# Patient Record
Sex: FEMALE | Race: BLACK OR AFRICAN AMERICAN | NOT HISPANIC OR LATINO | Employment: OTHER | URBAN - METROPOLITAN AREA
[De-identification: names, ages, dates, MRNs, and addresses within clinical notes are randomized per-mention and may not be internally consistent; named-entity substitution may affect disease eponyms.]

---

## 2018-09-25 DIAGNOSIS — N63.11 BREAST LUMP ON RIGHT SIDE AT 10 O'CLOCK POSITION: Primary | ICD-10-CM

## 2018-09-28 ENCOUNTER — HOSPITAL ENCOUNTER (OUTPATIENT)
Dept: RADIOLOGY | Facility: HOSPITAL | Age: 68
Discharge: HOME | End: 2018-09-28
Attending: NURSE PRACTITIONER
Payer: MEDICARE

## 2018-09-28 DIAGNOSIS — N63.11 BREAST LUMP ON RIGHT SIDE AT 10 O'CLOCK POSITION: ICD-10-CM

## 2018-09-28 PROCEDURE — 77062 BREAST TOMOSYNTHESIS BI: CPT | Mod: 26,,, | Performed by: RADIOLOGY

## 2018-09-28 PROCEDURE — 77066 DX MAMMO INCL CAD BI: CPT | Mod: 26,,, | Performed by: RADIOLOGY

## 2018-09-28 PROCEDURE — 76642 ULTRASOUND BREAST LIMITED: CPT | Mod: 26,50,, | Performed by: RADIOLOGY

## 2018-09-28 PROCEDURE — 77066 DX MAMMO INCL CAD BI: CPT | Mod: TC,PO

## 2018-09-28 PROCEDURE — 76642 ULTRASOUND BREAST LIMITED: CPT | Mod: TC,50,PO

## 2018-10-11 ENCOUNTER — HOSPITAL ENCOUNTER (OUTPATIENT)
Dept: RADIOLOGY | Facility: HOSPITAL | Age: 68
Discharge: HOME | End: 2018-10-11
Attending: NURSE PRACTITIONER
Payer: MEDICARE

## 2018-10-11 DIAGNOSIS — R92.8 ABNORMAL MAMMOGRAM: ICD-10-CM

## 2018-10-11 PROCEDURE — 88360 TUMOR IMMUNOHISTOCHEM/MANUAL: CPT | Mod: 26,,, | Performed by: PATHOLOGY

## 2018-10-11 PROCEDURE — 19084 BX BREAST ADD LESION US IMAG: CPT | Mod: RT,,, | Performed by: RADIOLOGY

## 2018-10-11 PROCEDURE — 19083 BX BREAST 1ST LESION US IMAG: CPT | Mod: RT,,, | Performed by: RADIOLOGY

## 2018-10-11 PROCEDURE — 77065 DX MAMMO INCL CAD UNI: CPT | Mod: 26,RT,, | Performed by: RADIOLOGY

## 2018-10-11 PROCEDURE — 27201068 US BREAST BIOPSY WITH IMAGING 1ST SITE RIGHT: Mod: PO

## 2018-10-11 PROCEDURE — 88342 IMHCHEM/IMCYTCHM 1ST ANTB: CPT | Mod: 26,59,, | Performed by: PATHOLOGY

## 2018-10-11 PROCEDURE — 88305 TISSUE EXAM BY PATHOLOGIST: CPT | Mod: 26,,, | Performed by: PATHOLOGY

## 2018-10-11 PROCEDURE — 77065 DX MAMMO INCL CAD UNI: CPT | Mod: TC,PO,RT

## 2018-10-11 PROCEDURE — 19084 BX BREAST ADD LESION US IMAG: CPT | Mod: PO,RT

## 2018-10-11 PROCEDURE — 88360 TUMOR IMMUNOHISTOCHEM/MANUAL: CPT | Mod: 59 | Performed by: PATHOLOGY

## 2018-10-15 ENCOUNTER — TELEPHONE (OUTPATIENT)
Dept: SURGERY | Facility: CLINIC | Age: 68
End: 2018-10-15

## 2018-10-15 NOTE — TELEPHONE ENCOUNTER
Called patient to go over biopsy results. Explained to patient that biopsy showed invasive mammary carcinoma. We discussed what this means, and that the next step is to meet with a surgeon to discuss treatment options. Patient would like a female surgeon as soon as possible. Scheduled with DR. Frederick for Thursday afternoon. Reviewed the location of the breast center, patient verbalized understanding of all information

## 2018-10-19 ENCOUNTER — OFFICE VISIT (OUTPATIENT)
Dept: SURGERY | Facility: CLINIC | Age: 68
End: 2018-10-19
Payer: MEDICARE

## 2018-10-19 ENCOUNTER — DOCUMENTATION ONLY (OUTPATIENT)
Dept: SURGERY | Facility: CLINIC | Age: 68
End: 2018-10-19

## 2018-10-19 VITALS
HEART RATE: 88 BPM | TEMPERATURE: 98 F | HEIGHT: 67 IN | DIASTOLIC BLOOD PRESSURE: 67 MMHG | BODY MASS INDEX: 23.18 KG/M2 | SYSTOLIC BLOOD PRESSURE: 147 MMHG | WEIGHT: 147.69 LBS

## 2018-10-19 DIAGNOSIS — C50.411 MALIGNANT NEOPLASM OF UPPER-OUTER QUADRANT OF RIGHT BREAST IN FEMALE, ESTROGEN RECEPTOR POSITIVE: Primary | ICD-10-CM

## 2018-10-19 DIAGNOSIS — Z01.818 PRE-OP TESTING: ICD-10-CM

## 2018-10-19 DIAGNOSIS — Z17.0 MALIGNANT NEOPLASM OF UPPER-OUTER QUADRANT OF RIGHT BREAST IN FEMALE, ESTROGEN RECEPTOR POSITIVE: Primary | ICD-10-CM

## 2018-10-19 DIAGNOSIS — E04.9 THYROID GOITER: ICD-10-CM

## 2018-10-19 PROCEDURE — 99205 OFFICE O/P NEW HI 60 MIN: CPT | Mod: S$PBB,,, | Performed by: SURGERY

## 2018-10-19 PROCEDURE — 99215 OFFICE O/P EST HI 40 MIN: CPT | Mod: PBBFAC,PO | Performed by: SURGERY

## 2018-10-19 PROCEDURE — 99999 PR PBB SHADOW E&M-EST. PATIENT-LVL V: CPT | Mod: PBBFAC,,, | Performed by: SURGERY

## 2018-10-19 PROCEDURE — 1101F PT FALLS ASSESS-DOCD LE1/YR: CPT | Mod: CPTII,,, | Performed by: SURGERY

## 2018-10-19 RX ORDER — BLOOD SUGAR DIAGNOSTIC
STRIP MISCELLANEOUS
COMMUNITY
Start: 2018-09-05 | End: 2022-04-28

## 2018-10-19 RX ORDER — IBUPROFEN 200 MG
TABLET ORAL
Refills: 0 | COMMUNITY
Start: 2018-08-03 | End: 2023-04-10

## 2018-10-19 RX ORDER — METFORMIN HYDROCHLORIDE 500 MG/1
TABLET ORAL
COMMUNITY
Start: 2018-09-20 | End: 2020-04-20 | Stop reason: SDUPTHER

## 2018-10-19 RX ORDER — LEVOTHYROXINE SODIUM 75 UG/1
75 TABLET ORAL
COMMUNITY
Start: 2018-09-20 | End: 2019-05-23

## 2018-10-19 RX ORDER — LOSARTAN POTASSIUM 50 MG/1
50 TABLET ORAL DAILY
COMMUNITY
Start: 2018-09-20 | End: 2019-05-22 | Stop reason: SDUPTHER

## 2018-10-19 NOTE — PROGRESS NOTES
Breast Surgery  [facility]  Department of Surgery      REFERRING PROVIDER:  Nila Freitas NP  [address] [place] [address]    Chief Complaint: Consult (New Patient New Cancer Right Breast )      Subjective:      Patient ID: Alia Andre is a 68 y.o. female who presents with newly diagnosed RIGHT breast cancer. She reports that she felt the mass on SBE approx 1 month ago. Bilateral diagnostic mammogram and ultrasound (18) was BI-RADS 4 showing 19 x 14 x 14 mm irregularly shaped, hypoechoic mass with indistinct margins seen in the RIGHT breast at [id number], a lymph node in the RIGHT axilla with thickened cortex of 3 mm, and a LEFT breast 9 mm oval simple cyst at [id number]. An ultrasound guided biopsy was performed on 10/11/18 with pathology revealing grade 3 ER+ (10-20%, weak) SD- Her2- [id number]% invasive mammary carcinoma with medullary features of the RIGHT breast with CNBx of lymph node showing no evidence of carcinoma.    Patient does routinely do self breast exams. Patient has noted a change on breast exam. Patient denies nipple discharge. Patient denies previous breast biopsy (prior to current events). Patient denies a personal history of breast cancer (prior to current events).    Findings at that time were the following:   Tumor size: 1.9 cm (on imaging)   Tumor thgthrthathdtheth:th th4th Estrogen Receptor: + (10-20%, weak)  Progesterone Receptor: -  Her-2 cristhian: -   Lymph node status: Clinically negative   Lymphatic invasion: N/A       Risk Factors/Breast History  Age of Menarche: 20  Menstrual History: Regular, monthly, no menorrhagia  Age of Menopause: Uncertain (hot flashes at 29, cycles until late 30s)  History of Hormonal Therapy: No OCPs or HRT  Number of Pregnancies: 2 ()  Age of First Birth: 24  Lactational History: No  Mammogram History: One at approx age 42 prior to this occurrence  History of Chest Radiation: No  History of Breast Bx: Yes (current events only)  History  "of Breast Ca: Yes (current events only)  Family History of Breast Ca: Paternal first cousin (age late 50s), maternal first cousin (age late 50s), paternal aunt (age early 50s), possible sister (age 60s) - states "they didn't do any surgery; they're just watching it every year"  Family History of Ovarian Ca: No      Current everyday smoker. 1 PPD x 40 years.  Had BTL. No hysterectomy or oophorectomy.    Past Medical History:   Diagnosis Date    Diabetes mellitus type 2 in nonobese     Goiter     Hypertension     Tobacco abuse       No past surgical history on file.     Current Outpatient Medications on File Prior to Visit   Medication Sig Dispense Refill    ACCU-CHEK USHA PLUS TEST STRP Strp       levothyroxine (SYNTHROID) 75 MCG tablet       losartan (COZAAR) 50 MG tablet       metFORMIN (GLUCOPHAGE) 500 MG tablet       nicotine (NICODERM CQ) 21 mg/24 hr APPLY 1 PATCH TRANSDERMALLY EVERY 24 HOURS  0     No current facility-administered medications on file prior to visit.      Social History     Socioeconomic History    Marital status:      Spouse name: Not on file    Number of children: Not on file    Years of education: Not on file    Highest education level: Not on file   Social Needs    Financial resource strain: Not on file    Food insecurity - worry: Not on file    Food insecurity - inability: Not on file    Transportation needs - medical: Not on file    Transportation needs - non-medical: Not on file   Occupational History    Not on file   Tobacco Use    Smoking status: Not on file   Substance and Sexual Activity    Alcohol use: Not on file    Drug use: Not on file    Sexual activity: Not on file   Other Topics Concern    Not on file   Social History Narrative    Not on file     No family history on file.     Review of Systems   Constitutional: Negative for activity change, chills, fatigue and fever.   HENT: Negative for congestion, rhinorrhea, sore throat, trouble swallowing " "and voice change.    Eyes: Negative for visual disturbance.   Respiratory: Negative for cough, shortness of breath and wheezing.    Cardiovascular: Negative for chest pain, palpitations and leg swelling.   Gastrointestinal: Negative for abdominal distention, abdominal pain, constipation, diarrhea, nausea and vomiting.   Genitourinary: Negative for dysuria, frequency and hematuria.   Musculoskeletal: Negative for arthralgias and myalgias.   Skin: Negative for color change, rash and wound.   Neurological: Negative for syncope, weakness and numbness.   Hematological: Does not bruise/bleed easily.   Psychiatric/Behavioral: Negative for behavioral problems and confusion.        Objective:   BP (!) 147/67 (BP Location: Left arm, Patient Position: Sitting, BP Method: Medium (Automatic))   Pulse 88   Temp 98.2 °F (36.8 °C) (Oral)   Ht 5' 7" (1.702 m)   Wt 67 kg (147 lb 11.2 oz)   BMI 23.13 kg/m²     Physical Exam   Nursing note and vitals reviewed.  Constitutional: She is oriented to person, place, and time. She appears well-developed and well-nourished. No distress.   HENT:   Head: Normocephalic and atraumatic.   Eyes: Conjunctivae and EOM are normal.   Neck: Neck supple. Thyromegaly (Large symmetric goiter) present.   Cardiovascular: Normal rate, regular rhythm and intact distal pulses.    Pulmonary/Chest: Effort normal. No respiratory distress. She has no wheezes. Right breast exhibits mass. Right breast exhibits no inverted nipple, no nipple discharge, no skin change and no tenderness. Left breast exhibits no inverted nipple, no mass, no nipple discharge, no skin change and no tenderness. Breasts are symmetrical. There is no breast swelling.       Abdominal: Soft. She exhibits no distension. There is no tenderness.   Genitourinary: No breast bleeding.   Musculoskeletal: Normal range of motion. She exhibits no edema or deformity.   Neurological: She is alert and oriented to person, place, and time.   Skin: Skin is " warm and dry. No rash noted. She is not diaphoretic. No erythema.     Psychiatric: She has a normal mood and affect. Her behavior is normal.       Radiology review: Images personally reviewed by me in the clinic.     Bilateral Diagnostic Mammogram (9/28/18): BI-RADS 4. The breasts are almost entirely fatty. There is a low density, oval mass with circumscribed margins seen in the upper outer quadrant of the LEFT breast in the posterior depth. There is a high density, irregularly shaped mass seen in the upper outer quadrant of the RIGHT breast in the middle depth. The mass correlates with a palpable mass reported by the patient.    US Breast Bilateral Limited (9/28/18): BI-RADS 4. There is a 9 mm oval simple cyst seen in the LEFT breast at 1 o'clock, 6 cm from the nipple. There is a lymph node seen in the right axilla with thickened cortex of 3 mm. There is a 19 mm x 14 mm x 14 mm irregularly shaped, hypoechoic mass with indistinct margins seen in the right breast at 11 o'clock, 6 cm from the nipple. The mass correlates with a palpable mass reported by the patient and mammographic finding.       Assessment:       1. Malignant neoplasm of upper-outer quadrant of right breast in female, estrogen receptor positive        Plan:     Options for management were discussed with the patient and her family. We reviewed the existing data noting the equivalency of breast conserving surgery with radiation therapy and mastectomy. We also reviewed the guidelines of the National Comprehensive Cancer Network for Stage 1 breast carcinoma. We discussed the need for lumpectomy margins to be negative for carcinoma, the necessity for postoperative radiation therapy after breast conservation in most cases, the possibility of a failed or false negative sentinel lymph node biopsy and the potential need for complete lymphadenectomy for a failed or positive sentinel lymph node biopsy were fully discussed. In the setting of mastectomy, delayed  or immediate reconstruction options are available and were discussed.     In the setting of lumpectomy, radiation therapy would be recommended majority of the time. The duration and treatment side effects were discussed with the patient. This will coordinated with the radiation oncologist pending final pathology.    We also discussed the role of systemic therapy in the treatment of early stage breast cancer. We discussed that this is based on tumor biology and lina status and will be determined based on final pathology. We discussed that if the cancer is hormone positive, endocrine therapy would be recommended in most cases and its use can reduce the risk of recurrence as well as improve survival. Side effects of treatment were briefly discussed. We also discussed the potential role for chemotherapy based on a number of factors such as tumor phenotype (ER+ vs. triple negative vs. Qzx9div+) and this would be determined in coordination with the medical oncologist.    The patient, in consultation with her family, has elected to proceed with right total mastectomy and sentinel lymph node biopsy without reconstruction. The operative risks of bleeding, infection, recurrence, scarring, and anesthetic complications and the possibility of requiring further surgery were all noted.  We will have her see Dr. Cameron to discuss her thyroid goiter and Port-A-Cath placement.  Will do genetic testing as well  Patient will also see medical oncology preop, though I do think upfront surgery is the best approach.    Surgery scheduled. Follow-up in clinic roughly 10 days after surgery.     Patient was educated on breast cancer, receptors, wire localization lumpectomy, mastectomy, sentinel lymph node mapping and biopsy, axillary lymph node dissection, reconstruction, breast prosthesis with post-mastectomy bra and radiation therapy. Patient was given patient information binder [facility]. All her  questions were answered.    Total time spent with the patient: 60 minutes. 45 minutes of face to face consultation and 15 minutes of chart review and coordination of care.

## 2018-10-19 NOTE — LETTER
October 19, 2018      Nila Freitas, TOMÁS  [address]  [facility] [address]           [facility]  [address]  [facility] [address]  Phone: [phone]  Fax: [phone]          Patient: Alia Andre   MR Number: [id number]   YOB: 1950   Date of Visit: 10/19/2018       Dear Nila Freitas:    Thank you for referring Alia Andre to me for evaluation. Attached you will find relevant portions of my assessment and plan of care.    If you have questions, please do not hesitate to call me. I look forward to following Alia Andre along with you.    Sincerely,    Cristiane Frederick MD    Enclosure  CC:  No Recipients    If you would like to receive this communication electronically, please contact [email or web address] or [phone] to request more information on [facility] Link access.    For providers and/or their staff who would like to refer a patient to [facility], please contact us through our one-stop-shop provider referral line, [facility], at [phone].    If you feel you have received this communication in error or would no longer like to receive these types of communications, please e-mail [email or web address]

## 2018-10-19 NOTE — PROGRESS NOTES
Nurse Navigator Note:     Met with patient during her consult with Dr. Frederick. Patient and I reviewed the information she discussed with Dr. Frederick, including treatment options, diagnosis, and future plans for workup. Patient and I went through the new patient binder, explained some of the information and why it is provided.     Also offered patient consults with our other specialty clinics: Dr. Reeves for gynecological health during treatment, Carolyn Davis for physical therapy evaluation, Dr. Golden for psychological support, and Marian Pollard for nutritional counseling. Explained to patient that all of these support services are completely optional. Discussed that physical therapy is the only service that is recommended pre-op specifically, everything else can be requested at a later time. Patient was given a copy of Dr. Frederick's card and my card. Encouraged her to call me if she has any questions or concerns or would like to schedule any additional appointments. Verbalized understanding of all information.

## 2018-10-19 NOTE — H&P (VIEW-ONLY)
Breast Surgery  [facility]  Department of Surgery      REFERRING PROVIDER:  Nila Freitas NP  [address] [place] [address]    Chief Complaint: Consult (New Patient New Cancer Right Breast )      Subjective:      Patient ID: Alia Andre is a 68 y.o. female who presents with newly diagnosed RIGHT breast cancer. She reports that she felt the mass on SBE approx 1 month ago. Bilateral diagnostic mammogram and ultrasound (18) was BI-RADS 4 showing 19 x 14 x 14 mm irregularly shaped, hypoechoic mass with indistinct margins seen in the RIGHT breast at [id number], a lymph node in the RIGHT axilla with thickened cortex of 3 mm, and a LEFT breast 9 mm oval simple cyst at 9MR7CNS. An ultrasound guided biopsy was performed on 10/11/18 with pathology revealing grade 3 ER+ (10-20%, weak) NH- Her2- [id number]% invasive mammary carcinoma with medullary features of the RIGHT breast with CNBx of lymph node showing no evidence of carcinoma.    Patient does routinely do self breast exams. Patient has noted a change on breast exam. Patient denies nipple discharge. Patient denies previous breast biopsy (prior to current events). Patient denies a personal history of breast cancer (prior to current events).    Findings at that time were the following:   Tumor size: 1.9 cm (on imaging)   Tumor thgthrthathdtheth:th th4th Estrogen Receptor: + (10-20%, weak)  Progesterone Receptor: -  Her-2 cristhian: -   Lymph node status: Clinically negative   Lymphatic invasion: N/A       Risk Factors/Breast History  Age of Menarche: 20  Menstrual History: Regular, monthly, no menorrhagia  Age of Menopause: Uncertain (hot flashes at 29, cycles until late 30s)  History of Hormonal Therapy: No OCPs or HRT  Number of Pregnancies: 2 ()  Age of First Birth: 24  Lactational History: No  Mammogram History: One at approx age 42 prior to this occurrence  History of Chest Radiation: No  History of Breast Bx: Yes (current events only)  History  "of Breast Ca: Yes (current events only)  Family History of Breast Ca: Paternal first cousin (age late 50s), maternal first cousin (age late 50s), paternal aunt (age early 50s), possible sister (age 60s) - states "they didn't do any surgery; they're just watching it every year"  Family History of Ovarian Ca: No      Current everyday smoker. 1 PPD x 40 years.  Had BTL. No hysterectomy or oophorectomy.    Past Medical History:   Diagnosis Date    Diabetes mellitus type 2 in nonobese     Goiter     Hypertension     Tobacco abuse       No past surgical history on file.     Current Outpatient Medications on File Prior to Visit   Medication Sig Dispense Refill    ACCU-CHEK USHA PLUS TEST STRP Strp       levothyroxine (SYNTHROID) 75 MCG tablet       losartan (COZAAR) 50 MG tablet       metFORMIN (GLUCOPHAGE) 500 MG tablet       nicotine (NICODERM CQ) 21 mg/24 hr APPLY 1 PATCH TRANSDERMALLY EVERY 24 HOURS  0     No current facility-administered medications on file prior to visit.      Social History     Socioeconomic History    Marital status:      Spouse name: Not on file    Number of children: Not on file    Years of education: Not on file    Highest education level: Not on file   Social Needs    Financial resource strain: Not on file    Food insecurity - worry: Not on file    Food insecurity - inability: Not on file    Transportation needs - medical: Not on file    Transportation needs - non-medical: Not on file   Occupational History    Not on file   Tobacco Use    Smoking status: Not on file   Substance and Sexual Activity    Alcohol use: Not on file    Drug use: Not on file    Sexual activity: Not on file   Other Topics Concern    Not on file   Social History Narrative    Not on file     No family history on file.     Review of Systems   Constitutional: Negative for activity change, chills, fatigue and fever.   HENT: Negative for congestion, rhinorrhea, sore throat, trouble swallowing " "and voice change.    Eyes: Negative for visual disturbance.   Respiratory: Negative for cough, shortness of breath and wheezing.    Cardiovascular: Negative for chest pain, palpitations and leg swelling.   Gastrointestinal: Negative for abdominal distention, abdominal pain, constipation, diarrhea, nausea and vomiting.   Genitourinary: Negative for dysuria, frequency and hematuria.   Musculoskeletal: Negative for arthralgias and myalgias.   Skin: Negative for color change, rash and wound.   Neurological: Negative for syncope, weakness and numbness.   Hematological: Does not bruise/bleed easily.   Psychiatric/Behavioral: Negative for behavioral problems and confusion.        Objective:   BP (!) 147/67 (BP Location: Left arm, Patient Position: Sitting, BP Method: Medium (Automatic))   Pulse 88   Temp 98.2 °F (36.8 °C) (Oral)   Ht 5' 7" (1.702 m)   Wt 67 kg (147 lb 11.2 oz)   BMI 23.13 kg/m²     Physical Exam   Nursing note and vitals reviewed.  Constitutional: She is oriented to person, place, and time. She appears well-developed and well-nourished. No distress.   HENT:   Head: Normocephalic and atraumatic.   Eyes: Conjunctivae and EOM are normal.   Neck: Neck supple. Thyromegaly (Large symmetric goiter) present.   Cardiovascular: Normal rate, regular rhythm and intact distal pulses.    Pulmonary/Chest: Effort normal. No respiratory distress. She has no wheezes. Right breast exhibits mass. Right breast exhibits no inverted nipple, no nipple discharge, no skin change and no tenderness. Left breast exhibits no inverted nipple, no mass, no nipple discharge, no skin change and no tenderness. Breasts are symmetrical. There is no breast swelling.       Abdominal: Soft. She exhibits no distension. There is no tenderness.   Genitourinary: No breast bleeding.   Musculoskeletal: Normal range of motion. She exhibits no edema or deformity.   Neurological: She is alert and oriented to person, place, and time.   Skin: Skin is " warm and dry. No rash noted. She is not diaphoretic. No erythema.     Psychiatric: She has a normal mood and affect. Her behavior is normal.       Radiology review: Images personally reviewed by me in the clinic.     Bilateral Diagnostic Mammogram (9/28/18): BI-RADS 4. The breasts are almost entirely fatty. There is a low density, oval mass with circumscribed margins seen in the upper outer quadrant of the LEFT breast in the posterior depth. There is a high density, irregularly shaped mass seen in the upper outer quadrant of the RIGHT breast in the middle depth. The mass correlates with a palpable mass reported by the patient.    US Breast Bilateral Limited (9/28/18): BI-RADS 4. There is a 9 mm oval simple cyst seen in the LEFT breast at 1 o'clock, 6 cm from the nipple. There is a lymph node seen in the right axilla with thickened cortex of 3 mm. There is a 19 mm x 14 mm x 14 mm irregularly shaped, hypoechoic mass with indistinct margins seen in the right breast at 11 o'clock, 6 cm from the nipple. The mass correlates with a palpable mass reported by the patient and mammographic finding.       Assessment:       1. Malignant neoplasm of upper-outer quadrant of right breast in female, estrogen receptor positive        Plan:     Options for management were discussed with the patient and her family. We reviewed the existing data noting the equivalency of breast conserving surgery with radiation therapy and mastectomy. We also reviewed the guidelines of the National Comprehensive Cancer Network for Stage 1 breast carcinoma. We discussed the need for lumpectomy margins to be negative for carcinoma, the necessity for postoperative radiation therapy after breast conservation in most cases, the possibility of a failed or false negative sentinel lymph node biopsy and the potential need for complete lymphadenectomy for a failed or positive sentinel lymph node biopsy were fully discussed. In the setting of mastectomy, delayed  or immediate reconstruction options are available and were discussed.     In the setting of lumpectomy, radiation therapy would be recommended majority of the time. The duration and treatment side effects were discussed with the patient. This will coordinated with the radiation oncologist pending final pathology.    We also discussed the role of systemic therapy in the treatment of early stage breast cancer. We discussed that this is based on tumor biology and lina status and will be determined based on final pathology. We discussed that if the cancer is hormone positive, endocrine therapy would be recommended in most cases and its use can reduce the risk of recurrence as well as improve survival. Side effects of treatment were briefly discussed. We also discussed the potential role for chemotherapy based on a number of factors such as tumor phenotype (ER+ vs. triple negative vs. Sey1ilh+) and this would be determined in coordination with the medical oncologist.    The patient, in consultation with her family, has elected to proceed with right total mastectomy and sentinel lymph node biopsy without reconstruction. The operative risks of bleeding, infection, recurrence, scarring, and anesthetic complications and the possibility of requiring further surgery were all noted.  We will have her see Dr. Cameron to discuss her thyroid goiter and Port-A-Cath placement.  Will do genetic testing as well  Patient will also see medical oncology preop, though I do think upfront surgery is the best approach.    Surgery scheduled. Follow-up in clinic roughly 10 days after surgery.     Patient was educated on breast cancer, receptors, wire localization lumpectomy, mastectomy, sentinel lymph node mapping and biopsy, axillary lymph node dissection, reconstruction, breast prosthesis with post-mastectomy bra and radiation therapy. Patient was given patient information binder [facility]. All her  questions were answered.    Total time spent with the patient: 60 minutes. 45 minutes of face to face consultation and 15 minutes of chart review and coordination of care.

## 2018-10-22 ENCOUNTER — TELEPHONE (OUTPATIENT)
Dept: PREADMISSION TESTING | Facility: HOSPITAL | Age: 68
End: 2018-10-22

## 2018-10-22 DIAGNOSIS — Z17.0 MALIGNANT NEOPLASM OF RIGHT BREAST IN FEMALE, ESTROGEN RECEPTOR POSITIVE, UNSPECIFIED SITE OF BREAST: Primary | ICD-10-CM

## 2018-10-22 DIAGNOSIS — Z91.89 AT RISK FOR LYMPHEDEMA: ICD-10-CM

## 2018-10-22 DIAGNOSIS — C50.911 MALIGNANT NEOPLASM OF RIGHT BREAST IN FEMALE, ESTROGEN RECEPTOR POSITIVE, UNSPECIFIED SITE OF BREAST: Primary | ICD-10-CM

## 2018-10-22 NOTE — TELEPHONE ENCOUNTER
----- Message from Maryam Rubio RN sent at 10/19/2018  4:04 PM CDT -----  Regarding: Pre-Op appointments   Good afternoon,  Ms Andre needs a pre-op appointment and a pre-op with anesthesia.  Patient has thyroid goiter.  Scheduled for mastectomy surgery 11-16-18.  Please call patient to schedule.  Thank you.    Maryam Rubio RN

## 2018-10-23 ENCOUNTER — TELEPHONE (OUTPATIENT)
Dept: SURGERY | Facility: CLINIC | Age: 68
End: 2018-10-23

## 2018-10-23 NOTE — PROGRESS NOTES
CC:  Breast cancer    HPI:  Ms. Andre is a 67 yo woman with HTN, T2DM, goiter who presents today for further evaluation of newly diagnosed right breast cancer. She self palpated a mass one month ago. Mammogram/US on 18 showed a 19 x 14 x 14 mm lesion in the right breast at 11 oclock. One LN in the right axilla with thickened cortex of 3 mm. a left breast 9 mm cyst at 10 oclock. Biopsy on 10/11/18 showed grade 3 invasive mammary carcinoma with medullary features of the right breast, ER +10-20% weak, WI negative, HER2 negative, Ki 67 40%. Biopsy of axillary LN negative for carcinoma. She presents to discuss further management. Currently feels well. Genetic study pending.     GYN:  Age of menarche 20, had cycles until late 30s. Postmenopausal. , no OCP or HRT.       ECO    Past Medical History:   Diagnosis Date    Diabetes mellitus type 2 in nonobese     Goiter     Hypertension     Tobacco abuse         PSH: No surgery    Family History   Problem Relation Age of Onset    Breast cancer Sister     Breast cancer Paternal Aunt     Breast cancer Paternal Cousin     Breast cancer Maternal Cousin        Social History     Socioeconomic History    Marital status:      Spouse name: Not on file    Number of children: Not on file    Years of education: Not on file    Highest education level: Not on file   Social Needs    Financial resource strain: Not on file    Food insecurity - worry: Not on file    Food insecurity - inability: Not on file    Transportation needs - medical: Not on file    Transportation needs - non-medical: Not on file   Occupational History    Not on file   Tobacco Use    Smoking status: Current Every Day Smoker     Packs/day: 1.00     Types: Cigarettes   Substance and Sexual Activity    Alcohol use: No     Frequency: Never    Drug use: Not on file    Sexual activity: Not on file   Other Topics Concern    Not on file   Social History Narrative    Not on file        Review of Systems   Constitutional: Negative for appetite change, chills, fatigue, fever and unexpected weight change.   HENT: Negative for mouth sores, nosebleeds, tinnitus, trouble swallowing and voice change.    Eyes: Negative for pain, redness and visual disturbance.   Respiratory: Negative for cough, shortness of breath and wheezing.    Cardiovascular: Negative for chest pain, palpitations and leg swelling.   Gastrointestinal: Negative for abdominal distention, abdominal pain, blood in stool, constipation, diarrhea, nausea and vomiting.   Endocrine: Negative for polydipsia, polyphagia and polyuria.   Genitourinary: Negative for flank pain, frequency and hematuria.   Musculoskeletal: Negative for arthralgias, back pain, gait problem, joint swelling, myalgias, neck pain and neck stiffness.   Skin: Negative for color change, pallor, rash and wound.   Neurological: Negative for tremors, seizures, syncope, speech difficulty, weakness, light-headedness, numbness and headaches.   Hematological: Negative for adenopathy. Does not bruise/bleed easily.   Psychiatric/Behavioral: Negative for confusion, dysphoric mood and self-injury. The patient is nervous/anxious.    All other systems reviewed and are negative.      Objective:  Physical Exam   Constitutional: She is oriented to person, place, and time. She appears well-developed and well-nourished. No distress.   HENT:   Head: Normocephalic and atraumatic.   Mouth/Throat: Oropharynx is clear and moist. No oropharyngeal exudate.   Eyes: Conjunctivae and EOM are normal. Pupils are equal, round, and reactive to light. No scleral icterus.   Neck: Normal range of motion. Neck supple. No JVD present. Thyromegaly present.   Cardiovascular: Normal rate, regular rhythm, normal heart sounds and intact distal pulses. Exam reveals no gallop and no friction rub.   No murmur heard.  Pulmonary/Chest: Effort normal and breath sounds normal. No respiratory distress. She has no  wheezes. She has no rales.       One 3.5 x 4.5 cm mass   Abdominal: Soft. Bowel sounds are normal. She exhibits no distension and no mass. There is no hepatosplenomegaly. There is no tenderness. There is no rebound. No hernia.   Musculoskeletal: Normal range of motion. She exhibits no edema, tenderness or deformity.   Lymphadenopathy:     She has no cervical adenopathy.        Right: No supraclavicular adenopathy present.        Left: No supraclavicular adenopathy present.   Neurological: She is alert and oriented to person, place, and time. No cranial nerve deficit. She exhibits normal muscle tone.   Skin: Skin is warm and dry. No rash noted. She is not diaphoretic. No erythema. No pallor.   Psychiatric: She has a normal mood and affect. Her behavior is normal. Judgment and thought content normal.   Vitals reviewed.      Labs:  pending    Imaging Data:  Mammogram 9/28/18:  This procedure was performed using tomosynthesis.  Computer-aided detection was utilized in the interpretation of this examination.  The breasts are almost entirely fatty.      Left  Mammo Digital Diagnostic Bilat with Tomosynthesis_CAD  There is a low density, oval mass with circumscribed margins seen in the upper outer quadrant of the left breast in the posterior depth.      US Breast Bilateral Limited  There is a 9 mm oval simple cyst seen in the left breast at 1 o'clock, 6 cm from the nipple.      Right  Mammo Digital Diagnostic Bilat with Tomosynthesis_CAD  There is a high density, irregularly shaped mass seen in the upper outer quadrant of the right breast in the middle depth. The mass correlates with a palpable mass reported by the patient.      US Breast Bilateral Limited  There is a lymph node seen in the right axilla with thickened cortex of 3 mm.      There is a 19 mm x 14 mm x 14 mm irregularly shaped, hypoechoic mass with indistinct margins seen in the right breast at 11 o'clock, 6 cm from the nipple. The mass correlates with a  palpable mass reported by the patient and mammographic finding.     Assessment and plan:    1. Invasive ductal carcinoma of breast, female, right    2. Infiltrating ductal carcinoma of right female breast    3. Essential hypertension    4. Type 2 diabetes mellitus without complication, without long-term current use of insulin    5. Menopause    6. Vitamin D deficiency        1-2  - Ms. Andre is a 69 yo postmenopausal woman with newly diagnosed clinical stage I ([id number]) invasive ductal carcinoma of the right breast, grade 3, ER weakly positive, FL negative, HER2 negative  - Long discussion re the diagnosis and treatment strategy. I recommend upfront surgery followed by adjuvant chemotherapy, +/- radiation, and adjuvant endocrine therapy. This will give her the opportunity of receiving a non-anthracycline based chemotherapy if she has pathologic lymph node negative disease.   - She is scheduled for surgery on 10/16. I will see her 3 weeks after surgery to discuss pathology result and chemo regimen    3.   - BP good  - c/w current meds    4  - BS good  - c/w metformin    5.6  - check vit D on return.     Distress Screening Results: Psychosocial Distress screening score of Distress Score: 0 noted and reviewed. No intervention indicated.

## 2018-10-24 ENCOUNTER — OFFICE VISIT (OUTPATIENT)
Dept: HEMATOLOGY/ONCOLOGY | Facility: CLINIC | Age: 68
End: 2018-10-24
Payer: MEDICARE

## 2018-10-24 ENCOUNTER — TELEPHONE (OUTPATIENT)
Dept: CARDIOTHORACIC SURGERY | Facility: CLINIC | Age: 68
End: 2018-10-24

## 2018-10-24 VITALS
DIASTOLIC BLOOD PRESSURE: 63 MMHG | TEMPERATURE: 98 F | SYSTOLIC BLOOD PRESSURE: 135 MMHG | HEIGHT: 66 IN | OXYGEN SATURATION: 99 % | HEART RATE: 84 BPM | RESPIRATION RATE: 16 BRPM | BODY MASS INDEX: 24.27 KG/M2 | WEIGHT: 151 LBS

## 2018-10-24 DIAGNOSIS — I10 ESSENTIAL HYPERTENSION: ICD-10-CM

## 2018-10-24 DIAGNOSIS — C50.911 INFILTRATING DUCTAL CARCINOMA OF RIGHT FEMALE BREAST: ICD-10-CM

## 2018-10-24 DIAGNOSIS — E55.9 VITAMIN D DEFICIENCY: ICD-10-CM

## 2018-10-24 DIAGNOSIS — C50.911 INVASIVE DUCTAL CARCINOMA OF BREAST, FEMALE, RIGHT: Primary | ICD-10-CM

## 2018-10-24 DIAGNOSIS — Z78.0 MENOPAUSE: ICD-10-CM

## 2018-10-24 DIAGNOSIS — E11.9 TYPE 2 DIABETES MELLITUS WITHOUT COMPLICATION, WITHOUT LONG-TERM CURRENT USE OF INSULIN: ICD-10-CM

## 2018-10-24 PROCEDURE — 99204 OFFICE O/P NEW MOD 45 MIN: CPT | Mod: S$PBB,,, | Performed by: INTERNAL MEDICINE

## 2018-10-24 PROCEDURE — 99999 PR PBB SHADOW E&M-EST. PATIENT-LVL III: CPT | Mod: PBBFAC,,, | Performed by: INTERNAL MEDICINE

## 2018-10-24 PROCEDURE — 1101F PT FALLS ASSESS-DOCD LE1/YR: CPT | Mod: CPTII,,, | Performed by: INTERNAL MEDICINE

## 2018-10-24 PROCEDURE — 99213 OFFICE O/P EST LOW 20 MIN: CPT | Mod: PBBFAC | Performed by: INTERNAL MEDICINE

## 2018-10-24 RX ORDER — MICONAZOLE NITRATE 2 %
CREAM (GRAM) TOPICAL
Refills: 0 | COMMUNITY
Start: 2018-08-06 | End: 2023-04-10

## 2018-10-24 RX ORDER — ISOPROPYL ALCOHOL 0.75 G/1
SWAB TOPICAL
COMMUNITY
Start: 2018-09-05 | End: 2023-10-25 | Stop reason: SDUPTHER

## 2018-10-24 NOTE — TELEPHONE ENCOUNTER
Returned call to pt.  Pt rescheduled to 10:30 with Dr. Cameron on 11-1.  ----- Message from Cassia Barkley sent at 10/24/2018 10:01 AM CDT -----    Reason for call:Later appt time        Communication Preference:[phone]    Additional Information:Pt states she need to speak with nurse to find if there is a later appt time on 11/1/18.

## 2018-10-24 NOTE — LETTER
October 24, 2018      Cristiane Frederick MD  [address] [facility] [address]           [facility] - Hematology Oncology  [address], [address]  [facility] [address]  Phone: [phone]          Patient: Alia Andre   MR Number: [id number]   YOB: 1950   Date of Visit: 10/24/2018       Dear Dr. Cristiane Frederick:    Thank you for referring Alia Andre to me for evaluation. Attached you will find relevant portions of my assessment and plan of care.    If you have questions, please do not hesitate to call me. I look forward to following Alia Andre along with you.    Sincerely,    Alexander Gacres MD    Enclosure  CC:  No Recipients    If you would like to receive this communication electronically, please contact [email or web address] or [phone] to request more information on [facility] access.    For providers and/or their staff who would like to refer a patient to [facility], please contact us through our one-stop-shop provider referral line, [facility], at [phone].    If you feel you have received this communication in error or would no longer like to receive these types of communications, please e-mail [email or web address]

## 2018-10-29 ENCOUNTER — ANESTHESIA EVENT (OUTPATIENT)
Dept: SURGERY | Facility: HOSPITAL | Age: 68
End: 2018-10-29
Payer: MEDICARE

## 2018-11-01 ENCOUNTER — INITIAL CONSULT (OUTPATIENT)
Dept: SURGERY | Facility: CLINIC | Age: 68
End: 2018-11-01
Payer: MEDICARE

## 2018-11-01 VITALS
HEIGHT: 66 IN | TEMPERATURE: 98 F | SYSTOLIC BLOOD PRESSURE: 154 MMHG | BODY MASS INDEX: 24.64 KG/M2 | RESPIRATION RATE: 18 BRPM | HEART RATE: 79 BPM | WEIGHT: 153.31 LBS | DIASTOLIC BLOOD PRESSURE: 73 MMHG

## 2018-11-01 DIAGNOSIS — E04.9 GOITER: Primary | ICD-10-CM

## 2018-11-01 PROCEDURE — 99999 PR PBB SHADOW E&M-EST. PATIENT-LVL IV: CPT | Mod: PBBFAC,,, | Performed by: SURGERY

## 2018-11-01 PROCEDURE — 99214 OFFICE O/P EST MOD 30 MIN: CPT | Mod: S$GLB,,, | Performed by: SURGERY

## 2018-11-01 PROCEDURE — 1101F PT FALLS ASSESS-DOCD LE1/YR: CPT | Mod: CPTII,S$GLB,, | Performed by: SURGERY

## 2018-11-01 NOTE — PROGRESS NOTES
Attending attestation:  I have reviewed the Resident's history and physical, assessment and plan. I agree with the findings.  Any changes were made directly in the note below.     Graciela Echeverria MD  Endocrine Surgery           Consult Note  Endocrine Surgery    Visit Diagnosis: Goiter [E04.9]    SUBJECTIVE:     Alia Andre is a 68 y.o. female seen at the request of Dr. Cristiane Frederick today in the Endocrine Surgery Clinic for evaluation of a goiter. Her history dates back to her early 30's when patient self-identified a thyroid mass and was subsequently treated for hypothyroidism. She states that she has been taking synthroid daily since that time. Over the last thirty years, her goiter has only slowly grown. She states that her last thyroid ultrasound was approximately three years ago, and was not told of any malignancy or abnormality at that time.  Alia Andre complains of difficulty with shortness of breath especially with postural changes, palpable neck mass and growth of thyroid nodule over time. The patient denies hot/cold intolerance, fatigue, unexplained weight changes, difficulty with swallowing and change in voice quality. She does not have a history of head and neck radiation therapy. She does have a family history of thyroid disease, but no thyroid cancers. She does have a family history of endocrinopathies. She is taking thyroid hormone supplementation. She has not undergone radioactive iodine treatment.    Of note, the patient was recently diagnosed with breast cancer (Malignant neoplasm of upper-outer quadrant of right breast in female, estrogen receptor positive) and is in need of a port for adjuvant treatment.     Review of patient's allergies indicates:  No Known Allergies    Current Outpatient Medications   Medication Sig Dispense Refill    ACCU-CHEK USHA PLUS TEST STRP Strp       BD ALCOHOL SWABS PadM       levothyroxine (SYNTHROID) 75 MCG tablet Take 75 mcg by mouth before  "breakfast.       losartan (COZAAR) 50 MG tablet Take 50 mg by mouth once daily.       metFORMIN (GLUCOPHAGE) 500 MG tablet daily with breakfast.       nicotine (NICODERM CQ) 21 mg/24 hr APPLY 1 PATCH TRANSDERMALLY EVERY 24 HOURS  0    nicotine, polacrilex, (NICORETTE) 2 mg Gum CHEW 1 PIECE (2 MG) BY MOUTH 10 TIMES PER DAY AS NEEDED  0     No current facility-administered medications for this visit.        Past Medical History:   Diagnosis Date    Diabetes mellitus type 2 in nonobese     Goiter     Hypertension     Tobacco abuse      Past Surgical History:   Procedure Laterality Date    TUBAL LIGATION      1970/80's     Social History     Tobacco Use    Smoking status: Current Every Day Smoker     Packs/day: 1.00     Types: Cigarettes    Smokeless tobacco: Never Used   Substance Use Topics    Alcohol use: No     Frequency: Never    Drug use: No          Review of Systems:    Review of Systems   Constitutional: negative for chills, fatigue, fevers, malaise and night sweats  Eyes: negative for icterus and irritation  Respiratory: negative for cough and dyspnea on exertion  Cardiovascular: negative for chest pain and palpitations  Gastrointestinal: negative for constipation, diarrhea and dysphagia  Genitourinary:negative for dysuria, hematuria and nocturia  Integument/breast: negative for rash and skin color change  Hematologic/lymphatic: negative for bleeding and easy bruising  Neurological: negative for gait problems, headaches and seizures  Behavioral/Psych: negative for anxiety and depression  Endocrine: negative    ENT ROS: negative  Endocrine ROS:   negative for - hair pattern changes, malaise/lethargy, mood swings, palpitations or polydipsia/polyuria        OBJECTIVE:     Vital Signs:  BP (!) 154/73   Pulse 79   Temp 97.7 °F (36.5 °C)   Resp 18   Ht 5' 6" (1.676 m)   Wt 69.6 kg (153 lb 5.3 oz)   BMI 24.75 kg/m²    Body mass index is 24.75 kg/m².      Physical Exam:    General:  no distress, see " vitals for BMI    Eyes:  conjunctivae/corneas clear   Neck: trachea midline and symmetric, no adenopathy    Thyroid:  thyroid enlarged and nontender. No palpable nodules.   Lung: clear to auscultation bilaterally   Heart:  regular rate and rhythm   Abdomen: soft, non-tender; bowel sounds normal; no masses,  no organomegaly   Skin/Extremities: warm and well-perfused   Pulses: 2+ and symmetric   Neuro: normal without focal findings and mental status, speech normal, alert and oriented x3       Laboratory/Radiologic Studies      Thyroid Uptake and scan(5/11/2015):        Component Value Date    Sodium 139 11/05/2018    Potassium 4.1 11/05/2018    Chloride 105 11/05/2018    CO2 25 11/05/2018    Glucose 88 11/05/2018    BUN, Bld 14 11/05/2018    Creatinine 0.7 11/05/2018    Calcium 10.2 11/05/2018    Anion Gap 9 11/05/2018    eGFR if African American >60.0 11/05/2018    eGFR if non African American >60.0 11/05/2018       ASSESSMENT/PLAN:       Assessment    Ms. Andre is a 68 y.o. female who presents with evidence of Thyroid goiter and evaluation for port placement for Right-sided breast cancer.    Plan    During this visit, thyroid anatomy and physiology were reviewed and she was given a copy of our patient education booklet, Understanding Thyroid Disease. Prior to her surgery, the patient will need to undergo ultrasound evaluation of the thyroid. We will also request records of her last ultrasound (approximately three years ago) and her most recent thyroid lab work completed by her PCP.   While the results of this thyroid imaging and lab work will not interfere with her port placement and right mastectomy, we feel that she would benefit from an updated evaluation. We will review findings and determine if concurrent surgery with thyroidectomy is possible and beneficial.  Patient's questions were answered and she wishes to proceed with the port placement and with the thyroid evaluation.

## 2018-11-01 NOTE — PROGRESS NOTES
Consult Note  Endocrine Surgery    Visit Diagnosis: Goiter [E04.9]    SUBJECTIVE:     Alia Andre is a 68 y.o. female seen at the request of Dr. Cristiane Frederick today in the Endocrine Surgery Clinic for evaluation of a goiter. Her history dates back to several years when patient was noted to have a thyroid goiter. Subsequent evaluation included neck ultrasound. Alia Andre complains of {ENDOSUR THYROID SYMPTOMS:06888}. The patient denies {ENDOSUR THYROID SYMPTOMS:35628}. She {does/does not:19886} have a history of head and neck radiation therapy. She does have a family history of thyroid disease(daughters with goiter and thyroid surgery). No thyroid cancer in the family. She is not taking thyroid hormone supplementation. She has not undergone radioactive iodine treatment.      Of note, the patient was recently diagnosed with breast cancer (Malignant neoplasm of upper-outer quadrant of right breast in female, estrogen receptor positive) and is in need of a port.  Will plan this on the day of her    Review of patient's allergies indicates:  No Known Allergies    Current Outpatient Medications   Medication Sig Dispense Refill    ACCU-CHEK USHA PLUS TEST STRP Strp       BD ALCOHOL SWABS PadM       levothyroxine (SYNTHROID) 75 MCG tablet Take 75 mcg by mouth before breakfast.       losartan (COZAAR) 50 MG tablet Take 50 mg by mouth once daily.       metFORMIN (GLUCOPHAGE) 500 MG tablet daily with breakfast.       nicotine (NICODERM CQ) 21 mg/24 hr APPLY 1 PATCH TRANSDERMALLY EVERY 24 HOURS  0    nicotine, polacrilex, (NICORETTE) 2 mg Gum CHEW 1 PIECE (2 MG) BY MOUTH 10 TIMES PER DAY AS NEEDED  0     No current facility-administered medications for this visit.        Past Medical History:   Diagnosis Date    Diabetes mellitus type 2 in nonobese     Goiter     Hypertension     Tobacco abuse      Past Surgical History:   Procedure Laterality Date    TUBAL LIGATION      1970/80's     Social History  "    Tobacco Use    Smoking status: Current Every Day Smoker     Packs/day: 1.00     Types: Cigarettes    Smokeless tobacco: Never Used   Substance Use Topics    Alcohol use: No     Frequency: Never    Drug use: No          Review of Systems:    Review of Systems   Constitutional: negative for chills, fatigue, fevers, malaise and night sweats  Eyes: negative for icterus and irritation  Respiratory: negative for cough and dyspnea on exertion  Cardiovascular: negative for chest pain and palpitations  Gastrointestinal: negative for constipation, diarrhea and dysphagia  Genitourinary:negative for dysuria, hematuria and nocturia  Integument/breast: negative for rash and skin color change  Hematologic/lymphatic: negative for bleeding and easy bruising  Neurological: negative for gait problems, headaches and seizures  Behavioral/Psych: negative for anxiety and depression  Endocrine: negative    ENT ROS: negative  Endocrine ROS:   negative for - hair pattern changes, malaise/lethargy, mood swings, palpitations or polydipsia/polyuria      OBJECTIVE:     Vital Signs:  BP (!) 154/73   Pulse 79   Temp 97.7 °F (36.5 °C)   Resp 18   Ht 5' 6" (1.676 m)   Wt 69.6 kg (153 lb 5.3 oz)   BMI 24.75 kg/m²    Body mass index is 24.75 kg/m².      Physical Exam:    General:  no distress, see vitals for BMI    Eyes:  conjunctivae/corneas clear   Neck: trachea midline and symmetric, no adenopathy    Thyroid:  {ENDOSUR THYROID EXAM:56880::"thyroid not enlarged"}   Lung: clear to auscultation bilaterally   Heart:  regular rate and rhythm   Abdomen: soft, non-tender; bowel sounds normal; no masses,  no organomegaly   Skin/Extremities: warm and well-perfused   Pulses: 2+ and symmetric   Neuro: normal without focal findings and mental status, speech normal, alert and oriented x3       Laboratory/Radiologic Studies    Studies:  Date:       Results:    Ultrasound:   Outside report/films not available today     Thyroid uptake and " scan(5/11/2015):            Component Value Date    Sodium 139 11/05/2018    Potassium 4.1 11/05/2018    Chloride 105 11/05/2018    CO2 25 11/05/2018    Glucose 88 11/05/2018    BUN, Bld 14 11/05/2018    Creatinine 0.7 11/05/2018    Calcium 10.2 11/05/2018    Anion Gap 9 11/05/2018    eGFR if African American >60.0 11/05/2018    eGFR if non African American >60.0 11/05/2018       ASSESSMENT/PLAN:       Assessment    Ms. Andre is a 68 y.o. female who presents with evidence of Goiter [E04.9].    Plan    During this visit, lab and imaging results were reviewed with the patient and her child(Daughter). Thyroid anatomy and physiology were reviewed and she was given a copy of our patient education booklet, Understanding Thyroid Disease. The above testing and examination support the diagnosis of goiter.     The patient is interested in possible combination surgery. Potential complications of this operation were reviewed with the patient.

## 2018-11-05 ENCOUNTER — INITIAL CONSULT (OUTPATIENT)
Dept: INTERNAL MEDICINE | Facility: CLINIC | Age: 68
End: 2018-11-05
Payer: MEDICARE

## 2018-11-05 ENCOUNTER — HOSPITAL ENCOUNTER (OUTPATIENT)
Dept: PREADMISSION TESTING | Facility: HOSPITAL | Age: 68
Discharge: HOME | End: 2018-11-05
Attending: ANESTHESIOLOGY
Payer: MEDICARE

## 2018-11-05 ENCOUNTER — HOSPITAL ENCOUNTER (OUTPATIENT)
Dept: RADIOLOGY | Facility: HOSPITAL | Age: 68
Discharge: HOME | End: 2018-11-05
Attending: SURGERY
Payer: MEDICARE

## 2018-11-05 ENCOUNTER — HOSPITAL ENCOUNTER (OUTPATIENT)
Dept: CARDIOLOGY | Facility: CLINIC | Age: 68
Discharge: HOME | End: 2018-11-05
Payer: MEDICARE

## 2018-11-05 VITALS
WEIGHT: 152.63 LBS | BODY MASS INDEX: 24.53 KG/M2 | DIASTOLIC BLOOD PRESSURE: 72 MMHG | SYSTOLIC BLOOD PRESSURE: 131 MMHG | HEIGHT: 66 IN | TEMPERATURE: 98 F | HEART RATE: 72 BPM | OXYGEN SATURATION: 98 %

## 2018-11-05 DIAGNOSIS — Z01.818 PRE-OP TESTING: ICD-10-CM

## 2018-11-05 DIAGNOSIS — E04.9 GOITER: ICD-10-CM

## 2018-11-05 DIAGNOSIS — I10 ESSENTIAL HYPERTENSION: ICD-10-CM

## 2018-11-05 DIAGNOSIS — R07.89 ATYPICAL CHEST PAIN: ICD-10-CM

## 2018-11-05 DIAGNOSIS — E11.9 TYPE 2 DIABETES MELLITUS WITHOUT COMPLICATION, WITHOUT LONG-TERM CURRENT USE OF INSULIN: ICD-10-CM

## 2018-11-05 DIAGNOSIS — Z01.818 PREOP EXAMINATION: Primary | ICD-10-CM

## 2018-11-05 DIAGNOSIS — Z72.0 TOBACCO ABUSE: ICD-10-CM

## 2018-11-05 PROCEDURE — 99214 OFFICE O/P EST MOD 30 MIN: CPT | Mod: S$GLB,,, | Performed by: HOSPITALIST

## 2018-11-05 PROCEDURE — 93005 ELECTROCARDIOGRAM TRACING: CPT | Mod: S$GLB,,, | Performed by: SURGERY

## 2018-11-05 PROCEDURE — 71046 X-RAY EXAM CHEST 2 VIEWS: CPT | Mod: TC,FY

## 2018-11-05 PROCEDURE — 93010 ELECTROCARDIOGRAM REPORT: CPT | Mod: S$GLB,,, | Performed by: INTERNAL MEDICINE

## 2018-11-05 PROCEDURE — 1101F PT FALLS ASSESS-DOCD LE1/YR: CPT | Mod: CPTII,S$GLB,, | Performed by: HOSPITALIST

## 2018-11-05 PROCEDURE — 71046 X-RAY EXAM CHEST 2 VIEWS: CPT | Mod: 26,,, | Performed by: RADIOLOGY

## 2018-11-05 PROCEDURE — 99999 PR PBB SHADOW E&M-EST. PATIENT-LVL III: CPT | Mod: PBBFAC,,, | Performed by: HOSPITALIST

## 2018-11-05 NOTE — HPI
History of present illness- I had the pleasure of meeting this pleasant 68 y.o. lady in the pre op clinic prior to her elective Breast , port placement  surgery. The patient is new to me ..    I have obtained the history by speaking to the patient and by reviewing the electronic health records.    Events leading up to surgery / History of presenting illness -    Malignant neoplasm of  right breast   She felt a breast lump, during self breast exam about 1- 1.5 months ago  Further evaluation , lead to the above diagnosis  Relevant health conditions of significance for the perioperative period/ History of presenting illness -    Patient Active Problem List    Diagnosis Date Noted    Essential hypertension 11/05/2018    Goiter 11/05/2018    Tobacco abuse 11/05/2018    Atypical chest pain 11/05/2018    Infiltrating ductal carcinoma of right female breast 10/24/2018    Type 2 diabetes mellitus, without long-term current use of insulin 10/24/2018     Not known to have heart disease

## 2018-11-05 NOTE — ANESTHESIA PREPROCEDURE EVALUATION
[facility]  Anesthesia Pre-Operative Evaluation         Patient Name: Alia Andre  YOB: 1950  MRN: [id number]    SUBJECTIVE:     Pre-operative evaluation for Procedure(s) (LRB):  MASTECTOMY RIGHT 2.5 HR CASE (Right)  BIOPSY, LYMPH NODE, SENTINEL RIGHT (Right)  [id number] LEFT (Left)     11/05/2018    Alia Andre is a 68 y.o. female w/ a significant PMHx of HTN, T2DM, goiter (x30 years), recently diagnosed invasive ductal carcinoma of the right breast. Patient presents to pre-op clinic for evaluation prior to planned procedures above on 11/16.     Patient was seen today by general surgery for workup of her goiter - plan to repeat ultrasound and repeat labs before deciding on thyroidectomy. Patient otherwise is doing well this morning with no specific complaints. Her HTN appears to be well controlled on losartan. She does note she has been smoking 1 ppd x30 years and is trying to quit in anticipation of surgery using nicotine patch and gum. She otherwise has no cardiopulmonary history or complaints. She has good exercise tolerance, >4 mets. She denies any history of anesthesia complications.     Prev airway: None documented      Patient Active Problem List   Diagnosis    Infiltrating ductal carcinoma of right female breast    Type 2 diabetes mellitus, without long-term current use of insulin    Essential hypertension    Goiter    Tobacco abuse    Atypical chest pain       Review of patient's allergies indicates:  No Known Allergies    Current Outpatient Medications:    Current Outpatient Medications:     ACCU-CHEK USHA PLUS TEST STRP Strp, , Disp: , Rfl:     BD ALCOHOL SWABS PadM, , Disp: , Rfl:     levothyroxine (SYNTHROID) 75 MCG tablet, Take 75 mcg by mouth before breakfast. , Disp: , Rfl:     losartan (COZAAR) 50 MG tablet, Take 50 mg by mouth once daily. , Disp: , Rfl:     metFORMIN (GLUCOPHAGE) 500 MG tablet, daily with breakfast. , Disp: , Rfl:      nicotine (NICODERM CQ) 21 mg/24 hr, APPLY 1 PATCH TRANSDERMALLY EVERY 24 HOURS, Disp: , Rfl: 0    nicotine, polacrilex, (NICORETTE) 2 mg Gum, CHEW 1 PIECE (2 MG) BY MOUTH 10 TIMES PER DAY AS NEEDED, Disp: , Rfl: 0    Past Surgical History:   Procedure Laterality Date    TUBAL LIGATION      1970/80's       Social History     Socioeconomic History    Marital status:      Spouse name: Not on file    Number of children: Not on file    Years of education: Not on file    Highest education level: Not on file   Social Needs    Financial resource strain: Not on file    Food insecurity - worry: Not on file    Food insecurity - inability: Not on file    Transportation needs - medical: Not on file    Transportation needs - non-medical: Not on file   Occupational History    Not on file   Tobacco Use    Smoking status: Current Every Day Smoker     Packs/day: 1.00     Types: Cigarettes    Smokeless tobacco: Never Used   Substance and Sexual Activity    Alcohol use: No     Frequency: Never    Drug use: No    Sexual activity: Not on file   Other Topics Concern    Not on file   Social History Narrative    Not on file       OBJECTIVE:     Vital Signs Range (Last 24H):  Temp:  [36.8 °C (98.2 °F)]   Pulse:  [72]   BP: (131)/(72)   SpO2:  [98 %]       Significant Labs:  No results found for: WBC, HGB, HCT, PLT, CHOL, TRIG, HDL, LDLDIRECT, ALT, AST, NA, K, CL, CREATININE, BUN, CO2, TSH, PSA, INR, GLUF, HGBA1C, MICROALBUR    Diagnostic Studies: No relevant studies.    EKG:    Pending 11/5/2018    2D ECHO:  No results found for this or any previous visit.      ASSESSMENT/PLAN:         Anesthesia Evaluation    I have reviewed the Patient Summary Reports.    I have reviewed the Nursing Notes.   I have reviewed the Medications.     Review of Systems  Anesthesia Hx:  No problems with previous Anesthesia Denies Hx of Anesthetic complications  History of prior surgery of interest to airway management or planning:  (tubal ligation)  Denies Personal Hx of Anesthesia complications.   Social:  Smoker    Hematology/Oncology:        Current/Recent Cancer.   EENT/Dental:EENT/Dental Normal   Cardiovascular:   Hypertension, well controlled Denies MI.      Pulmonary:  Pulmonary Normal  Denies COPD.  Denies Asthma.  Denies Shortness of breath.    Renal/:  Renal/ Normal     Hepatic/GI:  Hepatic/GI Normal    Musculoskeletal:  Musculoskeletal Normal    Neurological:  Neurology Normal    Endocrine:   Diabetes, type 2 Hypothyroidism    Psych:  Psychiatric Normal           Physical Exam  General:  Well nourished    Airway/Jaw/Neck:  Airway Findings: Mouth Opening: Normal Tongue: Normal  General Airway Assessment: Adult  Mallampati: II  TM Distance: Normal, at least 6 cm  Jaw/Neck Findings:  Neck ROM: Normal ROM  Neck Findings:  Goiter, mod.       Chest/Lungs:  Chest/Lungs Findings: Clear to auscultation, Normal Respiratory Rate     Heart/Vascular:  Heart Findings: Rate: Normal  Rhythm: Regular Rhythm     Abdomen:  Abdomen Findings:  Normal, Soft, Nontender     Musculoskeletal:  Musculoskeletal Findings: Normal    Mental Status:  Mental Status Findings:  Cooperative, Alert and Oriented         Anesthesia Plan  Type of Anesthesia, risks & benefits discussed:  Anesthesia Type:  general, regional  Patient's Preference: GA   Intra-op Monitoring Plan: standard ASA monitors and arterial line  Intra-op Monitoring Plan Comments:   Post Op Pain Control Plan: multimodal analgesia, IV/PO Opioids PRN and per primary service following discharge from PACU  Post Op Pain Control Plan Comments:   Induction:   IV  Beta Blocker:  Patient is not currently on a Beta-Blocker (No further documentation required).       Informed Consent: Patient understands risks and agrees with Anesthesia plan.  Questions answered. Anesthesia consent signed with patient.  ASA Score: 3     Day of Surgery Review of History & Physical:  There are no significant changes.  H&P update  referred to the surgeon.         Ready For Surgery From Anesthesia Perspective.

## 2018-11-05 NOTE — ASSESSMENT & PLAN NOTE
I suggested to consider stopping  smoking tobacco for its benefits in the kevin operative period and in the long term  Tobacco use-I  Informed about risk of wound healing problem ,infection,lung complications,thrombosis with tobacco use     Not known to have COPD, Bronchitis, Emphysema, wheezing , chronic phlegm   No inhaler, oxygen use   No undue SOB

## 2018-11-05 NOTE — ASSESSMENT & PLAN NOTE
Home BP readings -120's/70's   Recent BP readings in the [id number]/60-70's- As per Her - BP with doctor's higher than at the house   Hypertension-  Blood pressure is acceptable .I suggest holding Losartan - on the morning of the surgery and can continue that  post operatively under blood pressure, electrolyte and renal function monitoring as long as they are acceptable.I suggest addressing pain control as uncontrolled pain can increased blood pressure   Suggested taking her BP machine to see , how it compares with MD's machine

## 2018-11-05 NOTE — ASSESSMENT & PLAN NOTE
Chest pain-  Type of pain -tingling    Location-   Rt breast    Radiation-  None   Occasionally - past few days  ( 4 days ) - had it twice   Associated with- shortness of breath, sweating, nausea, vomiting diarrhea  Relived spontaneously   Not on exertion and does not feel the discomfort while physically active    CV risks   HTN- Yes  DM- Yes  HLD-none  Tobacco - yes  Family history -  None  Does not sound cardiac  Wonders, if it is related to the breat  biopsy that she had   Getting better

## 2018-11-05 NOTE — PATIENT INSTRUCTIONS
PreOp Instructions given:    -- Medication information (what to hold and what to take)   -- NPO guidelines as follows: (or as per your Surgeon)  1. Stop ALL solid food, gum, candy (including vitamins) 8 hours before surgery/procedure time.  2. Stop all CLOUDY liquids: coffee with creamer, cloudy juices, 6 hours prior to surgery/procedure time.  3. The patient should be ENCOURAGED to drink carbohydrate-rich clear liquids (sports drinks, clear juices) until 2 hours prior to surgery/procedure time.  4. CLEAR liquids include only water, black coffee NO creamer, clear oral rehydration drinks, clear sports drinks or clear fruit juices (no orange juice, no pulpy juices, no apple cider).   5. IF IN DOUBT, drink water instead.   6. NOTHING TO DRINK 2 hours before to surgery/procedure time. If you are told to take medication on the morning of surgery, it may be taken with a sip of water.   -- Arrival place and directions given; time to be given the day before procedure by the Surgeon's Office   -- Bathing with antibacterial soap   -- Don't wear any jewelry or bring any valuables AM of surgery   -- No makeup or moisturizer to face   -- No perfume/cologne, powder, lotions or aftershave     Pt verbalized understanding.    unable to perform/numbness

## 2018-11-05 NOTE — ASSESSMENT & PLAN NOTE
For many years   Not troubled with speaking , swallow, breathing problem  Work up in progress   Might have surgery on her thyroid along with the breast

## 2018-11-05 NOTE — PROGRESS NOTES
[facility] - Pre Op Consult  Progress Note    Patient Name: Alia Andre  MRN: [id number]  Date of Evaluation- 11/05/2018  PCP- German Morales MD    Future cases for Alia Andre [[id number]]     Case ID Status Date Time Krzysztof Procedure Provider Location    [id number] [facility] 11/16/2018  8:15  MASTECTOMY RIGHT 2.5 HR CASE Cristiane Frederick MD [[id number]] [facility] OR 2ND FLR          HPI:  History of present illness- I had the pleasure of meeting this pleasant 68 y.o. lady in the pre op clinic prior to her elective Breast , port placement  surgery. The patient is new to me ..    I have obtained the history by speaking to the patient and by reviewing the electronic health records.    Events leading up to surgery / History of presenting illness -    Malignant neoplasm of  right breast   She felt a breast lump, during self breast exam about 1- 1.5 months ago  Further evaluation , lead to the above diagnosis  Relevant health conditions of significance for the perioperative period/ History of presenting illness -    Patient Active Problem List    Diagnosis Date Noted    Essential hypertension 11/05/2018    Goiter 11/05/2018    Tobacco abuse 11/05/2018    Atypical chest pain 11/05/2018    Infiltrating ductal carcinoma of right female breast 10/24/2018    Type 2 diabetes mellitus, without long-term current use of insulin 10/24/2018     Not known to have heart disease       Subjective/ Objective:          Chief complaint-Preoperative evaluation, Perioperative Medical management, complication reduction plan     Active cardiac conditions- none    Revised cardiac risk index predictors- none    Functional capacity -Examples of physical activity , walks up to 3 miles, walks her grand children to the bus stop , 2 miles away, works in the house, yard,  can take 1 flight of stairs----- She can undertake all the above activities without  chest pain,chest tightness, Shortness of breath ,dizziness,lightheadedness making her exercise  "tolerance more, than 4 Mets.       Review of Systems   Constitutional: Negative for chills and fever.        No unusual weight changes     HENT:        STOPBANG score 3 / 8  HTN  Age over 50   Neck size over 40 CM     Eyes:        No new visual changes   Respiratory:        No cough , phlegm  No Hemoptysis   Cardiovascular:        As noted   Gastrointestinal:        No overt GI/ blood losses  Bowel movements- Regular   Endocrine:        Prednisone use > 20 mg daily for 3 weeks-None   Genitourinary: Negative for dysuria.        No urinary hesitancy    Musculoskeletal:          No unusual, muscle, joint pains   Skin: Negative for rash.   Neurological: Negative for syncope.        No unilateral weakness   Hematological:        Current use of Anticoagulants  Current use of Antiplatelet agents  None   Psychiatric/Behavioral:        No Depression,Anxiety       No vascular stenting     No past medical history pertinent negatives.    No past surgical history on file.    No anesthesia, bleeding, cardiac problems, PONV with previous surgeries/procedures.  Medications and Allergies reviewed in epic.   FH- No anesthesia,bleeding  , early onset heart disease in family   Lives alone , daughters are going to help       Physical Exam  Blood pressure 131/72, pulse 72, temperature 98.2 °F (36.8 °C), temperature source Oral, height 5' 6" (1.676 m), weight 69.2 kg (152 lb 9.6 oz), SpO2 98 %.      Physical Exam  Constitutional- Vitals - Body mass index is 24.63 kg/m².,   Vitals:    11/05/18 1003   BP: 131/72   Pulse: 72   Temp: 98.2 °F (36.8 °C)     General appearance-Conscious,Coherent  Eyes- No conjunctival icterus,pupils  round  and reactive to light   ENT-Oral cavity- moist ,  upper denture and lower denture  , Hearing grossly normal   Neck- thyromegaly ,Trachea -central, No jugular venous distension,   No Carotid Bruit   Cardiovascular -Heart Sounds- Normal  and  no murmur   , No gallop rhythm   Respiratory - Normal Respiratory " Effort, Normal breath sounds,  no wheeze  and  no forced expiratory wheeze    Peripheral pitting pedal edema-- none  and  varicose veins right lower extremity , no calf pain   Gastrointestinal -Soft abdomen, No palpable masses, Non Tender,Liver,Spleen not palpable. No-- free fluid and shifting dullness  Musculoskeletal- No finger Clubbing. Strength grossly normal   Lymphatic-No Palpable cervical, axillary,Inguinal lymphadenopathy   Psychiatric - normal effect,Orientation  Rt Dorsalis pedis pulses-palpable    Lt Dorsalis pedis pulses- palpable   Rt Posterior tibial pulses -palpable   Left posterior tibial pulses -palpable   Miscellaneous -  no renal bruit and  bowel sounds positive   Investigations    Labs, EKG- Pending           Review of old records- Was done and information gathered regards to events leading to surgery and health conditions of significance in the perioperative period.        Preoperative cardiac risk assessment-  The patient does not have any active cardiac conditions . Revised cardiac risk index predictors- 0---.Functional capacity is more than 4 Mets. She will be undergoing a Breast , Port ( possibly thyroid procedure ) procedure that carries a intermediate risk     The estimated risk of the rate of adverse cardiac outcomes  0.4%    No further cardiac work up is indicated prior to proceeding with the surgery          American Society of Anesthesiologists Physical status classification ( ASA ) class:      Postoperative pulmonary complication risk assessment:      ARISCAT ( Canet) risk index- risk class -  Low, if duration of surgery is under 3 hours, intermediate, if duration of surgery is over 3 hours      Reji Respiratory failure index- percentage risk of respiratory failure: 0.5 %  ( 1.8 % if she is going to have thyroid surgery also )     Assessment/Plan:     Essential hypertension  Home BP readings -120's/70's   Recent BP readings in the [id number]-[id number]/60-70's- As per Her - BP with  doctor's higher than at the house   Hypertension-  Blood pressure is acceptable .I suggest holding Losartan - on the morning of the surgery and can continue that  post operatively under blood pressure, electrolyte and renal function monitoring as long as they are acceptable.I suggest addressing pain control as uncontrolled pain can increased blood pressure   Suggested taking her BP machine to see , how it compares with MD's machine    Type 2 diabetes mellitus, without long-term current use of insulin  Type 2  Diabetes Mellitus  On treatment with oral agent, not on Insulin    Hemoglobin A1c- un known to her   Capillary glucose check-most days   Pre breakfast -110  Follows diet   Not known to have diabetic complications     Diabetes Mellitus-I suggest monitoring the glucose in the perioperative period ( Before meals and bed time,if the patient is on oral feeds or every 6 hourly ,if the patient is NPO )  Blood glucose target in hospitalized patients is 140-180. Oral Hypoglycemic agents are generally avoided during the hospital stay . If glucose is consistently elevated ,I suggest using basal ,prandial Insulin regimen to control the glucose , as elevated glucose can be associated with adverse surgical out comes. Please consider involving Hospital Medicine or Endocrinology ,if any help is needed with Glucose control. Patient will be instructed based on the pre op clinic guidelines  about adjustment of diabetic treatment  considering the NPO status for Surgery         Goiter  For many years   Not troubled with speaking , swallow, breathing problem  Work up in progress   Might have surgery on her thyroid along with the breast      Tobacco abuse   I suggested to consider stopping  smoking tobacco for its benefits in the kevin operative period and in the long term  Tobacco use-I  Informed about risk of wound healing problem ,infection,lung complications,thrombosis with tobacco use     Not known to have COPD, Bronchitis,  Emphysema, wheezing , chronic phlegm   No inhaler, oxygen use   No undue SOB      Atypical chest pain  Chest pain-  Type of pain -tingling    Location-   Rt breast    Radiation-  None   Occasionally - past few days  ( 4 days ) - had it twice   Associated with- shortness of breath, sweating, nausea, vomiting diarrhea  Relived spontaneously   Not on exertion and does not feel the discomfort while physically active    CV risks   HTN- Yes  DM- Yes  HLD-none  Tobacco - yes  Family history -  None  Does not sound cardiac  Wonders, if it is related to the breat  biopsy that she had   Getting better              Preventive perioperative care    Thromboembolic prophylaxis:  Her risk factors for thrombosis include cancer, tobacco use, surgical procedure and age.I suggest  thromboembolic prophylaxis ( mechanical/pharmacological, weighing the risk benefits of pharmacological agent use considering kevin procedural bleeding )  during the perioperative period.I suggested being active in the post operative period.      Postoperative pulmonary complication prophylaxis-Risk factors for post operative pulmonary complications include age over 65 years, ASA class >2, tobacco use and proximity of the surgical site to the lungs- I suggest tobacco smoking cessation, incentive spirometry use, early ambulation and end tidal carbon dioxide monitoring  , oral care , head end of bed elevation      Renal complication prophylaxis-Risk factors for renal complications include diabetes mellitus and hypertension . I suggest keeping her well hydrated .I suggested drinking 2 litre's of water a day      Surgical site Infection Prophylaxis-I  suggest appropriate antibiotic for Prophylaxis against Surgical site infections, if applicable      This visit was focused on Preoperative evaluation, Perioperative Medical management, complication reduction plans. I suggest that the patient follows up with primary care or relevant sub specialists for ongoing health  care.    I appreciate the opportunity to be involved in this patients care. Please feel free to contact me if there were any questions about this consultation.    Patient is optimized     Patient  was instructed to call and update me about any changes to health,  medication, office visits ,testing out side of the [facility] center , hospitalizations between now and surgery     Yane Shepard MD  Perioperative Medicine  [facility]   Pager [phone]    No thyroid bruit   ----  11/5- 19 15   CBC- Hb,HCT,PLT-N  CMP-N   EKG from 11/5/2018 personally reviewed reportedly showed   Sinus rhythm with 1st degree A-V block with occasional Premature  ventricular complexes  Otherwise normal ECG  No previous ECGs available   CXR from 11/5 /2018 personally reviewed reportedly showed     Heart size normal.  The lungs are clear.  No pleural effusion    Called to discuss - spoke to her -acceptable to move forward with surgery   No palpitations  Suggested coke reduction    ---  11/13- 16 57     Called to follow up about her tobacco use status- unable to speak/ leave a message  ---  11/15- 7 56     Called to follow up , spoke to her, to address any concerns with the up coming surgery or any questions on Medication instructions -  Doing good  ,No changes to Medication, Health -  Walked this morning and feeing good - breathing good , no chest pain   Suggested follow up about thyroid  Suggested tobacco cessation - she is aware of the tobacco effects on  healing   She has cut down on tobacco   Not drinking coke

## 2018-11-05 NOTE — DISCHARGE INSTRUCTIONS
Your surgery has been scheduled for:__________________________________________    You should report to:  ____[facility], located on the [place] side of the first floor of the           [facility] ([phone])  ____[facility], located on the [facility] side of the            Second floor of the [facility] ([phone])  ____3rd Floor [facility] located on the [facility] side of the [facility] [phone]  Please Note   - Tell your doctor if you take Aspirin, products containing Aspirin, herbal medications  or blood thinners, such as Coumadin, Ticlid, or Plavix.  (Consult your provider regarding holding or stopping before surgery).  - Arrange for someone to drive you home following surgery.  You will not be allowed to leave the surgical facility alone or drive yourself home following sedation and anesthesia.  Before Surgery  - Stop taking all herbal medications 14days prior to surgery  - No Motrin/Advil (Ibuprofen) 7 days before surgery  - No Aleve (Naproxen) 7 days before surgery  - No Goody's/BC powder 7 days before surgery  - Stop Taking Asprin, products containing Asprin _____days before surgery  - Stop taking blood thinners_______days before surgery  - Refrain from drinking alcoholic beverages for 24hours before and after surgery  - Stop or limit smoking _________days before surgery  - You may take Tylenol for pain  Night before Surgery  - Take a shower or bath (shower is recommended).  Bathe with Hibiclens soap or an antibacterial soap from the neck down.  If not supplied by your surgeon, hibiclens soap will need to be purchased over the counter in pharmacy.  Rinse soap off thoroughly.  - Shampoo your hair with your regular shampoo                           Food and Beverage Instructions  1. Stop ALL solid food, gum, candy (including vitamins) 8 hours before surgery/procedure time.  2. The patient should be  ENCOURAGED to drink carbohydrate-rich clear liquids (sports drinks, clear juices) until 2 hours prior to surgery/procedure time.  3. CLEAR liquids include only water, black coffee NO creamer, clear oral rehydration drinks, clear sports drinks or clear fruit juices (no orange juice, no pulpy juices, no apple cider). Advise patients if they can read newsprint through the liquid, it qualifies as clear liquid.   4. IF IN DOUBT, drink water instead.   5. NOTHING  TO DRINK 2 hours before to arrival for surgery/procedure time. If you are told to take medication on the morning of surgery, it may be taken with a sip of water.     The Day of Surgery  - Take another bath or shower with hibiclens or any antibacterial soap, to reduce the chance of infection.  - Take heart and blood pressure medications with a small sip of water, as advised by the perioperative team.  - Do not take fluid pills  - You may brush your teeth and rinse your mouth, but do not swall any additional water.   - Do not apply perfumes, powder, body lotions or deodorant on the day of surgery.  - Nail polish should be removed.  - Do not wear makeup or moisturizer  - Wear comfortable clothes, such as a button front shirt and loose fitting pants.  - Leave all jewelry, including body piercings, and valuables at home.    - Bring any devices you will neeed after surgery such as crutches or canes.  - If you have sleep apnea, please bring your CPAP machine  In the event that your physical condition changes including the onset of a cold or respiratory illness, or if you have to delay or cancel your surgery, please notify your surgeon.    Your surgery has been scheduled for:__________________________________________    You should report to:  ____[facility], located on the [place] side of the first floor of the [facility] ([phone])  ____[facility], located on the [facility] side of the             Second floor of the [facility] [phone])  ____3rd Floor [facility] located on the [facility] side of the [facility] [phone]  Please Note   - Tell your doctor if you take Aspirin, products containing Aspirin, herbal medications  or blood thinners, such as Coumadin, Ticlid, or Plavix.  (Consult your provider regarding holding or stopping before surgery).  - Arrange for someone to drive you home following surgery.  You will not be allowed to leave the surgical facility alone or drive yourself home following sedation and anesthesia.  Before Surgery  - Stop taking all herbal medications 14days prior to surgery  - No Motrin/Advil (Ibuprofen) 7 days before surgery  - No Aleve (Naproxen) 7 days before surgery  - No Goody's/BC powder 7 days before surgery  - Stop Taking Asprin, products containing Asprin _____days before surgery  - Stop taking blood thinners_______days before surgery  - Refrain from drinking alcoholic beverages for 24hours before and after surgery  - Stop or limit smoking _________days before surgery  - You may take Tylenol for pain  Night before Surgery  - Take a shower or bath (shower is recommended).  Bathe with Hibiclens soap or an antibacterial soap from the neck down.  If not supplied by your surgeon, hibiclens soap will need to be purchased over the counter in pharmacy.  Rinse soap off thoroughly.  - Shampoo your hair with your regular shampoo                           Food and Beverage Instructions  6. Stop ALL solid food, gum, candy (including vitamins) 8 hours before surgery/procedure time.  7. The patient should be ENCOURAGED to drink carbohydrate-rich clear liquids (sports drinks, clear juices) until 2 hours prior to surgery/procedure time.  8. CLEAR liquids include only water, black coffee NO creamer, clear oral rehydration drinks, clear sports drinks or clear fruit juices (no orange juice, no pulpy juices, no apple cider). Advise patients if they  can read newsprint through the liquid, it qualifies as clear liquid.   9. IF IN DOUBT, drink water instead.   10. NOTHING  TO DRINK 2 hours before to arrival for surgery/procedure time. If you are told to take medication on the morning of surgery, it may be taken with a sip of water.     The Day of Surgery  - Take another bath or shower with hibiclens or any antibacterial soap, to reduce the chance of infection.  - Take heart and blood pressure medications with a small sip of water, as advised by the perioperative team.  - Do not take fluid pills  - You may brush your teeth and rinse your mouth, but do not swall any additional water.   - Do not apply perfumes, powder, body lotions or deodorant on the day of surgery.  - Nail polish should be removed.  - Do not wear makeup or moisturizer  - Wear comfortable clothes, such as a button front shirt and loose fitting pants.  - Leave all jewelry, including body piercings, and valuables at home.    - Bring any devices you will neeed after surgery such as crutches or canes.  - If you have sleep apnea, please bring your CPAP machine  In the event that your physical condition changes including the onset of a cold or respiratory illness, or if you have to delay or cancel your surgery, please notify your surgeon.    Anesthesia: General Anesthesia     You are watched continuously during your procedure by your anesthesia provider.     Youre due to have surgery. During surgery, youll be given medicine called anesthesia or anesthetic. This will keep you comfortable and pain-free. Your anesthesia provider will use general anesthesia.  What is general anesthesia?  General anesthesia puts you into a state like deep sleep. It goes into the bloodstream (IV anesthetics), into the lungs (gas anesthetics), or both. You feel nothing during the procedure. You will not remember it. During the procedure, the anesthesia provider monitors you continuously. He or she checks your heart rate and  rhythm, blood pressure, breathing, and blood oxygen.  · IV anesthetics. IV anesthetics are given through an IV line in your arm. Theyre often given first. This is so you are asleep before a gas anesthetic is started. Some kinds of IV anesthetics relieve pain. Others relax you. Your doctor will decide which kind is best in your case.  · Gas anesthetics. Gas anesthetics are breathed into the lungs. They are often used to keep you asleep. They can be given through a facemask or a tube placed in your larynx or trachea (breathing tube).  ? If you have a facemask, your anesthesia provider will most likely place it over your nose and mouth while youre still awake. Youll breathe oxygen through the mask as your IV anesthetic is started. Gas anesthetic may be added through the mask.  ? If you have a tube in the larynx or trachea, it will be inserted into your throat after youre asleep.  Anesthesia tools and medicines  You will likely have:  · IV anesthetics. These are put into an IV line into your bloodstream.  · Gas anesthetics. You breathe these anesthetics into your lungs, where they pass into your bloodstream.  · Pulse oximeter. This is a small clip that is attached to the end of your finger. This measures your blood oxygen level.  · Electrocardiography leads (electrodes). These are small sticky pads that are placed on your chest. They record your heart rate and rhythm.  · Blood pressure cuff. This reads your blood pressure.  Risks and possible complications  General anesthesia has some risks. These include:  · Breathing problems  · Nausea and vomiting  · Sore throat or hoarseness (usually temporary)  · Allergic reaction to the anesthetic  · Irregular heartbeat (rare)  · Cardiac arrest (rare)   Anesthesia safety  · Follow all instructions you are given for how long not to eat or drink before your procedure.  · Be sure your doctor knows what medicines and drugs you take. This includes over-the-counter medicines, herbs,  supplements, alcohol or other drugs. You will be asked when those were last taken.  · Have an adult family member or friend drive you home after the procedure.  · For the first 24 hours after your surgery:  ? Do not drive or use heavy equipment.  ? Do not make important decisions or sign legal documents. If important decisions or signing legal documents is necessary during the first 24 hours after surgery, have a trusted family member or spouse act on your behalf.  ? Avoid alcohol.  ? Have a responsible adult stay with you. He or she can watch for problems and help keep you safe.  Date Last Reviewed: 12/1/2016 © 2000-2017 [employer]. [address] [place] [address], [place] [address]. All rights reserved. This information is not intended as a substitute for professional medical care. Always follow your healthcare professional's instructions  Anesthesia: General Anesthesia     You are watched continuously during your procedure by your anesthesia provider.     Youre due to have surgery. During surgery, youll be given medicine called anesthesia or anesthetic. This will keep you comfortable and pain-free. Your anesthesia provider will use general anesthesia.  What is general anesthesia?  General anesthesia puts you into a state like deep sleep. It goes into the bloodstream (IV anesthetics), into the lungs (gas anesthetics), or both. You feel nothing during the procedure. You will not remember it. During the procedure, the anesthesia provider monitors you continuously. He or she checks your heart rate and rhythm, blood pressure, breathing, and blood oxygen.  · IV anesthetics. IV anesthetics are given through an IV line in your arm. Theyre often given first. This is so you are asleep before a gas anesthetic is started. Some kinds of IV anesthetics relieve pain. Others relax you. Your doctor will decide which kind is best in your case.  · Gas anesthetics. Gas anesthetics are breathed into the lungs. They are  often used to keep you asleep. They can be given through a facemask or a tube placed in your larynx or trachea (breathing tube).  ? If you have a facemask, your anesthesia provider will most likely place it over your nose and mouth while youre still awake. Youll breathe oxygen through the mask as your IV anesthetic is started. Gas anesthetic may be added through the mask.  ? If you have a tube in the larynx or trachea, it will be inserted into your throat after youre asleep.  Anesthesia tools and medicines  You will likely have:  · IV anesthetics. These are put into an IV line into your bloodstream.  · Gas anesthetics. You breathe these anesthetics into your lungs, where they pass into your bloodstream.  · Pulse oximeter. This is a small clip that is attached to the end of your finger. This measures your blood oxygen level.  · Electrocardiography leads (electrodes). These are small sticky pads that are placed on your chest. They record your heart rate and rhythm.  · Blood pressure cuff. This reads your blood pressure.  Risks and possible complications  General anesthesia has some risks. These include:  · Breathing problems  · Nausea and vomiting  · Sore throat or hoarseness (usually temporary)  · Allergic reaction to the anesthetic  · Irregular heartbeat (rare)  · Cardiac arrest (rare)   Anesthesia safety  · Follow all instructions you are given for how long not to eat or drink before your procedure.  · Be sure your doctor knows what medicines and drugs you take. This includes over-the-counter medicines, herbs, supplements, alcohol or other drugs. You will be asked when those were last taken.  · Have an adult family member or friend drive you home after the procedure.  · For the first 24 hours after your surgery:  ? Do not drive or use heavy equipment.  ? Do not make important decisions or sign legal documents. If important decisions or signing legal documents is necessary during the first 24 hours after surgery,  have a trusted family member or spouse act on your behalf.  ? Avoid alcohol.  ? Have a responsible adult stay with you. He or she can watch for problems and help keep you safe.  Date Last Reviewed: 12/1/2016  © 8996-5861 [employer]. [address] [place] [address], [place] [address]. All rights reserved. This information is not intended as a substitute for professional medical care. Always follow your healthcare professional's instructions

## 2018-11-05 NOTE — OUTPATIENT SUBJECTIVE & OBJECTIVE
"Outpatient Subjective & Objective     Chief complaint-Preoperative evaluation, Perioperative Medical management, complication reduction plan     Active cardiac conditions- none    Revised cardiac risk index predictors- none    Functional capacity -Examples of physical activity , walks up to 3 miles, walks her grand children to the bus stop , 2 miles away, works in the house, yard,  can take 1 flight of stairs----- She can undertake all the above activities without  chest pain,chest tightness, Shortness of breath ,dizziness,lightheadedness making her exercise tolerance more, than 4 Mets.       Review of Systems   Constitutional: Negative for chills and fever.        No unusual weight changes     HENT:        STOPBANG score 3 / 8  HTN  Age over 50   Neck size over 40 CM     Eyes:        No new visual changes   Respiratory:        No cough , phlegm  No Hemoptysis   Cardiovascular:        As noted   Gastrointestinal:        No overt GI/ blood losses  Bowel movements- Regular   Endocrine:        Prednisone use > 20 mg daily for 3 weeks-None   Genitourinary: Negative for dysuria.        No urinary hesitancy    Musculoskeletal:          No unusual, muscle, joint pains   Skin: Negative for rash.   Neurological: Negative for syncope.        No unilateral weakness   Hematological:        Current use of Anticoagulants  Current use of Antiplatelet agents  None   Psychiatric/Behavioral:        No Depression,Anxiety       No vascular stenting     No past medical history pertinent negatives.    No past surgical history on file.    No anesthesia, bleeding, cardiac problems, PONV with previous surgeries/procedures.  Medications and Allergies reviewed in epic.   FH- No anesthesia,bleeding  , early onset heart disease in family   Lives alone , daughters are going to help       Physical Exam  Blood pressure 131/72, pulse 72, temperature 98.2 °F (36.8 °C), temperature source Oral, height 5' 6" (1.676 m), weight 69.2 kg (152 lb 9.6 " oz), SpO2 98 %.      Physical Exam  Constitutional- Vitals - Body mass index is 24.63 kg/m².,   Vitals:    11/05/18 1003   BP: 131/72   Pulse: 72   Temp: 98.2 °F (36.8 °C)     General appearance-Conscious,Coherent  Eyes- No conjunctival icterus,pupils  round  and reactive to light   ENT-Oral cavity- moist ,  upper denture and lower denture  , Hearing grossly normal   Neck- thyromegaly ,Trachea -central, No jugular venous distension,   No Carotid Bruit   Cardiovascular -Heart Sounds- Normal  and  no murmur   , No gallop rhythm   Respiratory - Normal Respiratory Effort, Normal breath sounds,  no wheeze  and  no forced expiratory wheeze    Peripheral pitting pedal edema-- none  and  varicose veins right lower extremity , no calf pain   Gastrointestinal -Soft abdomen, No palpable masses, Non Tender,Liver,Spleen not palpable. No-- free fluid and shifting dullness  Musculoskeletal- No finger Clubbing. Strength grossly normal   Lymphatic-No Palpable cervical, axillary,Inguinal lymphadenopathy   Psychiatric - normal effect,Orientation  Rt Dorsalis pedis pulses-palpable    Lt Dorsalis pedis pulses- palpable   Rt Posterior tibial pulses -palpable   Left posterior tibial pulses -palpable   Miscellaneous -  no renal bruit and  bowel sounds positive   Investigations    Labs, EKG- Pending           Review of old records- Was done and information gathered regards to events leading to surgery and health conditions of significance in the perioperative period.    Outpatient Subjective & Objective

## 2018-11-05 NOTE — ASSESSMENT & PLAN NOTE
Type 2  Diabetes Mellitus  On treatment with oral agent, not on Insulin    Hemoglobin A1c- un known to her   Capillary glucose check-most days   Pre breakfast -110  Follows diet   Not known to have diabetic complications     Diabetes Mellitus-I suggest monitoring the glucose in the perioperative period ( Before meals and bed time,if the patient is on oral feeds or every 6 hourly ,if the patient is NPO )  Blood glucose target in hospitalized patients is 140-180. Oral Hypoglycemic agents are generally avoided during the hospital stay . If glucose is consistently elevated ,I suggest using basal ,prandial Insulin regimen to control the glucose , as elevated glucose can be associated with adverse surgical out comes. Please consider involving Hospital Medicine or Endocrinology ,if any help is needed with Glucose control. Patient will be instructed based on the pre op clinic guidelines  about adjustment of diabetic treatment  considering the NPO status for Surgery

## 2018-11-05 NOTE — LETTER
November 5, 2018      Soren Grande MD  [address] [facility] [address]           [facility]  [address] [facility] [address]  Phone: [phone]          Patient: Alia Andre   MR Number: [id number]   YOB: 1950   Date of Visit: 11/5/2018       Dear Dr. Soren Grande:    Thank you for referring Alia Andre to me for evaluation. Attached you will find relevant portions of my assessment and plan of care.    If you have questions, please do not hesitate to call me. I look forward to following Alia Andre along with you.    Sincerely,    Yane Shepard MD    Enclosure  CC:  MD Cristiane Duran MD Jeremy G Dumas, MD    If you would like to receive this communication electronically, please contact [email or web address] or [phone] to request more information on [employer] Link access.    For providers and/or their staff who would like to refer a patient to [facility], please contact us through our one-stop-shop provider referral line, [facility], at [phone].    If you feel you have received this communication in error or would no longer like to receive these types of communications, please e-mail [email or web address]         
03-Oct-2018

## 2018-11-06 NOTE — PROGRESS NOTES
OUTPATIENT PHYSICAL THERAPY   EVALUATION    Name: Alia Andre  Clinic Number: [id number]    Therapy Diagnosis:   Encounter Diagnoses   Name Primary?    Infiltrating ductal carcinoma of right female breast Yes    At risk for lymphedema      Physician: Cristiane Frederick MD    Physician Orders: PT Eval and Treat   Medical Diagnosis: right breast cancer  Evaluation Date: 11/7/2018  Authorization period Expiration: 12/31/18  Plan of Care Certification Period: 11/7/18  Insurance: [employer]    Visit #: 1/ Visits authorized: 20  Time In:10:00 AM  Time Out: 10:45 AM  Total Billable Time: 45 minutes    Precautions: Standard and cancer    History   History of Present Illness: Alia is a 68 y.o. female that presents to  [facility] at the [facility] secondary to dx of right breast cancer.    Dx: right breast invasive mammary carcinoma, Grade 3, ER (+), NV (-), HER2 (-)  Surgery date: 11/16/18 right mastectomy      Pt presents today for baseline measurements to aid in the early detection of lymphedema, UE muscle testing, postural and ROM assessment along with education of risk of lymphedema and surgical precautions post surgery. Circumferential measurements will be taken today of BL UEs for early detection of lymphedema post surgery. Pt will also be instructed in exercises to perform pre and post-surgery to insure best outcomes.     Past Medical History:   Past Medical History:   Diagnosis Date    Cancer     breast    Diabetes mellitus type 2 in nonobese     Goiter     Hypertension     Tobacco abuse        Past Surgical History:   Alia Andre  has a past surgical history that includes Tubal ligation; MASTECTOMY RIGHT 2.5 HR CASE (Right, 11/16/2018); BIOPSY, LYMPH NODE, SENTINEL RIGHT (Right, 11/16/2018); and LNVJISUSA-JXPA-E-CATH LEFT (Left, 11/16/2018).    Medications:  Alia has a current medication list which includes the following prescription(s):  "accu-chek carmela plus test strp, acetaminophen, aspirin, bd alcohol swabs, levothyroxine, losartan, metformin, nicotine, nicotine (polacrilex), and oxycodone-acetaminophen.    Allergies:  Review of patient's allergies indicates:  No Known Allergies       Hand dominance: right Handed  Prior Therapy: None  Nutrition:  Normal  Social History: Lives alone  Place of Residence (Steps/Adaptations): 4 steps to enter home  Current functional status:  Independent with all ADL's  Exercise routine prior to onset : Walking--about 2 miles a day  DME owned: None  Work:  Caregiver                         Subjective   Pt states: having surgery 11/16/18  Pain: 0/10 on VAS.     Objective   Mental status :alert    Posture/Alignment   Postural examination/scapula alignment: Forward head and rounded shoulders  Joint integrity: WFLs  Skin integrity: intact  Edema: none noted    Sensation: Light Touch: Intact           Proprioception: Intact  - appearance: well groomed     ROM:   UPPER EXTREMITY--AROM/PROM  (R) UE: WNLs  (L) UE: WNLs     Shoulder Range of Motion:   ACTIVE ROM LEFT RIGHT   Flexion 170 170   Abduction 170 170   Horizontal Abd 60 60   Extension 70 70   IR/90deg 80 70   ER/90deg 85 80     Strength: manual muscle test grades below   Upper Extremity Strength   (L) UE (R) UE   Shoulder flexion: 5/5 5/5   Shoulder Abduction: 5/5 5/5   Shoulder IR 5/5 5/5   Shoulder ER 5/5 5/5   Elbow flexion: 5/5 5/5   Elbow extension: 5/5 5/5   Lower Trap: 5/5 5/5   Middle Trap: 5/5 5/5    5/5 5/5       Baseline Measurements of BL UE's for early detection of Lymphedema:     LANDMARK RIGHT UE LEFT UE DIFFERENCE   E + 8" 36 cm 35 cm 1 cm   E + 6" 32 cm 31 cm 1 cm   E + 4" 28.5 cm 28 cm 0.5 cm   E + 2" 26 cm 26 cm 0 cm   Elbow 25 cm 25 cm 0 cm   W+ 8" 25 cm 24.5cm 0.5 cm   W +  6" 23 cm 22 cm 1 cm   W + 4" 20 cm 19 cm 1 cm   Wrist 17 cm 17 cm 0 cm   DPC 21 cm 21 cm 0 cm   IP Thumb 8 cm 7.5 cm 0.5 cm         Coordination:   - fine motor: WFL  - UE " coordination: intact     - LE coordination:  Not tested     Functional Mobility (Bed mobility, transfers)  Bed mobility: I =  independent   Roll to left: I  Roll to right: I  Supine to prone: I  Scooting to edge of bed: I  Supine to sit: I  Sit to supine: I  Transfers to bed: I  Transfers to toilet: I  Sit to stand:  I  Stand pivot:  I  Car transfers: I      ADL's:  Feeding: I = independent   Grooming: I  Hygiene: I  UB Dressing: I  LB Dressing: I  Toileting: I  Bathing: I    Gait Assessment:   - AD used: none  - Assistance: independent  - Distance: community distances       Endurance Deficit: none      Patient Education   - role of PT in multi - disciplinary team, goals for PT  - Pt was educated in lymphedema etiology and management plans.    - Pt was provided with written risk reductions and precautions for managing lymphedema.   - Reviewed MELINDA drain care instructions.     ROM/lifting Precautions post surgery discussed -  until drains have been removed:  - do not lift affected arm above 90 degrees of shoulder flexion  - do not lift over 5 lbs  - do not pull or push heavy objects  - do not sleep on your stomach or surgery side     Written Home Exercises Provided and Patient Education: Handouts given   Pt was instructed in and performed therapeutic exercise for postural correction and alignment, stretching and soft tissue mobility, and strengthening.     Exercises included: handout given    - exaggerated deep breathing and relaxation  - scapular retractions  - wrist circles  - elbow flexion/extension      Pt was able to demonstrate and report understanding and performance    Pt has no cultural, educational or language barriers to learning provided.    Functional Limitations Reporting     Category: Carrying and lifting   0% Limitation   Current/ : CH = 0 % impaired, limited or restricted  Goal/ : CH = 0 % impaired, limited or restricted   Discharge:   CH = 0% impaired, limited or restrictied       Modifier   Impairment Limitation Restriction    CH  0 % impaired, limited or restricted    CI  @ least 1% but less than 20% impaired, limited or restricted    CJ  @ least 20%<40% impaired, limited or restricted    CK  @ least 40%<60% impaired, limited or restricted    CL  @ least 60% <80% impaired, limited or restricted    CM  @ least 80%<100% impaired limited or restricted    CN  100% impaired, limited or restricted     Assessment   This is a 68 y.o. female referred to outpatient physical therapy and presents with a medical diagnosis of right breast cancer and was seen today pre-operatively to assess strength and ROM of BL UEs, to take baseline circumferential measurements of BL UEs to aid in the early detection of lymphedema and provide pt education on exercises/precations post breast surgery. Pt does not exhibit any ROM impairments  Pt educated in lymphedema risks/precautions as well as ROM/lifting precautions post surgery - pt demonstrated/verbalized understanding. No goals established this visit as goals for PT will be established post surgery at follow up.      Anticipated barriers to physical therapy: None     Pt's spiritual, cultural and educational needs considered and pt agreeable to plan of care and goals as stated below:     Medical necessity is demonstrated by the following IMPAIRMENTS/PROMBLEM LIST:  History  Co-morbidities and personal factors that may impact the plan of care Examination  Body Structures and Functions, activity limitations and participation restrictions that may impact the plan of care    Clinical Presentation   Co-morbidities:   Right Breast Cancer        Personal Factors:   no deficits Body Regions:   upper extremities    Body Systems:   ROM  strength  edema        Participation Restrictions:   No Deficits     Activity limitations:   Mobility  lifting and carrying objects    Self care  no deficits    Domestic Life  no deficits    Interactions/Relationships  no deficits    Life Areas  no  deficits    Community and Social Life  no deficits         stable and uncomplicated                      low   low  low Decision Making/ Complexity Score:  low       Plan   Schedule patient for follow up with Physical therapy post surgery. Goals for therapy post surgery will be established at that time.     Therapist: Karely Pittman, PT  12/5/2018

## 2018-11-07 ENCOUNTER — CLINICAL SUPPORT (OUTPATIENT)
Dept: REHABILITATION | Facility: HOSPITAL | Age: 68
End: 2018-11-07
Payer: MEDICARE

## 2018-11-07 ENCOUNTER — HOSPITAL ENCOUNTER (OUTPATIENT)
Dept: RADIOLOGY | Facility: OTHER | Age: 68
Discharge: HOME | End: 2018-11-07
Attending: SURGERY
Payer: MEDICARE

## 2018-11-07 DIAGNOSIS — E04.9 GOITER: ICD-10-CM

## 2018-11-07 DIAGNOSIS — Z91.89 AT RISK FOR LYMPHEDEMA: ICD-10-CM

## 2018-11-07 DIAGNOSIS — C50.911 INFILTRATING DUCTAL CARCINOMA OF RIGHT FEMALE BREAST: Primary | ICD-10-CM

## 2018-11-07 PROCEDURE — G8985 CARRY GOAL STATUS: HCPCS | Mod: CH,PO | Performed by: PHYSICAL MEDICINE & REHABILITATION

## 2018-11-07 PROCEDURE — G8984 CARRY CURRENT STATUS: HCPCS | Mod: CH,PO | Performed by: PHYSICAL MEDICINE & REHABILITATION

## 2018-11-07 PROCEDURE — 97161 PT EVAL LOW COMPLEX 20 MIN: CPT | Mod: PO | Performed by: PHYSICAL MEDICINE & REHABILITATION

## 2018-11-07 PROCEDURE — G8986 CARRY D/C STATUS: HCPCS | Mod: CH,PO | Performed by: PHYSICAL MEDICINE & REHABILITATION

## 2018-11-07 PROCEDURE — 76536 US EXAM OF HEAD AND NECK: CPT | Mod: 26,,, | Performed by: RADIOLOGY

## 2018-11-07 PROCEDURE — 76536 US EXAM OF HEAD AND NECK: CPT | Mod: TC

## 2018-11-07 NOTE — PATIENT INSTRUCTIONS
PRE/POST OP PATIENT EDUCATION    Post Operative Instructions     Range of Motion/lifting Precautions post surgery  The following activities should be avoided until your drain(s) have been removed  - do not lift affected arm above 90 degrees of shoulder flexion  - do not lift over 5 lbs  - do not pull or push heavy objects  - do not sleep on your stomach or surgery side       After surgery, you may begin self-care tasks including grooming, dressing, feeding and simple hygiene as soon as you feel up to it.    Schedule your post-op therapy follow-up after your drains have been removed     When to call your doctor   - if any part of your affected arm or axilla feels hot, is reddened or has increased swelling   - if you develop a temperature over 101 degrees Fahrenheit      Lymphedema - Identification and Prevention     Lymphedema - is the swelling of a body area or extremity caused by the accumulation of lymphatic fluid.  There is a risk for lymphedema with the removal of lymph nodes, trauma or radiation therapy.  Treatment of breast cancer often involves surgery: mastectomy or lumpectomy. Some of the lymph nodes in the underarm (called axillary lymph nodes) may be removed and checked to see if they contain cancer cells.     During breast surgery when axillary lymph nodes are removed (with sentinel node biopsy or axillary dissection) or are treated with radiation therapy, the lymphatic system may become impaired. This may prevent lymphatic fluid from leaving the area therefore, causing lymphedema.     Lymphatic fluid is a normal part of the circulatory system. Its function is to remove waste products and to produce cells vital to fighting infection. Swelling occurs when the vessels become restricted and the lymphatic fluid is unable to freely flow through them.  If lymphedema is left untreated, the affected limb could progressively become more swollen, which could lead to hardening  of the skin, bulkiness in the limb, infection and impaired wound healing.         There are things you can do to decrease the chance of developing lymphedema.                                          [email or web address]                                                                                                                                                  The information presented is intended for general information and educational purposes. It is not intended to replace the  advice of your health care provider. Contact your health care provider if you believe you have a health problem.                                                    POST OP EXERCISES - SAFE TO DO THE FIRST 2 WEEKS AFTER SURGERY UNTIL YOUR FOLLOW UP APPOINTMENT WITH PHYSICAL/OCCUPATIONAL THERAPY    Scapular Retraction (Standing)    With arms at sides, squeeze shoulder blades together. Do not shrug shoulders and do not hold your breath. Hold 5 seconds. Repeat 10 times 1 sessions 1-2 x day.       Exaggerated Breathing and Relaxation      Practice deep breathing frequently in the first few days following surgery even before you begin exercising. This exercise helps with tissue extensibility in the chest wall.  Inhale slowly and deeply through the nose and exhale through pursed lips. Concentrate on relaxing as you let the air out of your lungs. Repeat three (3) to four (4) times, remembering to breath in deeply and then relaxing. This exercise helps to ease the sensation of pulling and discomfort that may be experienced while exercising.      Ball Squeeze OR Hand pumps       Perform this exercise three (3) times a day for 1-3 minutes each time.    The ball squeeze or hand pumps helps to prevent or reduce temporary swelling that may occur in the affected arm. This exercise may be performed standing, sitting or while lying in bed. During this exercise the affected arm should be slightly bent and held upward. Support your arm with a  pillow if you are uncomfortable holding it up.    a. Hold a rubber ball in your hand on the affected side and lift your arm upward.  b. Alternate squeezing and relaxing the ball.              AROM: Elbow Flexion / Extension        With left hand palm up, gently bend elbow as far as possible. Then straighten arm as far as possible. Do this in standing.   Repeat 10 times per set. Do 1 sets per session. Do 1-3 sessions per day.

## 2018-11-09 ENCOUNTER — DOCUMENTATION ONLY (OUTPATIENT)
Dept: SURGERY | Facility: CLINIC | Age: 68
End: 2018-11-09

## 2018-11-15 ENCOUNTER — TELEPHONE (OUTPATIENT)
Dept: SURGERY | Facility: CLINIC | Age: 68
End: 2018-11-15

## 2018-11-15 NOTE — TELEPHONE ENCOUNTER
Spoke with pt regarding surgery, pt advised to arrive to [facility] at 0530 for 0700 surgery, pt verbalized understanding, pre-op education reinforced, all questions answered at this time, pt given reassurance

## 2018-11-16 ENCOUNTER — HOSPITAL ENCOUNTER (OUTPATIENT)
Facility: HOSPITAL | Age: 68
Discharge: HOME | End: 2018-11-17
Attending: SURGERY | Admitting: SURGERY
Payer: MEDICARE

## 2018-11-16 ENCOUNTER — HOSPITAL ENCOUNTER (OUTPATIENT)
Dept: RADIOLOGY | Facility: HOSPITAL | Age: 68
Discharge: HOME | End: 2018-11-16
Attending: SURGERY | Admitting: SURGERY
Payer: MEDICARE

## 2018-11-16 ENCOUNTER — ANESTHESIA (OUTPATIENT)
Dept: SURGERY | Facility: HOSPITAL | Age: 68
End: 2018-11-16
Payer: MEDICARE

## 2018-11-16 DIAGNOSIS — Z17.0 MALIGNANT NEOPLASM OF UPPER-OUTER QUADRANT OF RIGHT BREAST IN FEMALE, ESTROGEN RECEPTOR POSITIVE: ICD-10-CM

## 2018-11-16 DIAGNOSIS — C50.919 INVASIVE CARCINOMA OF BREAST: Primary | ICD-10-CM

## 2018-11-16 DIAGNOSIS — C50.411 MALIGNANT NEOPLASM OF UPPER-OUTER QUADRANT OF RIGHT BREAST IN FEMALE, ESTROGEN RECEPTOR POSITIVE: ICD-10-CM

## 2018-11-16 LAB
POCT GLUCOSE: 103 MG/DL (ref 70–110)
POCT GLUCOSE: 117 MG/DL (ref 70–110)
POCT GLUCOSE: 123 MG/DL (ref 70–110)
POCT GLUCOSE: 96 MG/DL (ref 70–110)

## 2018-11-16 PROCEDURE — S0020 INJECTION, BUPIVICAINE HYDRO: HCPCS | Performed by: ANESTHESIOLOGY

## 2018-11-16 PROCEDURE — 77001 FLUOROGUIDE FOR VEIN DEVICE: CPT | Mod: 26,,, | Performed by: SURGERY

## 2018-11-16 PROCEDURE — 88307 TISSUE EXAM BY PATHOLOGIST: CPT | Performed by: PATHOLOGY

## 2018-11-16 PROCEDURE — 25000003 PHARM REV CODE 250: Performed by: SURGERY

## 2018-11-16 PROCEDURE — 63600175 PHARM REV CODE 636 W HCPCS: Performed by: STUDENT IN AN ORGANIZED HEALTH CARE EDUCATION/TRAINING PROGRAM

## 2018-11-16 PROCEDURE — 36000707: Performed by: SURGERY

## 2018-11-16 PROCEDURE — 88360 TUMOR IMMUNOHISTOCHEM/MANUAL: CPT | Performed by: PATHOLOGY

## 2018-11-16 PROCEDURE — 37000009 HC ANESTHESIA EA ADD 15 MINS: Performed by: SURGERY

## 2018-11-16 PROCEDURE — 63600175 PHARM REV CODE 636 W HCPCS: Performed by: NURSE ANESTHETIST, CERTIFIED REGISTERED

## 2018-11-16 PROCEDURE — 38525 BIOPSY/REMOVAL LYMPH NODES: CPT | Mod: 51,RT,, | Performed by: SURGERY

## 2018-11-16 PROCEDURE — 71000039 HC RECOVERY, EACH ADD'L HOUR: Performed by: SURGERY

## 2018-11-16 PROCEDURE — 25000003 PHARM REV CODE 250: Performed by: NURSE ANESTHETIST, CERTIFIED REGISTERED

## 2018-11-16 PROCEDURE — 36561 INSERT TUNNELED CV CATH: CPT | Mod: LT,,, | Performed by: SURGERY

## 2018-11-16 PROCEDURE — 63600175 PHARM REV CODE 636 W HCPCS: Mod: JG | Performed by: SURGERY

## 2018-11-16 PROCEDURE — 82962 GLUCOSE BLOOD TEST: CPT | Mod: 91 | Performed by: SURGERY

## 2018-11-16 PROCEDURE — 71000033 HC RECOVERY, INTIAL HOUR: Performed by: SURGERY

## 2018-11-16 PROCEDURE — C1894 INTRO/SHEATH, NON-LASER: HCPCS | Performed by: SURGERY

## 2018-11-16 PROCEDURE — 63600175 PHARM REV CODE 636 W HCPCS: Performed by: ANESTHESIOLOGY

## 2018-11-16 PROCEDURE — D9220A PRA ANESTHESIA: Mod: CRNA,,, | Performed by: NURSE ANESTHETIST, CERTIFIED REGISTERED

## 2018-11-16 PROCEDURE — 27000221 HC OXYGEN, UP TO 24 HOURS

## 2018-11-16 PROCEDURE — 25000003 PHARM REV CODE 250: Performed by: STUDENT IN AN ORGANIZED HEALTH CARE EDUCATION/TRAINING PROGRAM

## 2018-11-16 PROCEDURE — 38900 IO MAP OF SENT LYMPH NODE: CPT | Mod: ,,, | Performed by: SURGERY

## 2018-11-16 PROCEDURE — 63600175 PHARM REV CODE 636 W HCPCS: Performed by: SURGERY

## 2018-11-16 PROCEDURE — 64520 N BLOCK LUMBAR/THORACIC: CPT | Mod: 59 | Performed by: ANESTHESIOLOGY

## 2018-11-16 PROCEDURE — A9520 TC99 TILMANOCEPT DIAG 0.5MCI: HCPCS

## 2018-11-16 PROCEDURE — 82962 GLUCOSE BLOOD TEST: CPT | Performed by: SURGERY

## 2018-11-16 PROCEDURE — 88360 TUMOR IMMUNOHISTOCHEM/MANUAL: CPT | Mod: 26,,, | Performed by: PATHOLOGY

## 2018-11-16 PROCEDURE — D9220A PRA ANESTHESIA: Mod: ANES,,, | Performed by: ANESTHESIOLOGY

## 2018-11-16 PROCEDURE — 88307 TISSUE EXAM BY PATHOLOGIST: CPT | Mod: 26,,, | Performed by: PATHOLOGY

## 2018-11-16 PROCEDURE — 19303 MAST SIMPLE COMPLETE: CPT | Mod: RT,,, | Performed by: SURGERY

## 2018-11-16 PROCEDURE — G0378 HOSPITAL OBSERVATION PER HR: HCPCS

## 2018-11-16 PROCEDURE — 88341 IMHCHEM/IMCYTCHM EA ADD ANTB: CPT | Mod: 26,,, | Performed by: PATHOLOGY

## 2018-11-16 PROCEDURE — C1788 PORT, INDWELLING, IMP: HCPCS | Performed by: SURGERY

## 2018-11-16 PROCEDURE — 36000706: Performed by: SURGERY

## 2018-11-16 PROCEDURE — 94761 N-INVAS EAR/PLS OXIMETRY MLT: CPT

## 2018-11-16 PROCEDURE — 37000008 HC ANESTHESIA 1ST 15 MINUTES: Performed by: SURGERY

## 2018-11-16 PROCEDURE — 64461 PVB THORACIC SINGLE INJ SITE: CPT | Mod: 59,RT,, | Performed by: ANESTHESIOLOGY

## 2018-11-16 PROCEDURE — C1769 GUIDE WIRE: HCPCS | Performed by: SURGERY

## 2018-11-16 PROCEDURE — S0020 INJECTION, BUPIVICAINE HYDRO: HCPCS | Performed by: SURGERY

## 2018-11-16 PROCEDURE — 25000003 PHARM REV CODE 250: Performed by: ANESTHESIOLOGY

## 2018-11-16 PROCEDURE — 88342 IMHCHEM/IMCYTCHM 1ST ANTB: CPT | Mod: 26,,, | Performed by: PATHOLOGY

## 2018-11-16 PROCEDURE — S4991 NICOTINE PATCH NONLEGEND: HCPCS | Performed by: STUDENT IN AN ORGANIZED HEALTH CARE EDUCATION/TRAINING PROGRAM

## 2018-11-16 DEVICE — KIT POWERPORT SINGLE 8FR: Type: IMPLANTABLE DEVICE | Site: CHEST | Status: FUNCTIONAL

## 2018-11-16 RX ORDER — IBUPROFEN 200 MG
24 TABLET ORAL
Status: DISCONTINUED | OUTPATIENT
Start: 2018-11-16 | End: 2018-11-17 | Stop reason: HOSPADM

## 2018-11-16 RX ORDER — PROPOFOL 10 MG/ML
VIAL (ML) INTRAVENOUS
Status: DISCONTINUED | OUTPATIENT
Start: 2018-11-16 | End: 2018-11-16

## 2018-11-16 RX ORDER — INSULIN ASPART 100 [IU]/ML
0-5 INJECTION, SOLUTION INTRAVENOUS; SUBCUTANEOUS
Status: DISCONTINUED | OUTPATIENT
Start: 2018-11-16 | End: 2018-11-17 | Stop reason: HOSPADM

## 2018-11-16 RX ORDER — ISOSULFAN BLUE 50 MG/5ML
INJECTION, SOLUTION SUBCUTANEOUS
Status: DISCONTINUED | OUTPATIENT
Start: 2018-11-16 | End: 2018-11-16 | Stop reason: HOSPADM

## 2018-11-16 RX ORDER — FENTANYL CITRATE 50 UG/ML
25 INJECTION, SOLUTION INTRAMUSCULAR; INTRAVENOUS EVERY 5 MIN PRN
Status: DISCONTINUED | OUTPATIENT
Start: 2018-11-16 | End: 2018-11-16 | Stop reason: HOSPADM

## 2018-11-16 RX ORDER — OXYCODONE AND ACETAMINOPHEN 10; 325 MG/1; MG/1
TABLET ORAL
Status: DISPENSED
Start: 2018-11-16 | End: 2018-11-16

## 2018-11-16 RX ORDER — PHENYLEPHRINE HYDROCHLORIDE 10 MG/ML
INJECTION INTRAVENOUS
Status: DISCONTINUED | OUTPATIENT
Start: 2018-11-16 | End: 2018-11-16

## 2018-11-16 RX ORDER — ONDANSETRON 2 MG/ML
INJECTION INTRAMUSCULAR; INTRAVENOUS
Status: DISCONTINUED | OUTPATIENT
Start: 2018-11-16 | End: 2018-11-16

## 2018-11-16 RX ORDER — ROCURONIUM BROMIDE 10 MG/ML
INJECTION, SOLUTION INTRAVENOUS
Status: DISCONTINUED | OUTPATIENT
Start: 2018-11-16 | End: 2018-11-16

## 2018-11-16 RX ORDER — LIDOCAINE HCL/PF 100 MG/5ML
SYRINGE (ML) INTRAVENOUS
Status: DISCONTINUED | OUTPATIENT
Start: 2018-11-16 | End: 2018-11-16

## 2018-11-16 RX ORDER — OXYCODONE AND ACETAMINOPHEN 5; 325 MG/1; MG/1
1 TABLET ORAL EVERY 4 HOURS PRN
Status: DISCONTINUED | OUTPATIENT
Start: 2018-11-16 | End: 2018-11-17 | Stop reason: HOSPADM

## 2018-11-16 RX ORDER — IBUPROFEN 200 MG
1 TABLET ORAL DAILY
Status: DISCONTINUED | OUTPATIENT
Start: 2018-11-16 | End: 2018-11-17 | Stop reason: HOSPADM

## 2018-11-16 RX ORDER — FENTANYL CITRATE 50 UG/ML
25 INJECTION, SOLUTION INTRAMUSCULAR; INTRAVENOUS EVERY 5 MIN PRN
Status: DISCONTINUED | OUTPATIENT
Start: 2018-11-16 | End: 2018-11-16

## 2018-11-16 RX ORDER — OXYCODONE AND ACETAMINOPHEN 10; 325 MG/1; MG/1
1 TABLET ORAL EVERY 4 HOURS PRN
Status: DISCONTINUED | OUTPATIENT
Start: 2018-11-16 | End: 2018-11-17 | Stop reason: HOSPADM

## 2018-11-16 RX ORDER — LEVOTHYROXINE SODIUM 75 UG/1
75 TABLET ORAL
Status: DISCONTINUED | OUTPATIENT
Start: 2018-11-17 | End: 2018-11-17 | Stop reason: HOSPADM

## 2018-11-16 RX ORDER — MIDAZOLAM HYDROCHLORIDE 1 MG/ML
0.5 INJECTION INTRAMUSCULAR; INTRAVENOUS
Status: DISCONTINUED | OUTPATIENT
Start: 2018-11-16 | End: 2018-11-16

## 2018-11-16 RX ORDER — CEFAZOLIN SODIUM 1 G/3ML
2 INJECTION, POWDER, FOR SOLUTION INTRAMUSCULAR; INTRAVENOUS
Status: COMPLETED | OUTPATIENT
Start: 2018-11-16 | End: 2018-11-16

## 2018-11-16 RX ORDER — ACETAMINOPHEN 325 MG/1
325 TABLET ORAL EVERY 6 HOURS PRN
COMMUNITY

## 2018-11-16 RX ORDER — SUCCINYLCHOLINE CHLORIDE 20 MG/ML
INJECTION INTRAMUSCULAR; INTRAVENOUS
Status: DISCONTINUED | OUTPATIENT
Start: 2018-11-16 | End: 2018-11-16

## 2018-11-16 RX ORDER — SODIUM CHLORIDE 0.9 % (FLUSH) 0.9 %
3 SYRINGE (ML) INJECTION
Status: DISCONTINUED | OUTPATIENT
Start: 2018-11-16 | End: 2018-11-17 | Stop reason: HOSPADM

## 2018-11-16 RX ORDER — SODIUM CHLORIDE 9 MG/ML
INJECTION, SOLUTION INTRAVENOUS CONTINUOUS
Status: DISCONTINUED | OUTPATIENT
Start: 2018-11-16 | End: 2018-11-16

## 2018-11-16 RX ORDER — FENTANYL CITRATE 50 UG/ML
INJECTION, SOLUTION INTRAMUSCULAR; INTRAVENOUS
Status: DISCONTINUED | OUTPATIENT
Start: 2018-11-16 | End: 2018-11-16

## 2018-11-16 RX ORDER — EPHEDRINE SULFATE 50 MG/ML
INJECTION, SOLUTION INTRAVENOUS
Status: DISCONTINUED | OUTPATIENT
Start: 2018-11-16 | End: 2018-11-16

## 2018-11-16 RX ORDER — IBUPROFEN 200 MG
16 TABLET ORAL
Status: DISCONTINUED | OUTPATIENT
Start: 2018-11-16 | End: 2018-11-17 | Stop reason: HOSPADM

## 2018-11-16 RX ORDER — BUPIVACAINE HYDROCHLORIDE 5 MG/ML
INJECTION, SOLUTION EPIDURAL; INTRACAUDAL
Status: DISCONTINUED | OUTPATIENT
Start: 2018-11-16 | End: 2018-11-16 | Stop reason: HOSPADM

## 2018-11-16 RX ORDER — HEPARIN SODIUM (PORCINE) LOCK FLUSH IV SOLN 100 UNIT/ML 100 UNIT/ML
SOLUTION INTRAVENOUS
Status: DISCONTINUED | OUTPATIENT
Start: 2018-11-16 | End: 2018-11-16 | Stop reason: HOSPADM

## 2018-11-16 RX ORDER — OXYCODONE AND ACETAMINOPHEN 5; 325 MG/1; MG/1
1 TABLET ORAL EVERY 6 HOURS PRN
Qty: 20 TABLET | Refills: 0 | Status: SHIPPED | OUTPATIENT
Start: 2018-11-16 | End: 2019-01-15

## 2018-11-16 RX ORDER — GLUCAGON 1 MG
1 KIT INJECTION
Status: DISCONTINUED | OUTPATIENT
Start: 2018-11-16 | End: 2018-11-17 | Stop reason: HOSPADM

## 2018-11-16 RX ORDER — BUPIVACAINE HYDROCHLORIDE 5 MG/ML
INJECTION, SOLUTION EPIDURAL; INTRACAUDAL
Status: COMPLETED | OUTPATIENT
Start: 2018-11-16 | End: 2018-11-16

## 2018-11-16 RX ADMIN — EPHEDRINE SULFATE 5 MG: 50 INJECTION, SOLUTION INTRAMUSCULAR; INTRAVENOUS; SUBCUTANEOUS at 09:11

## 2018-11-16 RX ADMIN — PROPOFOL 130 MG: 10 INJECTION, EMULSION INTRAVENOUS at 07:11

## 2018-11-16 RX ADMIN — CEFAZOLIN 2 G: 330 INJECTION, POWDER, FOR SOLUTION INTRAMUSCULAR; INTRAVENOUS at 07:11

## 2018-11-16 RX ADMIN — SODIUM CHLORIDE: 0.9 INJECTION, SOLUTION INTRAVENOUS at 06:11

## 2018-11-16 RX ADMIN — BUPIVACAINE HYDROCHLORIDE 30 ML: 5 INJECTION, SOLUTION EPIDURAL; INTRACAUDAL; PERINEURAL at 08:11

## 2018-11-16 RX ADMIN — SUCCINYLCHOLINE CHLORIDE 120 MG: 20 INJECTION, SOLUTION INTRAMUSCULAR; INTRAVENOUS at 07:11

## 2018-11-16 RX ADMIN — EPHEDRINE SULFATE 5 MG: 50 INJECTION, SOLUTION INTRAMUSCULAR; INTRAVENOUS; SUBCUTANEOUS at 08:11

## 2018-11-16 RX ADMIN — PHENYLEPHRINE HYDROCHLORIDE 200 MCG: 10 INJECTION INTRAVENOUS at 08:11

## 2018-11-16 RX ADMIN — FENTANYL CITRATE 50 MCG: 50 INJECTION INTRAMUSCULAR; INTRAVENOUS at 06:11

## 2018-11-16 RX ADMIN — ONDANSETRON 4 MG: 2 INJECTION INTRAMUSCULAR; INTRAVENOUS at 09:11

## 2018-11-16 RX ADMIN — MIDAZOLAM HYDROCHLORIDE 2 MG: 1 INJECTION, SOLUTION INTRAMUSCULAR; INTRAVENOUS at 06:11

## 2018-11-16 RX ADMIN — PROPOFOL 20 MG: 10 INJECTION, EMULSION INTRAVENOUS at 08:11

## 2018-11-16 RX ADMIN — NICOTINE 1 PATCH: 21 PATCH, EXTENDED RELEASE TRANSDERMAL at 06:11

## 2018-11-16 RX ADMIN — SODIUM CHLORIDE, SODIUM GLUCONATE, SODIUM ACETATE, POTASSIUM CHLORIDE, MAGNESIUM CHLORIDE, SODIUM PHOSPHATE, DIBASIC, AND POTASSIUM PHOSPHATE: .53; .5; .37; .037; .03; .012; .00082 INJECTION, SOLUTION INTRAVENOUS at 08:11

## 2018-11-16 RX ADMIN — OXYCODONE HYDROCHLORIDE AND ACETAMINOPHEN 1 TABLET: 10; 325 TABLET ORAL at 06:11

## 2018-11-16 RX ADMIN — LIDOCAINE HYDROCHLORIDE 60 MG: 20 INJECTION, SOLUTION INTRAVENOUS at 07:11

## 2018-11-16 RX ADMIN — FENTANYL CITRATE 50 MCG: 50 INJECTION, SOLUTION INTRAMUSCULAR; INTRAVENOUS at 07:11

## 2018-11-16 RX ADMIN — PHENYLEPHRINE HYDROCHLORIDE 100 MCG: 10 INJECTION INTRAVENOUS at 07:11

## 2018-11-16 RX ADMIN — FENTANYL CITRATE 25 MCG: 50 INJECTION INTRAMUSCULAR; INTRAVENOUS at 10:11

## 2018-11-16 RX ADMIN — OXYCODONE HYDROCHLORIDE AND ACETAMINOPHEN 1 TABLET: 10; 325 TABLET ORAL at 10:11

## 2018-11-16 RX ADMIN — ROCURONIUM BROMIDE 10 MG: 10 INJECTION, SOLUTION INTRAVENOUS at 07:11

## 2018-11-16 RX ADMIN — PHENYLEPHRINE HYDROCHLORIDE 100 MCG: 10 INJECTION INTRAVENOUS at 08:11

## 2018-11-16 NOTE — ANESTHESIA POSTPROCEDURE EVALUATION
"Anesthesia Post Evaluation    Patient: Alia Andre    Procedure(s) Performed: Procedure(s) (LRB):  MASTECTOMY RIGHT 2.5 HR CASE (Right)  BIOPSY, LYMPH NODE, SENTINEL RIGHT (Right)  [id number]-CATH LEFT (Left)    Final Anesthesia Type: general  Patient location during evaluation: PACU  Patient participation: Yes- Able to Participate  Level of consciousness: awake and alert  Post-procedure vital signs: reviewed and stable  Pain management: adequate  Airway patency: patent  PONV status at discharge: No PONV  Anesthetic complications: no      Cardiovascular status: blood pressure returned to baseline  Respiratory status: unassisted  Hydration status: euvolemic  Follow-up not needed.        Visit Vitals  BP (!) 157/68   Pulse 72   Temp 36.5 °C (97.7 °F) (Temporal)   Resp 14   Ht 5' 6" (1.676 m)   Wt 68 kg (150 lb)   SpO2 95%   Breastfeeding? No   BMI 24.21 kg/m²       Pain/Chito Score: Pain Assessment Performed: Yes (11/16/2018  1:48 PM)  Presence of Pain: non-verbal indicators absent (11/16/2018  1:48 PM)  Pain Rating Prior to Med Admin: 7 (11/16/2018 10:01 AM)  Chito Score: 10 (11/16/2018  1:48 PM)        "

## 2018-11-16 NOTE — H&P
Consult Note  Endocrine Surgery     Visit Diagnosis: Goiter [E04.9]     SUBJECTIVE:      Alia Andre is a 68 y.o. female seen at the request of Dr. Cristiane Frederick today in the Endocrine Surgery Clinic for evaluation of a goiter. Her history dates back to her early 30's when patient self-identified a thyroid mass and was subsequently treated for hypothyroidism. She states that she has been taking synthroid daily since that time. Over the last thirty years, her goiter has only slowly grown. She states that her last thyroid ultrasound was approximately three years ago, and was not told of any malignancy or abnormality at that time.  Alia Andre complains of difficulty with shortness of breath especially with postural changes, palpable neck mass and growth of thyroid nodule over time. The patient denies hot/cold intolerance, fatigue, unexplained weight changes, difficulty with swallowing and change in voice quality. She does not have a history of head and neck radiation therapy. She does have a family history of thyroid disease, but no thyroid cancers. She does have a family history of endocrinopathies. She is taking thyroid hormone supplementation. She has not undergone radioactive iodine treatment.     Of note, the patient was recently diagnosed with breast cancer (Malignant neoplasm of upper-outer quadrant of right breast in female, estrogen receptor positive) and is in need of a port for adjuvant treatment.      Review of patient's allergies indicates:  No Known Allergies     Current Medications          Current Outpatient Medications   Medication Sig Dispense Refill    ACCU-CHEK USHA PLUS TEST STRP Strp          BD ALCOHOL SWABS PadM          levothyroxine (SYNTHROID) 75 MCG tablet Take 75 mcg by mouth before breakfast.         losartan (COZAAR) 50 MG tablet Take 50 mg by mouth once daily.         metFORMIN (GLUCOPHAGE) 500 MG tablet daily with breakfast.         nicotine (NICODERM CQ) 21 mg/24 hr  "APPLY 1 PATCH TRANSDERMALLY EVERY 24 HOURS   0    nicotine, polacrilex, (NICORETTE) 2 mg Gum CHEW 1 PIECE (2 MG) BY MOUTH 10 TIMES PER DAY AS NEEDED   0      No current facility-administered medications for this visit.                  Past Medical History:   Diagnosis Date    Diabetes mellitus type 2 in nonobese      Goiter      Hypertension      Tobacco abuse              Past Surgical History:   Procedure Laterality Date    TUBAL LIGATION         1970/80's      Social History            Tobacco Use    Smoking status: Current Every Day Smoker       Packs/day: 1.00       Types: Cigarettes    Smokeless tobacco: Never Used   Substance Use Topics    Alcohol use: No       Frequency: Never    Drug use: No            Review of Systems:     Review of Systems   Constitutional: negative for chills, fatigue, fevers, malaise and night sweats  Eyes: negative for icterus and irritation  Respiratory: negative for cough and dyspnea on exertion  Cardiovascular: negative for chest pain and palpitations  Gastrointestinal: negative for constipation, diarrhea and dysphagia  Genitourinary:negative for dysuria, hematuria and nocturia  Integument/breast: negative for rash and skin color change  Hematologic/lymphatic: negative for bleeding and easy bruising  Neurological: negative for gait problems, headaches and seizures  Behavioral/Psych: negative for anxiety and depression  Endocrine: negative    ENT ROS: negative  Endocrine ROS:   negative for - hair pattern changes, malaise/lethargy, mood swings, palpitations or polydipsia/polyuria           OBJECTIVE:      Vital Signs:  BP (!) 154/73   Pulse 79   Temp 97.7 °F (36.5 °C)   Resp 18   Ht 5' 6" (1.676 m)   Wt 69.6 kg (153 lb 5.3 oz)   BMI 24.75 kg/m²    Body mass index is 24.75 kg/m².        Physical Exam:     General:  no distress, see vitals for BMI    Eyes:  conjunctivae/corneas clear   Neck: trachea midline and symmetric, no adenopathy    Thyroid:  thyroid enlarged " and nontender. No palpable nodules.   Lung: clear to auscultation bilaterally   Heart:  regular rate and rhythm   Abdomen: soft, non-tender; bowel sounds normal; no masses,  no organomegaly   Skin/Extremities: warm and well-perfused   Pulses: 2+ and symmetric   Neuro: normal without focal findings and mental status, speech normal, alert and oriented x3         Laboratory/Radiologic Studies        Thyroid Uptake and scan(5/11/2015):              Component Value Date     Sodium 139 11/05/2018     Potassium 4.1 11/05/2018     Chloride 105 11/05/2018     CO2 25 11/05/2018     Glucose 88 11/05/2018     BUN, Bld 14 11/05/2018     Creatinine 0.7 11/05/2018     Calcium 10.2 11/05/2018     Anion Gap 9 11/05/2018     eGFR if African American >60.0 11/05/2018     eGFR if non African American >60.0 11/05/2018         ASSESSMENT/PLAN:         Assessment     Ms. Andre is a 68 y.o. female who presents with evidence of Thyroid goiter and evaluation for port placement for Right-sided breast cancer.     Plan     During this visit, thyroid anatomy and physiology were reviewed and she was given a copy of our patient education booklet, Understanding Thyroid Disease. Prior to her surgery, the patient will need to undergo ultrasound evaluation of the thyroid. We will also request records of her last ultrasound (approximately three years ago) and her most recent thyroid lab work completed by her PCP.   While the results of this thyroid imaging and lab work will not interfere with her port placement and right mastectomy, we feel that she would benefit from an updated evaluation. We will review findings and determine if concurrent surgery with thyroidectomy is possible and beneficial.  Patient's questions were answered and she wishes to proceed with the port placement and with the thyroid evaluation.      Will proceed to OR for port placement today

## 2018-11-16 NOTE — INTERVAL H&P NOTE
The patient has been examined and the H&P has been reviewed:    I concur with the findings and no changes have occurred since H&P was written.    Anesthesia/Surgery risks, benefits and alternative options discussed and understood by patient/family.          Active Hospital Problems    Diagnosis  POA    Invasive carcinoma of breast [C50.919]  Yes      Resolved Hospital Problems   No resolved problems to display.

## 2018-11-16 NOTE — PLAN OF CARE
POC reviewed with pt, acknowledged understanding. Pt AAO x 4. Pt remains free of falls/injuries. Pt on telemetry remains SR. Pt blood glucose being monitored upon arrival to the unit. Pt tolerating clear liquid diet. Pt pain controlled with prescribed meds. No acute events throughout shift. No distress noted, will continue to monitor.

## 2018-11-16 NOTE — NURSING TRANSFER
Nursing Transfer Note      11/16/2018     Transfer 529 B    Transfer via stretcher    Transfer with SCDs    Transported by transport    Medicines sent: none    Chart send with patient: yes    Notified: LINDSAY Brand     Patient reassessed at: Upon arrival to floor

## 2018-11-16 NOTE — OP NOTE
Operative Note     11/16/2018    PRE-OP DIAGNOSIS: Malignant neoplasm of upper-outer quadrant of right breast in female, estrogen receptor positive [C50.411, Z17.0]      POST-OP DIAGNOSIS: Post-Op Diagnosis Codes:     * Malignant neoplasm of upper-outer quadrant of right breast in female, estrogen receptor positive [C50.411, Z17.0]    Procedure(s):  MASTECTOMY RIGHT 2.5 HR CASE  BIOPSY, LYMPH NODE, SENTINEL RIGHT axillary  ID of SLN  Injection of radioactive technetium colloid dye and isosulfan blue due for SLN    [id number]-A-CATH LEFT (dictated by Dr. Echeverria)    SURGEON: Surgeon(s) and Role:  Panel 1:     * Cristiane Frederick MD - Primary     * Severiano Velez MD - Resident - Assisting  Panel 2:     * Graciela Echeverria MD - Primary    ANESTHESIA: General     OPERATIVE FINDINGS: 1 SLN, count 51. Negative on frozen section    INDICATION FOR PROCEDURE: This patient presents with a history of cancer of the right breast    PROCEDURE IN DETAIL:  Alia nAdre is a 68 y.o. female brought to the operating room for definitive surgery of cancer of the right breast.  The patient has elected to undergo right simple mastectomy with sentinel lymph node biopsy for lina assessment. The patient was informed of the possible risks and complications of the procedure, including but not limited to anesthetic risks, bleeding, infection, and need for additional surgery.  The patient concurred with the proposed plan, and has given informed consent.  The site of surgery was properly noted/marked in the preoperative holding area.    Prior to arriving in the operating room, the patient's right breast was injected with technetium to facilitate sentinel lymph node identification. The patient was then brought to the operating room and placed in the supine position with both upper extremities extended.  general anesthesia was administered. Perioperative antibiotics were administered consisting of Ancef and a time out was  performed confirming the patient, site, and procedure.  The bilateral chest and axilla was then prepped and draped in the usual sterile fashion.    We then turned our attention to the right breast where an elliptical incision was fashioned to incorporate the nipple areolar complex.  The incision was made with a 10-blade and extended through the subcutaneous tissues with Bovie electrocautery.  Skin flaps were raised to the clavicle superiorly.  We then  turned our attention to the right axilla.  Blue dye was injected prior to the incision in the usual subareolar fashion. The gamma probe was used to identify an area of increased radioactivity within the lower axilla. The clavipectoral sheath was sharply incised to reveal the level I axillary lymph nodes. The probe was used to identify a single node with increased radioactivity.  This node was brought into the operative field and carefully dissected free of the surrounding lymphovascular structures.  The highest ex vivo count of the node was 51.  The node was then sent to pathology for frozen section evaluation, labeled as sentinel node #1.  A total of 1 axillary sentinel nodes and 0 axillary non-sentinel nodes were identified, excised and submitted to pathology.  Bed counts were obtained to confirm that the 10% rule had not been violated.   The wound was irrigated with normal saline, and all bleeding points were secured with Bovie electrocautery.     We then proceeded to raise the remainder of the flaps to the lateral border of the sternum medially, to the inframammary fold inferiorly, and to the anterior border of the latissimus dorsi muscle laterally. The breast tissue was sharply excised off the chest wall taking care to incorporate the pectoralis fascia while leaving the serratus fascia behind.  The resulting mastectomy specimen was marked using a short stitch superiorly and long stitch laterally.  The breast was sent to pathology for permanent evaluation.       Frozen section lina evaluation revealed no evidence of metastatic disease.  Therefore, the operative field was irrigated with normal saline and all bleeding points were secured with Bovie electrocautery.  A 15 Fr anselmo drain was placed under the mastectomy flap. The incision was closed using an interrupted 3-0 vicryl deep dermal stitch followed by a running 4-0 monocryl subcuticular.      Dermabond was applied. A post surgical bra was placed on the patient. At the end of the operation, all sponge, instrument, and needle counts x 2 were correct.    ESTIMATED BLOOD LOSS: less than 50 mL    COMPLICATIONS: none    DISPOSITION: PACU - hemodynamically stable.    ATTESTATION:   I was present and scrubbed for the entire procedure.

## 2018-11-16 NOTE — ANESTHESIA PROCEDURE NOTES
Paravertebral Single Injection Block(s)    Patient location during procedure: pre-op   Block not for primary anesthetic.  Reason for block: at surgeon's request and post-op pain management   Post-op Pain Location: right breast pain  Start time: 11/16/2018 6:30 AM  Timeout: 11/16/2018 6:30 AM   End time: 11/16/2018 6:45 AM  Staffing  Anesthesiologist: Nadja Moses MD  Other anesthesia staff: David Hamilton Jr., MD  Performed: other anesthesia staff   Preanesthetic Checklist  Completed: patient identified, site marked, surgical consent, pre-op evaluation, timeout performed, IV checked, risks and benefits discussed and monitors and equipment checked  Peripheral Block  Patient position: sitting  Prep: ChloraPrep  Patient monitoring: heart rate, cardiac monitor, continuous pulse ox, continuous capnometry and frequent blood pressure checks  Block type: paravertebral - thoracic  Laterality: right  Injection technique: single shot  Needle  Needle type: Tuohy   Needle gauge: 17 G  Needle length: 3.5 in  Needle localization: anatomical landmarks     Assessment  Injection assessment: negative aspiration and negative parasthesia  Paresthesia pain: none  Heart rate change: no  Slow fractionated injection: yes  Additional Notes  T2 os at 4 cm  T4 os at 4 cm  VSS.  [facility] RN monitoring vitals throughout procedure.  Patient tolerated procedure well.

## 2018-11-16 NOTE — BRIEF OP NOTE
[facility]  Brief Operative Note     SUMMARY     Surgery Date: 11/16/2018     Surgeon(s) and Role:  Panel 1:     * Cristiane Frederick MD - Primary     * Severiano Velez MD - Resident - Assisting  Panel 2:     * Graciela Cameron-MD Monster - Primary        Pre-op Diagnosis:  Malignant neoplasm of upper-outer quadrant of right breast in female, estrogen receptor positive [C50.411, Z17.0]    Post-op Diagnosis:  Post-Op Diagnosis Codes:     * Malignant neoplasm of upper-outer quadrant of right breast in female, estrogen receptor positive [C50.411, Z17.0]    Procedure(s) (LRB):  MASTECTOMY RIGHT 2.5 HR CASE (Right)  BIOPSY, LYMPH NODE, SENTINEL RIGHT (Right)  [id number] LEFT (Left)    Anesthesia: General    Description of the findings of the procedure: Right Subclavian port placement was complicated by central venous stenosis, likely 2/2 goiter. The wire was successfully guided past the goiter and into the IVC, and the port was successfully placed in IVC. Right mastectomy and SLNB were performed without complication. Frozen pathology was negative for malignancy.    Findings/Key Components: Port catheter intentionally left long.    Estimated Blood Loss: Minimal         Specimens:   Specimen (12h ago, onward)    Start     Ordered    11/16/18 0931  Specimen to Pathology - Surgery  Once     Comments:  1) Right axillary sentinel lymph node #1 count 51- frozen2) right breast short stitch superior and long stitch lateral- perm     Start Status   11/16/18 0931 Collected (11/16/18 0931)       11/16/18 0931        Dispo: Sent to PACU in stable condition

## 2018-11-16 NOTE — TRANSFER OF CARE
"Anesthesia Transfer of Care Note    Patient: Alia Andre    Procedure(s) Performed: Procedure(s) (LRB):  MASTECTOMY RIGHT 2.5 HR CASE (Right)  BIOPSY, LYMPH NODE, SENTINEL RIGHT (Right)  [id number]-CATH LEFT (Left)    Patient location: PACU    Anesthesia Type: general    Transport from OR: Transported from OR on 6-10 L/min O2 by face mask with adequate spontaneous ventilation    Post pain: adequate analgesia    Post assessment: no apparent anesthetic complications and tolerated procedure well    Post vital signs: stable    Level of consciousness: awake    Nausea/Vomiting: no nausea/vomiting    Complications: none    Transfer of care protocol was followed      Last vitals:   Visit Vitals  BP (!) 123/57 (BP Location: Right arm)   Pulse 83   Temp 36.7 °C (98 °F)   Resp 16   Ht 5' 6" (1.676 m)   Wt 68 kg (150 lb)   SpO2 96%   Breastfeeding? No   BMI 24.21 kg/m²     "

## 2018-11-17 VITALS
DIASTOLIC BLOOD PRESSURE: 79 MMHG | WEIGHT: 150 LBS | HEART RATE: 84 BPM | HEIGHT: 66 IN | BODY MASS INDEX: 24.11 KG/M2 | OXYGEN SATURATION: 98 % | TEMPERATURE: 98 F | SYSTOLIC BLOOD PRESSURE: 145 MMHG | RESPIRATION RATE: 18 BRPM

## 2018-11-17 LAB
ANION GAP SERPL CALC-SCNC: 7 MMOL/L
BASOPHILS # BLD AUTO: 0.05 K/UL
BASOPHILS NFR BLD: 0.5 %
BUN SERPL-MCNC: 9 MG/DL
CALCIUM SERPL-MCNC: 9.6 MG/DL
CHLORIDE SERPL-SCNC: 108 MMOL/L
CO2 SERPL-SCNC: 25 MMOL/L
CREAT SERPL-MCNC: 0.7 MG/DL
DIFFERENTIAL METHOD: ABNORMAL
EOSINOPHIL # BLD AUTO: 0.2 K/UL
EOSINOPHIL NFR BLD: 2.6 %
ERYTHROCYTE [DISTWIDTH] IN BLOOD BY AUTOMATED COUNT: 13.9 %
EST. GFR  (AFRICAN AMERICAN): >60 ML/MIN/1.73 M^2
EST. GFR  (NON AFRICAN AMERICAN): >60 ML/MIN/1.73 M^2
GLUCOSE SERPL-MCNC: 91 MG/DL
HCT VFR BLD AUTO: 40.5 %
HGB BLD-MCNC: 12.8 G/DL
IMM GRANULOCYTES # BLD AUTO: 0.03 K/UL
IMM GRANULOCYTES NFR BLD AUTO: 0.3 %
LYMPHOCYTES # BLD AUTO: 3.4 K/UL
LYMPHOCYTES NFR BLD: 36 %
MAGNESIUM SERPL-MCNC: 1.9 MG/DL
MCH RBC QN AUTO: 29.2 PG
MCHC RBC AUTO-ENTMCNC: 31.6 G/DL
MCV RBC AUTO: 92 FL
MONOCYTES # BLD AUTO: 0.7 K/UL
MONOCYTES NFR BLD: 7.7 %
NEUTROPHILS # BLD AUTO: 5 K/UL
NEUTROPHILS NFR BLD: 52.9 %
NRBC BLD-RTO: 0 /100 WBC
PHOSPHATE SERPL-MCNC: 3.3 MG/DL
PLATELET # BLD AUTO: 255 K/UL
PMV BLD AUTO: 9.8 FL
POTASSIUM SERPL-SCNC: 3.7 MMOL/L
RBC # BLD AUTO: 4.39 M/UL
SODIUM SERPL-SCNC: 140 MMOL/L
WBC # BLD AUTO: 9.37 K/UL

## 2018-11-17 PROCEDURE — S4991 NICOTINE PATCH NONLEGEND: HCPCS | Performed by: STUDENT IN AN ORGANIZED HEALTH CARE EDUCATION/TRAINING PROGRAM

## 2018-11-17 PROCEDURE — 25000003 PHARM REV CODE 250: Performed by: STUDENT IN AN ORGANIZED HEALTH CARE EDUCATION/TRAINING PROGRAM

## 2018-11-17 PROCEDURE — 84100 ASSAY OF PHOSPHORUS: CPT

## 2018-11-17 PROCEDURE — 36415 COLL VENOUS BLD VENIPUNCTURE: CPT

## 2018-11-17 PROCEDURE — 85025 COMPLETE CBC W/AUTO DIFF WBC: CPT

## 2018-11-17 PROCEDURE — 80048 BASIC METABOLIC PNL TOTAL CA: CPT

## 2018-11-17 PROCEDURE — 83735 ASSAY OF MAGNESIUM: CPT

## 2018-11-17 RX ORDER — LOSARTAN POTASSIUM 25 MG/1
50 TABLET ORAL DAILY
Status: DISCONTINUED | OUTPATIENT
Start: 2018-11-17 | End: 2018-11-17 | Stop reason: HOSPADM

## 2018-11-17 RX ORDER — NAPROXEN SODIUM 220 MG/1
81 TABLET, FILM COATED ORAL DAILY
Refills: 0 | COMMUNITY
Start: 2018-11-17 | End: 2020-12-17 | Stop reason: SDUPTHER

## 2018-11-17 RX ADMIN — LOSARTAN POTASSIUM 50 MG: 25 TABLET, FILM COATED ORAL at 08:11

## 2018-11-17 RX ADMIN — NICOTINE 1 PATCH: 21 PATCH, EXTENDED RELEASE TRANSDERMAL at 08:11

## 2018-11-17 RX ADMIN — OXYCODONE HYDROCHLORIDE AND ACETAMINOPHEN 1 TABLET: 5; 325 TABLET ORAL at 04:11

## 2018-11-17 RX ADMIN — LEVOTHYROXINE SODIUM 75 MCG: 75 TABLET ORAL at 05:11

## 2018-11-17 RX ADMIN — OXYCODONE HYDROCHLORIDE AND ACETAMINOPHEN 1 TABLET: 5; 325 TABLET ORAL at 12:11

## 2018-11-17 NOTE — DISCHARGE INSTRUCTIONS
POSTOPERATIVE INSTRUCTIONS FOLLOWING   MASTECTOMY AND/OR AXILLARY LYMPH NODE DISSECTION    The following are post-operative instructions that will help you to recover from your surgery.  Please read over these instructions carefully and contact us if we can answer any of your questions or concerns.    Post-op care/Dressing/breast binder (surgi-bra)  A surgical bra may be placed around your chest after your surgery.  If you are given the bra, please wear it for the first 48 hours after surgery. After 48 hours you can remove your surgical bra and dressing to shower/cleanse the chest wall with antibacterial soap and warm water. Do not take a tub bath and do not soak the surgical site for at least 2 weeks.     The final pathology report will be available approximately 7-10 days after your surgery.  Our office will call you with your pathology report when it becomes available.    If blue dye was used to locate your sentinel lymph nodes, your urine and stool may be blue-green in color for 1 or 2 days.    Dr. Frederick patients: please wear the surgical bra as close to 24 hours a day as possible until your post-operative clinic appointment.  If the elastic around the bra irritates your skin, you may wear a soft t-shirt underneath the bra. You may shower AFTER the drains are removed.  Please sponge bathe until then. Please do not remove the white strips of tape (steri-strips) that cover your incision.  They will be removed at your clinic visit. You may go without wearing the bra long enough to bath, to launder and dry the bra. If you have fluffy filler placed inside the bra, the filler should be removed whenever the bra is taken off. Please reinsert the fluffy filler, or insert the new soft filler, under the bra when you put the bra back on.  If the bra is extremely uncomfortable, you may wear a supportive sports bra instead after 2 days.    Activity    You will be able to do much of your own personal care, such as bathing,  dressing, preparing simple meals, etc.   A short walk each day will help with your recovery   You may find that you need to take rest breaks between activities, but you should not need to stay in bed for prolonged periods of time during the day. A good rule during this time is to listen to your body, do what is comfortable, and stop and rest when your feel tired.  If it hurts, dont do it.   Return to taking your daily medications as prescribed   Please avoid activities that require moderate to heavy lifting (grocery shopping) or pushing/pulling (vacuuming) and repetitive motions (such as washing windows). Do not lift anything heavier than a gallon of milk.   Following a lymph node dissection, dont avoid using your arm, but dont exercise your arm until after your first post-operative visit.  At your first post-op visit, you will be given arm exercises to regain movement and flexibility.  You may be referred to physical therapy if needed.   You may restart driving when you are no longer on narcotics and you feel safe turning the wheel and stopping quickly.   You will need to be out of work approximately 2-6 weeks depending on your particular surgery and how well you are recovering.  We will evaluate how you are doing at the first post-op appointment.  This is a good time to ask when you may return to work and what activities you may do.    Medication for pain   You will be given a prescription for pain medication. You should not drive or operate machinery while taking these.  Please take prescription pain medication (narcotics) with food.  Narcotics can cause, or worsen, constipation.  You will need to increase your fluid intake, eat high fiber foods (such as fruits and bran) and make sure that you are up and walking. You may need to take an over the counter stool softener for constipation.   Short term use of an icepack may be helpful to decrease discomfort and swelling, particularly to the armpit after  lymph node surgery.   A small pillow positioned in the armpit may also decrease discomfort after lymph node surgery.   If you are given a prescription for antibiotics, take them as prescribed.    How to care for your Drain(s)  1. Wash hands--STRIP or milk the drainage tube as it comes out of your body toward the bulb.   a. Beginning where the drain comes out of your body, hold drainage tubing with one hand and with the other, stretch and release tubing an inch at time while moving downward with both hands toward the bulb.  b. Do this 2-3 times before emptying the bulb.  2. Remove the stopper from the bulbs port  (drainage port)  3. Pour the drainage in the measuring cup provided by the nurse  4. Flatten/squeeze the bulb to create a vacuum and replace the stopper before letting go of the bulb.  5. Record the date, time and amount of drainage in ccs (not ounces) each time bulb is emptied. If you have more than one drain, record each separately.  6. Discard the drainage into the toilet after measuring and then wash hands.  7. Empty bulbs 2-3 times/day or as needed if it fills up before 8 hours.  8. Remember to bring the output record with you to your doctors appointment.    Please report the following:   Temperature greater than 101 degrees   Discharge or bad odor from the wound   Excessive bleeding, such as saturated bloody dressing or extreme bruising   Redness at incision and/or drain sites   Swelling or buildup of fluid around incision   Persistent fevers, chills, nausea, vomiting, or diarrhea    Additional information  Your surgeon will see you approximately 2 weeks following your surgery.  If this follow-up appointment has not been made, please call the office.    If you have any questions or problems, please call my office or my nurse.    Dr. Cristiane Rubio, LINDSAY Echevarria, LINDSAY Echevarria, LINDSAY Parsons,  LINDSAY  [phone]      After hours and on weekends, you may call the main [facility] line at [phone] and ask to have the general surgery resident paged or have me paged      Lymphedema Risk Reduction    Lymphedema is a swelling of a part of the body, caused by an insufficient lymphatic system and an accumulation of fluid in the bodys tissues.  Lymphedema may occur when normal drainage of fluid is disrupted, such as an infection, injury, cancer, scar tissue, or removal of lymph nodes.    If you had a full axillary lymph node dissection procedure, you may be at greater risk for lymphedema.     For those patients having a sentinel lymph node biopsy, these risks may be smaller and the recommendations are provided for your review and consideration.    The following list contains recommendations for reducing your risk of developing lymphedema.    I. Skin Care--avoid trauma/injury to reduce infection risk   Keep the hand and arm on the side of surgery clean and dry   Pay attention to nail care and do not cut cuticles   Avoid punctures, such as injections and blood draws from you on the side of your surgery   Wear gloves while doing activities that may cause skin injury (washing dishes, gardening, etc.)   If scratches or punctures occur, wash area with soap and water, and observe for signs of infections (redness, drainage, swelling)   If a rash, itching, redness, pain, increased skin temperatures, fever, or flu-like symptoms occur, contact your physician immediately for early treatment of a possible infection  II. Activity/Lifestyle   Gradually build up the duration and intensity of any activity or exercise   Take frequent rest periods during activity to allow for arm recovery   Monitor your arm and upper body during and after activity for any change in size, shape, tissue, texture, soreness, heaviness, or firmness  III. Avoid constriction of your arm on the side of your  surgery   Avoid having blood pressure taken on the arm on the side of your surgery   Wear loose fitting jewelry and clothing   Be careful not to rest a heavy purse, luggage, or grocery bags on that arm   When you return to wearing a bra, make sure that it is well fitted and not too tight

## 2018-11-17 NOTE — PROGRESS NOTES
Discharge Note: Pt discharged home alert and oriented x4, skin warm to touch . Right breast incision edges well approximated with fluff, surgical bra intact. Right pac intact and not acessed. Right ary drain draining serousanguiness fluid. Pt and daughter taught ary drain teaching.Pt verbalized understanding of discharge teaching

## 2018-11-17 NOTE — DISCHARGE SUMMARY
[facility]  General Surgery  Discharge Summary      Patient Name: Alia Andre  MRN: [id number]  Admission Date: 11/16/2018  Hospital Length of Stay: 0 days  Discharge Date and Time:  11/17/2018 6:48 AM  Attending Physician: Cristiane Frederick MD   Discharging Provider: Terell Rowley MD  Primary Care Provider: German Morales MD     HPI:     Procedure(s) (LRB):  MASTECTOMY RIGHT 2.5 HR CASE (Right)  BIOPSY, LYMPH NODE, SENTINEL RIGHT (Right)  [id number] LEFT (Left)     Hospital Course: Underwent left port placement and Right mastectomy with SLNB. SLNB was negative on frozen. Follow up Xray showed that the port was extending far into the IVC although not causing any PVCs or arrthymias. This was placed purposely there due to central stenosis. She tolerated the procedures well and was kept overnight. She was progressed on her diet and hemodynamically stable on POD1 upon discharge. She was discharged home with oral pain medication and drain in place - which drained 170cc serosanguinous overnight.     PE:  Well developed, well nourished, no acute distress.  Unlabored breathing  Right mastectomy incision with dermabond in place clean and dry. Drain in place with serosanguinous drainage. Left port in place, incision with dermabond, c/d/i.     Consults: None    Significant Diagnostic Studies: Labs:   BMP:   Recent Labs   Lab 11/17/18  0501   GLU 91      K 3.7      CO2 25   BUN 9   CREATININE 0.7   CALCIUM 9.6   MG 1.9    and CBC   Recent Labs   Lab 11/17/18  0501   WBC 9.37   HGB 12.8   HCT 40.5          Pending Diagnostic Studies:     None        Final Active Diagnoses:    Diagnosis Date Noted POA    PRINCIPAL PROBLEM:  Invasive carcinoma of breast [C50.919] 11/16/2018 Yes      Problems Resolved During this Admission:      Discharged Condition: good    Disposition: Home or Self Care    Follow Up:    Patient Instructions:      Diet Adult Regular     No driving until:    Order Comments: Off narcotics     Notify your health care provider if you experience any of the following:  increased confusion or weakness     Notify your health care provider if you experience any of the following:  persistent dizziness, light-headedness, or visual disturbances     Notify your health care provider if you experience any of the following:  worsening rash     Notify your health care provider if you experience any of the following:  severe persistent headache     Notify your health care provider if you experience any of the following:  difficulty breathing or increased cough     Notify your health care provider if you experience any of the following:  redness, tenderness, or signs of infection (pain, swelling, redness, odor or green/yellow discharge around incision site)     Notify your health care provider if you experience any of the following:  severe uncontrolled pain     Notify your health care provider if you experience any of the following:  persistent nausea and vomiting or diarrhea     Notify your health care provider if you experience any of the following:  temperature >100.4     No dressing needed   Order Comments: Can shower tomorrow over glue. Do not peel off glue.     Record and empty drain twice per day. Can call the office to remove drain when <20 cc daily     Activity as tolerated     Medications:  Reconciled Home Medications:      Medication List      START taking these medications    oxyCODONE-acetaminophen 5-325 mg per tablet  Commonly known as:  PERCOCET  Take 1 tablet by mouth every 6 (six) hours as needed for Pain.        CONTINUE taking these medications    ACCU-CHEK USHA PLUS TEST STRP Strp  Generic drug:  blood sugar diagnostic     acetaminophen 325 MG tablet  Commonly known as:  TYLENOL  Take 325 mg by mouth every 6 (six) hours as needed for Pain.     BD ALCOHOL SWABS Padm  Generic drug:  alcohol swabs     levothyroxine 75 MCG tablet  Commonly known as:  SYNTHROID  Take  75 mcg by mouth before breakfast.     losartan 50 MG tablet  Commonly known as:  COZAAR  Take 50 mg by mouth once daily.     metFORMIN 500 MG tablet  Commonly known as:  GLUCOPHAGE  daily with breakfast.     nicotine (polacrilex) 2 mg Gum  Commonly known as:  NICORETTE  CHEW 1 PIECE (2 MG) BY MOUTH 10 TIMES PER DAY AS NEEDED     nicotine 21 mg/24 hr  Commonly known as:  NICODERM CQ  APPLY 1 PATCH TRANSDERMALLY EVERY 24 HOURS            Terell Rowley MD  General Surgery  [facility]

## 2018-11-19 NOTE — OP NOTE
"[facility]  Surgery  Operative Note    SUMMARY     Date of Procedure: 11/16/2018     Procedure: Port-a-cath    Attending Surgeon: Graciela Echeverria M.D.     Pre-Operative Diagnosis:   Breast Cancer    Post-Operative Diagnosis:   Same    Anesthesia: General    Indications: Alia Andre is a 68 y.o..female referred to [facility] for Port-a-cath.    Procedure in detail: The patient was seen in the Holding Room. The risks, benefits, complications, treatment options, and expected outcomes were discussed with the patient. The possibilities of pulmonary aspiration, bleeding, lung perforation, hemorrhage, arrhythmia, and adverse drug reaction were discussed. The patient concurred with the proposed plan, giving informed consent. The site of surgery properly noted/marked. The patient was taken to Operating Room, identified as Alia Andre and the procedure verified as CVL line placement. A Time Out was held and the above information confirmed.    The patient was brought to the operating room and placed supine. After induction with MAC anesthesia, the neck was placed in extension and a roll was placed between the scapulae and the neck was prepped and draped in standard fashion. Local anesthesia was applied to the skin and subcutaneous tissues of the right neck and chest. Using ultrasound guidance, the internal jugular vein was identified. It was extremely distorted as the patient had a large thyroid goiter with small substernal component.  Based on this the decision was made to access the left subclavian vein  An 18-gauge needle was then inserted into the vein. A guide wire was then passed easily through the catheter.  Despite numerous efforts the guidewire coiled upon exiting the catheter.  This was likely because of the goiter vs possible central narrowing.  In attempt to get this point, a 0.018"cm Nitinol Guidewire was placed through the catheter passed this point of narrowing into the SVC. " "   The placement of the guidewire was completed using flouroscopy.   The 11-blade was used to make a small stab incision on the right chest.  A .018" micropuncture cannula introducer was place over the guidewire under flouroscopic guideance.  The guidewire and inner catheter were removed.  Following this, an 0.035"cm guidewire was placed under fluoroscopy through the catheter into the central system and IVC. There were no arrhythmias, or PVCs. The power port catheter was then tunneled over the guidewire into the IVC.  The catheter was left long to ensure use use as the there was a presumed central stenosis.  Please note that this portion of the procedure was completed with vascular assistance.   A 15 blade was used to make a 2.5cm incision on the chest. Electrocautery was then used to create a pocket. The power port fit into this area without difficulty. It was anchored to the chest was using 2-0 and 3-0 Vicryl sutures.  Placement was verified using fluoroscopy. The port was aspirated and was flushed with Heparinized saline. 3mL of Heplock was then placed into the port. A 3-0 vicryl suture was used to close the pocket in a deep dermal location. This was followed by 4-0 monocryl to close the skin in a running subcuticular fashion. Sterile dressings were then placed. The patient tolerated the procedure without complication. All needle sponge and instrument counts were correct at the end of the case. I was present and scrubbed for the port placement portion of the procedure.  The case was this turned over to breast surgery for the remaining portion of the procedure.  Please see their note for full details.      Intraoperative Findings: Port-a-cath placed on left.    Significant Surgical Tasks Conducted by the Assistant(s), if Applicable: none    Complications: No    Estimated Blood Loss (EBL): * No values recorded between 11/16/2018  7:36 AM and 11/16/2018  9:45 AM *           Implants:   Implant Name Type Inv. Item " Serial No.  Lot No. LRB No. Used   KIT POWERPORT SINGLE 8FR - [id number]  KIT POWERPORT SINGLE 8FR  [employer] [id number] 1       Specimens:   Specimen (12h ago, onward)    None           Condition: Good    Disposition: PACU - hemodynamically stable.    Attestation: I was present and scrubbed for the entire procedure.

## 2018-11-25 ENCOUNTER — PATIENT MESSAGE (OUTPATIENT)
Dept: ADMINISTRATIVE | Facility: OTHER | Age: 68
End: 2018-11-25

## 2018-11-26 ENCOUNTER — OFFICE VISIT (OUTPATIENT)
Dept: SURGERY | Facility: CLINIC | Age: 68
End: 2018-11-26
Payer: MEDICARE

## 2018-11-26 ENCOUNTER — CLINICAL SUPPORT (OUTPATIENT)
Dept: REHABILITATION | Facility: HOSPITAL | Age: 68
End: 2018-11-26
Payer: MEDICARE

## 2018-11-26 VITALS
DIASTOLIC BLOOD PRESSURE: 80 MMHG | TEMPERATURE: 98 F | HEART RATE: 95 BPM | WEIGHT: 152 LBS | SYSTOLIC BLOOD PRESSURE: 205 MMHG | BODY MASS INDEX: 25.33 KG/M2 | HEIGHT: 65 IN

## 2018-11-26 DIAGNOSIS — M25.511 PAIN IN SHOULDER REGION, RIGHT: ICD-10-CM

## 2018-11-26 DIAGNOSIS — C50.911 MALIGNANT NEOPLASM OF RIGHT FEMALE BREAST, UNSPECIFIED ESTROGEN RECEPTOR STATUS, UNSPECIFIED SITE OF BREAST: Primary | ICD-10-CM

## 2018-11-26 DIAGNOSIS — R29.3 POOR POSTURE: ICD-10-CM

## 2018-11-26 DIAGNOSIS — C50.911 MALIGNANT NEOPLASM OF RIGHT FEMALE BREAST, UNSPECIFIED ESTROGEN RECEPTOR STATUS, UNSPECIFIED SITE OF BREAST: ICD-10-CM

## 2018-11-26 DIAGNOSIS — Z90.11 ACQUIRED ABSENCE OF RIGHT BREAST: ICD-10-CM

## 2018-11-26 DIAGNOSIS — M25.611 DECREASED ROM OF RIGHT SHOULDER: ICD-10-CM

## 2018-11-26 DIAGNOSIS — Z91.89 AT RISK FOR LYMPHEDEMA: ICD-10-CM

## 2018-11-26 PROCEDURE — 97164 PT RE-EVAL EST PLAN CARE: CPT | Mod: PO

## 2018-11-26 PROCEDURE — 97110 THERAPEUTIC EXERCISES: CPT | Mod: PO

## 2018-11-26 PROCEDURE — G8984 CARRY CURRENT STATUS: HCPCS | Mod: CI,PO

## 2018-11-26 PROCEDURE — 99999 PR PBB SHADOW E&M-EST. PATIENT-LVL III: CPT | Mod: PBBFAC,,, | Performed by: SURGERY

## 2018-11-26 PROCEDURE — G8985 CARRY GOAL STATUS: HCPCS | Mod: CI,PO

## 2018-11-26 PROCEDURE — 99024 POSTOP FOLLOW-UP VISIT: CPT | Mod: S$GLB,,, | Performed by: SURGERY

## 2018-11-26 NOTE — PLAN OF CARE
[facility]  Physical Therapy Re-Evaluation    Name: Alia Andre  Clinic Number: [id number]    Therapy Diagnosis:   Encounter Diagnoses   Name Primary?    Decreased ROM of right shoulder     Poor posture     Pain in shoulder region, right     At risk for lymphedema     Malignant neoplasm of right female breast, unspecified estrogen receptor status, unspecified site of breast      Physician: Cristiane Frederick MD    Physician Orders: PT Re-Eval and Treat   Medical Diagnosis: R breast Cancer; right breast invasive mammary carcinoma, Grade 3, ER (+), IA (-), HER2 (-)  Evaluation Date: 11/26/2018  Authorization Period Expiration: 12/31/18  Plan of Care Certification Period: 1/21/19  Visit # / Visits authorized: 2/ 20  Insurance: [employer]    Time In: 1300  Time Out: 1355  Total Billable Time: 55 minutes    Precautions: Standard and cancer      History   History of Present Illness: Alia is a 68 y.o. female that presents to  [facility] s/p R mastectomy, SLNB, and insertion of port cath on L on 11/16/18.  Chief complaint: pt reports intermittent pain at R chest wall- incision location  Surgical History:  Alia Andre  has a past surgical history that includes Tubal ligation; MASTECTOMY RIGHT 2.5 HR CASE (Right, 11/16/2018); BIOPSY, LYMPH NODE, SENTINEL RIGHT (Right, 11/16/2018); and [id number]-CATH LEFT (Left, 11/16/2018).    Chemotherapy: pt unsure when Chemo will start, likely in January  Radiation: none planned    Past Medical History:   Diagnosis Date    Cancer     breast    Diabetes mellitus type 2 in nonobese     Goiter     Hypertension     Tobacco abuse           Medications:  Alia has a current medication list which includes the following prescription(s): accu-chek carmela plus test strp, acetaminophen, aspirin, bd alcohol swabs, levothyroxine, losartan, metformin, nicotine, nicotine  "(polacrilex), and oxycodone-acetaminophen.    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   Best: 1/10  Worst: 5/10   Pain location: right breast across incisions   Objective   Mental status :A+ O x 3, pleasant, appropriate    Postural examination/scapula alignment: Rounded shoulder and Head forward    Skin Integrity:   Scar Location: across R breast, L chest wall (Port site), bandage over drain site on R  Appearance: clean, dry, no drainage, healed  Signs of infection: No  Drainage:N/A  Color:N/A    Edema: None  Location: N/A    Axillary Web Syndrome/Cording:   Location: N/A  Degree of Cording: none (mild, moderate etc...)   Number of cords present: N/A    Sensation: WNL         Range of Motion:      Shoulder Range of Motion:   Active /Passive ROM Right Left   Flexion 127 160   Abduction 145 170   Extension 65 60   IR/90deg 90 90   ER/90deg 60 75          Strength: manual muscle test grades below   Upper Extremity Strength   (R) UE (L) UE   Shoulder flexion: 5/5 5/5   Shoulder Abduction: 5/5 5/5   Shoulder IR 5/5 5/5   Shoulder ER 5/5 5/5   Elbow flexion: 5/5 5/5   Elbow extension: 5/5 5/5   Wrist flexion: 5/5 5/5   Wrist extension: 5/5 5/5    5/5 5/5       Baseline Measurements of BL UE's for early detection of Lymphedema:     LANDMARK RIGHT UE LEFT UE DIFFERENCE   E + 8" 34 cm 34 cm 0 cm   E + 6" 32 cm 30 cm 2 cm   E + 4" 27.5 cm 27.5cm 0 cm   E + 2" 25 cm 25 " "cm 0 cm   Elbow 24 cm 24 cm 0 cm   W+ 8" 24 cm 24 cm 0 cm   W +  6" 23 cm 22 cm 1 cm   W + 4" 19 cm 19 cm 0 cm   Wrist 15.5 cm 16 cm 0.5 cm   DPC 21 cm 20 cm 1 cm   IP Thumb 7.5cm 7.5 cm 0 cm          Functional Mobility (Bed mobility, transfers)  Independent    ADL's:  independent    Gait Assessment:   - AD used: none  - Assistance: independent  - Distance: community distances     Patient Education Provided   - role of therapy in multi - disciplinary team, goals for therapy  -Pt provided with HEP- see details below. Pt educated on PT POC.    - Pt was educated in lymphedema etiology and management plans  - Pt was provided with written risk reductions and precautions for managing lymphedema.   * pt states she still has information from Pre-op Evaluation regarding lymphedema education    Pt has no cultural, educational or language barriers to learning provided.  Treatment and Instruction of Home Exercise Program    Time In: 1330  Time Out: 1355    Alia received individual therapeutic exercises to improve postural correction and alignment, stretching and soft tissue mobility, and strengthening for 25 minutes including the following:   Shoulder flexion with wand       1 x 10  R Shoulder ER stretch in supine  3 x 30s  Sidelying R shoulder ABD       1 x 10  Scapular retractions         1 x 10, 3s holds  Wall climbs flexion        5 x 5s holds  Wall climbs abduction        5 x 5s holds        Written Home Exercises Provided and Patient Education: Handouts given   See EMR under patient instructions for program given  Pt demonstrated good understanding of the education provided. Patient demo good return demo of skill of exercises.    Functional Limitations Reporting      CMS Impairment/Limitation/Restriction for FOTO shoulder Survey    Therapist reviewed FOTO scores for Alia Andre on 11/26/2018.   FOTO documents entered into EPIC - see Media section.    Limitations Score: 16%  Category: Carrying    Current : CI = at " least 1% but < 20% impaired, limited or restricted  Goal: CI = at least 1% but < 20% impaired, limited or restricted           Assessment   This is a 68 y.o. female referred to outpatient physical therapy and presents with a medical diagnosis of R  breast cancer, s/p TM (no reconstruction) and SLNB and was seen today post-operatively to establish PT plan of care for impairments following surgery including: decreased ROM, impaired functional mobility, poor posture, and pain in R shoulder/axillary region.     Pt instructed in HEP this session and was able to perform all exercises given independently. Pt instructed to follow up with therapist if any concerns arise with program established. Pt will continue to benefit from skilled physical therapy to address the impairments stated in chart below, provide patient/family education and to maximize pt's level of independence in home and community environment     Anticipated barriers to physical therapy: none     Pt's spiritual, cultural and educational needs considered and pt agreeable to plan of care and goals as stated below:     Medical necessity is demonstrated by the following IMPAIRMENTS/PROMBLEM LIST:  History  Co-morbidities and personal factors that may impact the plan of care Examination  Body Structures and Functions, activity limitations and participation restrictions that may impact the plan of care    Clinical Presentation   Co-morbidities:   Breast cancer  S/p R TM and SLNB  diabetes, HTN and Tobacco use        Personal Factors:   no deficits Body Regions:   upper extremities  trunk    Body Systems:   ROM  scar formation  posture        Participation Restrictions:   none     Activity limitations:   Mobility  lifting and carrying objects    Self care  no deficits    Domestic Life  no deficits    Interactions/Relationships  no deficits    Life Areas  no deficits    Community and Social Life  no deficits         evolving clinical presentation with changing  clinical characteristics                      moderate   high  moderate Decision Making/ Complexity Score:  moderate       Goals: Pt agrees with goals set    Short Term goals: 4 weeks  1. Patient will demonstrate 100% understanding of lymphedema risk reduction practices to include self monitoring for lymphedema. (progressing, not met)  2. Patient will demonstrate independence with Home Exercise program established. (progressing, not met)  3. Pt will increase AROM/PROM in shoulder abduction ROM to >/=160 degrees on right to improve functional reach, carry, push, pull pain free. (progressing, not met)  4. Pt will increase AROM/PROM in shoulder flexion to >/=145 degrees on right to improve functional reach, carry, push, pull pain free.(progressing, not met)  5. Pt will increase AROM/PROM in shoulder ER to >/=75 degrees on the the right to improve functional reach, carry, push, pull pain free (progressing, not met)    Long Term Goals: 8 weeks   1.  Pt will increase AROM/PROM in shoulder flexion to 160 degrees on right to improve functional reach, carry, push, pull pain free. (progressing, not met)  2.. Pt will demonstrate full/maximized tissue mobility to increase ROM and promote healthy tissue to be pain free at discharge. (progressing, not met)  3. Pt will report decrease in overall worst pain to 2/10 at discharge. (progressing, not met)  4. Pt will increase AROM/PROM in shoulder abduction ROM to 170 degrees on right to improve functional reach, carry, push, pull pain free. (progressing, not met)  5. Patient will report compliance with walking program 5x week for >/=15 min each day to improve overall cardiovascular function and decrease cancer related fatigue at discharge. (progressing, not met)      Plan   Outpatient physical therapy 1x week for 8 weeks to include the following:   Manual Therapy, Neuromuscular Re-ed, Patient Education, Self Care, Therapeutic Activites and Therapeutic Exercise, modalities as  needed.    Plan of care Certification Period: 11/26/2018 to 1/21/19    Therapist: Kala Romano, PT  11/26/2018

## 2018-11-26 NOTE — PROGRESS NOTES
Breast Surgery  [facility]  Department of Surgery      REFERRING PROVIDER:  No referring provider defined for this encounter.    Chief Complaint: Post-op Evaluation (Post-Op Mastectomy 11/16/18)      Subjective:      Interval History:   She presents s/p Right mastectomy with right SLNB and port placement.     Doing well. 20 cc for the past 5 days out of the right drain. No fever/chills. Taking a pain medication once daily. Good mobility in right arm and no complaints of swelling. Has cough and congestion. Also complaining of bloating and gas pain. Had a normal BM yesterday.    Patient ID: Alia Andre is a 68 y.o. female who presents with newly diagnosed RIGHT breast cancer. She reports that she felt the mass on SBE approx 1 month ago. Bilateral diagnostic mammogram and ultrasound (9/28/18) was BI-RADS 4 showing 19 x 14 x 14 mm irregularly shaped, hypoechoic mass with indistinct margins seen in the RIGHT breast at [id number], a lymph node in the RIGHT axilla with thickened cortex of 3 mm, and a LEFT breast 9 mm oval simple cyst at [id number]. An ultrasound guided biopsy was performed on 10/11/18 with pathology revealing grade 3 ER+ (10-20%, weak) TX- Her2- [id number]% invasive mammary carcinoma with medullary features of the RIGHT breast with CNBx of lymph node showing no evidence of carcinoma.    Patient does routinely do self breast exams. Patient has noted a change on breast exam. Patient denies nipple discharge. Patient denies previous breast biopsy (prior to current events). Patient denies a personal history of breast cancer (prior to current events).    Findings at that time were the following:   Tumor size: 1.9 cm (on imaging)   Tumor thgthrthathdtheth:th th4th Estrogen Receptor: + (10-20%, weak)  Progesterone Receptor: -  Her-2 cristhian: -   Lymph node status: Clinically negative   Lymphatic invasion: N/A       Risk Factors/Breast History  Age of Menarche: 20  Menstrual History: Regular, monthly, no menorrhagia  Age of  "Menopause: Uncertain (hot flashes at 29, cycles until late 30s)  History of Hormonal Therapy: No OCPs or HRT  Number of Pregnancies: 2 ()  Age of First Birth: 24  Lactational History: No  Mammogram History: One at approx age 42 prior to this occurrence  History of Chest Radiation: No  History of Breast Bx: Yes (current events only)  History of Breast Ca: Yes (current events only)  Family History of Breast Ca: Paternal first cousin (age late 50s), maternal first cousin (age late 50s), paternal aunt (age early 50s), possible sister (age 60s) - states "they didn't do any surgery; they're just watching it every year"  Family History of Ovarian Ca: No      Current everyday smoker. 1 PPD x 40 years.  Had BTL. No hysterectomy or oophorectomy.    Past Medical History:   Diagnosis Date    Cancer     breast    Diabetes mellitus type 2 in nonobese     Goiter     Hypertension     Tobacco abuse       Past Surgical History:   Procedure Laterality Date    BIOPSY, LYMPH NODE, SENTINEL RIGHT Right 2018    Performed by Cristiane Frederick MD at [facility] OR [address] [place] [address]    INSERTION OF TUNNELED CENTRAL VENOUS CATHETER (CVC) WITH SUBCUTANEOUS PORT Left 2018    Procedure: UPJJIDXNP-DGSA-Y-CATH LEFT;  Surgeon: Graciela Echeverria MD;  Location: [facility] OR [address] [place] [address];  Service: General;  Laterality: Left;    LZJERAWBW-YRCP-U-CATH LEFT Left 2018    Performed by Graciela Echeverria MD at [facility] OR [address] [place] [address]    MASTECTOMY Right 2018    Procedure: MASTECTOMY RIGHT 2.5 HR CASE;  Surgeon: Cristiane Frederick MD;  Location: [facility] OR [address] [place] [address];  Service: General;  Laterality: Right;  needs to be 1st case, Dr. Cameron has afternoon cases at [facility]    MASTECTOMY RIGHT 2.5 HR CASE Right 2018    Performed by Cristiane Frederick MD at [facility] OR [address] [place] [address]    SENTINEL LYMPH NODE BIOPSY Right 2018    Procedure: BIOPSY, LYMPH NODE, SENTINEL RIGHT;  Surgeon: Cristiane Frederick MD;  Location: [address];  Service: General;  Laterality: Right;    " TUBAL LIGATION      1970/80's        Current Outpatient Medications on File Prior to Visit   Medication Sig Dispense Refill    ACCU-CHEK USHA PLUS TEST STRP Strp       acetaminophen (TYLENOL) 325 MG tablet Take 325 mg by mouth every 6 (six) hours as needed for Pain.      levothyroxine (SYNTHROID) 75 MCG tablet Take 75 mcg by mouth before breakfast.       losartan (COZAAR) 50 MG tablet Take 50 mg by mouth once daily.       metFORMIN (GLUCOPHAGE) 500 MG tablet daily with breakfast.       nicotine (NICODERM CQ) 21 mg/24 hr APPLY 1 PATCH TRANSDERMALLY EVERY 24 HOURS  0    nicotine, polacrilex, (NICORETTE) 2 mg Gum CHEW 1 PIECE (2 MG) BY MOUTH 10 TIMES PER DAY AS NEEDED  0    oxyCODONE-acetaminophen (PERCOCET) 5-325 mg per tablet Take 1 tablet by mouth every 6 (six) hours as needed for Pain. 20 tablet 0    aspirin 81 MG Chew Take 1 tablet (81 mg total) by mouth once daily. Start on Monday.  0    BD ALCOHOL SWABS PadM        No current facility-administered medications on file prior to visit.      Social History     Socioeconomic History    Marital status:      Spouse name: Not on file    Number of children: Not on file    Years of education: Not on file    Highest education level: Not on file   Social Needs    Financial resource strain: Not on file    Food insecurity - worry: Not on file    Food insecurity - inability: Not on file    Transportation needs - medical: Not on file    Transportation needs - non-medical: Not on file   Occupational History    Not on file   Tobacco Use    Smoking status: Current Some Day Smoker     Packs/day: 1.00     Years: 30.00     Pack years: 30.00     Types: Cigarettes    Smokeless tobacco: Never Used   Substance and Sexual Activity    Alcohol use: No     Frequency: Never    Drug use: No    Sexual activity: Not on file   Other Topics Concern    Not on file   Social History Narrative    Not on file     Family History   Problem Relation Age of Onset     "Breast cancer Sister     Breast cancer Paternal Aunt     Breast cancer Paternal Cousin     Breast cancer Maternal Cousin         Review of Systems   Constitutional: Negative for activity change, chills, fatigue and fever.   HENT: Negative for congestion, rhinorrhea, sore throat, trouble swallowing and voice change.    Eyes: Negative for visual disturbance.   Respiratory: Negative for cough, shortness of breath and wheezing.    Cardiovascular: Negative for chest pain, palpitations and leg swelling.   Gastrointestinal: Negative for abdominal distention, abdominal pain, constipation, diarrhea, nausea and vomiting.   Genitourinary: Negative for dysuria, frequency and hematuria.   Musculoskeletal: Negative for arthralgias and myalgias.   Skin: Negative for color change, rash and wound.   Neurological: Negative for syncope, weakness and numbness.   Hematological: Does not bruise/bleed easily.   Psychiatric/Behavioral: Negative for behavioral problems and confusion.        Objective:   BP (!) 205/80 (BP Location: Left arm, Patient Position: Sitting, BP Method: Large (Automatic))   Pulse 95   Temp 98 °F (36.7 °C) (Oral)   Ht 5' 5" (1.651 m)   Wt 68.9 kg (152 lb)   BMI 25.29 kg/m²     Physical Exam   Nursing note and vitals reviewed.  Constitutional: She is oriented to person, place, and time. She appears well-developed and well-nourished. No distress.   HENT:   Head: Normocephalic and atraumatic.   Eyes: Conjunctivae and EOM are normal.   Neck: Neck supple. Thyromegaly (Large symmetric goiter) present.   Cardiovascular: Normal rate, regular rhythm and intact distal pulses.    Pulmonary/Chest: Effort normal. No respiratory distress. She has no wheezes. Right breast exhibits skin change. Right breast exhibits no inverted nipple, no mass, no nipple discharge and no tenderness. Left breast exhibits no inverted nipple, no mass, no nipple discharge, no skin change and no tenderness. Breasts are symmetrical. There is no " breast swelling.       Right mastectomy incision in place with dermabond. No leakage. Slight redness of the incision. Left port-a-cath in place with dermabond that is clean and dry.    Abdominal: Soft. She exhibits no distension. There is no tenderness.   Genitourinary: No breast bleeding.   Musculoskeletal: Normal range of motion. She exhibits no edema or deformity.   No right arm swelling    Neurological: She is alert and oriented to person, place, and time.   Skin: Skin is warm and dry. No rash noted. She is not diaphoretic. No erythema.     Psychiatric: She has a normal mood and affect. Her behavior is normal.       Radiology review: Images personally reviewed by me in the clinic.     Bilateral Diagnostic Mammogram (9/28/18): BI-RADS 4. The breasts are almost entirely fatty. There is a low density, oval mass with circumscribed margins seen in the upper outer quadrant of the LEFT breast in the posterior depth. There is a high density, irregularly shaped mass seen in the upper outer quadrant of the RIGHT breast in the middle depth. The mass correlates with a palpable mass reported by the patient.    US Breast Bilateral Limited (9/28/18): BI-RADS 4. There is a 9 mm oval simple cyst seen in the LEFT breast at 1 o'clock, 6 cm from the nipple. There is a lymph node seen in the right axilla with thickened cortex of 3 mm. There is a 19 mm x 14 mm x 14 mm irregularly shaped, hypoechoic mass with indistinct margins seen in the right breast at 11 o'clock, 6 cm from the nipple. The mass correlates with a palpable mass reported by the patient and mammographic finding.       Assessment:       Right 1.9cm IDC (essently triple negative) s/p right simple mastectomy with right SLNB (negative on frozen)   Plan:     Path still in process, will call once resulted   Daily ASA for IVC located port   Follow up  medical oncology -Dr. Garces for post adjuvant chemotherapy  Plan to see PT this afternoon    F/u in 4 months

## 2018-11-26 NOTE — PATIENT INSTRUCTIONS
ROM: Flexion - Wand (Supine)        Lie on back holding wand. Raise arms over head.   Repeat _10___ times per set. Do ___1_ sets per session. Do __1-2__ sessions per day.     [email or web address].[email or web address]           Sidelying Shoulder Abduction            Sidelying Shoulder Abduction    Lie on your right/left side with right/left arm on top. Keep your elbow straight. Lift your arm upward toward your head.  Perform __10__ times. Perform __1__sets.   Perform __1-2__ times per day.  Copyright © 1962-9483 [employer]ROM:              .      Wall Walk (Shoulder Abduction)  Using your affected arm, walk your hand up  a wall straight out to the side, as high as you  are able. Perform 10 times for 1 set, perform 1-2 times/day      Contract / Relax: External Rotation    Place Right arm, elbow bent, beside head on pillow. Use 1-3 pillows to be comfortable.  REST AND RELAX. Hold 30 seconds. Repeat 3 times.  Do 1-2 times per day.               Wall Climb    Perform this exercise  1-2) times a day with ten (10) repetitions.    Stand and FACE THE WALL with your toes 10 to 12 inches away from the wall.    a. Place the fingers of your affected arm on the wall and slowly walk your fingers up the wall. Let your fingers climb the wall as high as possible without feeling pulling or pain.  b. Hold this stretch for 15 to 20 seconds then move your fingers back down the wall.  c. Try to go a little higher each time. It may relax you a bit if you rest your head against the wall.    Repeat the above exercises standing with your side to the wall.

## 2018-11-29 ENCOUNTER — TELEPHONE (OUTPATIENT)
Dept: SURGERY | Facility: CLINIC | Age: 68
End: 2018-11-29

## 2018-11-29 NOTE — TELEPHONE ENCOUNTER
Left VM with my direct contact information, patient advised to give a return call with any questions or concerns about pathology results and follow up

## 2018-11-29 NOTE — TELEPHONE ENCOUNTER
----- Message from Cassia Barkley sent at 11/29/2018  2:21 PM CST -----  Patient Returning Call from [facility]    Who Left Message for Patient:Dr. Frederick  Communication Preference:[phone]  Additional Information:

## 2018-12-04 ENCOUNTER — TUMOR BOARD CONFERENCE (OUTPATIENT)
Dept: SURGERY | Facility: CLINIC | Age: 68
End: 2018-12-04

## 2018-12-04 NOTE — PROGRESS NOTES
"   Infiltrating ductal carcinoma of right female breast (Resolved)    10/11/2018 Biopsy     There is a 19 mm x 14 mm x 14 mm irregularly shaped, hypoechoic mass with indistinct margins seen in the right breast at 11 o'clock, 6 cm from the nipple. The mass correlates with a palpable mass reported by the patient         10/11/2018 Initial Diagnosis     1. Right breast, mass, biopsy:  -Invasive mammary carcinoma with medullary features.  -Histologic grade: Grade 3 (3, 3, 2).  -9 mm in greatest linear dimension involving core biopsy tip.    2. Lymph node, submitted as "right lymph node", biopsy:  -Lymphoid tissue, negative for metastatic carcinoma.         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 pathologic prognostic staging system           12/4/2018 Tumor Conference     Port placed at time of surgery. With only 10% ER will need adjuvant therapy.     No radiation therapy              "

## 2018-12-05 ENCOUNTER — CLINICAL SUPPORT (OUTPATIENT)
Dept: REHABILITATION | Facility: HOSPITAL | Age: 68
End: 2018-12-05
Payer: MEDICARE

## 2018-12-05 DIAGNOSIS — Z91.89 AT RISK FOR LYMPHEDEMA: ICD-10-CM

## 2018-12-05 DIAGNOSIS — R29.3 POOR POSTURE: ICD-10-CM

## 2018-12-05 DIAGNOSIS — M25.511 PAIN IN SHOULDER REGION, RIGHT: ICD-10-CM

## 2018-12-05 DIAGNOSIS — M25.611 DECREASED ROM OF RIGHT SHOULDER: ICD-10-CM

## 2018-12-05 PROCEDURE — 97140 MANUAL THERAPY 1/> REGIONS: CPT | Mod: PO | Performed by: PHYSICAL MEDICINE & REHABILITATION

## 2018-12-05 PROCEDURE — 97110 THERAPEUTIC EXERCISES: CPT | Mod: PO | Performed by: PHYSICAL MEDICINE & REHABILITATION

## 2018-12-05 NOTE — PROGRESS NOTES
Physical Therapy Daily Treatment Note     Name: Alia Andre  Clinic Number: [id number]  Diagnosis:   Encounter Diagnoses   Name Primary?    Decreased ROM of right shoulder     Poor posture     Pain in shoulder region, right     At risk for lymphedema      Physician: Cristiane Frederick MD    Precautions: None  Visit #: 3 of 20  Time In: 9:45 AM  Time Out: 10:30 AM  Total Treatment Time 1:1: 45 minutes    Evaluation Date: 11/26/18  Visit # authorized: 20  Authorization period: 12/31/18  Plan of care Expiration: 1/21/18  MD referral: Cristiane Frederick MD  Insurance: [employer]      Subjective     Pt reports: still having pain in right anterior chest and right axilla. States able to move her RUE better  Pain Scale: Alia rates pain on a scale of 5/10 on VAS.   Pain location: anterior chest and axilla right    Fatigue: Mild  Functional change: moving RUE more  Surgery date:  R mastectomy, SLNB, and insertion of port cath on L on 11/16/18.  Surgical History:  Alia Andre  has a past surgical history that includes Tubal ligation; MASTECTOMY RIGHT 2.5 HR CASE (Right, 11/16/2018); BIOPSY, LYMPH NODE, SENTINEL RIGHT (Right, 11/16/2018); and ANQOCOXBO-HGED-O-CATH LEFT (Left, 11/16/2018).     Chemotherapy: pt unsure when Chemo will start, likely in January  Radiation: none planned    Objective     Alia received individual therapeutic exercises to improve postural correction and alignment, stretching and soft tissue mobility, and strengthening for 30 minutes including the following:   Shoulder flexion with wand           1 x 10  Supine shld ER with wand         1 x 10  Butterflies                                    10 x 5'  R Shoulder ER stretch in supine  3 x 30s--at home  Sidelying R shoulder ABD            1 x 10  Scapular retractions                      1 x 10, 3s holds YTB  Wall climbs flexion                        10 x 5s holds  Wall climbs  abduction                   10 x 5s holds       Alia received the following manual therapy techniques were performed to increased myofascial/soft tissue length, mobility and pliability, increase PROM, AROM and function as well as to decrease pain for 15 minutes  Stretching, bending for right axillary and upper arm cording  Anterior and lateral chest stretch  Posterior capsule stretch  Grade II G-H joint mobs  Passive stretch all Shld motions    Shoulder Range of Motion: 12/5/18  Active /Passive ROM Right Left   Flexion 140 160   Abduction 125 170   Extension 65 60   IR/90deg 90 90   ER/90deg 75 75         Home Exercise Program and Patient Education   Education provided re:  - role of PT in multi - disciplinary team, goals for PT  - progress towards goals     See EMR under notes/patient instructions for HEP given/taught this session - all sets and reps included. Pt received printed copy.     Pt was able to demonstrate and report understanding and performance  Pt has no cultural, educational or language barriers to learning provided.    CMS Impairment/Limitation/Restriction for FOTO shoulder Survey     Therapist reviewed FOTO scores for Alia Andre on 12/5/2018.   FOTO documents entered into [employer] - see Media section.     Limitations Score: 29%  Category: Carrying          Assessment     Patient is responding well to physical therapy. Is at expected function and pain level for 3 weeks post operatively.   Pain after treatment: 5/10 with less tighness    Pt prognosis is Good. Patient received manual therapy as above to decrease right shoulder joint tightness and right axillary cording. Patient performed above exercise program and tolerated addition of ER with wand,  supine ER (butterflies) and YTB with scap retractions. AROM right shoulder improving. Pt will continue to benefit from skilled outpatient physical therapy to address the deficits listed in the problem list chart on initial evaluation, provide  pt/family education and to maximize pt's level of independence in the home and community environment.     Goals as follows:  Short Term goals: 4 weeks  1. Patient will demonstrate 100% understanding of lymphedema risk reduction practices to include self monitoring for lymphedema. (progressing, not met)  2. Patient will demonstrate independence with Home Exercise program established. (progressing, not met)  3. Pt will increase AROM/PROM in shoulder abduction ROM to >/=160 degrees on right to improve functional reach, carry, push, pull pain free. (progressing, not met)  4. Pt will increase AROM/PROM in shoulder flexion to >/=145 degrees on right to improve functional reach, carry, push, pull pain free.(progressing, not met)  5. Pt will increase AROM/PROM in shoulder ER to >/=75 degrees on the the right to improve functional reach, carry, push, pull pain free (progressing, not met)     Long Term Goals: 8 weeks   1.  Pt will increase AROM/PROM in shoulder flexion to 160 degrees on right to improve functional reach, carry, push, pull pain free. (progressing, not met)  2.. Pt will demonstrate full/maximized tissue mobility to increase ROM and promote healthy tissue to be pain free at discharge. (progressing, not met)  3. Pt will report decrease in overall worst pain to 2/10 at discharge. (progressing, not met)  4. Pt will increase AROM/PROM in shoulder abduction ROM to 170 degrees on right to improve functional reach, carry, push, pull pain free. (progressing, not met)  5. Patient will report compliance with walking program 5x week for >/=15 min each day to improve overall cardiovascular function and decrease cancer related fatigue at discharge. (progressing, not met)           Plan     Continue with established POC toward physical therapy goals    Therapist: Karely Pittman, PT  12/5/2018       no

## 2018-12-05 NOTE — PATIENT INSTRUCTIONS
ROM: External Rotation - Wand (supine)    Lie on back holding wand with elbows bent to 90°. Rotate forearms over head as far as possible.   Repeat __10__ times per set. Do _1___ sets per session. Do __2__ sessions per day.          Butterflies    Lie on your back with your hands behind your head. Open your chest by  your elbows apart and gently down. Hold for 5 seconds. Then, bring  your elbows back together. Repeat 2x daily  10 reps       Scapular Retraction: Rowing (Eccentric) - Arms - Side (Resistance Band)        Hold end of band in each hand with elbows bent at your side. Pull elbows back against the band as you squeeze your shoulder blades together. Hold for 3 seconds, do 1 sets of 10 reps each 1x day everyday.

## 2018-12-07 ENCOUNTER — LAB VISIT (OUTPATIENT)
Dept: LAB | Facility: OTHER | Age: 68
End: 2018-12-07
Attending: INTERNAL MEDICINE
Payer: MEDICARE

## 2018-12-07 ENCOUNTER — OFFICE VISIT (OUTPATIENT)
Dept: HEMATOLOGY/ONCOLOGY | Facility: CLINIC | Age: 68
End: 2018-12-07
Payer: MEDICARE

## 2018-12-07 VITALS
RESPIRATION RATE: 16 BRPM | HEIGHT: 66 IN | OXYGEN SATURATION: 96 % | TEMPERATURE: 98 F | HEART RATE: 74 BPM | WEIGHT: 154.31 LBS | BODY MASS INDEX: 24.8 KG/M2 | DIASTOLIC BLOOD PRESSURE: 75 MMHG | SYSTOLIC BLOOD PRESSURE: 162 MMHG

## 2018-12-07 DIAGNOSIS — E11.9 TYPE 2 DIABETES MELLITUS WITHOUT COMPLICATION, WITHOUT LONG-TERM CURRENT USE OF INSULIN: ICD-10-CM

## 2018-12-07 DIAGNOSIS — Z78.0 MENOPAUSE: ICD-10-CM

## 2018-12-07 DIAGNOSIS — E55.9 VITAMIN D DEFICIENCY: ICD-10-CM

## 2018-12-07 DIAGNOSIS — I10 ESSENTIAL HYPERTENSION: ICD-10-CM

## 2018-12-07 DIAGNOSIS — C50.911 INVASIVE DUCTAL CARCINOMA OF BREAST, FEMALE, RIGHT: ICD-10-CM

## 2018-12-07 DIAGNOSIS — Z17.0 MALIGNANT NEOPLASM OF UPPER-OUTER QUADRANT OF RIGHT BREAST IN FEMALE, ESTROGEN RECEPTOR POSITIVE: Primary | ICD-10-CM

## 2018-12-07 DIAGNOSIS — C50.411 MALIGNANT NEOPLASM OF UPPER-OUTER QUADRANT OF RIGHT BREAST IN FEMALE, ESTROGEN RECEPTOR POSITIVE: Primary | ICD-10-CM

## 2018-12-07 LAB
25(OH)D3+25(OH)D2 SERPL-MCNC: 18 NG/ML
ALBUMIN SERPL BCP-MCNC: 4 G/DL
ALP SERPL-CCNC: 103 U/L
ALT SERPL W/O P-5'-P-CCNC: 18 U/L
ANION GAP SERPL CALC-SCNC: 8 MMOL/L
AST SERPL-CCNC: 16 U/L
BILIRUB SERPL-MCNC: 0.4 MG/DL
BUN SERPL-MCNC: 17 MG/DL
CALCIUM SERPL-MCNC: 9.9 MG/DL
CHLORIDE SERPL-SCNC: 107 MMOL/L
CO2 SERPL-SCNC: 26 MMOL/L
CREAT SERPL-MCNC: 0.7 MG/DL
ERYTHROCYTE [DISTWIDTH] IN BLOOD BY AUTOMATED COUNT: 13.9 %
EST. GFR  (AFRICAN AMERICAN): >60 ML/MIN/1.73 M^2
EST. GFR  (NON AFRICAN AMERICAN): >60 ML/MIN/1.73 M^2
GLUCOSE SERPL-MCNC: 106 MG/DL
HCT VFR BLD AUTO: 41.2 %
HGB BLD-MCNC: 13.4 G/DL
MCH RBC QN AUTO: 29.3 PG
MCHC RBC AUTO-ENTMCNC: 32.5 G/DL
MCV RBC AUTO: 90 FL
NEUTROPHILS # BLD AUTO: 3.2 K/UL
PLATELET # BLD AUTO: 323 K/UL
PMV BLD AUTO: 9.4 FL
POTASSIUM SERPL-SCNC: 4.1 MMOL/L
PROT SERPL-MCNC: 8 G/DL
RBC # BLD AUTO: 4.57 M/UL
SODIUM SERPL-SCNC: 141 MMOL/L
WBC # BLD AUTO: 6.63 K/UL

## 2018-12-07 PROCEDURE — 82306 VITAMIN D 25 HYDROXY: CPT

## 2018-12-07 PROCEDURE — 99999 PR PBB SHADOW E&M-EST. PATIENT-LVL III: CPT | Mod: PBBFAC,,, | Performed by: INTERNAL MEDICINE

## 2018-12-07 PROCEDURE — 99215 OFFICE O/P EST HI 40 MIN: CPT | Mod: S$GLB,,, | Performed by: INTERNAL MEDICINE

## 2018-12-07 PROCEDURE — 80053 COMPREHEN METABOLIC PANEL: CPT

## 2018-12-07 PROCEDURE — 36415 COLL VENOUS BLD VENIPUNCTURE: CPT

## 2018-12-07 PROCEDURE — 1101F PT FALLS ASSESS-DOCD LE1/YR: CPT | Mod: CPTII,S$GLB,, | Performed by: INTERNAL MEDICINE

## 2018-12-07 PROCEDURE — 85027 COMPLETE CBC AUTOMATED: CPT

## 2018-12-07 RX ORDER — PROMETHAZINE HYDROCHLORIDE 25 MG/1
25 TABLET ORAL EVERY 6 HOURS PRN
Qty: 120 TABLET | Refills: 2 | Status: SHIPPED | OUTPATIENT
Start: 2018-12-07 | End: 2019-05-22

## 2018-12-07 RX ORDER — DEXAMETHASONE 4 MG/1
8 TABLET ORAL EVERY 12 HOURS
Qty: 32 TABLET | Refills: 0 | Status: SHIPPED | OUTPATIENT
Start: 2018-12-07 | End: 2019-01-17 | Stop reason: SDUPTHER

## 2018-12-07 RX ORDER — ONDANSETRON 4 MG/1
4 TABLET, FILM COATED ORAL EVERY 6 HOURS PRN
Qty: 120 TABLET | Refills: 3 | Status: SHIPPED | OUTPATIENT
Start: 2018-12-07 | End: 2019-05-22

## 2018-12-07 RX ORDER — LIDOCAINE AND PRILOCAINE 25; 25 MG/G; MG/G
CREAM TOPICAL
Qty: 30 G | Refills: 0 | Status: SHIPPED | OUTPATIENT
Start: 2018-12-07 | End: 2020-12-17

## 2018-12-07 NOTE — PROGRESS NOTES
PROGRESS NOTE    Subjective:       Patient ID: Alia Andre is a 68 y.o. female.    Chief Complaint: follow up for breast cancer    Diagnosis:  Stage IIB (T2N0M0) IDC of the right breast, grade 3, ER weakly positive 10-20%, NM negative, HER2 negative.     Oncologic History:  1. Ms. Andre is a 69 yo woman with HTN, T2DM, goiter who initially saw me on 10/24/18 for further evaluation of newly diagnosed right breast cancer. She self palpated a mass one month ago. Mammogram/US on 9/28/18 showed a 19 x 14 x 14 mm lesion in the right breast at 11 oclock. One LN in the right axilla with thickened cortex of 3 mm. a left breast 9 mm cyst at 10 oclock. Biopsy on 10/11/18 showed grade 3 invasive mammary carcinoma with medullary features of the right breast, ER +10-20% weak, NM negative, HER2 negative, Ki 67 40%. Biopsy of axillary LN negative for carcinoma. She presents to discuss further management. Currently feels well. Genetic study negative.   2. Right mastectomy and SLNB on 11/16/18 showed:  Tumor size: 23 mm in greatest dimension.  Histologic type: Invasive carcinoma of no special type (ductal, not otherwise specified).  Histologic grade:  Tubule formation: Score 3  Nuclear pleomorphism: Score 3  Mitotic rate: Score 3  Overall grade: Grade 3  Ductal carcinoma in Situ: Not identified  Margins: Invasive carcinoma margins: Uninvolved by invasive carcinoma, closest margin is deep margin, 2.2 cm  from tumor  Regional lymph nodes: Uninvolved by tumor cells  Number of lymph nodes examined: 1  Number of sentinel nodes examined: 1  Treatment effect: No known presurgical therapy  Ancillary studies: Immunohistochemical stains performed on previous biopsy [id number] show the following  results:  Estrogen receptor: Positive, weak staining in 10-20% of tumor cell nuclei  Progesterone receptor: Negative  HER2: Negative  Ki-67 proliferative index: 40%  Pathologic Stage  Classification (pTNM, AJCC 8th Edition)  Primary tumor: pT2: tumor more than 20 mm but less than or equal to 50 mm in greatest dimension.  Lymph node: sn N0: No regional lymph node metastasis.    Port placed at surgery.       Interval History:   Ms. Andre returns for follow up. She underwent R mastectomy and SLNB on 18. Path showed invasive ductal carcinoma of 23 mm, grade 3, neg margin, one sentinel LN negative. She still has some pain in her right chest area, but wound has healed up well.     ECO    ROS:   A ten-point system review is obtained and negative except for what was stated in the Interval History.     Physical Examination:   Vital signs reviewed.   General: well hydrated, well developed, in no acute distress  HEENT: normocephalic, PERRLA, EOMI, anicteric sclerae, oropharynx clear  Neck: supple, no JVD, thyromegaly, cervical or supraclavicular lymphadenopathy  Lungs: clear breath sounds bilaterally, no wheezing, rales, or rhonchi  Heart: RRR, no M/R/G  Breast: s/p R mastectomy. Wound healed up well. L: no masses, axillary LAD  Abdomen: soft, no tenderness, non-distended, no hepatosplenomegaly, mass, or hernia. BS present  Extremities: no clubbing, cyanosis, or edema  Skin: no rash, ulcer, or open wounds  Neuro: alert and oriented x 4, no focal neuro deficit  Psych: pleasant and appropriate mood and affect    Objective:     Laboratory Data:  Labs reviewed. CBC, CMP unremarkable        Assessment and Plan:     1. Malignant neoplasm of upper-outer quadrant of right breast in female, estrogen receptor positive    2. Essential hypertension    3. Type 2 diabetes mellitus without complication, without long-term current use of insulin        1.  - Ms. Andre is a 67 yo woman with stage IIB ([id number]) IDC of the right breast, ER weakly positive, NV neg, HER2 negative, grade 3  - discussed the rational and benefit of adjuvant chemotherapy. I recommend 4 cycles of docetaxel/cytoxan. Discussed the  regimen and schedule of chemo  - The side effects of TC were discussed with patient, which include but are not limited to hair loss, fatigue, decreased appetite, weight loss, weakness, bone marrow suppression, increased risk of infection, sepsis, anemia that may require blood transfusion, low platelet counts with increased risk of bleeding that may require platelet transfusion, diarrhea, constipation, mucositis, damage to the brain, heart, liver, kidney, damage to the nerves that may not be reversible, severe allergic reaction, damage at the injection site, sterility, secondary cancer, death, swelling. Handouts provided to patient.  - Emla, zofran, phenergan, dex sent to [facility] pharmacy  - RTC next Thursday for consent and to start chemo    2.   - BP elevated today. Patient feels it is due to pain  - monitor. May need to increase losartan if continues to be elevated    3.   - BS ok  - c/w current meds      Knows to call in the interval if any problems arise.    Electronically signed by Alexander Garces

## 2018-12-10 DIAGNOSIS — E55.9 VITAMIN D DEFICIENCY: ICD-10-CM

## 2018-12-10 RX ORDER — ERGOCALCIFEROL 1.25 MG/1
50000 CAPSULE ORAL
Qty: 12 CAPSULE | Refills: 0 | Status: SHIPPED | OUTPATIENT
Start: 2018-12-10 | End: 2018-12-11 | Stop reason: SDUPTHER

## 2018-12-11 DIAGNOSIS — E55.9 VITAMIN D DEFICIENCY: ICD-10-CM

## 2018-12-11 RX ORDER — ERGOCALCIFEROL 1.25 MG/1
50000 CAPSULE ORAL
Qty: 12 CAPSULE | Refills: 0 | Status: SHIPPED | OUTPATIENT
Start: 2018-12-11 | End: 2019-03-07

## 2018-12-13 ENCOUNTER — OFFICE VISIT (OUTPATIENT)
Dept: HEMATOLOGY/ONCOLOGY | Facility: CLINIC | Age: 68
End: 2018-12-13
Payer: MEDICARE

## 2018-12-13 ENCOUNTER — LAB VISIT (OUTPATIENT)
Dept: LAB | Facility: OTHER | Age: 68
End: 2018-12-13
Attending: INTERNAL MEDICINE
Payer: MEDICARE

## 2018-12-13 VITALS
TEMPERATURE: 98 F | WEIGHT: 153.88 LBS | HEART RATE: 85 BPM | OXYGEN SATURATION: 98 % | BODY MASS INDEX: 24.73 KG/M2 | RESPIRATION RATE: 16 BRPM | DIASTOLIC BLOOD PRESSURE: 72 MMHG | HEIGHT: 66 IN | SYSTOLIC BLOOD PRESSURE: 162 MMHG

## 2018-12-13 DIAGNOSIS — C50.411 MALIGNANT NEOPLASM OF UPPER-OUTER QUADRANT OF RIGHT BREAST IN FEMALE, ESTROGEN RECEPTOR POSITIVE: ICD-10-CM

## 2018-12-13 DIAGNOSIS — E55.9 VITAMIN D DEFICIENCY: ICD-10-CM

## 2018-12-13 DIAGNOSIS — Z17.0 MALIGNANT NEOPLASM OF UPPER-OUTER QUADRANT OF RIGHT BREAST IN FEMALE, ESTROGEN RECEPTOR POSITIVE: Primary | ICD-10-CM

## 2018-12-13 DIAGNOSIS — Z17.0 MALIGNANT NEOPLASM OF UPPER-OUTER QUADRANT OF RIGHT BREAST IN FEMALE, ESTROGEN RECEPTOR POSITIVE: ICD-10-CM

## 2018-12-13 DIAGNOSIS — E11.9 TYPE 2 DIABETES MELLITUS WITHOUT COMPLICATION, WITHOUT LONG-TERM CURRENT USE OF INSULIN: ICD-10-CM

## 2018-12-13 DIAGNOSIS — C50.411 MALIGNANT NEOPLASM OF UPPER-OUTER QUADRANT OF RIGHT BREAST IN FEMALE, ESTROGEN RECEPTOR POSITIVE: Primary | ICD-10-CM

## 2018-12-13 DIAGNOSIS — I10 ESSENTIAL HYPERTENSION: ICD-10-CM

## 2018-12-13 DIAGNOSIS — Z51.11 ENCOUNTER FOR ANTINEOPLASTIC CHEMOTHERAPY: ICD-10-CM

## 2018-12-13 LAB
ALBUMIN SERPL BCP-MCNC: 3.9 G/DL
ALP SERPL-CCNC: 100 U/L
ALT SERPL W/O P-5'-P-CCNC: 15 U/L
ANION GAP SERPL CALC-SCNC: 6 MMOL/L
AST SERPL-CCNC: 16 U/L
BILIRUB SERPL-MCNC: 0.6 MG/DL
BUN SERPL-MCNC: 19 MG/DL
CALCIUM SERPL-MCNC: 9.7 MG/DL
CHLORIDE SERPL-SCNC: 109 MMOL/L
CO2 SERPL-SCNC: 28 MMOL/L
CREAT SERPL-MCNC: 0.8 MG/DL
ERYTHROCYTE [DISTWIDTH] IN BLOOD BY AUTOMATED COUNT: 14 %
EST. GFR  (AFRICAN AMERICAN): >60 ML/MIN/1.73 M^2
EST. GFR  (NON AFRICAN AMERICAN): >60 ML/MIN/1.73 M^2
GLUCOSE SERPL-MCNC: 110 MG/DL
HCT VFR BLD AUTO: 41.6 %
HGB BLD-MCNC: 13.4 G/DL
MCH RBC QN AUTO: 29.5 PG
MCHC RBC AUTO-ENTMCNC: 32.2 G/DL
MCV RBC AUTO: 91 FL
NEUTROPHILS # BLD AUTO: 2.9 K/UL
PLATELET # BLD AUTO: 310 K/UL
PMV BLD AUTO: 9.8 FL
POTASSIUM SERPL-SCNC: 3.8 MMOL/L
PROT SERPL-MCNC: 7.8 G/DL
RBC # BLD AUTO: 4.55 M/UL
SODIUM SERPL-SCNC: 143 MMOL/L
WBC # BLD AUTO: 5.58 K/UL

## 2018-12-13 PROCEDURE — 85027 COMPLETE CBC AUTOMATED: CPT

## 2018-12-13 PROCEDURE — 80053 COMPREHEN METABOLIC PANEL: CPT

## 2018-12-13 PROCEDURE — 1101F PT FALLS ASSESS-DOCD LE1/YR: CPT | Mod: CPTII,S$GLB,, | Performed by: INTERNAL MEDICINE

## 2018-12-13 PROCEDURE — 99999 PR PBB SHADOW E&M-EST. PATIENT-LVL III: CPT | Mod: PBBFAC,,, | Performed by: INTERNAL MEDICINE

## 2018-12-13 PROCEDURE — 99215 OFFICE O/P EST HI 40 MIN: CPT | Mod: S$GLB,,, | Performed by: INTERNAL MEDICINE

## 2018-12-13 PROCEDURE — 36415 COLL VENOUS BLD VENIPUNCTURE: CPT

## 2018-12-13 NOTE — Clinical Note
Cancel chemo today. Reschedule for CBC, CMP, and chemo (TC/Neulasta) at [facility] on 12/26. RTC on 1/15 with CBC, CMP, then chemo at [facility] on 1/16

## 2018-12-13 NOTE — PROGRESS NOTES
Subjective:       Patient ID: Alia Andre is a 68 y.o. female.     Chief Complaint: follow up for breast cancer     Diagnosis:  Stage IIB (T2N0M0) IDC of the right breast, grade 3, ER weakly positive 10-20%, OH negative, HER2 negative.      Oncologic History:  1. Ms. Andre is a 69 yo woman with HTN, T2DM, goiter who initially saw me on 10/24/18 for further evaluation of newly diagnosed right breast cancer. She self palpated a mass one month ago. Mammogram/US on 9/28/18 showed a 19 x 14 x 14 mm lesion in the right breast at 11 oclock. One LN in the right axilla with thickened cortex of 3 mm. a left breast 9 mm cyst at 10 oclock. Biopsy on 10/11/18 showed grade 3 invasive mammary carcinoma with medullary features of the right breast, ER +10-20% weak, OH negative, HER2 negative, Ki 67 40%. Biopsy of axillary LN negative for carcinoma. She presents to discuss further management. Currently feels well. Genetic study negative.   2. Right mastectomy and SLNB on 11/16/18 showed:  Tumor size: 23 mm in greatest dimension.  Histologic type: Invasive carcinoma of no special type (ductal, not otherwise specified).  Histologic grade:  Tubule formation: Score 3  Nuclear pleomorphism: Score 3  Mitotic rate: Score 3  Overall grade: Grade 3  Ductal carcinoma in Situ: Not identified  Margins: Invasive carcinoma margins: Uninvolved by invasive carcinoma, closest margin is deep margin, 2.2 cm  from tumor  Regional lymph nodes: Uninvolved by tumor cells  Number of lymph nodes examined: 1  Number of sentinel nodes examined: 1  Treatment effect: No known presurgical therapy  Ancillary studies: Immunohistochemical stains performed on previous biopsy [id number] show the following  results:  Estrogen receptor: Positive, weak staining in 10-20% of tumor cell nuclei  Progesterone receptor: Negative  HER2: Negative  Ki-67 proliferative index: 40%  Pathologic Stage Classification (pTNM, AJCC 8th Edition)  Primary tumor: pT2: tumor more  than 20 mm but less than or equal to 50 mm in greatest dimension.  Lymph node: sn N0: No regional lymph node metastasis.     Port placed at surgery.   3. Adjuvant TC to be started on 18        Interval History:   Ms. Andre returns for follow up. She has read the chemo handouts and would like to start chemo after Roxana. Mild soreness in right axilla. Otherwise feeling well.      ECO     ROS:   A ten-point system review is obtained and negative except for what was stated in the Interval History.      Physical Examination:   Vital signs reviewed.   General: well hydrated, well developed, in no acute distress  HEENT: normocephalic, PERRLA, EOMI, anicteric sclerae, oropharynx clear  Neck: supple, no JVD, thyromegaly, cervical or supraclavicular lymphadenopathy  Lungs: clear breath sounds bilaterally, no wheezing, rales, or rhonchi  Heart: RRR, no M/R/G  Breast: s/p R mastectomy. Wound has completely healed. No open wounds or signs of infection. Mild swelling in the right axilla. L: no masses, axillary LAD  Abdomen: soft, no tenderness, non-distended, no hepatosplenomegaly, mass, or hernia. BS present  Extremities: no clubbing, cyanosis, or edema  Skin: no rash, ulcer, or open wounds  Neuro: alert and oriented x 4, no focal neuro deficit  Psych: pleasant and appropriate mood and affect     Objective:      Laboratory Data:  Labs reviewed. CBC, CMP unremarkable           Assessment and Plan:      1. Malignant neoplasm of upper-outer quadrant of right breast in female, estrogen receptor positive    2. Encounter for antineoplastic chemotherapy    3. Essential hypertension    4. Type 2 diabetes mellitus without complication, without long-term current use of insulin    5. Vitamin D deficiency           1.2  - Ms. Andre is a 67 yo woman with stage IIB ([id number]) IDC of the right breast, ER weakly positive, KS neg, HER2 negative, grade 3. She is going to start adjuvant chemotherapy with TC. She wants to start  chemo after Christmas. Will have her return to [facility] on 12/26 for chemo. Asked her to take dexamethasone 8 mg bid on 12/25 and 12/27  - The side effects of TC were discussed with patient and her daughters, which include but are not limited to hair loss, fatigue, decreased appetite, weight loss, weakness, bone marrow suppression, increased risk of infection, sepsis, anemia that may require blood transfusion, low platelet counts with increased risk of bleeding that may require platelet transfusion, diarrhea, constipation, mucositis, damage to the brain, heart, liver, kidney, damage to the nerves that may not be reversible, severe allergic reaction, damage at the injection site, sterility, secondary cancer, death, swelling. She understands and agrees with proceeding. Consent signed in the office.   - cycle 1 TC on 12/26. See me 3 weeks after that prior to cycle 2  - will need endocrine therapy after chemo     3.  - BP elevated today. Patient checks her BP at home daily and it is usually 120-130s.   - Asked her to monitor it daily and call me if SBP above 150, will increase losartan to 100 mg     4.   - BS ok  - c/w current meds    5.  - c/w high dose vitamin D        Knows to call in the interval if issues arise.

## 2018-12-18 ENCOUNTER — TELEPHONE (OUTPATIENT)
Dept: SURGERY | Facility: CLINIC | Age: 68
End: 2018-12-18

## 2018-12-18 DIAGNOSIS — R22.1 NECK MASS: ICD-10-CM

## 2018-12-18 NOTE — TELEPHONE ENCOUNTER
Spoke with pt. She's scheduled for a CT of the neck without contrast on Thurs, Jan 3rd @ 10:15 AM. Instructions given.

## 2018-12-21 ENCOUNTER — CLINICAL SUPPORT (OUTPATIENT)
Dept: REHABILITATION | Facility: HOSPITAL | Age: 68
End: 2018-12-21
Payer: MEDICARE

## 2018-12-21 DIAGNOSIS — M25.611 DECREASED ROM OF RIGHT SHOULDER: ICD-10-CM

## 2018-12-21 DIAGNOSIS — R29.3 POOR POSTURE: ICD-10-CM

## 2018-12-21 DIAGNOSIS — M25.511 PAIN IN SHOULDER REGION, RIGHT: ICD-10-CM

## 2018-12-21 DIAGNOSIS — C50.011 MALIGNANT NEOPLASM INVOLVING BOTH NIPPLE AND AREOLA OF RIGHT BREAST IN FEMALE, UNSPECIFIED ESTROGEN RECEPTOR STATUS: ICD-10-CM

## 2018-12-21 DIAGNOSIS — Z91.89 AT RISK FOR LYMPHEDEMA: ICD-10-CM

## 2018-12-21 PROCEDURE — 97110 THERAPEUTIC EXERCISES: CPT | Mod: PO | Performed by: PHYSICAL MEDICINE & REHABILITATION

## 2018-12-21 PROCEDURE — G8985 CARRY GOAL STATUS: HCPCS | Mod: CH,PO | Performed by: PHYSICAL MEDICINE & REHABILITATION

## 2018-12-21 PROCEDURE — G8986 CARRY D/C STATUS: HCPCS | Mod: CH,PO | Performed by: PHYSICAL MEDICINE & REHABILITATION

## 2018-12-21 PROCEDURE — 97140 MANUAL THERAPY 1/> REGIONS: CPT | Mod: PO | Performed by: PHYSICAL MEDICINE & REHABILITATION

## 2018-12-21 NOTE — PROGRESS NOTES
Physical Therapy Discharge Note     Name: Alia Andre  Clinic Number: [id number]  Diagnosis:   Encounter Diagnoses   Name Primary?    Decreased ROM of right shoulder     Poor posture     Pain in shoulder region, right     At risk for lymphedema     Malignant neoplasm involving both nipple and areola of right breast in female, unspecified estrogen receptor status      Physician: Cristiane Frederick MD    Precautions: None  Visit #: 4 of 20  Time In: 9:00 AM  Time Out:  9:40 AM  Total Treatment Time 1:1: 40 minutes    Evaluation Date: 11/26/18  Visit # authorized: 20  Authorization period: 12/31/18  Plan of care Expiration: 1/21/18  MD referral: Cristiane Frederick MD  Insurance: [employer]      Subjective     Pt reports: feeling very good and not having any pain.  States able to move her RUE without difficulty and can use RUE with all ADL.  Pain Scale: Alia rates pain on a scale of 0/10 on VAS.   Pain location:NA    Fatigue: None  Functional change:  Able to use RUE with all ADL's  Surgery date:  R mastectomy, SLNB, and insertion of port cath on L on 11/16/18.  Surgical History:  Alia Andre  has a past surgical history that includes Tubal ligation; MASTECTOMY RIGHT 2.5 HR CASE (Right, 11/16/2018); BIOPSY, LYMPH NODE, SENTINEL RIGHT (Right, 11/16/2018); and [id number]-A-CATH LEFT (Left, 11/16/2018).     Chemotherapy: Chemo will start 12/26/18  Radiation: none planned    Objective     Alia received individual therapeutic exercises to improve postural correction and alignment, stretching and soft tissue mobility, and strengthening for 30 minutes including the following:   Shoulder flexion with wand           1 x 10  Supine shld ER with wand            1 x 10  Butterflies                                      10 x 5'  LTR with ER                                  1 x 10  Sidelying R shoulder ABD             1 x 10  Scapular retractions                       1 x 10, 3s holds OTB  Wall climbs flexion                        10 x 5s holds  Wall climbs abduction                   10 x 5s holds       Alia received the following manual therapy techniques were performed to increased myofascial/soft tissue length, mobility and pliability, increase PROM, AROM and function as well as to decrease pain for 10 minutes  Stretching, bending for right axillary and upper arm cording--no longer present  Anterior and lateral chest stretch  Posterior capsule stretch  Grade II G-H joint mobs  Passive stretch all Shld motions    Shoulder Range of Motion: 12/21/18  Active /Passive ROM Right Left   Flexion 165 170   Abduction 165 170   Extension 70 70   IR/90deg 90 90   ER/90deg 85 90     Strength: manual muscle test grades below   Upper Extremity Strength: 12/21/18    (L) UE (R) UE   Shoulder flexion: 5/5 5/5   Shoulder Abduction: 5/5 5/5   Shoulder IR 5/5 5/5   Shoulder ER 5/5 5/5   Elbow flexion: 5/5 5/5   Elbow extension: 5/5 5/5   Lower Trap: 5/5 5/5   Middle Trap: 5/5 5/5    5/5 5/5          Home Exercise Program and Patient Education   Education provided re:  - role of PT in multi - disciplinary team, goals for PT  - progress towards goals     See EMR under notes/patient instructions for HEP given/taught this session - all sets and reps included. Pt received printed copy.     Pt was able to demonstrate and report understanding and performance  Pt has no cultural, educational or language barriers to learning provided.    CMS Impairment/Limitation/Restriction for FOTO shoulder Survey     Therapist reviewed FOTO scores for Alia Andre on 12/21/2018.   FOTO documents entered into [employer] - see Media section.     Limitations Score: 0%  Category: Carrying   Current/ : CH = 0 % impaired, limited or restricted  Goal/ : CH = 0 % impaired, limited or restricted   Discharge/:   CH = 0% impaired, limited or restrictied          Assessment     Patient has responded  well to physical therapy. Is at expected function and pain level for 5 weeks post operatively.   Pain after treatment: 0/10.    Pt prognosis is Good. Reassessment for discharge. AROM right shoulder is WFL and strength RUE in 5/5 range. Patient received manual therapy as above and no longer exhibits cording in right axilla and right shoulder joint tightness has decreased. Reviewed above exercise program for patient to continue on HEP and patient is independent with HEP.Patient met all STG and 4 of 5 LTG.    Goals as follows:  Short Term goals: 4 weeks  1. Patient will demonstrate 100% understanding of lymphedema risk reduction practices to include self monitoring for lymphedema. (met, 12/21/18)  2. Patient will demonstrate independence with Home Exercise program established. (met, 12/21/18)  3. Pt will increase AROM/PROM in shoulder abduction ROM to >/=160 degrees on right to improve functional reach, carry, push, pull pain free. (met, 12/21/18)  4. Pt will increase AROM/PROM in shoulder flexion to >/=145 degrees on right to improve functional reach, carry, push, pull pain free.(met, 12/21/18)  5. Pt will increase AROM/PROM in shoulder ER to >/=75 degrees on the the right to improve functional reach, carry, push, pull pain free (met, 12/21/18)     Long Term Goals: 8 weeks   1.  Pt will increase AROM/PROM in shoulder flexion to 160 degrees on right to improve functional reach, carry, push, pull pain free. (met, 12/21/18)  2.. Pt will demonstrate full/maximized tissue mobility to increase ROM and promote healthy tissue to be pain free at discharge. (met, 12/21/18)  3. Pt will report decrease in overall worst pain to 2/10 at discharge. (met, 12/21/18)  4. Pt will increase AROM/PROM in shoulder abduction ROM to 170 degrees on right to improve functional reach, carry, push, pull pain free. ( not met)  5. Patient will report compliance with walking program 5x week for >/=15 min each day to improve overall cardiovascular  function and decrease cancer related fatigue at discharge. (met, 12/21/18)           Plan     Patient is discharged from physical therapy.    Therapist: Karely Pittman, PT  12/21/2018

## 2018-12-21 NOTE — PATIENT INSTRUCTIONS
Gentle Lower trunk rotation with butterfly stretch   1 x 10 reps each side . Perform 2 times a day

## 2018-12-26 ENCOUNTER — INFUSION (OUTPATIENT)
Dept: INFUSION THERAPY | Facility: HOSPITAL | Age: 68
End: 2018-12-26
Attending: INTERNAL MEDICINE
Payer: MEDICARE

## 2018-12-26 VITALS
HEIGHT: 66 IN | OXYGEN SATURATION: 98 % | SYSTOLIC BLOOD PRESSURE: 161 MMHG | HEART RATE: 92 BPM | TEMPERATURE: 99 F | RESPIRATION RATE: 18 BRPM | BODY MASS INDEX: 24.73 KG/M2 | DIASTOLIC BLOOD PRESSURE: 75 MMHG | WEIGHT: 153.88 LBS

## 2018-12-26 DIAGNOSIS — C50.411 MALIGNANT NEOPLASM OF UPPER-OUTER QUADRANT OF RIGHT BREAST IN FEMALE, ESTROGEN RECEPTOR POSITIVE: Primary | ICD-10-CM

## 2018-12-26 DIAGNOSIS — Z17.0 MALIGNANT NEOPLASM OF UPPER-OUTER QUADRANT OF RIGHT BREAST IN FEMALE, ESTROGEN RECEPTOR POSITIVE: Primary | ICD-10-CM

## 2018-12-26 PROCEDURE — 96413 CHEMO IV INFUSION 1 HR: CPT

## 2018-12-26 PROCEDURE — 96417 CHEMO IV INFUS EACH ADDL SEQ: CPT

## 2018-12-26 PROCEDURE — 25000003 PHARM REV CODE 250: Performed by: INTERNAL MEDICINE

## 2018-12-26 PROCEDURE — 96377 APPLICATON ON-BODY INJECTOR: CPT | Mod: 59

## 2018-12-26 PROCEDURE — C9463 INJECTION, APREPITANT: HCPCS | Mod: TB | Performed by: INTERNAL MEDICINE

## 2018-12-26 PROCEDURE — 96367 TX/PROPH/DG ADDL SEQ IV INF: CPT

## 2018-12-26 PROCEDURE — 63600175 PHARM REV CODE 636 W HCPCS: Mod: JG | Performed by: INTERNAL MEDICINE

## 2018-12-26 RX ORDER — SODIUM CHLORIDE 0.9 % (FLUSH) 0.9 %
10 SYRINGE (ML) INJECTION
Status: CANCELLED | OUTPATIENT
Start: 2018-12-26

## 2018-12-26 RX ORDER — HEPARIN 100 UNIT/ML
500 SYRINGE INTRAVENOUS
Status: CANCELLED | OUTPATIENT
Start: 2018-12-26

## 2018-12-26 RX ORDER — HEPARIN 100 UNIT/ML
500 SYRINGE INTRAVENOUS
Status: DISCONTINUED | OUTPATIENT
Start: 2018-12-26 | End: 2018-12-26 | Stop reason: HOSPADM

## 2018-12-26 RX ORDER — SODIUM CHLORIDE 0.9 % (FLUSH) 0.9 %
10 SYRINGE (ML) INJECTION
Status: DISCONTINUED | OUTPATIENT
Start: 2018-12-26 | End: 2018-12-26 | Stop reason: HOSPADM

## 2018-12-26 RX ADMIN — SODIUM CHLORIDE: 0.9 INJECTION, SOLUTION INTRAVENOUS at 02:12

## 2018-12-26 RX ADMIN — APREPITANT 130 MG: 130 INJECTION, EMULSION INTRAVENOUS at 03:12

## 2018-12-26 RX ADMIN — CYCLOPHOSPHAMIDE 1085 MG: 1 INJECTION, POWDER, FOR SOLUTION INTRAVENOUS; ORAL at 04:12

## 2018-12-26 RX ADMIN — PEGFILGRASTIM 6 MG: KIT SUBCUTANEOUS at 05:12

## 2018-12-26 RX ADMIN — HEPARIN 500 UNITS: 100 SYRINGE at 05:12

## 2018-12-26 RX ADMIN — DOCETAXEL 135 MG: 10 INJECTION, SOLUTION INTRAVENOUS at 03:12

## 2018-12-26 RX ADMIN — DEXAMETHASONE SODIUM PHOSPHATE: 4 INJECTION, SOLUTION INTRA-ARTICULAR; INTRALESIONAL; INTRAMUSCULAR; INTRAVENOUS; SOFT TISSUE at 02:12

## 2018-12-26 NOTE — NURSING
1450 pt here for D1C1 T/C infuson, labs, hx, meds, allergies reviewed, chemo binder given to pt and reviewed common side effects and when to seek medical attention, pt watched video on OBI and questions answered, pt reclining in chair, warm blanket provided, continue to monitor

## 2018-12-27 NOTE — PLAN OF CARE
Problem: Adult Inpatient Plan of Care  Goal: Plan of Care Review  Outcome: Ongoing (interventions implemented as appropriate)  Pt tolerated TC infusions well, with no complications. VS stable. Left chest port positive for blood return, flushed with normal saline and heparin and de-accessed prior to discharge. Site dressed with band-aid. RTC 1/3/18. Pt discharged with no distress noted and ambulated indepedently out of infusion center.

## 2018-12-31 ENCOUNTER — TELEPHONE (OUTPATIENT)
Dept: HEMATOLOGY/ONCOLOGY | Facility: CLINIC | Age: 68
End: 2018-12-31

## 2018-12-31 DIAGNOSIS — G89.3 CANCER RELATED PAIN: Primary | ICD-10-CM

## 2018-12-31 RX ORDER — TRAMADOL HYDROCHLORIDE 50 MG/1
50 TABLET ORAL EVERY 6 HOURS PRN
Qty: 30 TABLET | Refills: 0 | Status: SHIPPED | OUTPATIENT
Start: 2018-12-31 | End: 2019-01-15

## 2018-12-31 NOTE — TELEPHONE ENCOUNTER
"Returned call and spoke with Ms Andre. Ms Andre received chemotherapy on Wed, 12/19--Taxotere, Cytoxan and Neulasta OBI. Since Wednesday she started with worsening pain to both hips and both knees--Rate her pain @ 7-8 (0-10). She has also developed "burning sensation /rash" to her neck. Patient states she is taking Tylenol, without any relief. The Tylenol has also caused GI upset.    Please advise.   "

## 2018-12-31 NOTE — TELEPHONE ENCOUNTER
----- Message from Carolyn Brady sent at 12/31/2018  1:11 PM CST -----  Contact: WAYNE BYRD [[id number]]            Name of Who is Calling: WAYNE BYRD [[id number]]    What is the request in detail: Patient would like doctor to prescribe pain medication states she is in pain because of the chemo treatment. Please call     Can the clinic reply by [facility]: no    [facility]: [phone]

## 2018-12-31 NOTE — TELEPHONE ENCOUNTER
Called and informed Ms Andre, Dr Garces have prescribed Tramadol 50 mg. She to take it every 6 hours prn pain. The Rx has been sent to [facility]. Instructed Ms Thai to call the clinic if she has little or no relief from the Tramadol. Patient voiced verbal understanding.

## 2018-12-31 NOTE — TELEPHONE ENCOUNTER
Called and spoke with  Ms Andre. Informed Ms Andre of the following, per Dr Garces:  Please ask patient to take claritin daily and ibuprofen 600 mg (OTC tablets are 200 mg each) twice a day for bone pain, drink plenty of fluids. Take zantac twice a day for heart burn.   Ms Andre states she has taken her daughter's Ibuprofen 800mg and she was advised by the chemo nurse to take the Claritin and she has taken the Claritin since Wednesday and she drinking plenty of water.    When I originally spoke with Ms Andre, I asked her what medication she was taking, she only stated Tylenol. The Claritin and the Ibuprofen was not mention.

## 2019-01-01 ENCOUNTER — HOSPITAL ENCOUNTER (EMERGENCY)
Facility: OTHER | Age: 69
Discharge: HOME | End: 2019-01-01
Attending: EMERGENCY MEDICINE
Payer: MEDICARE

## 2019-01-01 VITALS
HEIGHT: 66 IN | TEMPERATURE: 98 F | RESPIRATION RATE: 20 BRPM | BODY MASS INDEX: 24.11 KG/M2 | DIASTOLIC BLOOD PRESSURE: 67 MMHG | HEART RATE: 96 BPM | OXYGEN SATURATION: 97 % | SYSTOLIC BLOOD PRESSURE: 152 MMHG | WEIGHT: 150 LBS

## 2019-01-01 DIAGNOSIS — D70.1 CHEMOTHERAPY INDUCED NEUTROPENIA: ICD-10-CM

## 2019-01-01 DIAGNOSIS — R10.9 ABDOMINAL PAIN: ICD-10-CM

## 2019-01-01 DIAGNOSIS — R10.11 RIGHT UPPER QUADRANT PAIN: Primary | ICD-10-CM

## 2019-01-01 DIAGNOSIS — N30.00 ACUTE CYSTITIS WITHOUT HEMATURIA: ICD-10-CM

## 2019-01-01 DIAGNOSIS — T45.1X5A CHEMOTHERAPY INDUCED NEUTROPENIA: ICD-10-CM

## 2019-01-01 LAB
ALBUMIN SERPL BCP-MCNC: 3.7 G/DL
ALP SERPL-CCNC: 116 U/L
ALT SERPL W/O P-5'-P-CCNC: 19 U/L
ANION GAP SERPL CALC-SCNC: 9 MMOL/L
ANISOCYTOSIS BLD QL SMEAR: SLIGHT
AST SERPL-CCNC: 17 U/L
BACTERIA #/AREA URNS HPF: ABNORMAL /HPF
BASOPHILS # BLD AUTO: ABNORMAL K/UL
BASOPHILS NFR BLD: 0 %
BILIRUB SERPL-MCNC: 0.4 MG/DL
BILIRUB UR QL STRIP: NEGATIVE
BUN SERPL-MCNC: 15 MG/DL
CALCIUM SERPL-MCNC: 9.9 MG/DL
CHLORIDE SERPL-SCNC: 106 MMOL/L
CLARITY UR: CLEAR
CO2 SERPL-SCNC: 24 MMOL/L
COLOR UR: YELLOW
CREAT SERPL-MCNC: 0.7 MG/DL
DIFFERENTIAL METHOD: ABNORMAL
EOSINOPHIL # BLD AUTO: ABNORMAL K/UL
EOSINOPHIL NFR BLD: 0 %
ERYTHROCYTE [DISTWIDTH] IN BLOOD BY AUTOMATED COUNT: 13.7 %
EST. GFR  (AFRICAN AMERICAN): >60 ML/MIN/1.73 M^2
EST. GFR  (NON AFRICAN AMERICAN): >60 ML/MIN/1.73 M^2
GLUCOSE SERPL-MCNC: 89 MG/DL
GLUCOSE UR QL STRIP: NEGATIVE
HCT VFR BLD AUTO: 41.3 %
HGB BLD-MCNC: 13.4 G/DL
HGB UR QL STRIP: ABNORMAL
HYPOCHROMIA BLD QL SMEAR: ABNORMAL
KETONES UR QL STRIP: NEGATIVE
LEUKOCYTE ESTERASE UR QL STRIP: ABNORMAL
LIPASE SERPL-CCNC: 26 U/L
LYMPHOCYTES # BLD AUTO: ABNORMAL K/UL
LYMPHOCYTES NFR BLD: 78 %
MCH RBC QN AUTO: 28.8 PG
MCHC RBC AUTO-ENTMCNC: 32.4 G/DL
MCV RBC AUTO: 89 FL
MICROSCOPIC COMMENT: ABNORMAL
MONOCYTES # BLD AUTO: ABNORMAL K/UL
MONOCYTES NFR BLD: 4 %
NEUTROPHILS NFR BLD: 13 %
NEUTS BAND NFR BLD MANUAL: 5 %
NITRITE UR QL STRIP: NEGATIVE
PH UR STRIP: 7 [PH] (ref 5–8)
PLATELET # BLD AUTO: 154 K/UL
PMV BLD AUTO: 11.1 FL
POTASSIUM SERPL-SCNC: 4.4 MMOL/L
PROT SERPL-MCNC: 7.6 G/DL
PROT UR QL STRIP: NEGATIVE
RBC # BLD AUTO: 4.66 M/UL
RBC #/AREA URNS HPF: 2 /HPF (ref 0–4)
SODIUM SERPL-SCNC: 139 MMOL/L
SP GR UR STRIP: <=1.005 (ref 1–1.03)
URN SPEC COLLECT METH UR: ABNORMAL
UROBILINOGEN UR STRIP-ACNC: NEGATIVE EU/DL
WBC # BLD AUTO: 4.03 K/UL
WBC #/AREA URNS HPF: 5 /HPF (ref 0–5)

## 2019-01-01 PROCEDURE — 93010 EKG 12-LEAD: ICD-10-PCS | Mod: ,,, | Performed by: INTERNAL MEDICINE

## 2019-01-01 PROCEDURE — 81000 URINALYSIS NONAUTO W/SCOPE: CPT

## 2019-01-01 PROCEDURE — 80053 COMPREHEN METABOLIC PANEL: CPT

## 2019-01-01 PROCEDURE — 96372 THER/PROPH/DIAG INJ SC/IM: CPT | Mod: 59

## 2019-01-01 PROCEDURE — 83690 ASSAY OF LIPASE: CPT

## 2019-01-01 PROCEDURE — 85007 BL SMEAR W/DIFF WBC COUNT: CPT

## 2019-01-01 PROCEDURE — 25000003 PHARM REV CODE 250: Performed by: EMERGENCY MEDICINE

## 2019-01-01 PROCEDURE — 96361 HYDRATE IV INFUSION ADD-ON: CPT

## 2019-01-01 PROCEDURE — 96374 THER/PROPH/DIAG INJ IV PUSH: CPT

## 2019-01-01 PROCEDURE — 85027 COMPLETE CBC AUTOMATED: CPT

## 2019-01-01 PROCEDURE — 99285 EMERGENCY DEPT VISIT HI MDM: CPT | Mod: 25

## 2019-01-01 PROCEDURE — 93010 ELECTROCARDIOGRAM REPORT: CPT | Mod: ,,, | Performed by: INTERNAL MEDICINE

## 2019-01-01 PROCEDURE — 63600175 PHARM REV CODE 636 W HCPCS: Performed by: EMERGENCY MEDICINE

## 2019-01-01 PROCEDURE — 93005 ELECTROCARDIOGRAM TRACING: CPT

## 2019-01-01 PROCEDURE — 87086 URINE CULTURE/COLONY COUNT: CPT

## 2019-01-01 RX ORDER — SULFAMETHOXAZOLE AND TRIMETHOPRIM 800; 160 MG/1; MG/1
1 TABLET ORAL 2 TIMES DAILY
Qty: 14 TABLET | Refills: 0 | Status: SHIPPED | OUTPATIENT
Start: 2019-01-01 | End: 2019-01-08

## 2019-01-01 RX ORDER — DICYCLOMINE HYDROCHLORIDE 10 MG/ML
20 INJECTION INTRAMUSCULAR
Status: COMPLETED | OUTPATIENT
Start: 2019-01-01 | End: 2019-01-01

## 2019-01-01 RX ORDER — SULFAMETHOXAZOLE AND TRIMETHOPRIM 800; 160 MG/1; MG/1
1 TABLET ORAL
Status: COMPLETED | OUTPATIENT
Start: 2019-01-01 | End: 2019-01-01

## 2019-01-01 RX ORDER — KETOROLAC TROMETHAMINE 30 MG/ML
10 INJECTION, SOLUTION INTRAMUSCULAR; INTRAVENOUS
Status: COMPLETED | OUTPATIENT
Start: 2019-01-01 | End: 2019-01-01

## 2019-01-01 RX ORDER — SODIUM CHLORIDE 9 MG/ML
1000 INJECTION, SOLUTION INTRAVENOUS
Status: COMPLETED | OUTPATIENT
Start: 2019-01-01 | End: 2019-01-01

## 2019-01-01 RX ADMIN — SULFAMETHOXAZOLE AND TRIMETHOPRIM 1 TABLET: 800; 160 TABLET ORAL at 10:01

## 2019-01-01 RX ADMIN — DICYCLOMINE HYDROCHLORIDE 20 MG: 20 INJECTION, SOLUTION INTRAMUSCULAR at 07:01

## 2019-01-01 RX ADMIN — SODIUM CHLORIDE 1000 ML: 0.9 INJECTION, SOLUTION INTRAVENOUS at 07:01

## 2019-01-01 RX ADMIN — KETOROLAC TROMETHAMINE 10 MG: 30 INJECTION, SOLUTION INTRAMUSCULAR at 08:01

## 2019-01-02 NOTE — ED PROVIDER NOTES
"Encounter Date: 1/1/2019    SCRIBE #1 NOTE: I, Naun Walton, am scribing for, and in the presence of, Dr. Malone.       History     Chief Complaint   Patient presents with    Abdominal Pain     +right upper epigastric pain that radiates " up to my chest and back" since 5pm tonight. pt states that she was " cleaning the dishes when it started" pt denies fever, chills, n/v, sob, numbness/tingling in extremities      Time seen by provider: 7:15 PM    This is a 68 y.o. female, with a history of breast cancer, HTN, and DM, who presents with complaint of intermittent back pain that radiates to her abdomen then to her chest. That patient reports pain began two hours ago while she was washing dishes. The patient denies having this pain in the past. The patient reports she has been having regular bowel movements. The patient denies fever, chills, nausea, vomiting, diarrhea, constipation, shortness of breath, cough, urinary symptoms, or blood in her stool. The patient denies taking medication after onset of pain, but reports taking one tramadol before pain began. The patient reports her last chemotherapy session was yesterday. She denies radiation. The patient denies history of gallbladder, or kidney problems. The patient admits to tobacco use, but denies alcohol use.      The history is provided by the patient.     Review of patient's allergies indicates:  No Known Allergies  Past Medical History:   Diagnosis Date    Cancer     breast    Diabetes mellitus type 2 in nonobese     Goiter     Hypertension     Tobacco abuse      Past Surgical History:   Procedure Laterality Date    BIOPSY, LYMPH NODE, SENTINEL RIGHT Right 11/16/2018    Performed by [facility] at [facility] 2ND FLR    ESRAJTEMP-VACP-Q-CATH LEFT Left 11/16/2018    Performed by Graciela Echeverria MD at [facility] OR 2ND FLR    MASTECTOMY RIGHT 2.5 HR CASE Right 11/16/2018    Performed by [facility]iane Frederick [facility] at [facility] OR 2ND FLR    TUBAL LIGATION      " 1970/80's     Family History   Problem Relation Age of Onset    Breast cancer Sister     Breast cancer Paternal Aunt     Breast cancer Paternal Cousin     Breast cancer Maternal Cousin      Social History     Tobacco Use    Smoking status: Current Some Day Smoker     Packs/day: 1.00     Years: 30.00     Pack years: 30.00     Types: Cigarettes    Smokeless tobacco: Never Used   Substance Use Topics    Alcohol use: No     Frequency: Never    Drug use: No     Review of Systems   Constitutional: Negative for chills, diaphoresis and fever.   HENT: Negative for sore throat.    Respiratory: Negative for cough and shortness of breath.    Cardiovascular: Positive for chest pain.   Gastrointestinal: Positive for abdominal pain. Negative for blood in stool, constipation, diarrhea, nausea and vomiting.   Genitourinary: Negative for dysuria, frequency and hematuria.   Musculoskeletal: Positive for back pain.   Skin: Negative for rash.   Neurological: Negative for weakness.   Hematological: Does not bruise/bleed easily.       Physical Exam     Initial Vitals [01/01/19 1818]   BP Pulse Resp Temp SpO2   127/60 107 16 98.1 °F (36.7 °C) 97 %      MAP       --         Physical Exam    Nursing note and vitals reviewed.  Constitutional: She appears well-developed and well-nourished. She is not diaphoretic. No distress.   Overweight. Not diaphoretic or warm to touch.   HENT:   Head: Normocephalic and atraumatic.   Eyes: Conjunctivae and EOM are normal.   No pallor or icterus.   Neck: Normal range of motion.   Abdominal: There is no CVA tenderness and negative Avila's sign.   Tenderness in right upper quadrant > right lower quadrant. No focal tenderness at McBurney's point. No CVA tenderness.   Musculoskeletal: Normal range of motion.   Neurological: She is alert and oriented to person, place, and time.   Skin: Skin is warm and dry.         ED Course   Procedures  Labs Reviewed   CBC W/ AUTO DIFFERENTIAL - Abnormal; Notable for  the following components:       Result Value    Gran% 13.0 (*)     Lymph% 78.0 (*)     All other components within normal limits   URINALYSIS - Abnormal; Notable for the following components:    Specific Gravity, UA <=1.005 (*)     Occult Blood UA Trace (*)     Leukocytes, UA 2+ (*)     All other components within normal limits   URINALYSIS MICROSCOPIC - Abnormal; Notable for the following components:    Bacteria, UA Moderate (*)     All other components within normal limits   CULTURE, URINE   CULTURE, URINE   COMPREHENSIVE METABOLIC PANEL   LIPASE     EKG Readings: (Independently Interpreted)   Sinus tachycardia at a rate of 101 bmp. No ischemia. No arrhythmia.       Imaging Results          X-Ray Chest PA And Lateral (Final result)  Result time 01/01/19 20:05:34    Final result by Jalen Watkins MD (01/01/19 20:05:34)                 Impression:      Bilateral diffuse nonspecific interstitial coarsening, without focal consolidation.      Electronically signed by: Jalen Watkins MD  Date:    01/01/2019  Time:    20:05             Narrative:    EXAMINATION:  XR CHEST PA AND LATERAL    CLINICAL HISTORY:  Chest Pain;    TECHNIQUE:  PA and lateral views of the chest were performed.    COMPARISON:  Chest radiograph 11/16/2018    FINDINGS:  Monitoring leads overlie the chest.  Patient is slightly rotated.  Left upper chest Port-A-Cath again projects over the right atrium, somewhat curved towards the patient's left and posteriorly.  Cardiomediastinal silhouette is midline and grossly similar to prior, noting calcific atherosclerosis of the aortic arch.    Bilateral mild diffuse interstitial coarsening slightly increased from prior, nonspecific and can be seen with pulmonary edema or interstitial pneumonia.  Bibasilar scattered linear opacities consistent with subsegmental scarring versus atelectasis.  The lungs are otherwise well expanded without large consolidation, pleural effusion or pneumothorax.  Included osseous  structures appear stable without acute process seen.                              X-Rays:   Independently Interpreted Readings:   Chest X-Ray: No infiltrate. No effusion.     Medical Decision Making:   Clinical Tests:   Lab Tests: Ordered and Reviewed  Radiological Study: Ordered and Reviewed  Medical Tests: Ordered and Reviewed  ED Management:  10:30 PM - Secured chat message sent to patient's oncologist. RE: neutropenia, possible UTI.             Scribe Attestation:   Scribe #1: I performed the above scribed service and the documentation accurately describes the services I performed. I attest to the accuracy of the note.    Attending Attestation:           Physician Attestation for Scribe:  Physician Attestation Statement for Scribe #1: I, Dr. Malone, reviewed documentation, as scribed by Naun Walton in my presence, and it is both accurate and complete.           Patient with history of breast cancer, undergoing chemotherapy.  Presents with pain located in right upper abdomen, right flank right lower chest.  No fevers.  Afebrile, vital signs stable,.  Not acutely ill or toxic appearing.  Exam she did have tenderness in the right upper quadrant but no CVA tenderness. Chest x-ray with nonspecific interstitial prominence but no focal infiltrate or right-sided pleural effusion.  Chemistries, abdominal labs unremarkable. CBC significant for development of neutropenia.  Previous white count 9.5, now 4, with ANC approximately 720.  Although she did not have dysuria frequency etc to suggest UTI, her urinalysis does have significant amount of bacteria in it.  Therefore will send a culture and treat with Bactrim in the interim.  Her hematologist was updated with findings, plan of care.  Return precautions discussed.  Encouraged close follow-up, especially if symptoms persist.             Clinical Impression:     1. Right upper quadrant pain    2. Abdominal pain    3. Chemotherapy induced neutropenia    4. Acute cystitis  without hematuria                                   Jesus Malone II, MD  01/05/19 7927

## 2019-01-02 NOTE — ED NOTES
Pt sitting up in bed stating she does not feel any better. When she went to X Ray she stopped and urinated in the restroom and did not get a specimen. Pt reminded of need for urine and specimen cup remains at bedside. MD chi

## 2019-01-02 NOTE — ED TRIAGE NOTES
Patient presents to ED with c/o epigastric chest pain radiating to her back starting tonight. She denies SOB, N/V, or fever.

## 2019-01-03 ENCOUNTER — TELEPHONE (OUTPATIENT)
Dept: HEMATOLOGY/ONCOLOGY | Facility: CLINIC | Age: 69
End: 2019-01-03

## 2019-01-03 LAB — BACTERIA UR CULT: NO GROWTH

## 2019-01-03 NOTE — TELEPHONE ENCOUNTER
Spoke with Ms Andre. She is taking bactrim and is feeling better now. No fevers.   ----- Message from Landon Gomez sent at 1/3/2019 11:59 AM CST -----  Contact: WAYNE ANDRE [[id number]]  Name of Who is Calling: WAYNE ANDRE [[id number]]        What is the request in detail: Pt called to advise staff she went to ED on 1.1.2019. Her white blood count was low. She also had a UTI.  Contact at your earliest convenience.  Thanks-        Can the clinic reply by [facility]: N    What Number to Call Back if not in [facility]: [phone]

## 2019-01-15 ENCOUNTER — LAB VISIT (OUTPATIENT)
Dept: LAB | Facility: OTHER | Age: 69
End: 2019-01-15
Attending: INTERNAL MEDICINE
Payer: MEDICARE

## 2019-01-15 ENCOUNTER — OFFICE VISIT (OUTPATIENT)
Dept: HEMATOLOGY/ONCOLOGY | Facility: CLINIC | Age: 69
End: 2019-01-15
Payer: MEDICARE

## 2019-01-15 VITALS
SYSTOLIC BLOOD PRESSURE: 163 MMHG | DIASTOLIC BLOOD PRESSURE: 72 MMHG | TEMPERATURE: 98 F | RESPIRATION RATE: 18 BRPM | BODY MASS INDEX: 24.66 KG/M2 | HEART RATE: 85 BPM | HEIGHT: 66 IN | WEIGHT: 153.44 LBS | OXYGEN SATURATION: 99 %

## 2019-01-15 DIAGNOSIS — Z17.0 MALIGNANT NEOPLASM INVOLVING BOTH NIPPLE AND AREOLA OF RIGHT BREAST IN FEMALE, ESTROGEN RECEPTOR POSITIVE: Primary | ICD-10-CM

## 2019-01-15 DIAGNOSIS — G89.3 CANCER RELATED PAIN: ICD-10-CM

## 2019-01-15 DIAGNOSIS — Z51.11 ENCOUNTER FOR ANTINEOPLASTIC CHEMOTHERAPY: ICD-10-CM

## 2019-01-15 DIAGNOSIS — I10 ESSENTIAL HYPERTENSION: ICD-10-CM

## 2019-01-15 DIAGNOSIS — Z17.0 MALIGNANT NEOPLASM OF UPPER-OUTER QUADRANT OF RIGHT BREAST IN FEMALE, ESTROGEN RECEPTOR POSITIVE: ICD-10-CM

## 2019-01-15 DIAGNOSIS — E11.9 TYPE 2 DIABETES MELLITUS WITHOUT COMPLICATION, WITHOUT LONG-TERM CURRENT USE OF INSULIN: ICD-10-CM

## 2019-01-15 DIAGNOSIS — C50.011 MALIGNANT NEOPLASM INVOLVING BOTH NIPPLE AND AREOLA OF RIGHT BREAST IN FEMALE, ESTROGEN RECEPTOR POSITIVE: Primary | ICD-10-CM

## 2019-01-15 DIAGNOSIS — C50.411 MALIGNANT NEOPLASM OF UPPER-OUTER QUADRANT OF RIGHT BREAST IN FEMALE, ESTROGEN RECEPTOR POSITIVE: ICD-10-CM

## 2019-01-15 LAB
ALBUMIN SERPL BCP-MCNC: 4.1 G/DL
ALP SERPL-CCNC: 103 U/L
ALT SERPL W/O P-5'-P-CCNC: 14 U/L
ANION GAP SERPL CALC-SCNC: 9 MMOL/L
AST SERPL-CCNC: 14 U/L
BILIRUB SERPL-MCNC: 0.7 MG/DL
BUN SERPL-MCNC: 16 MG/DL
CALCIUM SERPL-MCNC: 10.1 MG/DL
CHLORIDE SERPL-SCNC: 108 MMOL/L
CO2 SERPL-SCNC: 25 MMOL/L
CREAT SERPL-MCNC: 0.7 MG/DL
ERYTHROCYTE [DISTWIDTH] IN BLOOD BY AUTOMATED COUNT: 14.4 %
EST. GFR  (AFRICAN AMERICAN): >60 ML/MIN/1.73 M^2
EST. GFR  (NON AFRICAN AMERICAN): >60 ML/MIN/1.73 M^2
GLUCOSE SERPL-MCNC: 116 MG/DL
HCT VFR BLD AUTO: 40.1 %
HGB BLD-MCNC: 13.1 G/DL
MCH RBC QN AUTO: 29.4 PG
MCHC RBC AUTO-ENTMCNC: 32.7 G/DL
MCV RBC AUTO: 90 FL
NEUTROPHILS # BLD AUTO: 4.1 K/UL
PLATELET # BLD AUTO: 377 K/UL
PMV BLD AUTO: 9.2 FL
POTASSIUM SERPL-SCNC: 4.3 MMOL/L
PROT SERPL-MCNC: 7.9 G/DL
RBC # BLD AUTO: 4.46 M/UL
SODIUM SERPL-SCNC: 142 MMOL/L
WBC # BLD AUTO: 6.83 K/UL

## 2019-01-15 PROCEDURE — 99215 OFFICE O/P EST HI 40 MIN: CPT | Mod: S$GLB,,, | Performed by: INTERNAL MEDICINE

## 2019-01-15 PROCEDURE — 1101F PR PT FALLS ASSESS DOC 0-1 FALLS W/OUT INJ PAST YR: ICD-10-PCS | Mod: CPTII,S$GLB,, | Performed by: INTERNAL MEDICINE

## 2019-01-15 PROCEDURE — 85027 COMPLETE CBC AUTOMATED: CPT

## 2019-01-15 PROCEDURE — 99215 PR OFFICE/OUTPT VISIT, EST, LEVL V, 40-54 MIN: ICD-10-PCS | Mod: S$GLB,,, | Performed by: INTERNAL MEDICINE

## 2019-01-15 PROCEDURE — 3077F PR MOST RECENT SYSTOLIC BLOOD PRESSURE >= 140 MM HG: ICD-10-PCS | Mod: CPTII,S$GLB,, | Performed by: INTERNAL MEDICINE

## 2019-01-15 PROCEDURE — 3078F DIAST BP <80 MM HG: CPT | Mod: CPTII,S$GLB,, | Performed by: INTERNAL MEDICINE

## 2019-01-15 PROCEDURE — 36415 COLL VENOUS BLD VENIPUNCTURE: CPT

## 2019-01-15 PROCEDURE — 3077F SYST BP >= 140 MM HG: CPT | Mod: CPTII,S$GLB,, | Performed by: INTERNAL MEDICINE

## 2019-01-15 PROCEDURE — 3078F PR MOST RECENT DIASTOLIC BLOOD PRESSURE < 80 MM HG: ICD-10-PCS | Mod: CPTII,S$GLB,, | Performed by: INTERNAL MEDICINE

## 2019-01-15 PROCEDURE — 99999 PR PBB SHADOW E&M-EST. PATIENT-LVL III: CPT | Mod: PBBFAC,,, | Performed by: INTERNAL MEDICINE

## 2019-01-15 PROCEDURE — 1101F PT FALLS ASSESS-DOCD LE1/YR: CPT | Mod: CPTII,S$GLB,, | Performed by: INTERNAL MEDICINE

## 2019-01-15 PROCEDURE — 80053 COMPREHEN METABOLIC PANEL: CPT

## 2019-01-15 PROCEDURE — 99999 PR PBB SHADOW E&M-EST. PATIENT-LVL III: ICD-10-PCS | Mod: PBBFAC,,, | Performed by: INTERNAL MEDICINE

## 2019-01-15 RX ORDER — SODIUM CHLORIDE 0.9 % (FLUSH) 0.9 %
10 SYRINGE (ML) INJECTION
Status: CANCELLED | OUTPATIENT
Start: 2019-01-15

## 2019-01-15 RX ORDER — OXYCODONE HYDROCHLORIDE 5 MG/1
5 TABLET ORAL EVERY 6 HOURS PRN
Qty: 20 TABLET | Refills: 0 | Status: SHIPPED | OUTPATIENT
Start: 2019-01-15 | End: 2019-02-06 | Stop reason: ALTCHOICE

## 2019-01-15 RX ORDER — HEPARIN 100 UNIT/ML
500 SYRINGE INTRAVENOUS
Status: CANCELLED | OUTPATIENT
Start: 2019-01-15

## 2019-01-15 NOTE — Clinical Note
RTC to see Dr Mata on 2/4 with CBC, CMP (spoke with Dr Mata). Then return to [facility] on 2/5 for chemo (TC/neulasta)

## 2019-01-15 NOTE — PROGRESS NOTES
Subjective:       Patient ID: Alia Andre is a 68 y.o. female.     Chief Complaint: follow up for breast cancer     Diagnosis:  Stage IIB (T2N0M0) IDC of the right breast, grade 3, ER weakly positive 10-20%, UT negative, HER2 negative.      Oncologic History:  1. Ms. Andre is a 69 yo woman with HTN, T2DM, goiter who initially saw me on 10/24/18 for further evaluation of newly diagnosed right breast cancer. She self palpated a mass one month ago. Mammogram/US on 9/28/18 showed a 19 x 14 x 14 mm lesion in the right breast at 11 oclock. One LN in the right axilla with thickened cortex of 3 mm. a left breast 9 mm cyst at 10 oclock. Biopsy on 10/11/18 showed grade 3 invasive mammary carcinoma with medullary features of the right breast, ER +10-20% weak, UT negative, HER2 negative, Ki 67 40%. Biopsy of axillary LN negative for carcinoma. She presents to discuss further management. Currently feels well. Genetic study negative.   2. Right mastectomy and SLNB on 11/16/18 showed:  Tumor size: 23 mm in greatest dimension.  Histologic type: Invasive carcinoma of no special type (ductal, not otherwise specified).  Histologic grade:  Tubule formation: Score 3  Nuclear pleomorphism: Score 3  Mitotic rate: Score 3  Overall grade: Grade 3  Ductal carcinoma in Situ: Not identified  Margins: Invasive carcinoma margins: Uninvolved by invasive carcinoma, closest margin is deep margin, 2.2 cm  from tumor  Regional lymph nodes: Uninvolved by tumor cells  Number of lymph nodes examined: 1  Number of sentinel nodes examined: 1  Treatment effect: No known presurgical therapy  Ancillary studies: Immunohistochemical stains performed on previous biopsy [id number] show the following  results:  Estrogen receptor: Positive, weak staining in 10-20% of tumor cell nuclei  Progesterone receptor: Negative  HER2: Negative  Ki-67 proliferative index: 40%  Pathologic Stage Classification (pTNM, AJCC 8th Edition)  Primary tumor: pT2: tumor more  than 20 mm but less than or equal to 50 mm in greatest dimension.  Lymph node: sn N0: No regional lymph node metastasis.     Port placed at surgery.   3. Adjuvant TC started on 18        Interval History:   Ms. Andre returns for follow up. Received cycle 1 TC on 18. Had Right hip and RUQ pain a couple of days later, which did not resolve after taking claritin, tylenol, ibuprofen, and tramadol. Went to ED on 19. Workup unremarkable except for bacteria on UA. She was given bactrim. Her pain resolved after the ER visit.      ECO     ROS:   A ten-point system review is obtained and negative except for what was stated in the Interval History.      Physical Examination:   Vital signs reviewed.   General: well hydrated, well developed, in no acute distress  HEENT: normocephalic, PERRLA, EOMI, anicteric sclerae, oropharynx clear  Neck: supple, no JVD, thyromegaly, cervical or supraclavicular lymphadenopathy  Lungs: clear breath sounds bilaterally, no wheezing, rales, or rhonchi  Heart: RRR, no M/R/G  Breast: s/p R mastectomy. No abnormal skin lesions or axillary lymphadenopathy. L: no masses, axillary LAD  Abdomen: soft, no tenderness, non-distended, no hepatosplenomegaly, mass, or hernia. BS present  Extremities: no clubbing, cyanosis, or edema  Skin: no rash, ulcer, or open wounds  Neuro: alert and oriented x 4, no focal neuro deficit  Psych: pleasant and appropriate mood and affect     Objective:      Laboratory Data:  Labs reviewed. CBC, CMP unremarkable        Assessment and Plan:      1. Malignant neoplasm involving both nipple and areola of right breast in female, estrogen receptor positive    2. Encounter for antineoplastic chemotherapy    3. Essential hypertension    4. Type 2 diabetes mellitus without complication, without long-term current use of insulin    5. Cancer related pain        1.2  - Ms. Andre is a 67 yo woman with stage IIB (H4yN6U5) IDC of the right breast, ER weakly  positive, ID neg, HER2 negative, grade 3. She is getting adjuvant chemotherapy with TC.   - she only took dex 4 mg before and after chemo with cycle 1. Asked her and wrote down on the paper for her to take 2 tablets (8 mg) twice a day the day before and after chemo. She knows to take it today  - cycle 2 TC tomorrow  - I am out of office in 3 weeks. She will return to see Dr Mata on 2/4 and get cycle 3 TC on 2/5     3.  - BP elevated today. Patient checks her BP at home daily and it is usually in the 140s  - continue to monitor BP at home     4.   - FRANCIE ok  - c/w current meds     5.  - oxycodone prn for neulasta-related pain. #20 prescribed

## 2019-01-15 NOTE — LETTER
January 15, 2019      [facility] - Hematology On[facility]  [address], [address]  [facility] [address]  Phone: [phone]       Patient: Alia Andre   YOB: 1950  Date of Visit: 01/15/2019    To Whom It May Concern:    Ms Alia Andre is a patient under my care. She was able to return to work on Dec 3, 2018 with no lifting of 5 lbs or more. If you have any questions or concerns, or if I can be of further assistance, please do not hesitate to contact me.    Sincerely,    Alexander Garces MD

## 2019-01-16 ENCOUNTER — INFUSION (OUTPATIENT)
Dept: INFUSION THERAPY | Facility: HOSPITAL | Age: 69
End: 2019-01-16
Attending: INTERNAL MEDICINE
Payer: MEDICARE

## 2019-01-16 VITALS
RESPIRATION RATE: 18 BRPM | HEART RATE: 89 BPM | SYSTOLIC BLOOD PRESSURE: 139 MMHG | TEMPERATURE: 98 F | DIASTOLIC BLOOD PRESSURE: 73 MMHG

## 2019-01-16 DIAGNOSIS — Z17.0 MALIGNANT NEOPLASM OF UPPER-OUTER QUADRANT OF RIGHT BREAST IN FEMALE, ESTROGEN RECEPTOR POSITIVE: Primary | ICD-10-CM

## 2019-01-16 DIAGNOSIS — C50.411 MALIGNANT NEOPLASM OF UPPER-OUTER QUADRANT OF RIGHT BREAST IN FEMALE, ESTROGEN RECEPTOR POSITIVE: Primary | ICD-10-CM

## 2019-01-16 PROCEDURE — 25000003 PHARM REV CODE 250: Performed by: INTERNAL MEDICINE

## 2019-01-16 PROCEDURE — 96367 TX/PROPH/DG ADDL SEQ IV INF: CPT

## 2019-01-16 PROCEDURE — 96415 CHEMO IV INFUSION ADDL HR: CPT

## 2019-01-16 PROCEDURE — 63600175 PHARM REV CODE 636 W HCPCS: Mod: JG | Performed by: INTERNAL MEDICINE

## 2019-01-16 PROCEDURE — 96417 CHEMO IV INFUS EACH ADDL SEQ: CPT

## 2019-01-16 PROCEDURE — 96377 APPLICATON ON-BODY INJECTOR: CPT

## 2019-01-16 PROCEDURE — 96413 CHEMO IV INFUSION 1 HR: CPT

## 2019-01-16 RX ORDER — HEPARIN 100 UNIT/ML
500 SYRINGE INTRAVENOUS
Status: DISCONTINUED | OUTPATIENT
Start: 2019-01-16 | End: 2019-01-16 | Stop reason: HOSPADM

## 2019-01-16 RX ORDER — SODIUM CHLORIDE 0.9 % (FLUSH) 0.9 %
10 SYRINGE (ML) INJECTION
Status: DISCONTINUED | OUTPATIENT
Start: 2019-01-16 | End: 2019-01-16 | Stop reason: HOSPADM

## 2019-01-16 RX ADMIN — APREPITANT 130 MG: 130 INJECTION, EMULSION INTRAVENOUS at 11:01

## 2019-01-16 RX ADMIN — PEGFILGRASTIM 6 MG: KIT SUBCUTANEOUS at 02:01

## 2019-01-16 RX ADMIN — CYCLOPHOSPHAMIDE 1085 MG: 1 INJECTION, POWDER, FOR SOLUTION INTRAVENOUS; ORAL at 01:01

## 2019-01-16 RX ADMIN — SODIUM CHLORIDE: 9 INJECTION, SOLUTION INTRAVENOUS at 11:01

## 2019-01-16 RX ADMIN — DEXAMETHASONE SODIUM PHOSPHATE: 4 INJECTION, SOLUTION INTRA-ARTICULAR; INTRALESIONAL; INTRAMUSCULAR; INTRAVENOUS; SOFT TISSUE at 11:01

## 2019-01-16 RX ADMIN — HEPARIN SODIUM (PORCINE) LOCK FLUSH IV SOLN 100 UNIT/ML 500 UNITS: 100 SOLUTION at 02:01

## 2019-01-16 RX ADMIN — DOCETAXEL 135 MG: 10 INJECTION, SOLUTION INTRAVENOUS at 12:01

## 2019-01-16 NOTE — PLAN OF CARE
Problem: Adult Inpatient Plan of Care  Goal: Plan of Care Review  Outcome: Ongoing (interventions implemented as appropriate)  Pt. Tolerated Taxotere/Cytoxan infusions today without adverse effect. Neulasta OBI placed on abdomen. Injected appropriately. Pt reports feeling needle stick. Given instruction card, given pt hand out instructions from package. Time for administration and OBI removal written down on instructions. Pt verbalizes understanding.    Problem: Anemia (Chemotherapy Effects)  Goal: Anemia Symptom Improvement  Outcome: Ongoing (interventions implemented as appropriate)  Denies new or worsening symptoms.

## 2019-01-17 ENCOUNTER — NURSE TRIAGE (OUTPATIENT)
Dept: ADMINISTRATIVE | Facility: CLINIC | Age: 69
End: 2019-01-17

## 2019-01-17 DIAGNOSIS — Z17.0 MALIGNANT NEOPLASM OF UPPER-OUTER QUADRANT OF RIGHT BREAST IN FEMALE, ESTROGEN RECEPTOR POSITIVE: ICD-10-CM

## 2019-01-17 DIAGNOSIS — C50.411 MALIGNANT NEOPLASM OF UPPER-OUTER QUADRANT OF RIGHT BREAST IN FEMALE, ESTROGEN RECEPTOR POSITIVE: ICD-10-CM

## 2019-01-17 RX ORDER — DEXAMETHASONE 4 MG/1
8 TABLET ORAL EVERY 12 HOURS
Qty: 32 TABLET | Refills: 0 | Status: SHIPPED | OUTPATIENT
Start: 2019-01-17 | End: 2019-05-22

## 2019-01-17 NOTE — TELEPHONE ENCOUNTER
----- Message from Tracy Correia sent at 1/17/2019 11:45 AM CST -----  Name of Who is Calling: Anne MarieWAYNE BYRD [[id number]]    What is the request in detail: Pt states she misplaced her medication.Pt would also like to discuss her next chemo appt.  Please call to further discuss and advise.      Can the clinic reply by [facility]:   No       What Number to Call Back if not in [facility]:[phone]

## 2019-01-17 NOTE — TELEPHONE ENCOUNTER
Returned call and spoke with Ms Andre. Ms Andre follow up appointment on 2/14/19 has been scheduled with Dr Mata @ 2:30pm (Dr Garces will be on vacation ). Ms Andre states her daughter usually bring her to her appointments. Her daughter in unavailable in the afternoon, due to work. Ms Andre asking if we can re schedule her appointment to am time on that day. Informed Ms Andre, Dr Mata only sees patient at [facility] in the afternoon. Informed Ms Andre I will speak with the , Nichelle and ask if she can arrange a cab pick on that day for her so we want need to reschedule her appts.    Secondly, Ms Andre states she misplaced her Dexamethasone prescription. She is due to take her next dose today. She requesting another Rx be forward to [facility] pharmacy in [place]. Request forward to Dr Garces.

## 2019-01-17 NOTE — TELEPHONE ENCOUNTER
Called and spoke with Mr Andre. Informed her that the Dexamethasone Rx has been forward to [facility].  Ms Andre state her granddaughter will be able to bring her on the 4th for her appointment with Dr Mata.

## 2019-01-18 ENCOUNTER — INFUSION (OUTPATIENT)
Dept: INFUSION THERAPY | Facility: OTHER | Age: 69
End: 2019-01-18
Attending: INTERNAL MEDICINE
Payer: MEDICARE

## 2019-01-18 DIAGNOSIS — C50.011 MALIGNANT NEOPLASM INVOLVING BOTH NIPPLE AND AREOLA OF RIGHT BREAST IN FEMALE, ESTROGEN RECEPTOR POSITIVE: Primary | ICD-10-CM

## 2019-01-18 DIAGNOSIS — Z17.0 MALIGNANT NEOPLASM INVOLVING BOTH NIPPLE AND AREOLA OF RIGHT BREAST IN FEMALE, ESTROGEN RECEPTOR POSITIVE: Primary | ICD-10-CM

## 2019-01-18 NOTE — PLAN OF CARE
"Problem: Adult Inpatient Plan of Care  Goal: Plan of Care Review  Outcome: Ongoing (interventions implemented as appropriate)  Patient came to [facility] for evaluation of neulasta OBI after calling stating "it didn't do the same thing as last time". Patient arrived with OBI in place on stomach. OBI read "empty", no flashing red light, nurse concluded that neulasta was delivered successfully. Patient educated on OBI procedure when at home, delivery, and removal. Verbalized understanding, d/c'd home with self.       "

## 2019-01-18 NOTE — TELEPHONE ENCOUNTER
Reason for Disposition   Health Information question, no triage required and triager able to answer question    Protocols used: ST INFORMATION ONLY CALL-A-AH    Pt states she had neulasta placed yesterday. Pt states she was told medication should come out at approx 5:30 today. Pt states medication light is blinking green but it has not administered injection. Pt advised to call [employer] and pt verbalizes understanding. Pt called back. Pt states [employer] is closed, but she spoke with her Dr and was told to come into clinic tomorrow. Pt denies any other questions.

## 2019-02-04 ENCOUNTER — OFFICE VISIT (OUTPATIENT)
Dept: HEMATOLOGY/ONCOLOGY | Facility: CLINIC | Age: 69
End: 2019-02-04
Attending: INTERNAL MEDICINE
Payer: MEDICARE

## 2019-02-04 ENCOUNTER — LAB VISIT (OUTPATIENT)
Dept: LAB | Facility: OTHER | Age: 69
End: 2019-02-04
Attending: INTERNAL MEDICINE
Payer: MEDICARE

## 2019-02-04 VITALS
RESPIRATION RATE: 16 BRPM | HEART RATE: 105 BPM | DIASTOLIC BLOOD PRESSURE: 66 MMHG | OXYGEN SATURATION: 99 % | HEIGHT: 66 IN | WEIGHT: 152.13 LBS | BODY MASS INDEX: 24.45 KG/M2 | SYSTOLIC BLOOD PRESSURE: 134 MMHG | TEMPERATURE: 97 F

## 2019-02-04 DIAGNOSIS — C50.411 MALIGNANT NEOPLASM OF UPPER-OUTER QUADRANT OF RIGHT BREAST IN FEMALE, ESTROGEN RECEPTOR POSITIVE: Primary | ICD-10-CM

## 2019-02-04 DIAGNOSIS — Z51.11 ENCOUNTER FOR ANTINEOPLASTIC CHEMOTHERAPY: ICD-10-CM

## 2019-02-04 DIAGNOSIS — Z17.0 MALIGNANT NEOPLASM INVOLVING BOTH NIPPLE AND AREOLA OF RIGHT BREAST IN FEMALE, ESTROGEN RECEPTOR POSITIVE: ICD-10-CM

## 2019-02-04 DIAGNOSIS — M79.604 PAIN IN BOTH LOWER EXTREMITIES: ICD-10-CM

## 2019-02-04 DIAGNOSIS — Z17.0 MALIGNANT NEOPLASM OF UPPER-OUTER QUADRANT OF RIGHT BREAST IN FEMALE, ESTROGEN RECEPTOR POSITIVE: Primary | ICD-10-CM

## 2019-02-04 DIAGNOSIS — M79.605 PAIN IN BOTH LOWER EXTREMITIES: ICD-10-CM

## 2019-02-04 DIAGNOSIS — Z72.0 TOBACCO ABUSE DISORDER: ICD-10-CM

## 2019-02-04 DIAGNOSIS — C50.011 MALIGNANT NEOPLASM INVOLVING BOTH NIPPLE AND AREOLA OF RIGHT BREAST IN FEMALE, ESTROGEN RECEPTOR POSITIVE: ICD-10-CM

## 2019-02-04 LAB
ALBUMIN SERPL BCP-MCNC: 3.8 G/DL
ALP SERPL-CCNC: 108 U/L
ALT SERPL W/O P-5'-P-CCNC: 15 U/L
ANION GAP SERPL CALC-SCNC: 9 MMOL/L
AST SERPL-CCNC: 15 U/L
BILIRUB SERPL-MCNC: 0.6 MG/DL
BUN SERPL-MCNC: 13 MG/DL
CALCIUM SERPL-MCNC: 10.3 MG/DL
CHLORIDE SERPL-SCNC: 106 MMOL/L
CO2 SERPL-SCNC: 23 MMOL/L
CREAT SERPL-MCNC: 0.8 MG/DL
ERYTHROCYTE [DISTWIDTH] IN BLOOD BY AUTOMATED COUNT: 15.5 %
EST. GFR  (AFRICAN AMERICAN): >60 ML/MIN/1.73 M^2
EST. GFR  (NON AFRICAN AMERICAN): >60 ML/MIN/1.73 M^2
GLUCOSE SERPL-MCNC: 164 MG/DL
HCT VFR BLD AUTO: 41.4 %
HGB BLD-MCNC: 13.4 G/DL
MCH RBC QN AUTO: 29.6 PG
MCHC RBC AUTO-ENTMCNC: 32.4 G/DL
MCV RBC AUTO: 92 FL
NEUTROPHILS # BLD AUTO: 6.7 K/UL
PLATELET # BLD AUTO: 372 K/UL
PMV BLD AUTO: 9.4 FL
POTASSIUM SERPL-SCNC: 5.1 MMOL/L
PROT SERPL-MCNC: 8 G/DL
RBC # BLD AUTO: 4.52 M/UL
SODIUM SERPL-SCNC: 138 MMOL/L
WBC # BLD AUTO: 7.19 K/UL

## 2019-02-04 PROCEDURE — 99499 RISK ADDL DX/OHS AUDIT: ICD-10-PCS | Mod: S$GLB,,, | Performed by: INTERNAL MEDICINE

## 2019-02-04 PROCEDURE — 99999 PR PBB SHADOW E&M-EST. PATIENT-LVL IV: ICD-10-PCS | Mod: PBBFAC,,, | Performed by: INTERNAL MEDICINE

## 2019-02-04 PROCEDURE — 99499 UNLISTED E&M SERVICE: CPT | Mod: S$GLB,,, | Performed by: INTERNAL MEDICINE

## 2019-02-04 PROCEDURE — 3075F SYST BP GE 130 - 139MM HG: CPT | Mod: CPTII,S$GLB,, | Performed by: INTERNAL MEDICINE

## 2019-02-04 PROCEDURE — 1101F PR PT FALLS ASSESS DOC 0-1 FALLS W/OUT INJ PAST YR: ICD-10-PCS | Mod: CPTII,S$GLB,, | Performed by: INTERNAL MEDICINE

## 2019-02-04 PROCEDURE — 1101F PT FALLS ASSESS-DOCD LE1/YR: CPT | Mod: CPTII,S$GLB,, | Performed by: INTERNAL MEDICINE

## 2019-02-04 PROCEDURE — 3075F PR MOST RECENT SYSTOLIC BLOOD PRESS GE 130-139MM HG: ICD-10-PCS | Mod: CPTII,S$GLB,, | Performed by: INTERNAL MEDICINE

## 2019-02-04 PROCEDURE — 99214 PR OFFICE/OUTPT VISIT, EST, LEVL IV, 30-39 MIN: ICD-10-PCS | Mod: S$GLB,,, | Performed by: INTERNAL MEDICINE

## 2019-02-04 PROCEDURE — 3078F PR MOST RECENT DIASTOLIC BLOOD PRESSURE < 80 MM HG: ICD-10-PCS | Mod: CPTII,S$GLB,, | Performed by: INTERNAL MEDICINE

## 2019-02-04 PROCEDURE — 85027 COMPLETE CBC AUTOMATED: CPT

## 2019-02-04 PROCEDURE — 99999 PR PBB SHADOW E&M-EST. PATIENT-LVL IV: CPT | Mod: PBBFAC,,, | Performed by: INTERNAL MEDICINE

## 2019-02-04 PROCEDURE — 3078F DIAST BP <80 MM HG: CPT | Mod: CPTII,S$GLB,, | Performed by: INTERNAL MEDICINE

## 2019-02-04 PROCEDURE — 80053 COMPREHEN METABOLIC PANEL: CPT

## 2019-02-04 PROCEDURE — 99214 OFFICE O/P EST MOD 30 MIN: CPT | Mod: S$GLB,,, | Performed by: INTERNAL MEDICINE

## 2019-02-04 PROCEDURE — 36415 COLL VENOUS BLD VENIPUNCTURE: CPT

## 2019-02-04 RX ORDER — HEPARIN 100 UNIT/ML
500 SYRINGE INTRAVENOUS
Status: CANCELLED | OUTPATIENT
Start: 2019-02-05

## 2019-02-04 RX ORDER — GABAPENTIN 300 MG/1
300 CAPSULE ORAL NIGHTLY
Qty: 30 CAPSULE | Refills: 11 | Status: SHIPPED | OUTPATIENT
Start: 2019-02-04 | End: 2019-05-22

## 2019-02-04 RX ORDER — SODIUM CHLORIDE 0.9 % (FLUSH) 0.9 %
10 SYRINGE (ML) INJECTION
Status: CANCELLED | OUTPATIENT
Start: 2019-02-05

## 2019-02-04 NOTE — Clinical Note
Taxotere and Cytoxan every 3 weeks with day 2 NeulastaCBC and CMP each visitReferral to smoking cessation program

## 2019-02-04 NOTE — PROGRESS NOTES
Subjective:       Patient ID: Alia Andre is a 68 y.o. female.    Chief Complaint: No chief complaint on file.    HPI Ms. Andre is a 67 yo woman with HTN, T2DM, goiter who returns for follow-up of right breast cancer, T2N0, ER weakly positive, HER2 negative.  She is currently on adjuvant TC chemotherapy.  She has had 2 cycles thus far.  She has been tolerating that extremely well.  Only complaint is some bilateral leg pain which started after she began chemotherapy.   She reports that her taste is off but otherwise she has been eating well. She occasionally has some mild constipation but no diarrhea.  She has no shortness of breath.  Energy level has been variable.  She has been trying to quit smoking but has been having difficulty with that.     Breast history:  She self palpated a mass in September 2018. Mammogram/US on 9/28/18 showed a 19 x 14 x 14 mm lesion in the right breast at 11 oclock. One LN in the right axilla with thickened cortex of 3 mm. a left breast 9 mm cyst at 10 oclock. Biopsy on 10/11/18 showed grade 3 invasive mammary carcinoma with medullary features of the right breast, ER +10-20% weak, OR negative, HER2 negative, Ki 67 40%. Biopsy of axillary LN negative for carcinoma.  Genetic study negative.   2. Right mastectomy and SLNB on 11/16/18 showed:  Tumor size: 23 mm invasive carcinoma which was high-grade.    [place] lymph node biopsy was negative.      3. Adjuvant TC started on 12/26/18.      Review of Systems   Constitutional: Positive for appetite change (Taste is off) and fatigue ( at times). Negative for activity change, fever and unexpected weight change.   HENT: Negative for mouth sores.    Respiratory: Negative.    Cardiovascular: Negative.    Gastrointestinal: Positive for constipation. Negative for abdominal pain and diarrhea.   Genitourinary: Negative.    Musculoskeletal:        Bilateral leg pain   Neurological: Negative.    Psychiatric/Behavioral: Negative.         Objective:      Physical Exam   Constitutional: She is oriented to person, place, and time. She appears well-developed and well-nourished. No distress.   HENT:   Mouth/Throat: No oropharyngeal exudate.   Eyes: No scleral icterus.   Cardiovascular: Normal rate and regular rhythm.   Pulmonary/Chest: Effort normal and breath sounds normal. She has no wheezes. Left breast exhibits no mass, no nipple discharge and no skin change.       Abdominal: Soft. She exhibits no mass. There is no tenderness.   Lymphadenopathy:     She has no cervical adenopathy.   Neurological: She is alert and oriented to person, place, and time.   Skin: No rash noted. No erythema.   Psychiatric: She has a normal mood and affect. Her behavior is normal. Thought content normal.   Vitals reviewed.      Assessment:     CBC shows white count 7190, hemoglobin 13.4 and platelet count 986120  1. Malignant neoplasm of upper-outer quadrant of right breast in female, estrogen receptor positive        Plan:       Cycle 3 of docetaxel and Cytoxan therapy.      Referral to smoking cessation program.

## 2019-02-05 ENCOUNTER — INFUSION (OUTPATIENT)
Dept: INFUSION THERAPY | Facility: HOSPITAL | Age: 69
End: 2019-02-05
Attending: INTERNAL MEDICINE
Payer: MEDICARE

## 2019-02-05 VITALS
DIASTOLIC BLOOD PRESSURE: 65 MMHG | HEART RATE: 90 BPM | RESPIRATION RATE: 18 BRPM | TEMPERATURE: 98 F | SYSTOLIC BLOOD PRESSURE: 145 MMHG

## 2019-02-05 DIAGNOSIS — Z17.0 MALIGNANT NEOPLASM OF UPPER-OUTER QUADRANT OF RIGHT BREAST IN FEMALE, ESTROGEN RECEPTOR POSITIVE: Primary | ICD-10-CM

## 2019-02-05 DIAGNOSIS — Z51.11 ENCOUNTER FOR ANTINEOPLASTIC CHEMOTHERAPY: ICD-10-CM

## 2019-02-05 DIAGNOSIS — C50.411 MALIGNANT NEOPLASM OF UPPER-OUTER QUADRANT OF RIGHT BREAST IN FEMALE, ESTROGEN RECEPTOR POSITIVE: Primary | ICD-10-CM

## 2019-02-05 DIAGNOSIS — Z51.11 ENCOUNTER FOR ANTINEOPLASTIC CHEMOTHERAPY: Primary | ICD-10-CM

## 2019-02-05 DIAGNOSIS — C50.411 MALIGNANT NEOPLASM OF UPPER-OUTER QUADRANT OF RIGHT BREAST IN FEMALE, ESTROGEN RECEPTOR POSITIVE: ICD-10-CM

## 2019-02-05 DIAGNOSIS — Z17.0 MALIGNANT NEOPLASM OF UPPER-OUTER QUADRANT OF RIGHT BREAST IN FEMALE, ESTROGEN RECEPTOR POSITIVE: ICD-10-CM

## 2019-02-05 PROCEDURE — 25000003 PHARM REV CODE 250: Performed by: INTERNAL MEDICINE

## 2019-02-05 PROCEDURE — 96417 CHEMO IV INFUS EACH ADDL SEQ: CPT

## 2019-02-05 PROCEDURE — 96377 APPLICATON ON-BODY INJECTOR: CPT | Mod: 59

## 2019-02-05 PROCEDURE — 96367 TX/PROPH/DG ADDL SEQ IV INF: CPT

## 2019-02-05 PROCEDURE — 96413 CHEMO IV INFUSION 1 HR: CPT

## 2019-02-05 PROCEDURE — 63600175 PHARM REV CODE 636 W HCPCS: Performed by: INTERNAL MEDICINE

## 2019-02-05 RX ORDER — HEPARIN 100 UNIT/ML
500 SYRINGE INTRAVENOUS
Status: DISCONTINUED | OUTPATIENT
Start: 2019-02-05 | End: 2019-02-05 | Stop reason: HOSPADM

## 2019-02-05 RX ORDER — SODIUM CHLORIDE 0.9 % (FLUSH) 0.9 %
10 SYRINGE (ML) INJECTION
Status: DISCONTINUED | OUTPATIENT
Start: 2019-02-05 | End: 2019-02-05 | Stop reason: HOSPADM

## 2019-02-05 RX ADMIN — PEGFILGRASTIM 6 MG: KIT SUBCUTANEOUS at 03:02

## 2019-02-05 RX ADMIN — DOCETAXEL ANHYDROUS 135 MG: 10 INJECTION, SOLUTION INTRAVENOUS at 12:02

## 2019-02-05 RX ADMIN — HEPARIN 500 UNITS: 100 SYRINGE at 03:02

## 2019-02-05 RX ADMIN — SODIUM CHLORIDE: 0.9 INJECTION, SOLUTION INTRAVENOUS at 11:02

## 2019-02-05 RX ADMIN — DEXAMETHASONE SODIUM PHOSPHATE: 4 INJECTION, SOLUTION INTRA-ARTICULAR; INTRALESIONAL; INTRAMUSCULAR; INTRAVENOUS; SOFT TISSUE at 11:02

## 2019-02-05 RX ADMIN — CYCLOPHOSPHAMIDE 1085 MG: 1 INJECTION, POWDER, FOR SOLUTION INTRAVENOUS; ORAL at 01:02

## 2019-02-05 RX ADMIN — APREPITANT 130 MG: 130 INJECTION, EMULSION INTRAVENOUS at 11:02

## 2019-02-05 NOTE — PLAN OF CARE
Problem: Anemia (Chemotherapy Effects)  Goal: Anemia Symptom Improvement    Intervention: Monitor and Manage Anemia  Taxotere cytoxan tolerated without incident, vitals stable, PAC flushed hep locked de accessed, neulasta obi applied to abd, educated on use and proper disposal of device, avs printed future appts reviewed pt to rtn to clinic on 2/26/19, leaves ambulatory no assist needed instructed to contact MD office with questions no distress.

## 2019-02-06 ENCOUNTER — TELEPHONE (OUTPATIENT)
Dept: HEMATOLOGY/ONCOLOGY | Facility: CLINIC | Age: 69
End: 2019-02-06

## 2019-02-06 DIAGNOSIS — M79.605 PAIN IN BOTH LOWER EXTREMITIES: Primary | ICD-10-CM

## 2019-02-06 DIAGNOSIS — M79.604 PAIN IN BOTH LOWER EXTREMITIES: Primary | ICD-10-CM

## 2019-02-06 RX ORDER — TRAMADOL HYDROCHLORIDE 50 MG/1
50 TABLET ORAL EVERY 6 HOURS PRN
Qty: 30 TABLET | Refills: 0 | Status: SHIPPED | OUTPATIENT
Start: 2019-02-06 | End: 2019-02-07 | Stop reason: SDUPTHER

## 2019-02-06 NOTE — TELEPHONE ENCOUNTER
----- Message from May Rubi sent at 2/6/2019 10:02 AM CST -----  Contact: Pt  Name of Who is Calling:WAYNE BYRD [[id number]]    What is the request in detail: Patient states she would like to speck with staff regarding a cream that she was prescribed Please contact to further discuss and advise     Can the clinic reply by [facility]: No    What [facility] Back [facility] not [facility]: [phone]

## 2019-02-06 NOTE — TELEPHONE ENCOUNTER
Returned call and spoke with Ms Andre. Ms Andre states Dr Mata prescribed a cream she was to apply to her legs to help the pain she is having. Ms Andre states she is not getting any relief using this cream. Patient requesting a refill on the Tramadol 50mg prescription, which was prescribed by Dr Garces. Please forward the Rx to [facility] pharmacy.

## 2019-02-07 DIAGNOSIS — M79.605 PAIN IN BOTH LOWER EXTREMITIES: ICD-10-CM

## 2019-02-07 DIAGNOSIS — M79.604 PAIN IN BOTH LOWER EXTREMITIES: ICD-10-CM

## 2019-02-07 RX ORDER — TRAMADOL HYDROCHLORIDE 50 MG/1
50 TABLET ORAL EVERY 6 HOURS PRN
Qty: 30 TABLET | Refills: 0 | Status: SHIPPED | OUTPATIENT
Start: 2019-02-07 | End: 2019-02-25 | Stop reason: SDUPTHER

## 2019-02-12 ENCOUNTER — TELEPHONE (OUTPATIENT)
Dept: HEMATOLOGY/ONCOLOGY | Facility: CLINIC | Age: 69
End: 2019-02-12

## 2019-02-12 NOTE — TELEPHONE ENCOUNTER
Returned call and spoke with Ms Andre. Informed Ms Andre, per Dr Garces, she has 1 cycle remaining. She has had only 3 cycle, the appointment on 2/25 will be her last.

## 2019-02-12 NOTE — TELEPHONE ENCOUNTER
----- Message from Carleen Malik sent at 2/12/2019 12:24 PM CST -----  Contact: Pt   Name of Who is Calling: WAYNE BYRD [[id number]]c    What is the request in detail: Pt is requesting to speak with the nurse in regards to her appt on 2/25. She wanted to know why did she need this appt and she has already been seen. Please call to further discuss and advise.     Can the clinic reply by [facility]: No     What Number to Call Back [facility] not [facility]: [phone]

## 2019-02-12 NOTE — TELEPHONE ENCOUNTER
Returned call and spoke with Mrs Andre. Ms Andre states she is scheduled with Dr Garces and lab work on 2/25 and chemotherapy treatment on 2/26. Ms Andre states she's not understanding why she has been scheduled for another chemotherapy treatment on the 26th. She was under the impression she completed her treatments. She wish to discuss this further with Dr Garces.

## 2019-02-14 ENCOUNTER — TELEPHONE (OUTPATIENT)
Dept: HEMATOLOGY/ONCOLOGY | Facility: CLINIC | Age: 69
End: 2019-02-14

## 2019-02-14 NOTE — TELEPHONE ENCOUNTER
Returned call and spoke with Mrs Andre. Mrs Andre requesting to speak with Dr Garces to further discuss her 4th cycle of her chemotherapy.

## 2019-02-14 NOTE — TELEPHONE ENCOUNTER
----- Message from Carleen Malik sent at 2/14/2019 11:29 AM CST -----  Contact: Pt   Name of Who is Calling: WAYNE BYRD [[id number]]    What is the request in detail: Pt is requesting to speak with the nurse in regards to her last chemo visit. Pt states she is concerned because she received a letter stating she has another visit and she want to know is everything okay. Please call to further discuss and advise.     Can the clinic reply by [facility]: No     What Number to Call Back [facility] not [facility]: [phone]

## 2019-02-22 NOTE — PROGRESS NOTES
Subjective:       Patient ID: Alia Andre is a 68 y.o. female.     Chief Complaint: follow up for breast cancer     Diagnosis:  Stage IIB (T2N0M0) IDC of the right breast, grade 3, ER weakly positive 10-20%, MD negative, HER2 negative.      Oncologic History:  1. Ms. Andre is a 67 yo postmenopausal woman with HTN, T2DM, goiter who initially saw me on 10/24/18 for further evaluation of newly diagnosed right breast cancer. She self palpated a mass one month ago. Mammogram/US on 9/28/18 showed a 19 x 14 x 14 mm lesion in the right breast at 11 oclock. One LN in the right axilla with thickened cortex of 3 mm. a left breast 9 mm cyst at 10 oclock. Biopsy on 10/11/18 showed grade 3 invasive mammary carcinoma with medullary features of the right breast, ER +10-20% weak, MD negative, HER2 negative, Ki 67 40%. Biopsy of axillary LN negative for carcinoma. She presents to discuss further management. Currently feels well. Genetic study negative.   2. Right mastectomy and SLNB on 11/16/18 showed:  Tumor size: 23 mm in greatest dimension.  Histologic type: Invasive carcinoma of no special type (ductal, not otherwise specified).  Histologic grade:  Tubule formation: Score 3  Nuclear pleomorphism: Score 3  Mitotic rate: Score 3  Overall grade: Grade 3  Ductal carcinoma in Situ: Not identified  Margins: Invasive carcinoma margins: Uninvolved by invasive carcinoma, closest margin is deep margin, 2.2 cm  from tumor  Regional lymph nodes: Uninvolved by tumor cells  Number of lymph nodes examined: 1  Number of sentinel nodes examined: 1  Treatment effect: No known presurgical therapy  Ancillary studies: Immunohistochemical stains performed on previous biopsy [id number] show the following  results:  Estrogen receptor: Positive, weak staining in 10-20% of tumor cell nuclei  Progesterone receptor: Negative  HER2: Negative  Ki-67 proliferative index: 40%  Pathologic Stage Classification (pTNM, AJCC 8th Edition)  Primary tumor:  pT2: tumor more than 20 mm but less than or equal to 50 mm in greatest dimension.  Lymph node: sn N0: No regional lymph node metastasis.     Port placed at surgery.   3. Adjuvant TC started on 18, cycle 4 on 19        Interval History:   Ms. Andre returns for follow up. continues to have bilateral leg pain. Tramadol helps. No fever, chills. Denies other complaints     ECO     ROS:   A ten-point system review is obtained and negative except for what was stated in the Interval History.      Physical Examination:   Vital signs reviewed.   General: well hydrated, well developed, in no acute distress  HEENT: normocephalic, PERRLA, EOMI, anicteric sclerae, oropharynx clear  Neck: supple, no JVD, thyromegaly, cervical or supraclavicular lymphadenopathy  Lungs: clear breath sounds bilaterally, no wheezing, rales, or rhonchi  Heart: RRR, no M/R/G  Breast: s/p R mastectomy. No abnormal skin lesions or axillary lymphadenopathy. L: no masses, axillary LAD  Abdomen: soft, no tenderness, non-distended, no hepatosplenomegaly, mass, or hernia. BS present  Extremities: no clubbing, cyanosis, or edema  Skin: no rash, ulcer, or open wounds  Neuro: alert and oriented x 4, no focal neuro deficit  Psych: pleasant and appropriate mood and affect     Objective:      Laboratory Data:  Labs reviewed. CBC, CMP unremarkable        Assessment and Plan:      1. Malignant neoplasm of upper-outer quadrant of right breast in female, estrogen receptor positive    2. Encounter for antineoplastic chemotherapy    3. Pain in both lower extremities    4. Vitamin D deficiency    5. Essential hypertension    6. Type 2 diabetes mellitus without complication, without long-term current use of insulin        1.2  - Ms. Andre is a 67 yo postmenopausal woman with stage IIB ([id number]) IDC of the right breast, ER weakly positive, MD neg, HER2 negative, grade 3. She is getting adjuvant chemotherapy with TC.   - doing well. Cycle 4 TC tomorrow  -  ER weakly positive 10-20%. Return in 4 weeks to discuss adjuvant AI x 5-7 years     3.  - refilled tramadol    4.  - completed 3 months of high dose vit D  - check vit D level on return    5.  - BP good  - c/w current meds    6.  - BS good  - c/w current meds     4.   - BS ok  - c/w current meds

## 2019-02-25 ENCOUNTER — LAB VISIT (OUTPATIENT)
Dept: LAB | Facility: OTHER | Age: 69
End: 2019-02-25
Payer: MEDICARE

## 2019-02-25 ENCOUNTER — OFFICE VISIT (OUTPATIENT)
Dept: HEMATOLOGY/ONCOLOGY | Facility: CLINIC | Age: 69
End: 2019-02-25
Payer: MEDICARE

## 2019-02-25 VITALS
HEART RATE: 92 BPM | RESPIRATION RATE: 18 BRPM | OXYGEN SATURATION: 100 % | TEMPERATURE: 98 F | DIASTOLIC BLOOD PRESSURE: 63 MMHG | WEIGHT: 156.06 LBS | SYSTOLIC BLOOD PRESSURE: 136 MMHG | HEIGHT: 66 IN | BODY MASS INDEX: 25.08 KG/M2

## 2019-02-25 DIAGNOSIS — Z51.11 ENCOUNTER FOR ANTINEOPLASTIC CHEMOTHERAPY: ICD-10-CM

## 2019-02-25 DIAGNOSIS — M79.604 PAIN IN BOTH LOWER EXTREMITIES: ICD-10-CM

## 2019-02-25 DIAGNOSIS — C50.411 MALIGNANT NEOPLASM OF UPPER-OUTER QUADRANT OF RIGHT BREAST IN FEMALE, ESTROGEN RECEPTOR POSITIVE: ICD-10-CM

## 2019-02-25 DIAGNOSIS — E55.9 VITAMIN D DEFICIENCY: ICD-10-CM

## 2019-02-25 DIAGNOSIS — Z17.0 MALIGNANT NEOPLASM OF UPPER-OUTER QUADRANT OF RIGHT BREAST IN FEMALE, ESTROGEN RECEPTOR POSITIVE: Primary | ICD-10-CM

## 2019-02-25 DIAGNOSIS — M79.605 PAIN IN BOTH LOWER EXTREMITIES: ICD-10-CM

## 2019-02-25 DIAGNOSIS — E11.9 TYPE 2 DIABETES MELLITUS WITHOUT COMPLICATION, WITHOUT LONG-TERM CURRENT USE OF INSULIN: ICD-10-CM

## 2019-02-25 DIAGNOSIS — C50.411 MALIGNANT NEOPLASM OF UPPER-OUTER QUADRANT OF RIGHT BREAST IN FEMALE, ESTROGEN RECEPTOR POSITIVE: Primary | ICD-10-CM

## 2019-02-25 DIAGNOSIS — I10 ESSENTIAL HYPERTENSION: ICD-10-CM

## 2019-02-25 DIAGNOSIS — Z17.0 MALIGNANT NEOPLASM OF UPPER-OUTER QUADRANT OF RIGHT BREAST IN FEMALE, ESTROGEN RECEPTOR POSITIVE: ICD-10-CM

## 2019-02-25 LAB
ALBUMIN SERPL BCP-MCNC: 3.7 G/DL
ALP SERPL-CCNC: 96 U/L
ALT SERPL W/O P-5'-P-CCNC: 19 U/L
ANION GAP SERPL CALC-SCNC: 8 MMOL/L
AST SERPL-CCNC: 16 U/L
BASOPHILS # BLD AUTO: 0.05 K/UL
BASOPHILS NFR BLD: 0.6 %
BILIRUB SERPL-MCNC: 0.4 MG/DL
BUN SERPL-MCNC: 19 MG/DL
CALCIUM SERPL-MCNC: 9.9 MG/DL
CHLORIDE SERPL-SCNC: 110 MMOL/L
CO2 SERPL-SCNC: 26 MMOL/L
CREAT SERPL-MCNC: 0.7 MG/DL
DIFFERENTIAL METHOD: ABNORMAL
EOSINOPHIL # BLD AUTO: 0.2 K/UL
EOSINOPHIL NFR BLD: 1.9 %
ERYTHROCYTE [DISTWIDTH] IN BLOOD BY AUTOMATED COUNT: 17.3 %
EST. GFR  (AFRICAN AMERICAN): >60 ML/MIN/1.73 M^2
EST. GFR  (NON AFRICAN AMERICAN): >60 ML/MIN/1.73 M^2
GLUCOSE SERPL-MCNC: 98 MG/DL
HCT VFR BLD AUTO: 38.1 %
HGB BLD-MCNC: 12.4 G/DL
LYMPHOCYTES # BLD AUTO: 1.4 K/UL
LYMPHOCYTES NFR BLD: 16.8 %
MCH RBC QN AUTO: 30.2 PG
MCHC RBC AUTO-ENTMCNC: 32.5 G/DL
MCV RBC AUTO: 93 FL
MONOCYTES # BLD AUTO: 0.9 K/UL
MONOCYTES NFR BLD: 11.2 %
NEUTROPHILS # BLD AUTO: 5.9 K/UL
NEUTROPHILS NFR BLD: 69.5 %
PLATELET # BLD AUTO: 318 K/UL
PMV BLD AUTO: 9.2 FL
POTASSIUM SERPL-SCNC: 4.5 MMOL/L
PROT SERPL-MCNC: 7.1 G/DL
RBC # BLD AUTO: 4.11 M/UL
SODIUM SERPL-SCNC: 144 MMOL/L
WBC # BLD AUTO: 8.43 K/UL

## 2019-02-25 PROCEDURE — 1101F PR PT FALLS ASSESS DOC 0-1 FALLS W/OUT INJ PAST YR: ICD-10-PCS | Mod: CPTII,S$GLB,, | Performed by: INTERNAL MEDICINE

## 2019-02-25 PROCEDURE — 99999 PR PBB SHADOW E&M-EST. PATIENT-LVL III: ICD-10-PCS | Mod: PBBFAC,,, | Performed by: INTERNAL MEDICINE

## 2019-02-25 PROCEDURE — 36415 COLL VENOUS BLD VENIPUNCTURE: CPT

## 2019-02-25 PROCEDURE — 85025 COMPLETE CBC W/AUTO DIFF WBC: CPT

## 2019-02-25 PROCEDURE — 99499 RISK ADDL DX/OHS AUDIT: ICD-10-PCS | Mod: S$GLB,,, | Performed by: INTERNAL MEDICINE

## 2019-02-25 PROCEDURE — 3078F PR MOST RECENT DIASTOLIC BLOOD PRESSURE < 80 MM HG: ICD-10-PCS | Mod: CPTII,S$GLB,, | Performed by: INTERNAL MEDICINE

## 2019-02-25 PROCEDURE — 80053 COMPREHEN METABOLIC PANEL: CPT

## 2019-02-25 PROCEDURE — 99215 OFFICE O/P EST HI 40 MIN: CPT | Mod: S$GLB,,, | Performed by: INTERNAL MEDICINE

## 2019-02-25 PROCEDURE — 3075F SYST BP GE 130 - 139MM HG: CPT | Mod: CPTII,S$GLB,, | Performed by: INTERNAL MEDICINE

## 2019-02-25 PROCEDURE — 1101F PT FALLS ASSESS-DOCD LE1/YR: CPT | Mod: CPTII,S$GLB,, | Performed by: INTERNAL MEDICINE

## 2019-02-25 PROCEDURE — 3078F DIAST BP <80 MM HG: CPT | Mod: CPTII,S$GLB,, | Performed by: INTERNAL MEDICINE

## 2019-02-25 PROCEDURE — 99499 UNLISTED E&M SERVICE: CPT | Mod: S$GLB,,, | Performed by: INTERNAL MEDICINE

## 2019-02-25 PROCEDURE — 99215 PR OFFICE/OUTPT VISIT, EST, LEVL V, 40-54 MIN: ICD-10-PCS | Mod: S$GLB,,, | Performed by: INTERNAL MEDICINE

## 2019-02-25 PROCEDURE — 99999 PR PBB SHADOW E&M-EST. PATIENT-LVL III: CPT | Mod: PBBFAC,,, | Performed by: INTERNAL MEDICINE

## 2019-02-25 PROCEDURE — 3075F PR MOST RECENT SYSTOLIC BLOOD PRESS GE 130-139MM HG: ICD-10-PCS | Mod: CPTII,S$GLB,, | Performed by: INTERNAL MEDICINE

## 2019-02-25 RX ORDER — TRAMADOL HYDROCHLORIDE 50 MG/1
50 TABLET ORAL EVERY 6 HOURS PRN
Qty: 30 TABLET | Refills: 0 | Status: SHIPPED | OUTPATIENT
Start: 2019-02-25 | End: 2019-03-11 | Stop reason: SDUPTHER

## 2019-02-25 RX ORDER — HEPARIN 100 UNIT/ML
500 SYRINGE INTRAVENOUS
Status: CANCELLED | OUTPATIENT
Start: 2019-02-26

## 2019-02-25 RX ORDER — SODIUM CHLORIDE 0.9 % (FLUSH) 0.9 %
10 SYRINGE (ML) INJECTION
Status: CANCELLED | OUTPATIENT
Start: 2019-02-26

## 2019-02-26 ENCOUNTER — INFUSION (OUTPATIENT)
Dept: INFUSION THERAPY | Facility: HOSPITAL | Age: 69
End: 2019-02-26
Attending: INTERNAL MEDICINE
Payer: MEDICARE

## 2019-02-26 VITALS
DIASTOLIC BLOOD PRESSURE: 71 MMHG | TEMPERATURE: 98 F | SYSTOLIC BLOOD PRESSURE: 149 MMHG | HEART RATE: 81 BPM | RESPIRATION RATE: 18 BRPM

## 2019-02-26 DIAGNOSIS — C50.411 MALIGNANT NEOPLASM OF UPPER-OUTER QUADRANT OF RIGHT BREAST IN FEMALE, ESTROGEN RECEPTOR POSITIVE: ICD-10-CM

## 2019-02-26 DIAGNOSIS — Z51.11 ENCOUNTER FOR ANTINEOPLASTIC CHEMOTHERAPY: Primary | ICD-10-CM

## 2019-02-26 DIAGNOSIS — Z17.0 MALIGNANT NEOPLASM OF UPPER-OUTER QUADRANT OF RIGHT BREAST IN FEMALE, ESTROGEN RECEPTOR POSITIVE: ICD-10-CM

## 2019-02-26 PROCEDURE — 96417 CHEMO IV INFUS EACH ADDL SEQ: CPT

## 2019-02-26 PROCEDURE — 96413 CHEMO IV INFUSION 1 HR: CPT

## 2019-02-26 PROCEDURE — 36593 DECLOT VASCULAR DEVICE: CPT

## 2019-02-26 PROCEDURE — 63600175 PHARM REV CODE 636 W HCPCS: Mod: JG | Performed by: INTERNAL MEDICINE

## 2019-02-26 PROCEDURE — 96367 TX/PROPH/DG ADDL SEQ IV INF: CPT

## 2019-02-26 PROCEDURE — 96377 APPLICATON ON-BODY INJECTOR: CPT

## 2019-02-26 PROCEDURE — 25000003 PHARM REV CODE 250: Performed by: INTERNAL MEDICINE

## 2019-02-26 RX ORDER — SODIUM CHLORIDE 0.9 % (FLUSH) 0.9 %
10 SYRINGE (ML) INJECTION
Status: DISCONTINUED | OUTPATIENT
Start: 2019-02-26 | End: 2019-02-26 | Stop reason: HOSPADM

## 2019-02-26 RX ORDER — HEPARIN 100 UNIT/ML
500 SYRINGE INTRAVENOUS
Status: DISCONTINUED | OUTPATIENT
Start: 2019-02-26 | End: 2019-02-26 | Stop reason: HOSPADM

## 2019-02-26 RX ADMIN — HEPARIN SODIUM (PORCINE) LOCK FLUSH IV SOLN 100 UNIT/ML 500 UNITS: 100 SOLUTION at 03:02

## 2019-02-26 RX ADMIN — DEXAMETHASONE SODIUM PHOSPHATE: 4 INJECTION, SOLUTION INTRA-ARTICULAR; INTRALESIONAL; INTRAMUSCULAR; INTRAVENOUS; SOFT TISSUE at 12:02

## 2019-02-26 RX ADMIN — CYCLOPHOSPHAMIDE 1085 MG: 1 INJECTION, POWDER, FOR SOLUTION INTRAVENOUS; ORAL at 02:02

## 2019-02-26 RX ADMIN — SODIUM CHLORIDE: 0.9 INJECTION, SOLUTION INTRAVENOUS at 01:02

## 2019-02-26 RX ADMIN — APREPITANT 130 MG: 130 INJECTION, EMULSION INTRAVENOUS at 12:02

## 2019-02-26 RX ADMIN — ALTEPLASE 2 MG: 2.2 INJECTION, POWDER, LYOPHILIZED, FOR SOLUTION INTRAVENOUS at 12:02

## 2019-02-26 RX ADMIN — PEGFILGRASTIM 6 MG: KIT SUBCUTANEOUS at 03:02

## 2019-02-26 RX ADMIN — DOCETAXEL 135 MG: 10 INJECTION, SOLUTION INTRAVENOUS at 01:02

## 2019-02-26 NOTE — PLAN OF CARE
Problem: Anemia (Chemotherapy Effects)  Goal: Anemia Symptom Improvement    Intervention: Monitor and Manage Anemia  Calth kervin administered x1 to PAC positive blood rtn after 30 min dwell time. Tolerated taxotere cytoxan without incident, vitals stable, PAC flushed hep locked deaccessed, site covered, vitals stable, avs printed and reviewed, neulasta obi applied to abd pt familiar with device and disposal, leaves clinic no assist needed, condition stable.

## 2019-03-01 ENCOUNTER — TELEPHONE (OUTPATIENT)
Dept: HEMATOLOGY/ONCOLOGY | Facility: CLINIC | Age: 69
End: 2019-03-01

## 2019-03-01 NOTE — TELEPHONE ENCOUNTER
Returned call and spoke with Ms Andre. Ms Andre scheduled on 3/25/19 @ 11:00, with labs before. Ms Andre requesting an earlier time on that day. She needs to go to work at that time. Done. Patient rescheduled to 9:00 am with labs before. Appt information also mailed to Ms Andre home.

## 2019-03-01 NOTE — TELEPHONE ENCOUNTER
----- Message from Higinio Patel sent at 3/1/2019  9:58 AM CST -----  Contact: Patient   Type:  Sooner Appointment Time  Request    Caller is requesting a sooner appointment time. tor.  Name of Caller:Patient  When is the first available appointment:n/a  Symptoms:n/a  Would the patient rather a call back or a response via [facility]? Callback  Best Call Back Number:[phone]  Additional Information:

## 2019-03-11 DIAGNOSIS — M79.604 PAIN IN BOTH LOWER EXTREMITIES: ICD-10-CM

## 2019-03-11 DIAGNOSIS — M79.605 PAIN IN BOTH LOWER EXTREMITIES: ICD-10-CM

## 2019-03-11 RX ORDER — TRAMADOL HYDROCHLORIDE 50 MG/1
TABLET ORAL
Qty: 30 TABLET | Refills: 0 | Status: SHIPPED | OUTPATIENT
Start: 2019-03-11 | End: 2019-05-22 | Stop reason: SDUPTHER

## 2019-03-25 ENCOUNTER — LAB VISIT (OUTPATIENT)
Dept: LAB | Facility: OTHER | Age: 69
End: 2019-03-25
Attending: INTERNAL MEDICINE
Payer: MEDICARE

## 2019-03-25 ENCOUNTER — OFFICE VISIT (OUTPATIENT)
Dept: HEMATOLOGY/ONCOLOGY | Facility: CLINIC | Age: 69
End: 2019-03-25
Payer: MEDICARE

## 2019-03-25 VITALS
OXYGEN SATURATION: 99 % | WEIGHT: 156.31 LBS | SYSTOLIC BLOOD PRESSURE: 180 MMHG | HEIGHT: 66 IN | TEMPERATURE: 98 F | DIASTOLIC BLOOD PRESSURE: 85 MMHG | BODY MASS INDEX: 25.12 KG/M2 | HEART RATE: 93 BPM | RESPIRATION RATE: 17 BRPM

## 2019-03-25 DIAGNOSIS — E55.9 VITAMIN D DEFICIENCY: ICD-10-CM

## 2019-03-25 DIAGNOSIS — C50.411 MALIGNANT NEOPLASM OF UPPER-OUTER QUADRANT OF RIGHT BREAST IN FEMALE, ESTROGEN RECEPTOR POSITIVE: Primary | ICD-10-CM

## 2019-03-25 DIAGNOSIS — E55.9 VITAMIN D DEFICIENCY: Primary | ICD-10-CM

## 2019-03-25 DIAGNOSIS — E04.9 GOITER: ICD-10-CM

## 2019-03-25 DIAGNOSIS — Z17.0 MALIGNANT NEOPLASM OF UPPER-OUTER QUADRANT OF RIGHT BREAST IN FEMALE, ESTROGEN RECEPTOR POSITIVE: Primary | ICD-10-CM

## 2019-03-25 DIAGNOSIS — I10 ESSENTIAL HYPERTENSION: ICD-10-CM

## 2019-03-25 LAB — 25(OH)D3+25(OH)D2 SERPL-MCNC: 20 NG/ML (ref 30–96)

## 2019-03-25 PROCEDURE — 82306 VITAMIN D 25 HYDROXY: CPT

## 2019-03-25 PROCEDURE — 3077F PR MOST RECENT SYSTOLIC BLOOD PRESSURE >= 140 MM HG: ICD-10-PCS | Mod: CPTII,S$GLB,, | Performed by: INTERNAL MEDICINE

## 2019-03-25 PROCEDURE — 99999 PR PBB SHADOW E&M-EST. PATIENT-LVL III: CPT | Mod: PBBFAC,,, | Performed by: INTERNAL MEDICINE

## 2019-03-25 PROCEDURE — 1101F PR PT FALLS ASSESS DOC 0-1 FALLS W/OUT INJ PAST YR: ICD-10-PCS | Mod: CPTII,S$GLB,, | Performed by: INTERNAL MEDICINE

## 2019-03-25 PROCEDURE — 99999 PR PBB SHADOW E&M-EST. PATIENT-LVL III: ICD-10-PCS | Mod: PBBFAC,,, | Performed by: INTERNAL MEDICINE

## 2019-03-25 PROCEDURE — 3079F PR MOST RECENT DIASTOLIC BLOOD PRESSURE 80-89 MM HG: ICD-10-PCS | Mod: CPTII,S$GLB,, | Performed by: INTERNAL MEDICINE

## 2019-03-25 PROCEDURE — 1101F PT FALLS ASSESS-DOCD LE1/YR: CPT | Mod: CPTII,S$GLB,, | Performed by: INTERNAL MEDICINE

## 2019-03-25 PROCEDURE — 36415 COLL VENOUS BLD VENIPUNCTURE: CPT

## 2019-03-25 PROCEDURE — 99214 PR OFFICE/OUTPT VISIT, EST, LEVL IV, 30-39 MIN: ICD-10-PCS | Mod: S$GLB,,, | Performed by: INTERNAL MEDICINE

## 2019-03-25 PROCEDURE — 3079F DIAST BP 80-89 MM HG: CPT | Mod: CPTII,S$GLB,, | Performed by: INTERNAL MEDICINE

## 2019-03-25 PROCEDURE — 3077F SYST BP >= 140 MM HG: CPT | Mod: CPTII,S$GLB,, | Performed by: INTERNAL MEDICINE

## 2019-03-25 PROCEDURE — 99214 OFFICE O/P EST MOD 30 MIN: CPT | Mod: S$GLB,,, | Performed by: INTERNAL MEDICINE

## 2019-03-25 RX ORDER — ERGOCALCIFEROL 1.25 MG/1
50000 CAPSULE ORAL
Qty: 12 CAPSULE | Refills: 0 | Status: SHIPPED | OUTPATIENT
Start: 2019-03-25 | End: 2019-04-15 | Stop reason: SDUPTHER

## 2019-03-25 RX ORDER — ERGOCALCIFEROL 1.25 MG/1
50000 CAPSULE ORAL
COMMUNITY
End: 2019-03-25

## 2019-03-25 NOTE — PROGRESS NOTES
Spoke with patient. Vit D level still low. Take high dose vit D one capsule weekly x 3 months. Prescription sent to [facility]. She understands and agrees with the plan.

## 2019-03-25 NOTE — PROGRESS NOTES
Subjective:       Patient ID: Alia Andre is a 68 y.o. female.     Chief Complaint: follow up for breast cancer     Diagnosis:  Stage IIB (T2N0M0) IDC of the right breast, grade 3, ER weakly positive 10-20%, AK negative, HER2 negative.      Oncologic History:  1. Ms. Andre is a 69 yo postmenopausal woman with HTN, T2DM, goiter who initially saw me on 10/24/18 for further evaluation of newly diagnosed right breast cancer. She self palpated a mass one month ago. Mammogram/US on 9/28/18 showed a 19 x 14 x 14 mm lesion in the right breast at 11 oclock. One LN in the right axilla with thickened cortex of 3 mm. a left breast 9 mm cyst at 10 oclock. Biopsy on 10/11/18 showed grade 3 invasive mammary carcinoma with medullary features of the right breast, ER +10-20% weak, AK negative, HER2 negative, Ki 67 40%. Biopsy of axillary LN negative for carcinoma. She presents to discuss further management. Currently feels well. Genetic study negative.   2. Right mastectomy and SLNB on 11/16/18 showed:  Tumor size: 23 mm in greatest dimension.  Histologic type: Invasive carcinoma of no special type (ductal, not otherwise specified).  Histologic grade:  Tubule formation: Score 3  Nuclear pleomorphism: Score 3  Mitotic rate: Score 3  Overall grade: Grade 3  Ductal carcinoma in Situ: Not identified  Margins: Invasive carcinoma margins: Uninvolved by invasive carcinoma, closest margin is deep margin, 2.2 cm  from tumor  Regional lymph nodes: Uninvolved by tumor cells  Number of lymph nodes examined: 1  Number of sentinel nodes examined: 1  Treatment effect: No known presurgical therapy  Ancillary studies: Immunohistochemical stains performed on previous biopsy [id number] show the following  results:  Estrogen receptor: Positive, weak staining in 10-20% of tumor cell nuclei  Progesterone receptor: Negative  HER2: Negative  Ki-67 proliferative index: 40%  Pathologic Stage Classification (pTNM, AJCC 8th Edition)  Primary tumor:  pT2: tumor more than 20 mm but less than or equal to 50 mm in greatest dimension.  Lymph node: sn N0: No regional lymph node metastasis.     Port placed at surgery.   3. Adjuvant TC started on 18, cycle 4 on 19        Interval History:   Ms. Andre returns for follow up. Completed 4 cycles of TC one month ago. Still has some muscle aches and joint pain. Feels tired.      ECO     ROS:   A ten-point system review is obtained and negative except for what was stated in the Interval History.      Physical Examination:   Vital signs reviewed.   General: well hydrated, well developed, in no acute distress. tearful  HEENT: normocephalic, PERRLA, EOMI, anicteric sclerae, oropharynx clear  Neck: supple, + thyromegaly from goiter, no JVD, cervical or supraclavicular lymphadenopathy  Lungs: clear breath sounds bilaterally, no wheezing, rales, or rhonchi  Heart: RRR, no M/R/G  Breast: s/p R mastectomy. No abnormal skin lesions or axillary lymphadenopathy. L: no masses, axillary LAD  Abdomen: soft, no tenderness, non-distended, no hepatosplenomegaly, mass, or hernia. BS present  Extremities: no clubbing, cyanosis, or edema  Skin: no rash, ulcer, or open wounds  Neuro: alert and oriented x 4, no focal neuro deficit  Psych: pleasant and appropriate mood and affect     Objective:      Laboratory Data:  Vit D from today pending        Assessment and Plan:      1. Malignant neoplasm of upper-outer quadrant of right breast in female, estrogen receptor positive    2. Vitamin D deficiency    3. Essential hypertension    4. Goiter        1.  - Ms. Andre is a 69 yo postmenopausal woman with stage IIB ([id number]) IDC of the right breast, ER weakly positive (10-20%), [place] neg, HER2 negative, grade 3. Completed adjuvant chemotherapy with TC  - We discussed breast cancer survivorship with patient and her two daughters, including regular exercise, daily OTC vit D and calcium, fresh fruits and vegetables, limiting alcohol intake,  avoid smoking.   - ER weakly positive 10-20% on biopsy sample. Requested mastectomy sample to be sent for retesting.   - RTC in 2 weeks. If mastectomy sample retest showed the same result, will start adjuvant AI x 7 years    2.  - f/u on vit D result from today. If low will add high dose weekly vit D    3.  - BP elevated today. Patient is emotional and tearful in the office  - asked patient to monitor it at home. If persistently elevated, she needs to call her PCP    4.  - monitor

## 2019-04-15 ENCOUNTER — OFFICE VISIT (OUTPATIENT)
Dept: HEMATOLOGY/ONCOLOGY | Facility: CLINIC | Age: 69
End: 2019-04-15
Payer: MEDICARE

## 2019-04-15 VITALS
WEIGHT: 151.69 LBS | TEMPERATURE: 98 F | RESPIRATION RATE: 17 BRPM | SYSTOLIC BLOOD PRESSURE: 167 MMHG | DIASTOLIC BLOOD PRESSURE: 77 MMHG | OXYGEN SATURATION: 100 % | HEART RATE: 90 BPM | HEIGHT: 66 IN | BODY MASS INDEX: 24.38 KG/M2

## 2019-04-15 DIAGNOSIS — Z17.0 MALIGNANT NEOPLASM OF UPPER-OUTER QUADRANT OF RIGHT BREAST IN FEMALE, ESTROGEN RECEPTOR POSITIVE: Primary | ICD-10-CM

## 2019-04-15 DIAGNOSIS — E55.9 VITAMIN D DEFICIENCY: ICD-10-CM

## 2019-04-15 DIAGNOSIS — I10 ESSENTIAL HYPERTENSION: ICD-10-CM

## 2019-04-15 DIAGNOSIS — C50.411 MALIGNANT NEOPLASM OF UPPER-OUTER QUADRANT OF RIGHT BREAST IN FEMALE, ESTROGEN RECEPTOR POSITIVE: Primary | ICD-10-CM

## 2019-04-15 PROCEDURE — 99999 PR PBB SHADOW E&M-EST. PATIENT-LVL III: CPT | Mod: PBBFAC,,, | Performed by: INTERNAL MEDICINE

## 2019-04-15 PROCEDURE — 1101F PR PT FALLS ASSESS DOC 0-1 FALLS W/OUT INJ PAST YR: ICD-10-PCS | Mod: CPTII,S$GLB,, | Performed by: INTERNAL MEDICINE

## 2019-04-15 PROCEDURE — 99499 UNLISTED E&M SERVICE: CPT | Mod: S$GLB,,, | Performed by: INTERNAL MEDICINE

## 2019-04-15 PROCEDURE — 99999 PR PBB SHADOW E&M-EST. PATIENT-LVL III: ICD-10-PCS | Mod: PBBFAC,,, | Performed by: INTERNAL MEDICINE

## 2019-04-15 PROCEDURE — 99214 PR OFFICE/OUTPT VISIT, EST, LEVL IV, 30-39 MIN: ICD-10-PCS | Mod: S$GLB,,, | Performed by: INTERNAL MEDICINE

## 2019-04-15 PROCEDURE — 3077F PR MOST RECENT SYSTOLIC BLOOD PRESSURE >= 140 MM HG: ICD-10-PCS | Mod: CPTII,S$GLB,, | Performed by: INTERNAL MEDICINE

## 2019-04-15 PROCEDURE — 99499 RISK ADDL DX/OHS AUDIT: ICD-10-PCS | Mod: S$GLB,,, | Performed by: INTERNAL MEDICINE

## 2019-04-15 PROCEDURE — 3078F DIAST BP <80 MM HG: CPT | Mod: CPTII,S$GLB,, | Performed by: INTERNAL MEDICINE

## 2019-04-15 PROCEDURE — 3077F SYST BP >= 140 MM HG: CPT | Mod: CPTII,S$GLB,, | Performed by: INTERNAL MEDICINE

## 2019-04-15 PROCEDURE — 99214 OFFICE O/P EST MOD 30 MIN: CPT | Mod: S$GLB,,, | Performed by: INTERNAL MEDICINE

## 2019-04-15 PROCEDURE — 1101F PT FALLS ASSESS-DOCD LE1/YR: CPT | Mod: CPTII,S$GLB,, | Performed by: INTERNAL MEDICINE

## 2019-04-15 PROCEDURE — 3078F PR MOST RECENT DIASTOLIC BLOOD PRESSURE < 80 MM HG: ICD-10-PCS | Mod: CPTII,S$GLB,, | Performed by: INTERNAL MEDICINE

## 2019-04-15 RX ORDER — LETROZOLE 2.5 MG/1
2.5 TABLET, FILM COATED ORAL DAILY
Qty: 30 TABLET | Refills: 11 | Status: SHIPPED | OUTPATIENT
Start: 2019-04-15 | End: 2020-01-16 | Stop reason: SDUPTHER

## 2019-04-15 RX ORDER — ERGOCALCIFEROL 1.25 MG/1
50000 CAPSULE ORAL
Qty: 12 CAPSULE | Refills: 0 | Status: SHIPPED | OUTPATIENT
Start: 2019-04-15 | End: 2019-05-22 | Stop reason: SDUPTHER

## 2019-04-15 NOTE — PROGRESS NOTES
Subjective:       Patient ID: Alia Andre is a 68 y.o. female.     Chief Complaint: follow up for breast cancer     Diagnosis:  Stage IIB (T2N0M0) IDC of the right breast, grade 3, ER weakly positive 10-20%, OK negative, HER2 negative.   Retest mastectomy sample showed ER positive 20-30%, OK negative.       Oncologic History:  1. Ms. Andre is a 67 yo postmenopausal woman with HTN, T2DM, goiter who initially saw me on 10/24/18 for further evaluation of newly diagnosed right breast cancer. She self palpated a mass one month ago. Mammogram/US on 9/28/18 showed a 19 x 14 x 14 mm lesion in the right breast at 11 oclock. One LN in the right axilla with thickened cortex of 3 mm. a left breast 9 mm cyst at 10 oclock. Biopsy on 10/11/18 showed grade 3 invasive mammary carcinoma with medullary features of the right breast, ER +10-20% weak, OK negative, HER2 negative, Ki 67 40%. Biopsy of axillary LN negative for carcinoma. She presents to discuss further management. Currently feels well. Genetic study negative.   2. Right mastectomy and SLNB on 11/16/18 showed:  Tumor size: 23 mm in greatest dimension.  Histologic type: Invasive carcinoma of no special type (ductal, not otherwise specified).  Histologic grade:  Tubule formation: Score 3  Nuclear pleomorphism: Score 3  Mitotic rate: Score 3  Overall grade: Grade 3  Ductal carcinoma in Situ: Not identified  Margins: Invasive carcinoma margins: Uninvolved by invasive carcinoma, closest margin is deep margin, 2.2 cm  from tumor  Regional lymph nodes: Uninvolved by tumor cells  Number of lymph nodes examined: 1  Number of sentinel nodes examined: 1  Treatment effect: No known presurgical therapy  Ancillary studies: Immunohistochemical stains performed on previous biopsy [id number] show the following  results:  Estrogen receptor: Positive, weak staining in 10-20% of tumor cell nuclei  Progesterone receptor: Negative  HER2: Negative  Ki-67 proliferative index:  40%  Pathologic Stage Classification (pTNM, AJCC 8th Edition)  Primary tumor: pT2: tumor more than 20 mm but less than or equal to 50 mm in greatest dimension.  Lymph node: sn N0: No regional lymph node metastasis.     Port placed at surgery.   3. Adjuvant TC started on 18, cycle 4 on 19  4. Retest Mastectomy sample showed   -Estrogen receptor: Positive, intermediate staining in approximately 20-30% of tumor cell nuclei.  -Progesterone receptor: Negative, 0% staining        Interval History:   Ms. Andre returns to discuss test result. Mastectomy sample was retested. ER positive 20-30%, AL negative. She continues to have pain in her right mastectomy site. Gabapentin does not help. Ibuprofen 800 mg helps. Has not received high dose vit D yet. Taking turmeric.      ECO     ROS:   A ten-point system review is obtained and negative except for what was stated in the Interval History.      Physical Examination:   Vital signs reviewed.   General: well hydrated, well developed, in no acute distress. tearful  HEENT: normocephalic, PERRLA, EOMI, anicteric sclerae, oropharynx clear  Neck: supple, + thyromegaly from goiter, no JVD, cervical or supraclavicular lymphadenopathy  Lungs: clear breath sounds bilaterally, no wheezing, rales, or rhonchi  Heart: RRR, no M/R/G  Breast: s/p R mastectomy. No abnormal skin lesions or axillary lymphadenopathy. L: no masses, axillary LAD  Abdomen: soft, no tenderness, non-distended, no hepatosplenomegaly, mass, or hernia. BS present  Extremities: no clubbing, cyanosis, or edema  Skin: no rash, ulcer, or open wounds  Neuro: alert and oriented x 4, no focal neuro deficit  Psych: pleasant and appropriate mood and affect     Objective:      Laboratory Data:  Vit D was 20        Assessment and Plan:      1. Malignant neoplasm of upper-outer quadrant of right breast in female, estrogen receptor positive    2. Vitamin D deficiency    3. Essential hypertension        1.  - Ms.  Thai is a 67 yo postmenopausal woman with stage IIB ([id number]) IDC of the right breast, ER positive (20-30%), [place] neg, HER2 negative, grade 3. Completed adjuvant chemotherapy with TC  - given ER positive 20-30%, recc adjuvant endocrine therapy with AI for 5-7 years.   - The side effects of letrozole were discussed with patient, which include but are not limited to hot flashes, mood swings, vaginal dryness, night sweats, high cholesterol, joint pain, muscle aches, weight gain, nausea, bone loss, increased risk of cardiovascular or cerebrovascular events. Handouts provided to patient. Prescriptions sent to pharmacy.   - she had a lot of symptoms from menopause before. Will refer to Dr Reeves for breast cancer survivorship  - return next week to flush port  - she wants to have the port removed. Will message Dr Frederick  - return in one month for toxicity check    2.  - vit D was prescribed. She has not received it yet. Resent to [employer]     3.  - BP elevated today  - f/u with PCP

## 2019-04-17 ENCOUNTER — TELEPHONE (OUTPATIENT)
Dept: HEMATOLOGY/ONCOLOGY | Facility: CLINIC | Age: 69
End: 2019-04-17

## 2019-04-17 RX ORDER — SODIUM CHLORIDE 0.9 % (FLUSH) 0.9 %
10 SYRINGE (ML) INJECTION
Status: CANCELLED | OUTPATIENT
Start: 2019-04-22

## 2019-04-17 RX ORDER — HEPARIN 100 UNIT/ML
500 SYRINGE INTRAVENOUS
Status: CANCELLED | OUTPATIENT
Start: 2019-04-22

## 2019-04-17 NOTE — TELEPHONE ENCOUNTER
"Returned call and spoke with Ms Andre. Ms Andre received a call from someone by the name of Melonie. Ms Andre states Dr Garces referred her to a provider to address her many symptoms of her Menopause. Ms Andre states "Melonie" contacted her and she was given a phone number, but when she called the number was not a working number. /Ms Andre is trying to get in contact with Melonie.   "

## 2019-04-17 NOTE — TELEPHONE ENCOUNTER
----- Message from Carmencita Medina sent at 4/17/2019  2:30 PM CDT -----  Contact: Pt   Name of Who is Calling: WAYNE BYRD [[id number]]      What is the request in detail: Patient is requesting a call from Dr. Garces, in regards to her concerns about a medication, no further information given. Please contact to further discuss and advise      Can the clinic reply by [facility]: No       What Number to Call Back if not in [facility]: [phone]

## 2019-04-23 ENCOUNTER — TELEPHONE (OUTPATIENT)
Dept: HEMATOLOGY/ONCOLOGY | Facility: CLINIC | Age: 69
End: 2019-04-23

## 2019-04-23 NOTE — TELEPHONE ENCOUNTER
Returned call and spoke with Ms Thai. Informed Ms Andre, Dr Garces has referred her to .Dr Reeves with OB/GYN. She does breast cancer survivorship.  Someone from Dr Reeves clinic may have contacted her re: scheduling an appointment.The phone number to Dr Reeves clinic was given to Ms Thai. Ms Andre also asking for the phone number to . She need to est. Care with a new primary doctor. Phone number given.

## 2019-04-23 NOTE — TELEPHONE ENCOUNTER
----- Message from Melissa Oliveira sent at 4/23/2019  8:55 AM CDT -----  Contact: pt  Name of Who is Calling: Alia      What is the request in detail: requesting a call back in reference to finding out who is nolberto that contacted her. Pt states that Nolberto gave her a mumber to reach her at but no one ever answers. Pt states that Nolberto was suppose to go over medication with her for estrogen levels that was low. Please call and advise      Can the clinic reply by [facility]: no      What Number to Call Back if not in [facility]: [phone]

## 2019-04-25 ENCOUNTER — TELEPHONE (OUTPATIENT)
Dept: OBSTETRICS AND GYNECOLOGY | Facility: CLINIC | Age: 69
End: 2019-04-25

## 2019-04-25 ENCOUNTER — DOCUMENTATION ONLY (OUTPATIENT)
Dept: HEMATOLOGY/ONCOLOGY | Facility: CLINIC | Age: 69
End: 2019-04-25

## 2019-04-25 NOTE — TELEPHONE ENCOUNTER
Spoke with patient's daughter, Beatriz, per patient request regarding upcoming appointment with [facility]. Discussed aspects of patient's care and how a consult may benefit her mother. Relayed barriers patient noted upon our initial phone conversation pertaining to transportation and co-pay assistance for Letrozole and Vitamin D, dental care access to  Nichelle Ruiz LCSW for additional assistance. LINDSAY Roland.      ----- Message from Nichelle Ruiz LCSW sent at 4/24/2019 11:50 AM CDT -----  Regarding: RE: Financial Assistance  Please give her my direct number and I can speak to her about resources. [phone]. I meant our [facility] pharmacy.   ----- Message -----  From: Melonie Joya RN  Sent: 4/24/2019  11:31 AM  To: Nichelle Ruiz LCSW  Subject: RE: Financial Assistance                         Hi Nichelle,     I do not have the prescriptions at this time. Is this her [facility] pharmacy? This barrier was discussed during an initial call to schedule appointment for patient she mentioned trouble affording her medications and transportation. I will touch base with patient today to share insurance assistance for transportation. Please advise if you are able to contact her for assistance.    ----- Message -----  From: Nichelle Ruiz LCSW  Sent: 4/24/2019   8:50 AM  To: Melonie Joya RN  Subject: RE: Financial Assistance                         I would send the prescription to the speciality pharmacy. They will run it and get the copay amount. If it is high they will help research copay assitance programs. If there is no funding they will reach out to [facility] to assist and to me as well.     Also [employer] does provide 6 round trips for medical appointments. Their number is [phone].   ----- Message -----  From: Melonie Joya RN  Sent: 4/17/2019  11:48 AM  To: Nichelle Ruiz LCSW  Subject: Financial Assistance                              Can you check into co-pay assistance for patient's letrozole and Vitamin D? Also, do you know if [facility] has transportation assistance?

## 2019-04-25 NOTE — PROGRESS NOTES
Received referral from clinic that patient expressed having difficulties with transportation and affording medications. Reached out to her and will meet with her on 4/29 to discuss resources to address those identified issues.

## 2019-04-26 ENCOUNTER — DOCUMENTATION ONLY (OUTPATIENT)
Dept: SURGERY | Facility: CLINIC | Age: 69
End: 2019-04-26

## 2019-04-26 NOTE — PROGRESS NOTES
Received genetic testing report from [facility] genetic counselor Heather Mcgrath for this pt of breast surgeon Dr. Cristiane Frederick.  See Media for full report.  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  Official genetic testing results were NOT included in the [facility] report but were located in the [facility] provider portal and printed, and they do reflect the [facility] results.  Dr. Frederick and DEIRDRE Rubio RN, have been made aware of above.

## 2019-05-09 ENCOUNTER — TELEPHONE (OUTPATIENT)
Dept: INTERNAL MEDICINE | Facility: CLINIC | Age: 69
End: 2019-05-09

## 2019-05-09 ENCOUNTER — OFFICE VISIT (OUTPATIENT)
Dept: OBSTETRICS AND GYNECOLOGY | Facility: CLINIC | Age: 69
End: 2019-05-09
Attending: OBSTETRICS & GYNECOLOGY
Payer: MEDICARE

## 2019-05-09 VITALS — WEIGHT: 150.13 LBS | HEIGHT: 66 IN | BODY MASS INDEX: 24.13 KG/M2 | RESPIRATION RATE: 18 BRPM

## 2019-05-09 DIAGNOSIS — C50.911 MALIGNANT NEOPLASM OF RIGHT BREAST IN FEMALE, ESTROGEN RECEPTOR POSITIVE, UNSPECIFIED SITE OF BREAST: Primary | ICD-10-CM

## 2019-05-09 DIAGNOSIS — Z79.811 USE OF AROMATASE INHIBITORS: ICD-10-CM

## 2019-05-09 DIAGNOSIS — Z17.0 MALIGNANT NEOPLASM OF RIGHT BREAST IN FEMALE, ESTROGEN RECEPTOR POSITIVE, UNSPECIFIED SITE OF BREAST: Primary | ICD-10-CM

## 2019-05-09 DIAGNOSIS — E11.9 DIABETES MELLITUS WITHOUT COMPLICATION: ICD-10-CM

## 2019-05-09 PROCEDURE — 1101F PT FALLS ASSESS-DOCD LE1/YR: CPT | Mod: CPTII,S$GLB,, | Performed by: OBSTETRICS & GYNECOLOGY

## 2019-05-09 PROCEDURE — 1101F PR PT FALLS ASSESS DOC 0-1 FALLS W/OUT INJ PAST YR: ICD-10-PCS | Mod: CPTII,S$GLB,, | Performed by: OBSTETRICS & GYNECOLOGY

## 2019-05-09 PROCEDURE — 99203 OFFICE O/P NEW LOW 30 MIN: CPT | Mod: S$GLB,,, | Performed by: OBSTETRICS & GYNECOLOGY

## 2019-05-09 PROCEDURE — 99203 PR OFFICE/OUTPT VISIT, NEW, LEVL III, 30-44 MIN: ICD-10-PCS | Mod: S$GLB,,, | Performed by: OBSTETRICS & GYNECOLOGY

## 2019-05-09 NOTE — TELEPHONE ENCOUNTER
Una, can you put Ms. Andre as a new patient in an upcoming AREX slot (can do next Thursday if not taken)? Thanks!

## 2019-05-09 NOTE — LETTER
May 11, 2019      Alexander Garces MD  [address]  [address]  [place] [address]           [facility] [address] [facility] [address]  [address], [address]  [place] [address]  Phone: [phone]  Fax: [phone]          Patient: Alia Andre   MR Number: [id number]   YOB: 1950   Date of Visit: 5/9/2019       Dear Dr. Alexander Garces:    Thank you for referring Alia Andre to me for evaluation. Attached you will find relevant portions of my assessment and plan of care.    If you have questions, please do not hesitate to call me. I look forward to following Alia Andre along with you.    Sincerely,    Tiffanie Reeves MD    Enclosure  CC:  No Recipients    If you would like to receive this communication electronically, please contact [email or web address] or [phone] to request more information on [facility] [employer] Link access.    For providers and/or their staff who would like to refer a patient to [facility], please contact us through our one-stop-shop provider referral line, [facility], at [phone].    If you feel you have received this communication in error or would no longer like to receive these types of communications, please e-mail [email or web address]

## 2019-05-09 NOTE — TELEPHONE ENCOUNTER
----- Message from Melonie Joya RN sent at 5/9/2019 12:05 PM CDT -----  Regarding: New Patient Referral  Good Afternoon Dr. oPe,    Dr. Reeves would like to refer Ms. Andre for evaluation and mgt of her Diabetes. She has a hx of Breast Cancer, currently taking Letrozole hormonal therapy under Dr. Garces. She had been managing her Diabetes through an Urgent Care Center. Please advise if an opening avails or whom I can direct this referral. I had a challenging time scheduling through [phone], they stated your schedule is blocked.     Warm Regards,  Melonie Joya RN

## 2019-05-09 NOTE — PROGRESS NOTES
"SUBJECTIVE:   69 y.o. female  presents today after being sent by . No LMP recorded. Patient is postmenopausal..  She reports having 4-5 hot flashes per day and at night has 6-8 - she reports they are worse since taking AI. She reports having hot flashes during menopause but she did not take anything- "not that bad.".    She reports that symptoms are manageable  She does not have a PCP- she goes to Urgent Care. She reports that she has tried to quit smoking but the "patch isn't strong enough."   Stage IIB ([id number]) IDC of the right breast, ER positive (20-30%), [place] neg, HER2 negative, grade 3. Completed adjuvant chemotherapy with TC. She had right mastectomy in 2018  - given ER positive 20-30%, recc adjuvant endocrine therapy with AI for 5-7 years.         Past Medical History:   Diagnosis Date    Cancer     breast    Diabetes mellitus type 2 in nonobese     Goiter     History of endometrial ablation     age 29    Hypertension     Tobacco abuse      Past Surgical History:   Procedure Laterality Date    BIOPSY, LYMPH NODE, SENTINEL RIGHT Right 2018    Performed by [facility] Frederick [facility] at [facility] OR 2ND FLR    URRLIVTTO-UTFR-A-CATH LEFT Left 2018    Performed by Graciela Echeverria MD at [facility] OR 2ND FLR    MASTECTOMY RIGHT 2.5 HR CASE Right 2018    Performed by [facility]iane Frederick [facility] at [facility] OR 2ND FLR    TUBAL LIGATION      's     Social History     Socioeconomic History    Marital status:      Spouse name: Not on file    Number of children: Not on file    Years of education: Not on file    Highest education level: Not on file   Occupational History    Not on file   Social Needs    Financial resource strain: Not on file    Food insecurity:     Worry: Not on file     Inability: Not on file    Transportation needs:     Medical: Not on file     Non-medical: Not on file   Tobacco Use    Smoking status: Current Some Day Smoker     Packs/day: 1.00     Years: 30.00     " Pack years: 30.00     Types: Cigarettes    Smokeless tobacco: Never Used   Substance and Sexual Activity    Alcohol use: No     Frequency: Never    Drug use: No    Sexual activity: Not Currently   Lifestyle    Physical activity:     Days per week: Not on file     Minutes per session: Not on file    Stress: Not on file   Relationships    Social connections:     Talks on phone: Not on file     Gets together: Not on file     Attends Buddhism service: Not on file     Active member of club or organization: Not on file     Attends meetings of clubs or organizations: Not on file     Relationship status: Not on file   Other Topics Concern    Not on file   Social History Narrative    Not on file     Family History   Problem Relation Age of Onset    No Known Problems Sister     Breast cancer Paternal Aunt     Breast cancer Paternal Cousin     Breast cancer Maternal Cousin     Aortic aneurysm Mother     Leukemia Father     Breast cancer Paternal Cousin      OB History    Para Term  AB Living   2 2 2         SAB TAB Ectopic Multiple Live Births                  # Outcome Date GA Lbr Krzysztof/2nd Weight Sex Delivery Anes PTL Lv   2 Term            1 Term                    Current Outpatient Medications   Medication Sig Dispense Refill    ACCU-CHEK USHA PLUS METER Misc       ACCU-CHEK USHA PLUS TEST STRP Strp       acetaminophen (TYLENOL) 325 MG tablet Take 325 mg by mouth every 6 (six) hours as needed for Pain.      aspirin 81 MG Chew Take 1 tablet (81 mg total) by mouth once daily. Start on Monday.  0    BD ALCOHOL SWABS PadM       ergocalciferol (VITAMIN D2) 50,000 unit Cap Take 1 capsule (50,000 Units total) by mouth every 7 days. 12 capsule 0    letrozole (FEMARA) 2.5 mg Tab Take 1 tablet (2.5 mg total) by mouth once daily. 30 tablet 11    levothyroxine (SYNTHROID) 75 MCG tablet Take 75 mcg by mouth before breakfast.       losartan (COZAAR) 50 MG tablet Take 50 mg by mouth once daily.        metFORMIN (GLUCOPHAGE) 500 MG tablet daily with breakfast.       nicotine (NICODERM CQ) 21 mg/24 hr APPLY 1 PATCH TRANSDERMALLY EVERY 24 HOURS  0    nicotine, polacrilex, (NICORETTE) 2 mg Gum CHEW 1 PIECE (2 MG) BY MOUTH 10 TIMES PER DAY AS NEEDED  0    dexamethasone (DECADRON) 4 MG Tab Take 2 tablets (8 mg total) by mouth every 12 (twelve) hours. Take the day before and the day after chemo 32 tablet 0    gabapentin (NEURONTIN) 300 MG capsule Take 1 capsule (300 mg total) by mouth every evening. 30 capsule 11    lidocaine-prilocaine (EMLA) cream Apply topically as needed. Apply over port site 30 minutes before appointment for chemotherapy 30 g 0    ondansetron (ZOFRAN) 4 MG tablet Take 1 tablet (4 mg total) by mouth every 6 (six) hours as needed for Nausea. 120 tablet 3    promethazine (PHENERGAN) 25 MG tablet Take 1 tablet (25 mg total) by mouth every 6 (six) hours as needed for Nausea. 120 tablet 2    traMADol (ULTRAM) 50 mg tablet TAKE 1 TABLET BY MOUTH EVERY 6 HOURS AS NEEDED FOR PAIN 30 tablet 0     No current facility-administered medications for this visit.      Allergies: Patient has no known allergies.     The ASCVD Risk score (Genevieve DC Jr., et al., 2013) failed to calculate for the following reasons:    Cannot find a previous HDL lab    Cannot find a previous total cholesterol lab      ROS:  Constitutional: no weight loss, weight gain, fever, fatigue  Eyes:  No vision changes, glasses/contacts  ENT/Mouth: No ulcers, sinus problems, ears ringing, headache  Cardiovascular: No inability to lie flat, chest pain, exercise intolerance, swelling, heart palpitations  Respiratory: No wheezing, coughing blood, shortness of breath, or cough  Gastrointestinal: No diarrhea, bloody stool, nausea/vomiting, constipation, gas, hemorrhoids  Genitourinary: No blood in urine, painful urination, urgency of urination, frequency of urination, incomplete emptying, incontinence, abnormal bleeding, painful periods, heavy  periods, vaginal discharge, vaginal odor, painful intercourse, sexual problems, bleeding after intercourse.  Musculoskeletal: No muscle weakness  Skin/Breast: +breast cancer  Neurological: No passing out, seizures, numbness, headache  Endocrine: +diabetes, hypothyroid, hyperthyroid, hot flashes, hair loss, abnormal hair growth, acne  Psychiatric: No depression, crying  Hematologic: No bruises, bleeding, swollen lymph nodes, anemia.      Physical Exam    deferred  ASSESSMENT:   Breast cancer  Use of AI  Diabetes mellitus    PLAN:   Face to face time with patient 25 minutes- majority spent in counseling and arranging follow up  Counseled her on side effects of AI  Discussed menopausal symptoms- she does not desire any intervention at this time  Counseled her on importance of diabetes control- she would like referral to PCP

## 2019-05-20 ENCOUNTER — OFFICE VISIT (OUTPATIENT)
Dept: HEMATOLOGY/ONCOLOGY | Facility: CLINIC | Age: 69
End: 2019-05-20
Payer: MEDICARE

## 2019-05-20 ENCOUNTER — LAB VISIT (OUTPATIENT)
Dept: LAB | Facility: OTHER | Age: 69
End: 2019-05-20
Attending: INTERNAL MEDICINE
Payer: MEDICARE

## 2019-05-20 VITALS
HEIGHT: 66 IN | SYSTOLIC BLOOD PRESSURE: 115 MMHG | TEMPERATURE: 98 F | RESPIRATION RATE: 16 BRPM | DIASTOLIC BLOOD PRESSURE: 59 MMHG | OXYGEN SATURATION: 97 % | HEART RATE: 96 BPM | WEIGHT: 150.13 LBS | BODY MASS INDEX: 24.13 KG/M2

## 2019-05-20 DIAGNOSIS — Z17.0 MALIGNANT NEOPLASM OF UPPER-OUTER QUADRANT OF RIGHT BREAST IN FEMALE, ESTROGEN RECEPTOR POSITIVE: ICD-10-CM

## 2019-05-20 DIAGNOSIS — E55.9 VITAMIN D DEFICIENCY: ICD-10-CM

## 2019-05-20 DIAGNOSIS — C50.411 MALIGNANT NEOPLASM OF UPPER-OUTER QUADRANT OF RIGHT BREAST IN FEMALE, ESTROGEN RECEPTOR POSITIVE: Primary | ICD-10-CM

## 2019-05-20 DIAGNOSIS — R63.0 POOR APPETITE: ICD-10-CM

## 2019-05-20 DIAGNOSIS — C50.411 MALIGNANT NEOPLASM OF UPPER-OUTER QUADRANT OF RIGHT BREAST IN FEMALE, ESTROGEN RECEPTOR POSITIVE: ICD-10-CM

## 2019-05-20 DIAGNOSIS — Z17.0 MALIGNANT NEOPLASM OF UPPER-OUTER QUADRANT OF RIGHT BREAST IN FEMALE, ESTROGEN RECEPTOR POSITIVE: Primary | ICD-10-CM

## 2019-05-20 DIAGNOSIS — I10 ESSENTIAL HYPERTENSION: ICD-10-CM

## 2019-05-20 LAB
ALBUMIN SERPL BCP-MCNC: 4.1 G/DL (ref 3.5–5.2)
ALP SERPL-CCNC: 103 U/L (ref 55–135)
ALT SERPL W/O P-5'-P-CCNC: 19 U/L (ref 10–44)
ANION GAP SERPL CALC-SCNC: 10 MMOL/L (ref 8–16)
AST SERPL-CCNC: 18 U/L (ref 10–40)
BASOPHILS # BLD AUTO: 0.03 K/UL (ref 0–0.2)
BASOPHILS NFR BLD: 0.5 % (ref 0–1.9)
BILIRUB SERPL-MCNC: 0.3 MG/DL (ref 0.1–1)
BUN SERPL-MCNC: 19 MG/DL (ref 8–23)
CALCIUM SERPL-MCNC: 10.1 MG/DL (ref 8.7–10.5)
CHLORIDE SERPL-SCNC: 106 MMOL/L (ref 95–110)
CO2 SERPL-SCNC: 25 MMOL/L (ref 23–29)
CREAT SERPL-MCNC: 0.7 MG/DL (ref 0.5–1.4)
DIFFERENTIAL METHOD: NORMAL
EOSINOPHIL # BLD AUTO: 0.3 K/UL (ref 0–0.5)
EOSINOPHIL NFR BLD: 5.3 % (ref 0–8)
ERYTHROCYTE [DISTWIDTH] IN BLOOD BY AUTOMATED COUNT: 14.2 % (ref 11.5–14.5)
EST. GFR  (AFRICAN AMERICAN): >60 ML/MIN/1.73 M^2
EST. GFR  (NON AFRICAN AMERICAN): >60 ML/MIN/1.73 M^2
GLUCOSE SERPL-MCNC: 121 MG/DL (ref 70–110)
HCT VFR BLD AUTO: 40.1 % (ref 37–48.5)
HGB BLD-MCNC: 13.1 G/DL (ref 12–16)
LYMPHOCYTES # BLD AUTO: 2.4 K/UL (ref 1–4.8)
LYMPHOCYTES NFR BLD: 40 % (ref 18–48)
MCH RBC QN AUTO: 29.4 PG (ref 27–31)
MCHC RBC AUTO-ENTMCNC: 32.7 G/DL (ref 32–36)
MCV RBC AUTO: 90 FL (ref 82–98)
MONOCYTES # BLD AUTO: 0.3 K/UL (ref 0.3–1)
MONOCYTES NFR BLD: 4.8 % (ref 4–15)
NEUTROPHILS # BLD AUTO: 3 K/UL (ref 1.8–7.7)
NEUTROPHILS NFR BLD: 49.4 % (ref 38–73)
PLATELET # BLD AUTO: 289 K/UL (ref 150–350)
PMV BLD AUTO: 9.5 FL (ref 9.2–12.9)
POTASSIUM SERPL-SCNC: 4 MMOL/L (ref 3.5–5.1)
PROT SERPL-MCNC: 7.7 G/DL (ref 6–8.4)
RBC # BLD AUTO: 4.46 M/UL (ref 4–5.4)
SODIUM SERPL-SCNC: 141 MMOL/L (ref 136–145)
WBC # BLD AUTO: 6.07 K/UL (ref 3.9–12.7)

## 2019-05-20 PROCEDURE — 99214 PR OFFICE/OUTPT VISIT, EST, LEVL IV, 30-39 MIN: ICD-10-PCS | Mod: S$GLB,,, | Performed by: INTERNAL MEDICINE

## 2019-05-20 PROCEDURE — 99214 OFFICE O/P EST MOD 30 MIN: CPT | Mod: S$GLB,,, | Performed by: INTERNAL MEDICINE

## 2019-05-20 PROCEDURE — 99999 PR PBB SHADOW E&M-EST. PATIENT-LVL III: ICD-10-PCS | Mod: PBBFAC,,, | Performed by: INTERNAL MEDICINE

## 2019-05-20 PROCEDURE — 36415 COLL VENOUS BLD VENIPUNCTURE: CPT

## 2019-05-20 PROCEDURE — 80053 COMPREHEN METABOLIC PANEL: CPT

## 2019-05-20 PROCEDURE — 1101F PR PT FALLS ASSESS DOC 0-1 FALLS W/OUT INJ PAST YR: ICD-10-PCS | Mod: CPTII,S$GLB,, | Performed by: INTERNAL MEDICINE

## 2019-05-20 PROCEDURE — 3078F DIAST BP <80 MM HG: CPT | Mod: CPTII,S$GLB,, | Performed by: INTERNAL MEDICINE

## 2019-05-20 PROCEDURE — 3074F SYST BP LT 130 MM HG: CPT | Mod: CPTII,S$GLB,, | Performed by: INTERNAL MEDICINE

## 2019-05-20 PROCEDURE — 1101F PT FALLS ASSESS-DOCD LE1/YR: CPT | Mod: CPTII,S$GLB,, | Performed by: INTERNAL MEDICINE

## 2019-05-20 PROCEDURE — 99999 PR PBB SHADOW E&M-EST. PATIENT-LVL III: CPT | Mod: PBBFAC,,, | Performed by: INTERNAL MEDICINE

## 2019-05-20 PROCEDURE — 85025 COMPLETE CBC W/AUTO DIFF WBC: CPT

## 2019-05-20 PROCEDURE — 3078F PR MOST RECENT DIASTOLIC BLOOD PRESSURE < 80 MM HG: ICD-10-PCS | Mod: CPTII,S$GLB,, | Performed by: INTERNAL MEDICINE

## 2019-05-20 PROCEDURE — 3074F PR MOST RECENT SYSTOLIC BLOOD PRESSURE < 130 MM HG: ICD-10-PCS | Mod: CPTII,S$GLB,, | Performed by: INTERNAL MEDICINE

## 2019-05-20 RX ORDER — CYPROHEPTADINE HYDROCHLORIDE 4 MG/1
4 TABLET ORAL 3 TIMES DAILY PRN
Qty: 90 TABLET | Refills: 0 | Status: SHIPPED | OUTPATIENT
Start: 2019-05-20 | End: 2023-04-10

## 2019-05-20 NOTE — PROGRESS NOTES
Subjective:       Patient ID: Alia Andre is a 69 y.o. female.     Chief Complaint: follow up for breast cancer     Diagnosis:  Stage IIB (T2N0M0) IDC of the right breast, grade 3, ER positive 10-20%, MT negative, HER2 negative.   Retest mastectomy sample showed ER positive 20-30%, MT negative.       Oncologic History:  1. Ms. Andre is a 69 yo postmenopausal woman with HTN, T2DM, goiter who initially saw me on 10/24/18 for further evaluation of newly diagnosed right breast cancer. She self palpated a mass one month ago. Mammogram/US on 9/28/18 showed a 19 x 14 x 14 mm lesion in the right breast at 11 oclock. One LN in the right axilla with thickened cortex of 3 mm. a left breast 9 mm cyst at 10 oclock. Biopsy on 10/11/18 showed grade 3 invasive mammary carcinoma with medullary features of the right breast, ER +10-20% weak, MT negative, HER2 negative, Ki 67 40%. Biopsy of axillary LN negative for carcinoma. She presents to discuss further management. Currently feels well. Genetic study negative.   2. Right mastectomy and SLNB on 11/16/18 showed:  Tumor size: 23 mm in greatest dimension.  Histologic type: Invasive carcinoma of no special type (ductal, not otherwise specified).  Histologic grade:  Tubule formation: Score 3  Nuclear pleomorphism: Score 3  Mitotic rate: Score 3  Overall grade: Grade 3  Ductal carcinoma in Situ: Not identified  Margins: Invasive carcinoma margins: Uninvolved by invasive carcinoma, closest margin is deep margin, 2.2 cm  from tumor  Regional lymph nodes: Uninvolved by tumor cells  Number of lymph nodes examined: 1  Number of sentinel nodes examined: 1  Treatment effect: No known presurgical therapy  Ancillary studies: Immunohistochemical stains performed on previous biopsy [id number] show the following  results:  Estrogen receptor: Positive, weak staining in 10-20% of tumor cell nuclei  Progesterone receptor: Negative  HER2: Negative  Ki-67 proliferative index: 40%  Pathologic  Stage Classification (pTNM, AJCC 8th Edition)  Primary tumor: pT2: tumor more than 20 mm but less than or equal to 50 mm in greatest dimension.  Lymph node: sn N0: No regional lymph node metastasis.     Port placed at surgery.   3. Adjuvant TC started on 18, cycle 4 on 19  4. Retest Mastectomy sample showed   -Estrogen receptor: Positive, intermediate staining in approximately 20-30% of tumor cell nuclei.  -Progesterone receptor: Negative, 0% staining  5. Started adjuvant letrozole on 4/15/19.         Interval History:   Ms. Andre returns for follow up. Has been tolerating letrozole well with no significant side effects. +loss of appetite and wants to try some appetite stimulant     ECO     ROS:   A ten-point system review is obtained and negative except for what was stated in the Interval History.      Physical Examination:   Vital signs reviewed.   General: well hydrated, well developed, in no acute distress. tearful  HEENT: normocephalic, PERRLA, EOMI, anicteric sclerae, oropharynx clear  Neck: supple, + thyromegaly from goiter, no JVD, cervical or supraclavicular lymphadenopathy  Lungs: clear breath sounds bilaterally, no wheezing, rales, or rhonchi  Heart: RRR, no M/R/G  Breast: s/p R mastectomy. No abnormal skin lesions or axillary lymphadenopathy. L: no masses, axillary LAD  Abdomen: soft, no tenderness, non-distended, no hepatosplenomegaly, mass, or hernia. BS present  Extremities: no clubbing, cyanosis, or edema  Skin: no rash, ulcer, or open wounds  Neuro: alert and oriented x 4, no focal neuro deficit  Psych: pleasant and appropriate mood and affect     Objective:      Laboratory Data:  Vit D was 20        Assessment and Plan:      1. Malignant neoplasm of upper-outer quadrant of right breast in female, estrogen receptor positive    2. Poor appetite    3. Vitamin D deficiency    4. Essential hypertension        1.  - Ms. Andre is a 70 yo postmenopausal woman with stage IIB ([id number])  IDC of the right breast, ER positive (20-30%), [place] neg, HER2 negative, grade 3. Completed adjuvant chemotherapy with TC  - c/w adjuvant letrozole. recc adjuvant endocrine therapy with AI for 5-7 years.   - she wants to have the port removed. messaged Dr Frederick  - RTC in 3 months     2.  - try periactin    3.  - c/w high dose vit D x 3 months  - recheck vit D on return     4.   - BP good  - c/w current meds

## 2019-05-22 ENCOUNTER — LAB VISIT (OUTPATIENT)
Dept: LAB | Facility: HOSPITAL | Age: 69
End: 2019-05-22
Attending: INTERNAL MEDICINE
Payer: MEDICARE

## 2019-05-22 ENCOUNTER — TELEPHONE (OUTPATIENT)
Dept: SURGERY | Facility: CLINIC | Age: 69
End: 2019-05-22

## 2019-05-22 ENCOUNTER — OFFICE VISIT (OUTPATIENT)
Dept: INTERNAL MEDICINE | Facility: CLINIC | Age: 69
End: 2019-05-22
Payer: MEDICARE

## 2019-05-22 VITALS
HEART RATE: 89 BPM | WEIGHT: 148.81 LBS | SYSTOLIC BLOOD PRESSURE: 120 MMHG | DIASTOLIC BLOOD PRESSURE: 70 MMHG | BODY MASS INDEX: 23.92 KG/M2 | TEMPERATURE: 98 F | HEIGHT: 66 IN

## 2019-05-22 DIAGNOSIS — E55.9 VITAMIN D DEFICIENCY: ICD-10-CM

## 2019-05-22 DIAGNOSIS — E11.9 TYPE 2 DIABETES MELLITUS WITHOUT COMPLICATION, WITHOUT LONG-TERM CURRENT USE OF INSULIN: ICD-10-CM

## 2019-05-22 DIAGNOSIS — Z12.2 ENCOUNTER FOR SCREENING FOR LUNG CANCER: ICD-10-CM

## 2019-05-22 DIAGNOSIS — E03.9 HYPOTHYROIDISM, UNSPECIFIED TYPE: ICD-10-CM

## 2019-05-22 DIAGNOSIS — M79.605 PAIN IN BOTH LOWER EXTREMITIES: ICD-10-CM

## 2019-05-22 DIAGNOSIS — Z13.6 ENCOUNTER FOR LIPID SCREENING FOR CARDIOVASCULAR DISEASE: ICD-10-CM

## 2019-05-22 DIAGNOSIS — Z12.11 COLON CANCER SCREENING: ICD-10-CM

## 2019-05-22 DIAGNOSIS — E11.59 HYPERTENSION ASSOCIATED WITH DIABETES: ICD-10-CM

## 2019-05-22 DIAGNOSIS — Z13.220 ENCOUNTER FOR LIPID SCREENING FOR CARDIOVASCULAR DISEASE: ICD-10-CM

## 2019-05-22 DIAGNOSIS — Z00.00 ANNUAL PHYSICAL EXAM: ICD-10-CM

## 2019-05-22 DIAGNOSIS — Z23 NEED FOR PNEUMOCOCCAL VACCINATION: Primary | ICD-10-CM

## 2019-05-22 DIAGNOSIS — Z87.891 PERSONAL HISTORY OF NICOTINE DEPENDENCE: ICD-10-CM

## 2019-05-22 DIAGNOSIS — I15.2 HYPERTENSION ASSOCIATED WITH DIABETES: ICD-10-CM

## 2019-05-22 DIAGNOSIS — Z11.59 NEED FOR HEPATITIS C SCREENING TEST: ICD-10-CM

## 2019-05-22 DIAGNOSIS — M79.604 PAIN IN BOTH LOWER EXTREMITIES: ICD-10-CM

## 2019-05-22 DIAGNOSIS — C50.411 MALIGNANT NEOPLASM OF UPPER-OUTER QUADRANT OF RIGHT BREAST IN FEMALE, ESTROGEN RECEPTOR POSITIVE: ICD-10-CM

## 2019-05-22 DIAGNOSIS — Z72.0 TOBACCO ABUSE: ICD-10-CM

## 2019-05-22 DIAGNOSIS — Z17.0 MALIGNANT NEOPLASM OF UPPER-OUTER QUADRANT OF RIGHT BREAST IN FEMALE, ESTROGEN RECEPTOR POSITIVE: ICD-10-CM

## 2019-05-22 DIAGNOSIS — M17.0 PRIMARY OSTEOARTHRITIS OF BOTH KNEES: ICD-10-CM

## 2019-05-22 DIAGNOSIS — Z78.0 POSTMENOPAUSAL ESTROGEN DEFICIENCY: ICD-10-CM

## 2019-05-22 DIAGNOSIS — E04.9 THYROID GOITER: ICD-10-CM

## 2019-05-22 LAB
25(OH)D3+25(OH)D2 SERPL-MCNC: 40 NG/ML (ref 30–96)
CHOLEST SERPL-MCNC: 163 MG/DL (ref 120–199)
CHOLEST/HDLC SERPL: 4.4 {RATIO} (ref 2–5)
ESTIMATED AVG GLUCOSE: 148 MG/DL (ref 68–131)
HBA1C MFR BLD HPLC: 6.8 % (ref 4–5.6)
HDLC SERPL-MCNC: 37 MG/DL (ref 40–75)
HDLC SERPL: 22.7 % (ref 20–50)
LDLC SERPL CALC-MCNC: 99.2 MG/DL (ref 63–159)
NONHDLC SERPL-MCNC: 126 MG/DL
T4 FREE SERPL-MCNC: 1.51 NG/DL (ref 0.71–1.51)
TRIGL SERPL-MCNC: 134 MG/DL (ref 30–150)
TSH SERPL DL<=0.005 MIU/L-ACNC: <0.01 UIU/ML (ref 0.4–4)

## 2019-05-22 PROCEDURE — 99999 PR PBB SHADOW E&M-EST. PATIENT-LVL V: CPT | Mod: PBBFAC,,, | Performed by: INTERNAL MEDICINE

## 2019-05-22 PROCEDURE — 3078F PR MOST RECENT DIASTOLIC BLOOD PRESSURE < 80 MM HG: ICD-10-PCS | Mod: CPTII,S$GLB,, | Performed by: INTERNAL MEDICINE

## 2019-05-22 PROCEDURE — 83036 HEMOGLOBIN GLYCOSYLATED A1C: CPT

## 2019-05-22 PROCEDURE — 84439 ASSAY OF FREE THYROXINE: CPT

## 2019-05-22 PROCEDURE — 82306 VITAMIN D 25 HYDROXY: CPT

## 2019-05-22 PROCEDURE — 3078F DIAST BP <80 MM HG: CPT | Mod: CPTII,S$GLB,, | Performed by: INTERNAL MEDICINE

## 2019-05-22 PROCEDURE — 99999 PR PBB SHADOW E&M-EST. PATIENT-LVL V: ICD-10-PCS | Mod: PBBFAC,,, | Performed by: INTERNAL MEDICINE

## 2019-05-22 PROCEDURE — 3074F PR MOST RECENT SYSTOLIC BLOOD PRESSURE < 130 MM HG: ICD-10-PCS | Mod: CPTII,S$GLB,, | Performed by: INTERNAL MEDICINE

## 2019-05-22 PROCEDURE — 36415 COLL VENOUS BLD VENIPUNCTURE: CPT

## 2019-05-22 PROCEDURE — 80061 LIPID PANEL: CPT

## 2019-05-22 PROCEDURE — G0009 ADMIN PNEUMOCOCCAL VACCINE: HCPCS | Mod: S$GLB,,, | Performed by: INTERNAL MEDICINE

## 2019-05-22 PROCEDURE — 99499 RISK ADDL DX/OHS AUDIT: ICD-10-PCS | Mod: S$GLB,,, | Performed by: INTERNAL MEDICINE

## 2019-05-22 PROCEDURE — 99397 PER PM REEVAL EST PAT 65+ YR: CPT | Mod: 25,S$GLB,, | Performed by: INTERNAL MEDICINE

## 2019-05-22 PROCEDURE — 3074F SYST BP LT 130 MM HG: CPT | Mod: CPTII,S$GLB,, | Performed by: INTERNAL MEDICINE

## 2019-05-22 PROCEDURE — 86803 HEPATITIS C AB TEST: CPT

## 2019-05-22 PROCEDURE — 99499 UNLISTED E&M SERVICE: CPT | Mod: S$GLB,,, | Performed by: INTERNAL MEDICINE

## 2019-05-22 PROCEDURE — G0009 PNEUMOCOCCAL CONJUGATE VACCINE 13-VALENT LESS THAN 5YO & GREATER THAN: ICD-10-PCS | Mod: S$GLB,,, | Performed by: INTERNAL MEDICINE

## 2019-05-22 PROCEDURE — 90670 PCV13 VACCINE IM: CPT | Mod: S$GLB,,, | Performed by: INTERNAL MEDICINE

## 2019-05-22 PROCEDURE — 99397 PR PREVENTIVE VISIT,EST,65 & OVER: ICD-10-PCS | Mod: 25,S$GLB,, | Performed by: INTERNAL MEDICINE

## 2019-05-22 PROCEDURE — 90670 PNEUMOCOCCAL CONJUGATE VACCINE 13-VALENT LESS THAN 5YO & GREATER THAN: ICD-10-PCS | Mod: S$GLB,,, | Performed by: INTERNAL MEDICINE

## 2019-05-22 PROCEDURE — 84443 ASSAY THYROID STIM HORMONE: CPT

## 2019-05-22 RX ORDER — DICLOFENAC SODIUM 10 MG/G
2 GEL TOPICAL DAILY PRN
Qty: 100 G | Refills: 0 | Status: SHIPPED | OUTPATIENT
Start: 2019-05-22 | End: 2019-08-30 | Stop reason: SDUPTHER

## 2019-05-22 RX ORDER — LOSARTAN POTASSIUM 50 MG/1
50 TABLET ORAL DAILY
Qty: 90 TABLET | Refills: 3 | Status: SHIPPED | OUTPATIENT
Start: 2019-05-22 | End: 2020-04-20 | Stop reason: SDUPTHER

## 2019-05-22 RX ORDER — TRAMADOL HYDROCHLORIDE 50 MG/1
50 TABLET ORAL
Qty: 30 TABLET | Refills: 0 | Status: SHIPPED | OUTPATIENT
Start: 2019-05-22 | End: 2020-12-17

## 2019-05-22 RX ORDER — ERGOCALCIFEROL 1.25 MG/1
50000 CAPSULE ORAL
Qty: 12 CAPSULE | Refills: 3 | Status: SHIPPED | OUTPATIENT
Start: 2019-05-22 | End: 2020-05-21

## 2019-05-22 NOTE — PROGRESS NOTES
INTERNAL MEDICINE INITIAL VISIT NOTE      CHIEF COMPLAINT     Est care    HPI     Alia Andre is a 69 y.o. AA female who presents to est care.    DM2 x 1-2 yrs - metformin 500mg qam w/ breakfast.   Needs a1c, optometry, podiatry, MAC.   Checks sugars about 1-2x/day and all <160. Walks in the park for 45 min every day.     HTN - losartan 50mg daily. Needs a refill.     Hypothyroidism - synthroid 75mcg prior to breakfast.   Thyroid goiter. Has a nodule. Hasn't seen endocrine. No dysphagia.     Vit D def - ergo 50k weekly.     Current smoker - smokes about a 6 cigarettes in a day.   Prior to this, 1/2-1 ppd x 42 yrs.     R Breast CA (invasive mammary carcinoma) 10/2018 s/p R mastectomy and SLNB. S/p adjuvant chemo, which ended at the end of Feb 2019. On letrozole since 4/2019.  Follows w/ Dr. Garces in H/O - last seen 5/20/19. Started on periactin due to lack of appetite.  Also follows w/ Dr. Frederick - last seen 12/1/18 and rec f/u in 4 mo. Plan for port removal.  Pt reports still w/ some pain in the R chest area, previous surgical scar. Tylenol doesn't work for her.     Lives by self. Have 2 daughters that live nearby. No falls. No assistance w/ ADLs. Does not drive - never learned. Daughters drives her around.     Past Medical History:  Past Medical History:   Diagnosis Date    Cancer     breast    Diabetes mellitus type 2 in nonobese     Goiter     History of endometrial ablation     age 29    Hypertension     Tobacco abuse     Vitamin D deficiency        Past Surgical History:  Past Surgical History:   Procedure Laterality Date    BIOPSY, LYMPH NODE, SENTINEL RIGHT Right 11/16/2018    [facility] OR 2ND [facility]LR    GUFYZBAHM-KUKS-L-CATH LEFT Left 11/16/2018    Performed [facility] at [facility] OR 2ND FLR    MASTECTOMY RIGHT 2.5 HR CASE Right 11/16/2018    Performed [facility] at [facility] OR 2ND FLR    TUBAL LIGATION      1970/80's       Allergies:  Review of patient's  allergies indicates:  No Known Allergies    Home Medications:  Prior to Admission medications    Medication Sig Start Date End Date Taking? Authorizing Provider   ACCU-CHEK USHA PLUS METER Misc  1/22/19   Historical Provider, MD   ACCU-CHEK USHA PLUS TEST STRP Strp  9/5/18   Historical Provider, MD   acetaminophen (TYLENOL) 325 MG tablet Take 325 mg by mouth every 6 (six) hours as needed for Pain.    Historical Provider, MD   aspirin 81 MG Chew Take 1 tablet (81 mg total) by mouth once daily. Start on Monday. 11/17/18 11/17/19  Terell Rowley MD   BD ALCOHOL SWABS PadM  9/5/18   Historical Provider, MD   cyproheptadine (PERIACTIN) 4 mg tablet Take 1 tablet (4 mg total) by mouth 3 (three) times daily as needed. 5/20/19   Alexander Garces MD   dexamethasone (DECADRON) 4 MG Tab Take 2 tablets (8 mg total) by mouth every 12 (twelve) hours. Take the day before and the day after chemo 1/17/19   Alexander Garces MD   ergocalciferol (VITAMIN D2) 50,000 unit Cap Take 1 capsule (50,000 Units total) by mouth every 7 days. 4/15/19 4/14/20  Alexander Garces MD   gabapentin (NEURONTIN) 300 MG capsule Take 1 capsule (300 mg total) by mouth every evening. 2/4/19 2/4/20  Juan Mata MD   letrozole (FEMARA) 2.5 mg Tab Take 1 tablet (2.5 mg total) by mouth once daily. 4/15/19 4/14/20  Alexander Garces MD   levothyroxine (SYNTHROID) 75 MCG tablet Take 75 mcg by mouth before breakfast.  9/20/18   Historical Provider, MD   lidocaine-prilocaine (EMLA) cream Apply topically as needed. Apply over port site 30 minutes before appointment for chemotherapy 12/7/18   Alexander Garces MD   losartan (COZAAR) 50 MG tablet Take 50 mg by mouth once daily.  9/20/18   Historical Provider, MD   metFORMIN (GLUCOPHAGE) 500 MG tablet daily with breakfast.  9/20/18   Historical Provider, MD   nicotine (NICODERM CQ) 21 mg/24 hr APPLY 1 PATCH TRANSDERMALLY EVERY 24 HOURS 8/3/18   Historical Provider, MD   nicotine, polacrilex, (NICORETTE) 2 mg Gum CHEW 1 PIECE (2 MG)  "BY MOUTH 10 TIMES PER DAY AS NEEDED 8/6/18   Historical Provider, MD   ondansetron (ZOFRAN) 4 MG tablet Take 1 tablet (4 mg total) by mouth every 6 (six) hours as needed for Nausea. 12/7/18   Alexander Garces MD   promethazine (PHENERGAN) 25 MG tablet Take 1 tablet (25 mg total) by mouth every 6 (six) hours as needed for Nausea. 12/7/18   Alexander Garces MD   traMADol (ULTRAM) 50 mg tablet TAKE 1 TABLET BY MOUTH EVERY 6 HOURS AS NEEDED FOR PAIN 3/11/19   Alexander Garces MD       Family History:  Family History   Problem Relation Age of Onset    No Known Problems Sister     Breast cancer Paternal Aunt     Breast cancer Paternal Cousin     Breast cancer Maternal Cousin     Aortic aneurysm Mother     Leukemia Father     Breast cancer Paternal Cousin        Social History:  Social History     Tobacco Use    Smoking status: Current Some Day Smoker     Packs/day: 1.00     Years: 30.00     Pack years: 30.00     Types: Cigarettes    Smokeless tobacco: Never Used   Substance Use Topics    Alcohol use: No     Frequency: Never    Drug use: No       Review of Systems:  Review of Systems Comprehensive review of systems otherwise negative. See history/subjective section for more details.    Health Maintainence:    reviewed.     PHYSICAL EXAM     /70 (BP Location: Left arm, Patient Position: Sitting, BP Method: Medium (Manual))   Pulse 89   Temp 98.1 °F (36.7 °C)   Ht 5' 6" (1.676 m)   Wt 67.5 kg (148 lb 13 oz)   BMI 24.02 kg/m²     GEN - A+OX4, NAD   HEENT - PERRL, EOMI, OP clear. MMM.   Neck - large thyroid goiter  CV - RRR, no m/r   Chest - CTAB, no wheezing or rhonchi  Abd - S/NT/ND/+BS.   Ext - 2+BDP and radial pulses. No LE edema.   Feet - darkened toenails. No open lesions. Normal sensation to monofilament.   Neuro - PERRL, EOMI, no nystagmus, eyebrow raise, facial sensation, hearing, m of mastication, smile, palatal raise, shoulder shrug, tongue protrusion symmetric and intact. 5/5 BUE and BLE strength. " Sensation to light touch intact throughout. 2+ DTRs. Normal gait.   MSK - No spinal tenderness to palpation. Normal gait. B knee crepitus.  Skin - No rash.    LABS     Previous labs reviewed.    ASSESSMENT/PLAN     Aila Andre is a 69 y.o. female with  Alia was seen today for establish care.    Diagnoses and all orders for this visit:    Annual physical exam - hasn't seen a PCP for a while. Needs a lot of HM. Needs shingrix (declined due to finances), prevnar, Tdap.     Vitamin D deficiency - recheck Vit D.   -     ergocalciferol (VITAMIN D2) 50,000 unit Cap; Take 1 capsule (50,000 Units total) by mouth every 7 days.    Type 2 diabetes mellitus without complication, without long-term current use of insulin - cont metformin. Foot exam done today.   -     Ambulatory Referral to Optometry  -     Microalbumin/creatinine urine ratio; Future; Expected date: 05/22/2019  -     Hemoglobin A1c; Future; Expected date: 05/22/2019    Hypertension associated with diabetes - Stable and controlled. Continue current medications.  -     losartan (COZAAR) 50 MG tablet; Take 1 tablet (50 mg total) by mouth once daily.    Hypothyroidism, unspecified type - on synthroid. If TFT WNL, will refill at current dose.   -     TSH; Future; Expected date: 05/22/2019  -     T4, free; Future; Expected date: 05/22/2019    Encounter for lipid screening for cardiovascular disease  -     Lipid panel; Future; Expected date: 05/22/2019    Need for hepatitis C screening test  -     Hepatitis C antibody; Future; Expected date: 05/22/2019    Postmenopausal estrogen deficiency  -     DXA Bone Density Spine And Hip; Future; Expected date: 05/22/2019    Malignant neoplasm of upper-outer quadrant of right breast in female, estrogen receptor positive - f/u w/ Dr. Garces. Sent message to Dr. Frederick to see if she can have her port out.   -     traMADol (ULTRAM) 50 mg tablet; Take 1 tablet (50 mg total) by mouth every 24 hours as needed for Pain.    Tobacco  abuse - not ready to quit. Cutting down.    Encounter for screening for lung cancer  -     CT Chest Lung Screening Low Dose; Future; Expected date: 05/22/2019    Colon cancer screening  -     Case request GI: COLONOSCOPY    Personal history of nicotine dependence   -     CT Chest Lung Screening Low Dose; Future; Expected date: 05/22/2019    Pain in both lower extremities  -     traMADol (ULTRAM) 50 mg tablet; Take 1 tablet (50 mg total) by mouth every 24 hours as needed for Pain.  -     diclofenac sodium (VOLTAREN) 1 % Gel; Apply 2 g topically daily as needed.    Primary osteoarthritis of both knees - Discussed risks and benefits of controlled substance, including potential for tolerance and dependence, w/ pt. Reports current regimen and dosage controls the symptoms. Defers decreasing dosage. Agrees that benefits outweigh risks at this time. Will reassess at next visit.   -     traMADol (ULTRAM) 50 mg tablet; Take 1 tablet (50 mg total) by mouth every 24 hours as needed for Pain.  -     diclofenac sodium (VOLTAREN) 1 % Gel; Apply 2 g topically daily as needed.    Thyroid goiter  -     US Soft Tissue Head Neck Thyroid; Future; Expected date: 05/22/2019  -     Ambulatory Referral to Endocrine Surgery    prevnar today.  RTC in 6 months, sooner if needed and depending on labs.    Nancy Poe MD  Department of Internal Medicine - [facility]  8:40 AM

## 2019-05-22 NOTE — TELEPHONE ENCOUNTER
Spoke with patient regarding port removal, removal scheduled and confirmed per pt request for 6-25-19 at 1000

## 2019-05-22 NOTE — Clinical Note
Dr. Frederick,Ms. Andre is done with her chemo. I think she's not scheduled to see you again till the fall. Is it possible to see her sooner to get the port out?Thanks, Nancy

## 2019-05-23 ENCOUNTER — TELEPHONE (OUTPATIENT)
Dept: INTERNAL MEDICINE | Facility: CLINIC | Age: 69
End: 2019-05-23

## 2019-05-23 DIAGNOSIS — E78.5 HYPERLIPIDEMIA, UNSPECIFIED HYPERLIPIDEMIA TYPE: ICD-10-CM

## 2019-05-23 DIAGNOSIS — E03.9 HYPOTHYROIDISM, UNSPECIFIED TYPE: Primary | ICD-10-CM

## 2019-05-23 LAB — HCV AB SERPL QL IA: NEGATIVE

## 2019-05-23 RX ORDER — PRAVASTATIN SODIUM 10 MG/1
10 TABLET ORAL DAILY
Qty: 90 TABLET | Refills: 3 | Status: SHIPPED | OUTPATIENT
Start: 2019-05-23 | End: 2020-04-17

## 2019-05-23 RX ORDER — LEVOTHYROXINE SODIUM 50 UG/1
50 TABLET ORAL DAILY
Qty: 90 TABLET | Refills: 3 | Status: SHIPPED | OUTPATIENT
Start: 2019-05-23 | End: 2020-05-14

## 2019-05-23 NOTE — TELEPHONE ENCOUNTER
Please call and notify pt:    Her TSH is very suppressed, which means she's on too high of a dosage of thyroid medicine. Recommend to drop down to 50mcg daily. Repeat TFT in 2 mo. Diabetes is controlled. Due to her diabetes, I would like to put her on a low dose cholesterol medicine - pravastatin 10mg daily - to decrease her risk of having cardiovascular disease. Will send meds to pharmacy. Vitamin D is ok. No hepatitis C. There is no extra protein spilling into the urine.    Follow up w/ Dr. Cameron as scheduled.

## 2019-05-27 NOTE — TELEPHONE ENCOUNTER
Spoke with pt, notified of results. Labs scheduled pt verbalized understanding to medication changes

## 2019-06-06 ENCOUNTER — INITIAL CONSULT (OUTPATIENT)
Dept: SURGERY | Facility: CLINIC | Age: 69
End: 2019-06-06
Payer: MEDICARE

## 2019-06-06 ENCOUNTER — HOSPITAL ENCOUNTER (OUTPATIENT)
Dept: RADIOLOGY | Facility: HOSPITAL | Age: 69
Discharge: HOME | End: 2019-06-06
Attending: INTERNAL MEDICINE
Payer: MEDICARE

## 2019-06-06 ENCOUNTER — TELEPHONE (OUTPATIENT)
Dept: INTERNAL MEDICINE | Facility: CLINIC | Age: 69
End: 2019-06-06

## 2019-06-06 ENCOUNTER — TELEPHONE (OUTPATIENT)
Dept: SURGERY | Facility: CLINIC | Age: 69
End: 2019-06-06

## 2019-06-06 VITALS
BODY MASS INDEX: 24.22 KG/M2 | HEART RATE: 81 BPM | HEIGHT: 66 IN | DIASTOLIC BLOOD PRESSURE: 60 MMHG | TEMPERATURE: 98 F | SYSTOLIC BLOOD PRESSURE: 125 MMHG | WEIGHT: 150.69 LBS | RESPIRATION RATE: 18 BRPM

## 2019-06-06 DIAGNOSIS — R91.8 LUNG NODULES: ICD-10-CM

## 2019-06-06 DIAGNOSIS — E04.9 THYROID GOITER: ICD-10-CM

## 2019-06-06 DIAGNOSIS — Z12.2 ENCOUNTER FOR SCREENING FOR LUNG CANCER: ICD-10-CM

## 2019-06-06 DIAGNOSIS — I70.0 AORTIC ATHEROSCLEROSIS: ICD-10-CM

## 2019-06-06 DIAGNOSIS — I25.84 CORONARY ARTERY CALCIFICATION: ICD-10-CM

## 2019-06-06 DIAGNOSIS — Z87.891 PERSONAL HISTORY OF NICOTINE DEPENDENCE: ICD-10-CM

## 2019-06-06 DIAGNOSIS — I25.10 CORONARY ARTERY CALCIFICATION: ICD-10-CM

## 2019-06-06 DIAGNOSIS — E04.1 THYROID NODULE: Primary | ICD-10-CM

## 2019-06-06 PROCEDURE — 76536 US EXAM OF HEAD AND NECK: CPT | Mod: TC

## 2019-06-06 PROCEDURE — 1101F PT FALLS ASSESS-DOCD LE1/YR: CPT | Mod: CPTII,S$GLB,, | Performed by: SURGERY

## 2019-06-06 PROCEDURE — 3078F PR MOST RECENT DIASTOLIC BLOOD PRESSURE < 80 MM HG: ICD-10-PCS | Mod: CPTII,S$GLB,, | Performed by: SURGERY

## 2019-06-06 PROCEDURE — 76536 US EXAM OF HEAD AND NECK: CPT | Mod: 26,,, | Performed by: RADIOLOGY

## 2019-06-06 PROCEDURE — G0297 CT CHEST LUNG SCREENING LOW DOSE: ICD-10-PCS | Mod: 26,,, | Performed by: RADIOLOGY

## 2019-06-06 PROCEDURE — 99213 PR OFFICE/OUTPT VISIT, EST, LEVL III, 20-29 MIN: ICD-10-PCS | Mod: S$GLB,,, | Performed by: SURGERY

## 2019-06-06 PROCEDURE — 99999 PR PBB SHADOW E&M-EST. PATIENT-LVL V: CPT | Mod: PBBFAC,,, | Performed by: SURGERY

## 2019-06-06 PROCEDURE — 3074F PR MOST RECENT SYSTOLIC BLOOD PRESSURE < 130 MM HG: ICD-10-PCS | Mod: CPTII,S$GLB,, | Performed by: SURGERY

## 2019-06-06 PROCEDURE — 76536 US SOFT TISSUE HEAD NECK THYROID: ICD-10-PCS | Mod: 26,,, | Performed by: RADIOLOGY

## 2019-06-06 PROCEDURE — G0297 LDCT FOR LUNG CA SCREEN: HCPCS | Mod: TC

## 2019-06-06 PROCEDURE — 99999 PR PBB SHADOW E&M-EST. PATIENT-LVL V: ICD-10-PCS | Mod: PBBFAC,,, | Performed by: SURGERY

## 2019-06-06 PROCEDURE — 99213 OFFICE O/P EST LOW 20 MIN: CPT | Mod: S$GLB,,, | Performed by: SURGERY

## 2019-06-06 PROCEDURE — 3074F SYST BP LT 130 MM HG: CPT | Mod: CPTII,S$GLB,, | Performed by: SURGERY

## 2019-06-06 PROCEDURE — 1101F PR PT FALLS ASSESS DOC 0-1 FALLS W/OUT INJ PAST YR: ICD-10-PCS | Mod: CPTII,S$GLB,, | Performed by: SURGERY

## 2019-06-06 PROCEDURE — G0297 LDCT FOR LUNG CA SCREEN: HCPCS | Mod: 26,,, | Performed by: RADIOLOGY

## 2019-06-06 PROCEDURE — 3078F DIAST BP <80 MM HG: CPT | Mod: CPTII,S$GLB,, | Performed by: SURGERY

## 2019-06-06 NOTE — TELEPHONE ENCOUNTER
Called pt's daughter regarding scheduling of Parathyroid Protocol CT Scan, Bone Density and Thyroid Biospy in Interventional Radiology. Attempted to schedule today but pt states she could not stay for CT Scan because she hasn't eaten and is a diabetic and don't want to pas out. She couldn't schedule for another day because she need her daughter to bring her and have to check when she's available. Explained the appts may not be available when she's off but we can schedule and maybe she can make arrangements. PT will speak with daughter about appts.

## 2019-06-06 NOTE — Clinical Note
Afternoon, Where do we stand from a onc standpoint on this patient?  Would she be able to undergo thyroid surgery?Thanks,aoalli

## 2019-06-06 NOTE — TELEPHONE ENCOUNTER
Spoke with pt, notified of results. advised pt to stop smoking she says she is trying but does not want referral to smoking sensation. Echo scheduled

## 2019-06-06 NOTE — TELEPHONE ENCOUNTER
Please call and notify pt:    Thyroid nodule with 1 suspicious nodule of the right thyroid. Follow up with endocrine surgeon as discussed (I think she saw Dr. Echeverria today).    CT of the chest w/ subcentimeter lung nodules on both lungs. There is also some plaques seen around the blood vessels in the heart and the aorta. I'm going to order a stress test to make sure there's no significant blockages.   Recommend to repeat CT in a year to make sure the small lung nodules do not grow in size. Recommend stopping smoking as able. We can refer her to smoking cessation clinic if she likes.

## 2019-06-06 NOTE — LETTER
Endocrine/General Surgery  [address] [place] [address]  Phone: [phone]  Fax: [phone] June 12, 2019         Nancy Poe MD  [address]  [facility] [address]    Patient: Alia Andre   YOB: 1950   Date of Visit: 6/6/2019     Dear Dr. Poe:    I saw Ms. Andre in clinic. Attached you will find a copy of my note.    As you know, she is a 69 y.o. female who presented with a dominant thyroid nodule in the setting of multi-nodular thyroid. I was able to discuss the typical workup for thyroid nodules which at times includes FNA. The current plan includes neck CT and FNA of dominant nodule.  Will follow-up once this is completed.    If you have any questions or concerns, please don't hesitate to contact my office. Again, thank you kindly for this referral.    Regards,      Graciela Echeverria MD    CC  Alexander Garces MD  [address]  [address]  [facility] [address]  VIA In Basket     Cristiane Frederick MD  [address]  [facility] [place] [address]  VIA In Basket

## 2019-06-06 NOTE — Clinical Note
June 12, 2019      Nancy Poe MD  [address]  [facility] [address]           [facility] 2nd [place]  [address]  [facility] [address]  Phone: [phone]          Patient: Alia Andre   MR Number: [id number]   YOB: 1950   Date of Visit: 6/6/2019       Dear Dr. Nancy Poe:    Thank you for referring Alia Andre to me for evaluation. Attached you will find relevant portions of my assessment and plan of care.    If you have questions, please do not hesitate to call me. I look forward to following Alia Andre along with you.    Sincerely,    Graciela Echeverria MD    Enclosure  CC:  No Recipients    If you would like to receive this communication electronically, please contact [email or web address] or [phone] to request more information on [employer] Link access.    For providers and/or their staff who would like to refer a patient to [facility], please contact us through our one-stop-shop provider referral line, [facility], at [phone].    If you feel you have received this communication in error or would no longer like to receive these types of communications, please e-mail [email or web address]

## 2019-06-12 NOTE — PROGRESS NOTES
The patient presents for follow-up of thyroid goiter.  She was initially seen in November 2018.  At that time she had a new diagnosis of right breast cancer and was preparing to undergo treatment.  She has since undergone right mastectomy with sentinel lymph node biopsy and completed adjuvant chemotherapy with TC.    She notes that her thyroid is now more prominent than previous.  She also has some very mild dysphagia.  No other new changes.      Thyroid ultrasound (11/07/2018):  FINDINGS:  The thyroid is markedly enlarged. There are multiple nodules throughout both the right and left lobes of the thyroid.    Right lobe of the thyroid measures 11.9 x 0 1.9 x 2.7 cm. Whole thyroid on the right is filled with nodules. Most of lthese solid nodules contain calcifications which have shadowing consistent with colloid and are therefore benign. However in the lower pole of the right lobe of the thyroid there is a solid nodule measuring 2.5 x 2.1 x 2.8 cm which have contains microcalcifications. Nodule has a TI rads category of TR 5 and should be biopsied.    Left lobe of the thyroid measures 9.8 x 3.2 x 4.3 cm. There are multiple nodules throughout of this left lobe is well. For all of these nodules contain calcifications consistent with colloid and therefore do not warrant biopsy.    .    Cervical lymph nodes demonstrate normal morphology and size. No pathologic lymphadenopathy is noted       Impression           Diffuse multinodular thyroid    Single nodule in the lower pole of the right lobe of the thyroid has a TR 5 category and warrants biopsy       Will plan thyroid FNA of right thyroid nodule.  Will also obtain neck CT to determine extent of goiter. The patient is interesting in surgery related to symptoms and will follow-up once testing is completed.    See the original consult note below:    Attending attestation:  I have reviewed the Resident's history and physical, assessment and plan. I agree with the findings.   Any changes were made directly in the note below.     Graciela Echeverria MD  Endocrine Surgery           Consult Note  Endocrine Surgery    Visit Diagnosis: Goiter [E04.9]    SUBJECTIVE:     Alia Andre is a 68 y.o. female seen at the request of Dr. Cristiane Frederick today in the Endocrine Surgery Clinic for evaluation of a goiter. Her history dates back to her early 30's when patient self-identified a thyroid mass and was subsequently treated for hypothyroidism. She states that she has been taking synthroid daily since that time. Over the last thirty years, her goiter has only slowly grown. She states that her last thyroid ultrasound was approximately three years ago, and was not told of any malignancy or abnormality at that time.  Alia Andre complains of difficulty with shortness of breath especially with postural changes, palpable neck mass and growth of thyroid nodule over time. The patient denies hot/cold intolerance, fatigue, unexplained weight changes, difficulty with swallowing and change in voice quality. She does not have a history of head and neck radiation therapy. She does have a family history of thyroid disease, but no thyroid cancers. She does have a family history of endocrinopathies. She is taking thyroid hormone supplementation. She has not undergone radioactive iodine treatment.    Of note, the patient was recently diagnosed with breast cancer (Malignant neoplasm of upper-outer quadrant of right breast in female, estrogen receptor positive) and is in need of a port for adjuvant treatment.     Review of patient's allergies indicates:  No Known Allergies    Current Outpatient Medications   Medication Sig Dispense Refill    ACCU-CHEK USHA PLUS TEST STRP Strp       BD ALCOHOL SWABS PadM       levothyroxine (SYNTHROID) 75 MCG tablet Take 75 mcg by mouth before breakfast.       losartan (COZAAR) 50 MG tablet Take 50 mg by mouth once daily.       metFORMIN (GLUCOPHAGE) 500 MG tablet daily  "with breakfast.       nicotine (NICODERM CQ) 21 mg/24 hr APPLY 1 PATCH TRANSDERMALLY EVERY 24 HOURS  0    nicotine, polacrilex, (NICORETTE) 2 mg Gum CHEW 1 PIECE (2 MG) BY MOUTH 10 TIMES PER DAY AS NEEDED  0     No current facility-administered medications for this visit.        Past Medical History:   Diagnosis Date    Diabetes mellitus type 2 in nonobese     Goiter     Hypertension     Tobacco abuse      Past Surgical History:   Procedure Laterality Date    TUBAL LIGATION      1970/80's     Social History     Tobacco Use    Smoking status: Current Every Day Smoker     Packs/day: 1.00     Types: Cigarettes    Smokeless tobacco: Never Used   Substance Use Topics    Alcohol use: No     Frequency: Never    Drug use: No          Review of Systems:    Review of Systems   Constitutional: negative for chills, fatigue, fevers, malaise and night sweats  Eyes: negative for icterus and irritation  Respiratory: negative for cough and dyspnea on exertion  Cardiovascular: negative for chest pain and palpitations  Gastrointestinal: negative for constipation, diarrhea and dysphagia  Genitourinary:negative for dysuria, hematuria and nocturia  Integument/breast: negative for rash and skin color change  Hematologic/lymphatic: negative for bleeding and easy bruising  Neurological: negative for gait problems, headaches and seizures  Behavioral/Psych: negative for anxiety and depression  Endocrine: negative    ENT ROS: negative  Endocrine ROS:   negative for - hair pattern changes, malaise/lethargy, mood swings, palpitations or polydipsia/polyuria        OBJECTIVE:     Vital Signs:  BP (!) 154/73   Pulse 79   Temp 97.7 °F (36.5 °C)   Resp 18   Ht 5' 6" (1.676 m)   Wt 69.6 kg (153 lb 5.3 oz)   BMI 24.75 kg/m²    Body mass index is 24.75 kg/m².      Physical Exam:    General:  no distress, see vitals for BMI    Eyes:  conjunctivae/corneas clear   Neck: trachea midline and symmetric, no adenopathy    Thyroid:  thyroid " enlarged and nontender. No palpable nodules.   Lung: clear to auscultation bilaterally   Heart:  regular rate and rhythm   Abdomen: soft, non-tender; bowel sounds normal; no masses,  no organomegaly   Skin/Extremities: warm and well-perfused   Pulses: 2+ and symmetric   Neuro: normal without focal findings and mental status, speech normal, alert and oriented x3       Laboratory/Radiologic Studies      Thyroid Uptake and scan(5/11/2015):        Component Value Date    Sodium 139 11/05/2018    Potassium 4.1 11/05/2018    Chloride 105 11/05/2018    CO2 25 11/05/2018    Glucose 88 11/05/2018    BUN, Bld 14 11/05/2018    Creatinine 0.7 11/05/2018    Calcium 10.2 11/05/2018    Anion Gap 9 11/05/2018    eGFR if African American >60.0 11/05/2018    eGFR if non African American >60.0 11/05/2018       ASSESSMENT/PLAN:       Assessment    Ms. Andre is a 68 y.o. female who presents with evidence of Thyroid goiter and evaluation for port placement for Right-sided breast cancer.    Plan    During this visit, thyroid anatomy and physiology were reviewed and she was given a copy of our patient education booklet, Understanding Thyroid Disease. Prior to her surgery, the patient will need to undergo ultrasound evaluation of the thyroid. We will also request records of her last ultrasound (approximately three years ago) and her most recent thyroid lab work completed by her PCP.   While the results of this thyroid imaging and lab work will not interfere with her port placement and right mastectomy, we feel that she would benefit from an updated evaluation. We will review findings and determine if concurrent surgery with thyroidectomy is possible and beneficial.  Patient's questions were answered and she wishes to proceed with the port placement and with the thyroid evaluation.

## 2019-06-19 ENCOUNTER — HOSPITAL ENCOUNTER (OUTPATIENT)
Dept: CARDIOLOGY | Facility: CLINIC | Age: 69
Discharge: HOME | End: 2019-06-19
Attending: INTERNAL MEDICINE
Payer: MEDICARE

## 2019-06-19 VITALS — BODY MASS INDEX: 24.11 KG/M2 | WEIGHT: 150 LBS | HEIGHT: 66 IN

## 2019-06-19 DIAGNOSIS — I25.84 CORONARY ARTERY CALCIFICATION: ICD-10-CM

## 2019-06-19 DIAGNOSIS — I25.10 CORONARY ARTERY CALCIFICATION: ICD-10-CM

## 2019-06-19 LAB
ASCENDING AORTA: 2.35 CM
BSA FOR ECHO PROCEDURE: 1.78 M2
CV ECHO LV RWT: 0.44 CM
CV STRESS BASE HR: 75 BPM
DIASTOLIC BLOOD PRESSURE: 73 MMHG
DOP CALC LVOT AREA: 2.52 CM2
DOP CALC LVOT DIAMETER: 1.79 CM
DOP CALC LVOT PEAK VEL: 0.94 M/S
DOP CALC LVOT STROKE VOLUME: 46.25 CM3
DOP CALCLVOT PEAK VEL VTI: 18.39 CM
E WAVE DECELERATION TIME: 223.11 MSEC
E/A RATIO: 0.74
E/E' RATIO: 10.86
ECHO LV POSTERIOR WALL: 0.92 CM (ref 0.6–1.1)
FRACTIONAL SHORTENING: 32 % (ref 28–44)
INTERVENTRICULAR SEPTUM: 0.86 CM (ref 0.6–1.1)
IVRT: 0.09 MSEC
LA MAJOR: 4.43 CM
LA MINOR: 4.55 CM
LA WIDTH: 3.71 CM
LEFT ATRIUM SIZE: 3.43 CM
LEFT ATRIUM VOLUME INDEX: 27.4 ML/M2
LEFT ATRIUM VOLUME: 48.56 CM3
LEFT INTERNAL DIMENSION IN SYSTOLE: 2.83 CM (ref 2.1–4)
LEFT VENTRICLE DIASTOLIC VOLUME INDEX: 43.62 ML/M2
LEFT VENTRICLE DIASTOLIC VOLUME: 77.2 ML
LEFT VENTRICLE MASS INDEX: 65.3 G/M2
LEFT VENTRICLE SYSTOLIC VOLUME INDEX: 17.1 ML/M2
LEFT VENTRICLE SYSTOLIC VOLUME: 30.34 ML
LEFT VENTRICULAR INTERNAL DIMENSION IN DIASTOLE: 4.17 CM (ref 3.5–6)
LEFT VENTRICULAR MASS: 115.53 G
LV LATERAL E/E' RATIO: 8.44
LV SEPTAL E/E' RATIO: 15.2
MV PEAK A VEL: 1.03 M/S
MV PEAK E VEL: 0.76 M/S
OHS CV CPX 85 PERCENT MAX PREDICTED HEART RATE MALE: 125
OHS CV CPX ESTIMATED METS: 8
OHS CV CPX MAX PREDICTED HEART RATE: 147
OHS CV CPX PATIENT IS FEMALE: 1
OHS CV CPX PATIENT IS MALE: 0
OHS CV CPX PEAK DIASTOLIC BLOOD PRESSURE: 78 MMHG
OHS CV CPX PEAK HEAR RATE: 134 BPM
OHS CV CPX PEAK RATE PRESSURE PRODUCT: NORMAL
OHS CV CPX PEAK SYSTOLIC BLOOD PRESSURE: 218 MMHG
OHS CV CPX PERCENT MAX PREDICTED HEART RATE ACHIEVED: 91
OHS CV CPX RATE PRESSURE PRODUCT PRESENTING: NORMAL
PISA TR MAX VEL: 2.12 M/S
PULM VEIN S/D RATIO: 1.94
PV PEAK D VEL: 0.31 M/S
PV PEAK S VEL: 0.6 M/S
RA MAJOR: 4.33 CM
RA WIDTH: 3.45 CM
RIGHT VENTRICULAR END-DIASTOLIC DIMENSION: 3.01 CM
RV TISSUE DOPPLER FREE WALL SYSTOLIC VELOCITY 1 (APICAL 4 CHAMBER VIEW): 9.9 M/S
SINUS: 2.53 CM
STJ: 2.22 CM
STRESS ECHO POST EXERCISE DUR MIN: 5 MINUTES
STRESS ECHO POST EXERCISE DUR SEC: 3 SECONDS
SYSTOLIC BLOOD PRESSURE: 138 MMHG
TDI LATERAL: 0.09
TDI SEPTAL: 0.05
TDI: 0.07
TR MAX PG: 17.98 MMHG
TRICUSPID ANNULAR PLANE SYSTOLIC EXCURSION: 1.86 CM

## 2019-06-19 PROCEDURE — 93351 STRESS TTE COMPLETE: CPT | Mod: S$GLB,,, | Performed by: INTERNAL MEDICINE

## 2019-06-19 PROCEDURE — 93351 ECHOCARDIOGRAM STRESS TEST (CUPID ONLY): ICD-10-PCS | Mod: S$GLB,,, | Performed by: INTERNAL MEDICINE

## 2019-06-25 ENCOUNTER — PROCEDURE VISIT (OUTPATIENT)
Dept: SURGERY | Facility: CLINIC | Age: 69
End: 2019-06-25
Payer: MEDICARE

## 2019-06-25 VITALS
BODY MASS INDEX: 24.27 KG/M2 | TEMPERATURE: 98 F | DIASTOLIC BLOOD PRESSURE: 71 MMHG | SYSTOLIC BLOOD PRESSURE: 130 MMHG | WEIGHT: 150.38 LBS | HEART RATE: 100 BPM

## 2019-06-25 DIAGNOSIS — Z85.3 PERSONAL HISTORY OF BREAST CANCER: Primary | ICD-10-CM

## 2019-06-25 PROCEDURE — 36590 REMOVAL TUNNELED CV CATH: CPT | Mod: S$GLB,,, | Performed by: SURGERY

## 2019-06-25 PROCEDURE — 36590 REMOVAL, PORTACATH: ICD-10-PCS | Mod: S$GLB,,, | Performed by: SURGERY

## 2019-06-25 NOTE — PROCEDURES
"Removal, Portacath  Date/Time: 6/25/2019 10:58 AM  Performed by: Cristiane Frederick MD  Authorized by: Clif Cunningham MD     Consent Done?:  Yes (Written)  Time out: Immediately prior to procedure a "time out" was called to verify the correct patient, procedure, equipment, support staff and site/side marked as required.      STAFF:  Assistants?: Yes    List of assistants:  Clif Cunningham I was present for the entire procedure.  INDICATIONS:: port no longer needed.  LOCATION:  Body area:  Chestleft    PREP:Position:  Supine    ANESTHESIA:  Anesthesia:  Local infiltration  Local anesthetic:  Lidocaine 1% without epinephrine    PROCEDURE DETAILS:  Excision type:  Skin  Malignancy:  Benign  Excision size (cm):  4  Scalpel size:  15  Incision type:  Single straight  Specimens?: No      Hemostasis was obtained.  Estimated blood loss (cc):  5  Wound closure:  Intermediate layered  Wound repair size (cm):  4  Sutures:  3-0 Vicryl and 4-0 Monocryl  Dressings: surgical glue.  Post-op diagnosis: Same as pre-op diagnosis.  Needle, instrument, and sponge counts were correct.  Patient tolerated the procedure well with no immediate complications.  Post-operative instructions were provided for the patient.  Patient was discharged and will follow up for wound check. and Patient was discharged and will follow up for wound check and pathology results.   Port was dissected from pocket and figure of eight 3-0 vicryl suture was placed at catheter track site as catheter pulled. No complications.       "
2-point gait

## 2019-06-27 ENCOUNTER — TELEPHONE (OUTPATIENT)
Dept: HEMATOLOGY/ONCOLOGY | Facility: CLINIC | Age: 69
End: 2019-06-27

## 2019-06-27 RX ORDER — GUAIFENESIN AND PHENYLEPHRINE HCL 400; 10 MG/1; MG/1
1 TABLET ORAL DAILY
COMMUNITY

## 2019-06-27 NOTE — TELEPHONE ENCOUNTER
Received a fax from [facility]. They forward a copy of what was discussed , Including the medication report. Medication listed reviewed updated in Ms Andre' chart.

## 2019-07-29 ENCOUNTER — LAB VISIT (OUTPATIENT)
Dept: LAB | Facility: HOSPITAL | Age: 69
End: 2019-07-29
Attending: INTERNAL MEDICINE
Payer: MEDICARE

## 2019-07-29 DIAGNOSIS — E03.9 HYPOTHYROIDISM, UNSPECIFIED TYPE: ICD-10-CM

## 2019-07-29 LAB — TSH SERPL DL<=0.005 MIU/L-ACNC: 0.42 UIU/ML (ref 0.4–4)

## 2019-07-29 PROCEDURE — 36415 COLL VENOUS BLD VENIPUNCTURE: CPT

## 2019-07-29 PROCEDURE — 84443 ASSAY THYROID STIM HORMONE: CPT

## 2019-08-20 ENCOUNTER — LAB VISIT (OUTPATIENT)
Dept: LAB | Facility: OTHER | Age: 69
End: 2019-08-20
Attending: INTERNAL MEDICINE
Payer: MEDICARE

## 2019-08-20 ENCOUNTER — TELEPHONE (OUTPATIENT)
Dept: SURGERY | Facility: CLINIC | Age: 69
End: 2019-08-20

## 2019-08-20 ENCOUNTER — OFFICE VISIT (OUTPATIENT)
Dept: HEMATOLOGY/ONCOLOGY | Facility: CLINIC | Age: 69
End: 2019-08-20
Payer: MEDICARE

## 2019-08-20 VITALS
HEART RATE: 88 BPM | BODY MASS INDEX: 24.03 KG/M2 | WEIGHT: 149.5 LBS | TEMPERATURE: 97 F | OXYGEN SATURATION: 97 % | SYSTOLIC BLOOD PRESSURE: 130 MMHG | HEIGHT: 66 IN | DIASTOLIC BLOOD PRESSURE: 61 MMHG | RESPIRATION RATE: 16 BRPM

## 2019-08-20 DIAGNOSIS — E04.9 GOITER: ICD-10-CM

## 2019-08-20 DIAGNOSIS — Z17.0 MALIGNANT NEOPLASM OF UPPER-OUTER QUADRANT OF RIGHT BREAST IN FEMALE, ESTROGEN RECEPTOR POSITIVE: Primary | ICD-10-CM

## 2019-08-20 DIAGNOSIS — E55.9 VITAMIN D DEFICIENCY: ICD-10-CM

## 2019-08-20 DIAGNOSIS — Z79.811 AROMATASE INHIBITOR USE: ICD-10-CM

## 2019-08-20 DIAGNOSIS — M81.0 POSTMENOPAUSAL BONE LOSS: ICD-10-CM

## 2019-08-20 DIAGNOSIS — Z85.3 PERSONAL HISTORY OF BREAST CANCER: ICD-10-CM

## 2019-08-20 DIAGNOSIS — Z12.31 ENCOUNTER FOR SCREENING MAMMOGRAM FOR BREAST CANCER: Primary | ICD-10-CM

## 2019-08-20 DIAGNOSIS — C50.411 MALIGNANT NEOPLASM OF UPPER-OUTER QUADRANT OF RIGHT BREAST IN FEMALE, ESTROGEN RECEPTOR POSITIVE: Primary | ICD-10-CM

## 2019-08-20 LAB — 25(OH)D3+25(OH)D2 SERPL-MCNC: 41 NG/ML (ref 30–96)

## 2019-08-20 PROCEDURE — 3078F PR MOST RECENT DIASTOLIC BLOOD PRESSURE < 80 MM HG: ICD-10-PCS | Mod: CPTII,S$GLB,, | Performed by: INTERNAL MEDICINE

## 2019-08-20 PROCEDURE — 3078F DIAST BP <80 MM HG: CPT | Mod: CPTII,S$GLB,, | Performed by: INTERNAL MEDICINE

## 2019-08-20 PROCEDURE — 99999 PR PBB SHADOW E&M-EST. PATIENT-LVL III: ICD-10-PCS | Mod: PBBFAC,,, | Performed by: INTERNAL MEDICINE

## 2019-08-20 PROCEDURE — 3075F PR MOST RECENT SYSTOLIC BLOOD PRESS GE 130-139MM HG: ICD-10-PCS | Mod: CPTII,S$GLB,, | Performed by: INTERNAL MEDICINE

## 2019-08-20 PROCEDURE — 3075F SYST BP GE 130 - 139MM HG: CPT | Mod: CPTII,S$GLB,, | Performed by: INTERNAL MEDICINE

## 2019-08-20 PROCEDURE — 1101F PT FALLS ASSESS-DOCD LE1/YR: CPT | Mod: CPTII,S$GLB,, | Performed by: INTERNAL MEDICINE

## 2019-08-20 PROCEDURE — 99214 OFFICE O/P EST MOD 30 MIN: CPT | Mod: S$GLB,,, | Performed by: INTERNAL MEDICINE

## 2019-08-20 PROCEDURE — 1101F PR PT FALLS ASSESS DOC 0-1 FALLS W/OUT INJ PAST YR: ICD-10-PCS | Mod: CPTII,S$GLB,, | Performed by: INTERNAL MEDICINE

## 2019-08-20 PROCEDURE — 36415 COLL VENOUS BLD VENIPUNCTURE: CPT

## 2019-08-20 PROCEDURE — 82306 VITAMIN D 25 HYDROXY: CPT

## 2019-08-20 PROCEDURE — 99214 PR OFFICE/OUTPT VISIT, EST, LEVL IV, 30-39 MIN: ICD-10-PCS | Mod: S$GLB,,, | Performed by: INTERNAL MEDICINE

## 2019-08-20 PROCEDURE — 99999 PR PBB SHADOW E&M-EST. PATIENT-LVL III: CPT | Mod: PBBFAC,,, | Performed by: INTERNAL MEDICINE

## 2019-08-20 RX ORDER — GABAPENTIN 300 MG/1
CAPSULE ORAL
COMMUNITY
Start: 2019-06-05 | End: 2023-10-25 | Stop reason: SDUPTHER

## 2019-08-20 NOTE — PROGRESS NOTES
Subjective:       Patient ID: Alia Andre is a 69 y.o. female.     Chief Complaint: follow up for breast cancer     Diagnosis:  Stage IIB (T2N0M0) IDC of the right breast, grade 3, ER positive 10-20%, CT negative, HER2 negative.   Retest mastectomy sample showed ER positive 20-30%, CT negative.       Oncologic History:  1. Ms. Andre is a 69 yo postmenopausal woman with HTN, T2DM, goiter who initially saw me on 10/24/18 for further evaluation of newly diagnosed right breast cancer. She self palpated a mass one month ago. Mammogram/US on 9/28/18 showed a 19 x 14 x 14 mm lesion in the right breast at 11 oclock. One LN in the right axilla with thickened cortex of 3 mm. a left breast 9 mm cyst at 10 oclock. Biopsy on 10/11/18 showed grade 3 invasive mammary carcinoma with medullary features of the right breast, ER +10-20% weak, CT negative, HER2 negative, Ki 67 40%. Biopsy of axillary LN negative for carcinoma. She presents to discuss further management. Currently feels well. Genetic study negative.   2. Right mastectomy and SLNB on 11/16/18 showed:  Tumor size: 23 mm in greatest dimension.  Histologic type: Invasive carcinoma of no special type (ductal, not otherwise specified).  Histologic grade:  Tubule formation: Score 3  Nuclear pleomorphism: Score 3  Mitotic rate: Score 3  Overall grade: Grade 3  Ductal carcinoma in Situ: Not identified  Margins: Invasive carcinoma margins: Uninvolved by invasive carcinoma, closest margin is deep margin, 2.2 cm  from tumor  Regional lymph nodes: Uninvolved by tumor cells  Number of lymph nodes examined: 1  Number of sentinel nodes examined: 1  Treatment effect: No known presurgical therapy  Ancillary studies: Immunohistochemical stains performed on previous biopsy [id number] show the following  results:  Estrogen receptor: Positive, weak staining in 10-20% of tumor cell nuclei  Progesterone receptor: Negative  HER2: Negative  Ki-67 proliferative index: 40%  Pathologic  Stage Classification (pTNM, AJCC 8th Edition)  Primary tumor: pT2: tumor more than 20 mm but less than or equal to 50 mm in greatest dimension.  Lymph node: sn N0: No regional lymph node metastasis.     Port placed at surgery.   3. Adjuvant TC started on 18, cycle 4 on 19  4. Retest Mastectomy sample showed   -Estrogen receptor: Positive, intermediate staining in approximately 20-30% of tumor cell nuclei.  -Progesterone receptor: Negative, 0% staining  5. Started adjuvant letrozole on 4/15/19.   6. Port removed 19.         Interval History:   Ms. Andre returns for follow up. Has been tolerating letrozole well with no significant side effects. Seen by Dr Echeverria in  for goiter and thyroid nodule. As per Dr Echeverria's note, ultrasound evaluation was recommended before deciding on thyroidectomy. There was also a possibility of doing thyroidectomy with port removal and right mastectomy.  Patient has had port removed. She had a right mastectomy in 2018. I am not aware of a plan of prophylactic left mastectomy. Patient told me Dr Echeverria is waiting for my clearance from the breast cancer perspective.   Patient also asked for medical marijuana.      ECO     ROS:   A ten-point system review is obtained and negative except for what was stated in the Interval History.      Physical Examination:   Vital signs reviewed.   General: well hydrated, well developed, in no acute distress. tearful  HEENT: normocephalic, PERRLA, EOMI, anicteric sclerae, oropharynx clear  Neck: supple, + thyromegaly from goiter, no JVD, cervical or supraclavicular lymphadenopathy  Lungs: clear breath sounds bilaterally, no wheezing, rales, or rhonchi  Heart: RRR, no M/R/G  Breast: s/p R mastectomy. No abnormal skin lesions or axillary lymphadenopathy. L: no masses, axillary LAD  Abdomen: soft, no tenderness, non-distended, no hepatosplenomegaly, mass, or hernia. BS present  Extremities: no clubbing,  cyanosis, or edema  Skin: no rash, ulcer, or open wounds  Neuro: alert and oriented x 4, no focal neuro deficit  Psych: pleasant and appropriate mood and affect     Objective:      Laboratory Data:  Last vit D in May was 40        Assessment and Plan:      1. Malignant neoplasm of upper-outer quadrant of right breast in female, estrogen receptor positive    2. Aromatase inhibitor use    3. Vitamin D deficiency    4. Postmenopausal bone loss    5. Goiter        1.2  - Ms. Andre is a 68 yo postmenopausal woman with stage IIB ([id number]) IDC of the right breast, ER positive (20-30%), MO neg, HER2 negative, grade 3, s/p R mastectomy. Completed adjuvant chemotherapy with TC. Port has been removed  - c/w adjuvant letrozole. recc adjuvant endocrine therapy with AI for 5-7 years.   - Discussed survivorship with patient, including smoking cessation, limiting alcohol intake, regular exercise, vit D and calcium, fresh fruits and vegetables. She is trying to quit smoking  - discussed with patient that I would not prescribe medical marijuana unless it is for patients with terminal stage of stage IV cancer. She is a breast cancer survival and does not have any evidence of recurrent cancer  - due for mammogram and visit with Dr Frederick in Sep/Oct. Will message Dr Frederick's office    3.  - c/w high dose vit D for a total of 3 months  - c/w calcium 0428-4611 mg a day  - asked patient to take OTC vit D 6528-3983 units daily after she finishes high dose vit D    4.  - will get bone density on return  - asked patient to see a dentist in the next 3 months and to get a clearance for Prolia. Handouts of prolia printed and provided to patient.      5.  - no contraindication to surgery from the breast cancer perspective. No evidence of breast cancer recurrence.

## 2019-08-20 NOTE — TELEPHONE ENCOUNTER
Left message for pt to call b ack and schedule her mmg and follow up appt with dr frederick anytime after 9/28/----- Message from Maryam Rubio RN sent at 8/20/2019  3:43 PM CDT -----  Call to schedule, mmg needs to be 366 days apart from last bilateral mmg.  Needs appt with [facility] same day.      Maryam    ----- Message -----  From: Alexander Garces MD  Sent: 8/20/2019   3:27 PM  To: Cristiane Frederick MD, [facility] Staff    Hi Cristiane,     Ms Andre is due for mammogram and follow up with you this fall. Thanks.     Alexander

## 2019-08-22 ENCOUNTER — TELEPHONE (OUTPATIENT)
Dept: SURGERY | Facility: CLINIC | Age: 69
End: 2019-08-22

## 2019-08-22 DIAGNOSIS — E04.1 THYROID NODULE: Primary | ICD-10-CM

## 2019-08-22 NOTE — TELEPHONE ENCOUNTER
Spoke with pt. She's scheduled to see Dr. Rodriguez in [facility] and Dr. Cameron TF.

## 2019-08-30 DIAGNOSIS — M79.604 PAIN IN BOTH LOWER EXTREMITIES: ICD-10-CM

## 2019-08-30 DIAGNOSIS — M79.605 PAIN IN BOTH LOWER EXTREMITIES: ICD-10-CM

## 2019-08-30 DIAGNOSIS — M17.0 PRIMARY OSTEOARTHRITIS OF BOTH KNEES: ICD-10-CM

## 2019-09-03 ENCOUNTER — TELEPHONE (OUTPATIENT)
Dept: SURGERY | Facility: CLINIC | Age: 69
End: 2019-09-03

## 2019-09-03 RX ORDER — DICLOFENAC SODIUM 10 MG/G
2 GEL TOPICAL DAILY PRN
Qty: 100 G | Refills: 0 | Status: SHIPPED | OUTPATIENT
Start: 2019-09-03 | End: 2023-07-20 | Stop reason: SDUPTHER

## 2019-09-12 ENCOUNTER — TELEPHONE (OUTPATIENT)
Dept: HEMATOLOGY/ONCOLOGY | Facility: CLINIC | Age: 69
End: 2019-09-12

## 2019-09-12 ENCOUNTER — TELEPHONE (OUTPATIENT)
Dept: INTERNAL MEDICINE | Facility: CLINIC | Age: 69
End: 2019-09-12

## 2019-09-12 NOTE — TELEPHONE ENCOUNTER
----- Message from Laurence Carrillo sent at 9/12/2019  8:45 AM CDT -----  Contact: WAYNE BYRD [[id number]]  Name of Who is Calling : WAYNE BYRD [[id number]]    What is the request in detail :     Patient is requesting a call from staff she would like medication called into pharmacy ( [facility] ) for a UTI   .....Please contact to further discuss and advise.    Can the clinic reply by [facility] :  Phone call only    What Number to Call Back : [phone]

## 2019-09-12 NOTE — TELEPHONE ENCOUNTER
Returned call and spoke with Ms Andre. Ms Andre states she's having sx of a bladder infection and she is calling to request a prescription from Dr Garces. Informed Ms Andre she will have to contact her PCP Dr Poe. Ms Andre states she contacted her office and she was told her Dr is out on maternity leave and she will have to see another provider. Ms Andre will call her PCP office again to schedule an appointment or she will go to urgent care.

## 2019-09-12 NOTE — TELEPHONE ENCOUNTER
Spoke to patient and she is unable  To come in today or tomorrow because she does not have transportation. I offered a appt slot to see jeff and she denied but will call back on tomorrow to be seen by whoever has a available time slot to fit her transportation needs.

## 2019-09-12 NOTE — TELEPHONE ENCOUNTER
----- Message from Donna Hale sent at 9/12/2019  8:38 AM CDT -----  Contact: [phone]  Patient is requesting a call from the office regarding her UTI.  Patient is asking if NP Martina Walker can call into the [facility] something for her to take.    Please advise, thank you.

## 2019-09-23 ENCOUNTER — OFFICE VISIT (OUTPATIENT)
Dept: SURGERY | Facility: CLINIC | Age: 69
End: 2019-09-23
Attending: SURGERY
Payer: MEDICARE

## 2019-09-23 ENCOUNTER — APPOINTMENT (OUTPATIENT)
Dept: RADIOLOGY | Facility: CLINIC | Age: 69
End: 2019-09-23
Attending: INTERNAL MEDICINE
Payer: MEDICARE

## 2019-09-23 VITALS
SYSTOLIC BLOOD PRESSURE: 163 MMHG | DIASTOLIC BLOOD PRESSURE: 77 MMHG | HEART RATE: 84 BPM | WEIGHT: 149.25 LBS | BODY MASS INDEX: 23.99 KG/M2 | HEIGHT: 66 IN

## 2019-09-23 DIAGNOSIS — E04.1 THYROID NODULE: ICD-10-CM

## 2019-09-23 DIAGNOSIS — E04.8 OTHER SPECIFIED NONTOXIC GOITER: ICD-10-CM

## 2019-09-23 PROCEDURE — 88173 CYTOLOGY SPECIMEN-FNA NON-RADIOLOGY CLINICIAN PERFORMED W/O ON SITE: ICD-10-PCS | Mod: 26,,, | Performed by: PATHOLOGY

## 2019-09-23 PROCEDURE — 1101F PR PT FALLS ASSESS DOC 0-1 FALLS W/OUT INJ PAST YR: ICD-10-PCS | Mod: CPTII,S$GLB,, | Performed by: SURGERY

## 2019-09-23 PROCEDURE — 3077F PR MOST RECENT SYSTOLIC BLOOD PRESSURE >= 140 MM HG: ICD-10-PCS | Mod: CPTII,S$GLB,, | Performed by: SURGERY

## 2019-09-23 PROCEDURE — 3078F DIAST BP <80 MM HG: CPT | Mod: CPTII,S$GLB,, | Performed by: SURGERY

## 2019-09-23 PROCEDURE — 88173 CYTOPATH EVAL FNA REPORT: CPT | Mod: 26,,, | Performed by: PATHOLOGY

## 2019-09-23 PROCEDURE — 1101F PT FALLS ASSESS-DOCD LE1/YR: CPT | Mod: CPTII,S$GLB,, | Performed by: SURGERY

## 2019-09-23 PROCEDURE — 99213 PR OFFICE/OUTPT VISIT, EST, LEVL III, 20-29 MIN: ICD-10-PCS | Mod: S$GLB,,, | Performed by: SURGERY

## 2019-09-23 PROCEDURE — 3077F SYST BP >= 140 MM HG: CPT | Mod: CPTII,S$GLB,, | Performed by: SURGERY

## 2019-09-23 PROCEDURE — 10005 FNA BX W/US GDN 1ST LES: CPT | Mod: S$GLB,,, | Performed by: INTERNAL MEDICINE

## 2019-09-23 PROCEDURE — 99213 OFFICE O/P EST LOW 20 MIN: CPT | Mod: S$GLB,,, | Performed by: SURGERY

## 2019-09-23 PROCEDURE — 88173 CYTOPATH EVAL FNA REPORT: CPT | Performed by: PATHOLOGY

## 2019-09-23 PROCEDURE — 3078F PR MOST RECENT DIASTOLIC BLOOD PRESSURE < 80 MM HG: ICD-10-PCS | Mod: CPTII,S$GLB,, | Performed by: SURGERY

## 2019-09-23 PROCEDURE — 10005 US FINE NEEDLE ASPIRATION BIOPSY, FIRST LESION: ICD-10-PCS | Mod: S$GLB,,, | Performed by: INTERNAL MEDICINE

## 2019-09-23 NOTE — PROGRESS NOTES
The patient is known to me and presents today for follow-up.  In review, patient has a history of a very large multinodular goiter with associated compressive symptoms.  She is here for a thyroid FNA of most suspicious nodule.  She notes stability in her symptoms though is very aware of her thyroid.    She is looking to have surgery in early 2020(assuming no issues with the FNA). Smokes 4-5 cigs/daily.  Will follow up once FNA is completed.    See note from June 2019 below:    The patient presents for follow-up of thyroid goiter.  She was initially seen in November 2018.  At that time she had a new diagnosis of right breast cancer and was preparing to undergo treatment.  She has since undergone right mastectomy with sentinel lymph node biopsy and completed adjuvant chemotherapy with TC.    She notes that her thyroid is now more prominent than previous.  She also has some very mild dysphagia.  No other new changes.      Thyroid ultrasound (11/07/2018):  FINDINGS:  The thyroid is markedly enlarged. There are multiple nodules throughout both the right and left lobes of the thyroid.    Right lobe of the thyroid measures 11.9 x 0 1.9 x 2.7 cm. Whole thyroid on the right is filled with nodules. Most of lthese solid nodules contain calcifications which have shadowing consistent with colloid and are therefore benign. However in the lower pole of the right lobe of the thyroid there is a solid nodule measuring 2.5 x 2.1 x 2.8 cm which have contains microcalcifications. Nodule has a TI rads category of TR 5 and should be biopsied.    Left lobe of the thyroid measures 9.8 x 3.2 x 4.3 cm. There are multiple nodules throughout of this left lobe is well. For all of these nodules contain calcifications consistent with colloid and therefore do not warrant biopsy.    .    Cervical lymph nodes demonstrate normal morphology and size. No pathologic lymphadenopathy is noted       Impression           Diffuse multinodular  thyroid    Single nodule in the lower pole of the right lobe of the thyroid has a TR 5 category and warrants biopsy       Will plan thyroid FNA of right thyroid nodule.  Will also obtain neck CT to determine extent of goiter. The patient is interesting in surgery related to symptoms and will follow-up once testing is completed.    See the original consult note below:    Attending attestation:  I have reviewed the Resident's history and physical, assessment and plan. I agree with the findings.  Any changes were made directly in the note below.     Graciela Echeverria MD  Endocrine Surgery           Consult Note  Endocrine Surgery    Visit Diagnosis: Goiter [E04.9]    SUBJECTIVE:     Alia Andre is a 68 y.o. female seen at the request of Dr. Cristiane Frederick today in the [facility] for evaluation of a goiter. Her history dates back to her early 30's when patient self-identified a thyroid mass and was subsequently treated for hypothyroidism. She states that she has been taking synthroid daily since that time. Over the last thirty years, her goiter has only slowly grown. She states that her last thyroid ultrasound was approximately three years ago, and was not told of any malignancy or abnormality at that time.  Alia Andre complains of difficulty with shortness of breath especially with postural changes, palpable neck mass and growth of thyroid nodule over time. The patient denies hot/cold intolerance, fatigue, unexplained weight changes, difficulty with swallowing and change in voice quality. She does not have a history of head and neck radiation therapy. She does have a family history of thyroid disease, but no thyroid cancers. She does have a family history of endocrinopathies. She is taking thyroid hormone supplementation. She has not undergone radioactive iodine treatment.    Of note, the patient was recently diagnosed with breast cancer (Malignant neoplasm of upper-outer quadrant of right  breast in female, estrogen receptor positive) and is in need of a port for adjuvant treatment.     Review of patient's allergies indicates:  No Known Allergies    Current Outpatient Medications   Medication Sig Dispense Refill    ACCU-CHEK USHA PLUS TEST STRP Strp       BD ALCOHOL SWABS PadM       levothyroxine (SYNTHROID) 75 MCG tablet Take 75 mcg by mouth before breakfast.       losartan (COZAAR) 50 MG tablet Take 50 mg by mouth once daily.       metFORMIN (GLUCOPHAGE) 500 MG tablet daily with breakfast.       nicotine (NICODERM CQ) 21 mg/24 hr APPLY 1 PATCH TRANSDERMALLY EVERY 24 HOURS  0    nicotine, polacrilex, (NICORETTE) 2 mg Gum CHEW 1 PIECE (2 MG) BY MOUTH 10 TIMES PER DAY AS NEEDED  0     No current facility-administered medications for this visit.        Past Medical History:   Diagnosis Date    Diabetes mellitus type 2 in nonobese     Goiter     Hypertension     Tobacco abuse      Past Surgical History:   Procedure Laterality Date    TUBAL LIGATION      1970/80's     Social History     Tobacco Use    Smoking status: Current Every Day Smoker     Packs/day: 1.00     Types: Cigarettes    Smokeless tobacco: Never Used   Substance Use Topics    Alcohol use: No     Frequency: Never    Drug use: No          Review of Systems:    Review of Systems   Constitutional: negative for chills, fatigue, fevers, malaise and night sweats  Eyes: negative for icterus and irritation  Respiratory: negative for cough and dyspnea on exertion  Cardiovascular: negative for chest pain and palpitations  Gastrointestinal: negative for constipation, diarrhea and dysphagia  Genitourinary:negative for dysuria, hematuria and nocturia  Integument/breast: negative for rash and skin color change  Hematologic/lymphatic: negative for bleeding and easy bruising  Neurological: negative for gait problems, headaches and seizures  Behavioral/Psych: negative for anxiety and depression  Endocrine: negative    ENT ROS:  "negative  Endocrine ROS:   negative for - hair pattern changes, malaise/lethargy, mood swings, palpitations or polydipsia/polyuria        OBJECTIVE:     Vital Signs:  BP (!) 154/73   Pulse 79   Temp 97.7 °F (36.5 °C)   Resp 18   Ht 5' 6" (1.676 m)   Wt 69.6 kg (153 lb 5.3 oz)   BMI 24.75 kg/m²    Body mass index is 24.75 kg/m².      Physical Exam:    General:  no distress, see vitals for BMI    Eyes:  conjunctivae/corneas clear   Neck: trachea midline and symmetric, no adenopathy    Thyroid:  thyroid enlarged and nontender. No palpable nodules.   Lung: clear to auscultation bilaterally   Heart:  regular rate and rhythm   Abdomen: soft, non-tender; bowel sounds normal; no masses,  no organomegaly   Skin/Extremities: warm and well-perfused   Pulses: 2+ and symmetric   Neuro: normal without focal findings and mental status, speech normal, alert and oriented x3       Laboratory/Radiologic Studies      Thyroid Uptake and scan(5/11/2015):        Component Value Date    Sodium 139 11/05/2018    Potassium 4.1 11/05/2018    Chloride 105 11/05/2018    CO2 25 11/05/2018    Glucose 88 11/05/2018    BUN, Bld 14 11/05/2018    Creatinine 0.7 11/05/2018    Calcium 10.2 11/05/2018    Anion Gap 9 11/05/2018    eGFR if African American >60.0 11/05/2018    eGFR if non African American >60.0 11/05/2018       ASSESSMENT/PLAN:       Assessment    Ms. Andre is a 68 y.o. female who presents with evidence of Thyroid goiter and evaluation for port placement for Right-sided breast cancer.    Plan    During this visit, thyroid anatomy and physiology were reviewed and she was given a copy of our patient education booklet, Understanding Thyroid Disease. Prior to her surgery, the patient will need to undergo ultrasound evaluation of the thyroid. We will also request records of her last ultrasound (approximately three years ago) and her most recent thyroid lab work completed by her PCP.   While the results of this thyroid imaging and lab " work will not interfere with her port placement and right mastectomy, we feel that she would benefit from an updated evaluation. We will review findings and determine if concurrent surgery with thyroidectomy is possible and beneficial.  Patient's questions were answered and she wishes to proceed with the port placement and with the thyroid evaluation.

## 2019-09-27 ENCOUNTER — TELEPHONE (OUTPATIENT)
Dept: SURGERY | Facility: CLINIC | Age: 69
End: 2019-09-27

## 2019-09-27 NOTE — TELEPHONE ENCOUNTER
Called to review pathology with patient(see below):    FINAL PATHOLOGIC DIAGNOSIS  THYROID, RIGHT, FINE NEEDLE ASPIRATION:  [facility] Thyroid Cytology Category: Benign.  Benign follicular cells, thin watery colloid, macrophages, blood, and acute and chronic inflammatory cells,  suggestive of hyperplastic/adenomatoid nodule.  No cytologic features of papillary thyroid carcinoma.    Based on these findings, patient is ok to wait until next year to proceed with total thyroidectomy.  She will contact office(and we will see her back in clinic) when she is ready to schedule.  Will need update CT neck to determine extent of thyroid.    Patient voiced her understanding.

## 2019-10-16 ENCOUNTER — HOSPITAL ENCOUNTER (OUTPATIENT)
Dept: RADIOLOGY | Facility: HOSPITAL | Age: 69
Discharge: HOME | End: 2019-10-16
Attending: SURGERY
Payer: MEDICARE

## 2019-10-16 VITALS — BODY MASS INDEX: 23.99 KG/M2 | WEIGHT: 149.25 LBS | HEIGHT: 66 IN

## 2019-10-16 DIAGNOSIS — Z85.3 PERSONAL HISTORY OF BREAST CANCER: ICD-10-CM

## 2019-10-16 DIAGNOSIS — Z12.31 ENCOUNTER FOR SCREENING MAMMOGRAM FOR BREAST CANCER: ICD-10-CM

## 2019-10-16 PROCEDURE — 77063 MAMMO DIGITAL SCREENING LEFT WITH TOMOSYNTHESIS_CAD: ICD-10-PCS | Mod: 26,,, | Performed by: RADIOLOGY

## 2019-10-16 PROCEDURE — 77067 SCR MAMMO BI INCL CAD: CPT | Mod: 26,,, | Performed by: RADIOLOGY

## 2019-10-16 PROCEDURE — 77067 MAMMO DIGITAL SCREENING LEFT WITH TOMOSYNTHESIS_CAD: ICD-10-PCS | Mod: 26,,, | Performed by: RADIOLOGY

## 2019-10-16 PROCEDURE — 77063 BREAST TOMOSYNTHESIS BI: CPT | Mod: 26,,, | Performed by: RADIOLOGY

## 2019-10-16 PROCEDURE — 77063 BREAST TOMOSYNTHESIS BI: CPT | Mod: TC

## 2019-11-18 DIAGNOSIS — Z12.11 COLON CANCER SCREENING: ICD-10-CM

## 2019-11-27 ENCOUNTER — IMMUNIZATION (OUTPATIENT)
Dept: PHARMACY | Facility: CLINIC | Age: 69
End: 2019-11-27
Payer: MEDICARE

## 2019-12-06 DIAGNOSIS — E11.9 TYPE 2 DIABETES MELLITUS WITHOUT COMPLICATION: ICD-10-CM

## 2020-01-03 ENCOUNTER — TELEPHONE (OUTPATIENT)
Dept: HEMATOLOGY/ONCOLOGY | Facility: CLINIC | Age: 70
End: 2020-01-03

## 2020-01-03 NOTE — TELEPHONE ENCOUNTER
Returned call and spoke with Ms Andre. Ms Andre calling to reschedule today's appointment with Dr Garces. Ms Andre unable to come in due to the weather. Her appointment has been rescheduled to Jan 16 th. Appointment information mailed to patient's home.

## 2020-01-03 NOTE — TELEPHONE ENCOUNTER
----- Message from Myranda Marcano sent at 1/3/2020  9:16 AM CST -----  Contact: WAYNE BYRD [[id number]]  Name of Who is Calling: WAYNE BYRD [[id number]]      What is the request in detail: Pt is calling to speak to staff in regards to rescheduling her appointment.... Please call to further assist .       Can the clinic reply by [facility]: N       What Number to Call Back [facility]: [phone]

## 2020-01-08 ENCOUNTER — HOSPITAL ENCOUNTER (OUTPATIENT)
Dept: RADIOLOGY | Facility: CLINIC | Age: 70
Discharge: HOME | End: 2020-01-08
Attending: INTERNAL MEDICINE
Payer: MEDICARE

## 2020-01-08 DIAGNOSIS — M81.0 POSTMENOPAUSAL BONE LOSS: ICD-10-CM

## 2020-01-08 DIAGNOSIS — Z17.0 MALIGNANT NEOPLASM OF UPPER-OUTER QUADRANT OF RIGHT BREAST IN FEMALE, ESTROGEN RECEPTOR POSITIVE: ICD-10-CM

## 2020-01-08 DIAGNOSIS — C50.411 MALIGNANT NEOPLASM OF UPPER-OUTER QUADRANT OF RIGHT BREAST IN FEMALE, ESTROGEN RECEPTOR POSITIVE: ICD-10-CM

## 2020-01-08 PROCEDURE — 77080 DXA BONE DENSITY AXIAL: CPT | Mod: 26,,, | Performed by: INTERNAL MEDICINE

## 2020-01-08 PROCEDURE — 77080 DEXA BONE DENSITY SPINE HIP: ICD-10-PCS | Mod: 26,,, | Performed by: INTERNAL MEDICINE

## 2020-01-08 PROCEDURE — 77080 DXA BONE DENSITY AXIAL: CPT | Mod: TC

## 2020-01-16 ENCOUNTER — OFFICE VISIT (OUTPATIENT)
Dept: HEMATOLOGY/ONCOLOGY | Facility: CLINIC | Age: 70
End: 2020-01-16
Payer: MEDICARE

## 2020-01-16 VITALS
RESPIRATION RATE: 12 BRPM | SYSTOLIC BLOOD PRESSURE: 140 MMHG | OXYGEN SATURATION: 99 % | HEART RATE: 81 BPM | WEIGHT: 150.81 LBS | BODY MASS INDEX: 24.24 KG/M2 | HEIGHT: 66 IN | DIASTOLIC BLOOD PRESSURE: 67 MMHG | TEMPERATURE: 98 F

## 2020-01-16 DIAGNOSIS — Z17.0 MALIGNANT NEOPLASM OF UPPER-OUTER QUADRANT OF RIGHT BREAST IN FEMALE, ESTROGEN RECEPTOR POSITIVE: Primary | ICD-10-CM

## 2020-01-16 DIAGNOSIS — C50.411 MALIGNANT NEOPLASM OF UPPER-OUTER QUADRANT OF RIGHT BREAST IN FEMALE, ESTROGEN RECEPTOR POSITIVE: Primary | ICD-10-CM

## 2020-01-16 DIAGNOSIS — Z79.811 AROMATASE INHIBITOR USE: ICD-10-CM

## 2020-01-16 PROCEDURE — 99999 PR PBB SHADOW E&M-EST. PATIENT-LVL III: CPT | Mod: PBBFAC,,, | Performed by: INTERNAL MEDICINE

## 2020-01-16 PROCEDURE — 3078F PR MOST RECENT DIASTOLIC BLOOD PRESSURE < 80 MM HG: ICD-10-PCS | Mod: CPTII,S$GLB,, | Performed by: INTERNAL MEDICINE

## 2020-01-16 PROCEDURE — 1159F PR MEDICATION LIST DOCUMENTED IN MEDICAL RECORD: ICD-10-PCS | Mod: S$GLB,,, | Performed by: INTERNAL MEDICINE

## 2020-01-16 PROCEDURE — 1101F PT FALLS ASSESS-DOCD LE1/YR: CPT | Mod: CPTII,S$GLB,, | Performed by: INTERNAL MEDICINE

## 2020-01-16 PROCEDURE — 99214 OFFICE O/P EST MOD 30 MIN: CPT | Mod: S$GLB,,, | Performed by: INTERNAL MEDICINE

## 2020-01-16 PROCEDURE — 3078F DIAST BP <80 MM HG: CPT | Mod: CPTII,S$GLB,, | Performed by: INTERNAL MEDICINE

## 2020-01-16 PROCEDURE — 1159F MED LIST DOCD IN RCRD: CPT | Mod: S$GLB,,, | Performed by: INTERNAL MEDICINE

## 2020-01-16 PROCEDURE — 3077F PR MOST RECENT SYSTOLIC BLOOD PRESSURE >= 140 MM HG: ICD-10-PCS | Mod: CPTII,S$GLB,, | Performed by: INTERNAL MEDICINE

## 2020-01-16 PROCEDURE — 1126F AMNT PAIN NOTED NONE PRSNT: CPT | Mod: S$GLB,,, | Performed by: INTERNAL MEDICINE

## 2020-01-16 PROCEDURE — 1101F PR PT FALLS ASSESS DOC 0-1 FALLS W/OUT INJ PAST YR: ICD-10-PCS | Mod: CPTII,S$GLB,, | Performed by: INTERNAL MEDICINE

## 2020-01-16 PROCEDURE — 99999 PR PBB SHADOW E&M-EST. PATIENT-LVL III: ICD-10-PCS | Mod: PBBFAC,,, | Performed by: INTERNAL MEDICINE

## 2020-01-16 PROCEDURE — 99214 PR OFFICE/OUTPT VISIT, EST, LEVL IV, 30-39 MIN: ICD-10-PCS | Mod: S$GLB,,, | Performed by: INTERNAL MEDICINE

## 2020-01-16 PROCEDURE — 1126F PR PAIN SEVERITY QUANTIFIED, NO PAIN PRESENT: ICD-10-PCS | Mod: S$GLB,,, | Performed by: INTERNAL MEDICINE

## 2020-01-16 PROCEDURE — 3077F SYST BP >= 140 MM HG: CPT | Mod: CPTII,S$GLB,, | Performed by: INTERNAL MEDICINE

## 2020-01-16 RX ORDER — LETROZOLE 2.5 MG/1
2.5 TABLET, FILM COATED ORAL DAILY
Qty: 90 TABLET | Refills: 3 | Status: SHIPPED | OUTPATIENT
Start: 2020-01-16 | End: 2020-11-16

## 2020-01-16 NOTE — PROGRESS NOTES
Subjective:       Patient ID: Alia Andre is a 69 y.o. female.     Chief Complaint: follow up for breast cancer     Diagnosis:  Stage IIB (T2N0M0) IDC of the right breast, grade 3, ER positive 10-20%, KY negative, HER2 negative.   Retest mastectomy sample showed ER positive 20-30%, KY negative.       Oncologic History:  1. Ms. Andre is a 67 yo postmenopausal woman with HTN, T2DM, goiter who initially saw me on 10/24/18 for further evaluation of newly diagnosed right breast cancer. She self palpated a mass one month ago. Mammogram/US on 9/28/18 showed a 19 x 14 x 14 mm lesion in the right breast at 11 oclock. One LN in the right axilla with thickened cortex of 3 mm. a left breast 9 mm cyst at 10 oclock. Biopsy on 10/11/18 showed grade 3 invasive mammary carcinoma with medullary features of the right breast, ER +10-20% weak, KY negative, HER2 negative, Ki 67 40%. Biopsy of axillary LN negative for carcinoma. She presents to discuss further management. Currently feels well. Genetic study negative.   2. Right mastectomy and SLNB on 11/16/18 showed:  Tumor size: 23 mm in greatest dimension.  Histologic type: Invasive carcinoma of no special type (ductal, not otherwise specified).  Histologic grade:  Tubule formation: Score 3  Nuclear pleomorphism: Score 3  Mitotic rate: Score 3  Overall grade: Grade 3  Ductal carcinoma in Situ: Not identified  Margins: Invasive carcinoma margins: Uninvolved by invasive carcinoma, closest margin is deep margin, 2.2 cm  from tumor  Regional lymph nodes: Uninvolved by tumor cells  Number of lymph nodes examined: 1  Number of sentinel nodes examined: 1  Treatment effect: No known presurgical therapy  Ancillary studies: Immunohistochemical stains performed on previous biopsy [id number] show the following  results:  Estrogen receptor: Positive, weak staining in 10-20% of tumor cell nuclei  Progesterone receptor: Negative  HER2: Negative  Ki-67 proliferative index: 40%  Pathologic  Stage Classification (pTNM, AJCC 8th Edition)  Primary tumor: pT2: tumor more than 20 mm but less than or equal to 50 mm in greatest dimension.  Lymph node: sn N0: No regional lymph node metastasis.     Port placed at surgery.   3. Adjuvant TC started on 18, cycle 4 on 19  4. Retest Mastectomy sample showed   -Estrogen receptor: Positive, intermediate staining in approximately 20-30% of tumor cell nuclei.  -Progesterone receptor: Negative, 0% staining  5. Started adjuvant letrozole on 4/15/19.   6. Port removed 19.   7. Mammogram 10/16/2019 normal     Interval History:   Ms. Andre returns for follow up. Has been tolerating letrozole well with no significant side effects.      ECO     ROS:   A ten-point system review is obtained and negative except for what was stated in the Interval History.      Physical Examination:   Vital signs reviewed.   General: well hydrated, well developed, in no acute distress. tearful  HEENT: normocephalic, PERRLA, EOMI, anicteric sclerae, oropharynx clear  Neck: supple, + thyromegaly from goiter, no JVD, cervical or supraclavicular lymphadenopathy  Lungs: clear breath sounds bilaterally, no wheezing, rales, or rhonchi  Heart: RRR, no M/R/G  Breast: s/p R mastectomy. No abnormal skin lesions or axillary lymphadenopathy. L: no masses, axillary LAD  Abdomen: soft, no tenderness, non-distended, no hepatosplenomegaly, mass, or hernia. BS present  Extremities: no clubbing, cyanosis, or edema  Skin: no rash, ulcer, or open wounds  Neuro: alert and oriented x 4, no focal neuro deficit  Psych: pleasant and appropriate mood and affect     Objective:      Laboratory Data:  Last vit D in 2019 was normal    Imaging Data:  Mammogram 10/16/2019 normal    Bone density 2020 result pending        Assessment and Plan:      1. Malignant neoplasm of upper-outer quadrant of right breast in female, estrogen receptor positive    2. Aromatase inhibitor use        1.2  - Ms.  Thai is a 70 yo postmenopausal woman with stage IIB ([id number]) IDC of the right breast, ER positive (20-30%), VT neg, HER2 negative, grade 3, s/p R mastectomy. Completed adjuvant chemotherapy with TC. Port has been removed  - c/w adjuvant letrozole. recc adjuvant endocrine therapy with AI for 5 years  - last mammogram in Oct 2019 normal. Next due in Oct 2020  - f/u on bone density result. Previously discussed prolia and need for dental clearance with patient should bone density shows weakened bones. Patient has not seen a dentist yet. Will call patient re the result.   - c/w OTC vit D and calcium  - RTC in 3 months

## 2020-01-23 DIAGNOSIS — M89.8X9 CANCER TREATMENT INDUCED BONE LOSS: ICD-10-CM

## 2020-01-23 DIAGNOSIS — M85.80 OSTEOPENIA, UNSPECIFIED LOCATION: ICD-10-CM

## 2020-01-23 DIAGNOSIS — T45.1X5A CANCER TREATMENT INDUCED BONE LOSS: ICD-10-CM

## 2020-01-23 DIAGNOSIS — C50.411 MALIGNANT NEOPLASM OF UPPER-OUTER QUADRANT OF RIGHT BREAST IN FEMALE, ESTROGEN RECEPTOR POSITIVE: Primary | ICD-10-CM

## 2020-01-23 DIAGNOSIS — Z17.0 MALIGNANT NEOPLASM OF UPPER-OUTER QUADRANT OF RIGHT BREAST IN FEMALE, ESTROGEN RECEPTOR POSITIVE: Primary | ICD-10-CM

## 2020-01-23 NOTE — PROGRESS NOTES
Called and spoke with Ms Andre re bone density result. +osteopenia. She is taking letrozole and will be on it for 5 years. I recommend prolia every 6 months to prevent further deterioration of bone density, especially given that she is on letrozole. She should c/w daily vit D and calcium. She is taking calcium 1200 mg and vit D 1000 units daily. Also taking high dose weekly vit D. Discussed side effects of prolia. Asked her to go to her dentist for a clearance. She agrees. She thinks she has a tooth she would like to remove. Discussed with her if dentist removes the tooth, we will wait for 3 months after her last dental work before proceeding with prolia. Gave her our fax number and asked her to ask the dentist to fax us his plan.  She understands and agrees with the plan. Will order prolia and check with insurance company

## 2020-01-29 NOTE — PROGRESS NOTES
AUDIOLOGY REPORT      Lilian Higginbotham is a 63year old female     Referring Provider: Jennifer Quiles   YOB: 1955  Medical Record: [id number]      Patient Hearing History:   Patient here for follow up audiogram.   Last testing completed on 1-6- [facility]  Physical Therapy RE- Evaluation    Name: Alia Andre  Clinic Number: [id number]    Therapy Diagnosis: No diagnosis found.  Physician: Cristiane Frederick MD    Physician Orders: PT Eval and Treat   Medical Diagnosis: right breast cancer  Evaluation Date: 12/5/2018  Authorization Period Expiration: 12/31/18  Plan of Care Certification Period: ***  Visit # / Visits authorized: 2/ 20  Insurance:     Time In: ***  Time Out: ***  Total Billable Time: *** minutes    Precautions: Standard and cancer      History   History of Present Illness: Alia is a 68 y.o. female that presents to  [facility] at the [facility] s/p right breast surgery.   Chief complaint: ***  Surgical History: 11/16/18 for right breast mastectomy with SLNB and insertion of Port-A-Cath left  Alia Andre  has a past surgical history that includes Tubal ligation; MASTECTOMY RIGHT 2.5 HR CASE (Right, 11/16/2018); BIOPSY, LYMPH NODE, SENTINEL RIGHT (Right, 11/16/2018); and [id number]-CATH LEFT (Left, 11/16/2018).    Chemotherapy: pending  Radiation: ***    Past Medical History:   Diagnosis Date    Cancer     breast    Diabetes mellitus type 2 in nonobese     Goiter     Hypertension     Tobacco abuse           Medications:  Alia has a current medication list which includes the following prescription(s): accu-chek carmela plus test strp, acetaminophen, aspirin, bd alcohol swabs, levothyroxine, losartan, metformin, nicotine, nicotine (polacrilex), and oxycodone-acetaminophen.    Allergies:  Review of patient's allergies indicates:  No Known Allergies     Prior Therapy: None  Social History:  lives alone  Occupation: Caregiver  Prior Level of Function: Independent with all ADL's  Current Level of Function: ***    Other Past Medical History: None    Patient's Goals: ***    Hand dominance: right handed    Subjective   Pt states:   Pain: {PAIN 0-10:15266}/10 on VAS  "currently.   Best: {PAIN 0-10:93576}/10  Worst: {PAIN 0-10:65671}/10   Pain location: {Right/left:26833} ***   Objective   Mental status :oriented    Postural examination/scapula alignment: {AMB OT NEURO POSTURAL EXAM:28698}    Skin Integrity:   Scar Location: ***  Appearance: {Exam; wound:77409}  Signs of infection: {YES:76611}  Drainage:{Desc; discharge:13662}  Color:***    Edema: {AMB OT NEURO EDEMA:17557}  Location: ***    Axillary Web Syndrome/Cording:   Location: ***  Degree of Cording: *** (mild, moderate etc...)   Number of cords present: ***    Sensation: {AMB PT PELVIC FLOOR SENSATION:75657} ***        Range of Motion:      Shoulder Range of Motion:   Active /Passive ROM Right Left   Flexion *** ***   Abduction *** ***   Extension *** ***   IR/90deg *** ***   ER/90deg *** ***   IR sitting *** ***   ER sitting  *** ***          Strength: manual muscle test grades below   Upper Extremity Strength   (R) UE (L) UE   Shoulder flexion: {AMB OT SHOULDER STRENGTH:76195} {AMB OT SHOULDER STRENGTH:20321}   Shoulder Abduction: {AMB OT SHOULDER STRENGTH:66719} {AMB OT SHOULDER STRENGTH:55343}   Shoulder IR {AMB OT SHOULDER STRENGTH:27226} {AMB OT SHOULDER STRENGTH:47070}   Shoulder ER {AMB OT SHOULDER STRENGTH:92359} {AMB OT SHOULDER STRENGTH:51990}   Elbow flexion: {AMB OT SHOULDER STRENGTH:35466} {AMB OT SHOULDER STRENGTH:31974}   Elbow extension: {AMB OT SHOULDER STRENGTH:67732} {AMB OT SHOULDER STRENGTH:99436}   Wrist flexion: {AMB OT SHOULDER STRENGTH:01043} {AMB OT SHOULDER STRENGTH:62767}   Wrist extension: {AMB OT SHOULDER STRENGTH:54399} {AMB OT SHOULDER STRENGTH:09276}    {AMB OT SHOULDER STRENGTH:23055} {AMB OT SHOULDER STRENGTH:21759}       Baseline Measurements of BL UE's for early detection of Lymphedema: 11/7/18     LANDMARK RIGHT UE LEFT UE DIFFERENCE   E + 8" 36 cm 35 cm 1 cm   E + 6" 32 cm 31 cm 1 cm   E + 4" 28.5 cm 28 cm 0.5 cm   E + 2" 26 cm 26 cm 0 cm   Elbow 25 cm 25 cm 0 cm   W+ 8" 25 cm " "24.5cm 0.5 cm   W +  6" 23 cm 22 cm 1 cm   W + 4" 20 cm 19 cm 1 cm   Wrist 17 cm 17 cm 0 cm   DPC 21 cm 21 cm 0 cm   IP Thumb 8 cm 7.5 cm 0.5 cm          Functional Mobility (Bed mobility, transfers)  ***    ADL's:  ***    Gait Assessment:   - AD used: none  - Assistance: independent  - Distance: community distances     Patient Education Provided   - role of therapy in multi - disciplinary team, goals for therapy  ***  - Pt was educated in lymphedema etiology and management plans.    - Pt was provided with written risk reductions and precautions for managing lymphedema.     Pt has no cultural, educational or language barriers to learning provided.  Treatment and Instruction of Home Exercise Program    Time In:  Time Out:     Alia received individual therapeutic exercises to improve postural correction and alignment, stretching and soft tissue mobility, and strengthening for *** minutes including the following:   ***    Alia received the following manual therapy techniques were performed to increased myofascial/soft tissue length, mobility and pliability, increase PROM, AROM and function as well as to decrease pain for *** minutes        Written Home Exercises Provided and Patient Education: Handouts given   See EMR under patient instructions for program given  Pt demonstrated good understanding of the education provided. Patient demo good return demo of skill of exercises.    Functional Limitations Reporting      CMS Impairment/Limitation/Restriction for FOTO *** Survey    Therapist reviewed FOTO scores for Alia Andre on 12/5/2018.   FOTO documents entered into [employer] - see Media section.    Limitations Score: ***%  Category: {Blank single:72147::"Other","Self Care","Body Position","Carrying","Mobility"}    Current : {G Codes:24378}  Goal: {G Codes:41156}  Discharge: {G Codes:71830}         Assessment   This is a 68 y.o. female referred to outpatient physical therapy and presents with a medical diagnosis " of *** breast cancer and was seen today post-operatively to establish PT plan of care for impairments following surgery including: ***.     Pt instructed in HEP this session and was able to perform all exercises given independently. Pt instructed to follow up with therapist if any concerns arise with program established. Pt will continue to benefit from skilled physical therapy to address the impairments stated in chart below, provide patient/family education and to maximize pt's level of independence in home and community environment     Anticipated barriers to physical therapy: ***     Pt's spiritual, cultural and educational needs considered and pt agreeable to plan of care and goals as stated below:     Medical necessity is demonstrated by the following IMPAIRMENTS/PROMBLEM LIST:  History  Co-morbidities and personal factors that may impact the plan of care Examination  Body Structures and Functions, activity limitations and participation restrictions that may impact the plan of care    Clinical Presentation   Co-morbidities:   Breast cancer  S/p ***  {Co-morbidities:42569}        Personal Factors:   {Personal Factors:65920} Body Regions:   {Body Regions:97618}    Body Systems:   {Body Systems:48564}        Participation Restrictions:   ***     Activity limitations:   Mobility  {Mobility:64506}    Self care  {Self Care:31400}    Domestic Life  {Domestic Life:45311}    Interactions/Relationships  {Interactions/Relationships:30359}    Life Areas  {Life Areas:72373}    Community and Social Life  {Community/Social Life:16818}         {Clinical Presentation :93614}                      {Desc; low/moderate/high:999089}   {Desc; low/moderate/high:137977}  {Desc; low/moderate/high:788182} Decision Making/ Complexity Score:  {Desc; low/moderate/high:298540}       Goals: Pt agrees with goals set    Short Term goals: *** weeks  1. Patient will demonstrate 100% understanding of lymphedema risk reduction practices to include  self monitoring for lymphedema. (progressing, not met)  2. Patient will demonstrate independence with Home Exercise program established. (progressing, not met)  3. Pt will increase AROM/PROM in shoulder abduction ROM to *** degrees {RIGHT/LEFT:87969} to improve functional reach, carry, push, pull pain free. (progressing, not met)  4. Pt will increase AROM/PROM in shoulder flexion to *** degrees {RIGHT/LEFT:07116} to improve functional reach, carry, push, pull pain free.(progressing, not met)  5. Strength will be assessed and appropriate goals set. (progressing, not met)    Long Term Goals: *** weeks   1.  Pt will increase AROM/PROM in shoulder flexion to *** degrees {RIGHT/LEFT:03827} to improve functional reach, carry, push, pull pain free. (progressing, not met)  2. Pt will increase strength to 4+/5 in gross UE musculature to improve tolerance to all functional activities pain free. (progressing, not met)  3. Pt will demonstrate full/maximized tissue mobility to increase ROM and promote healthy tissue to be pain free at discharge. (progressing, not met)  4. Pt will report decrease in overall worst pain to 2/10 at discharge. (progressing, not met)  5. Pt will increase AROM/PROM in shoulder abduction ROM to *** degrees {RIGHT/LEFT:78032} to improve functional reach, carry, push, pull pain free. (progressing, not met)  6. Patient will report compliance with walking program 5x week for 10 min each day to improve overall cardiovascular function and decrease cancer related fatigue at discharge. (progressing, not met)      Plan   Outpatient physical therapy {NUMBERS; 0-3:09327}x week for {NUMBERS:25246} weeks to include the following:   {TX PLAN:62203}.    Plan of care Certification Period: 12/5/2018 to ***.    Therapist: Karely Pittman, PT  12/5/2018

## 2020-02-19 ENCOUNTER — TELEPHONE (OUTPATIENT)
Dept: SURGERY | Facility: CLINIC | Age: 70
End: 2020-02-19

## 2020-02-19 NOTE — TELEPHONE ENCOUNTER
Unable to leave message for pt susana aguilera back due to mailbox being full----- Message from Melonie Mora MA sent at 2/14/2020 11:54 AM CST -----  Contact: Pt  She is due anytime. Please call and schedule.    Thanks,  Court  ----- Message -----  From: Reyna Sifuentes  Sent: 2/14/2020  11:47 AM CST  To: [facility] Staff    Reason: Pt calling to see when will her next f/u will be with     Communication: [phone]

## 2020-04-15 NOTE — PROGRESS NOTES
Subjective:       Patient ID: Alia Andre is a 69 y.o. female.    Chief Complaint: No chief complaint on file.    HPI 69-year-old female who returns to clinic for follow-up of T2 N0, ER positive carcinoma of the right breast.  She is currently on letrozole endocrine therapy.    The patient location is: home.  The chief complaint leading to consultation is: breast cancer.    Visit type: audio only  Total time spent with patient: 10 minutes  Each patient to whom he or she provides medical services by telemedicine is:  (1) informed of the relationship between the physician and patient and the respective role of any other health care provider with respect to management of the patient; and (2) notified that he or she may decline to receive medical services by telemedicine and may withdraw from such care at any time.    Notes:  Today she reports that she has been doing well.  She has no cough or shortness of breath.  Other than some mild arthralgias she is having no significant pain.  Appetite and bowel function have both been good.  She has not been able to get her dental examination prior to starting Prolia.      Cancer history:  Initially seen on 10/24/18 for further evaluation of newly diagnosed right breast cancer. She self palpated a mass one month prior to presentation.   Mammogram/US on 9/28/18 showed a 19 x 14 x 14 mm lesion in the right breast at 11 oclock. One LN in the right axilla with thickened cortex of 3 mm. a left breast 9 mm cyst at 10 oclock.     Biopsy on 10/11/18 showed grade 3 invasive mammary carcinoma with medullary features of the right breast, ER +10-20% weak, OK negative, HER2 negative, Ki 67 40%. Biopsy of axillary LN negative for carcinoma.   Genetic study negative.     Right mastectomy and SLNB on 11/16/18 showed:  A 2.3 cm high-grade invasive carcinoma (3+ 3+3).  Margins were negative.  The sentinel lymph node was negative.      Adjuvant TC started on 12/26/18, cycle 4 on  2/26/19  Retest receptors on the mastectomy specimen showed ER positive at 20 to 30% and ME negative.    Started adjuvant letrozole on 4/15/19.     Her last Mammogram on 10/16/2019 was normal  Review of Systems   Constitutional: Negative.    HENT: Negative.    Cardiovascular: Negative.    Gastrointestinal: Negative.    Musculoskeletal: Positive for arthralgias.   Neurological: Negative.    Psychiatric/Behavioral: Negative.        Objective:      Physical Exam   Constitutional: She is oriented to person, place, and time.   Neurological: She is alert and oriented to person, place, and time.   Psychiatric: She has a normal mood and affect. Her behavior is normal. Thought content normal.       Assessment:       1. Malignant neoplasm of lower-outer quadrant of right breast of female, estrogen receptor positive        Plan:       She will continue letrozole.  Return in 3 months with metabolic profile to start Prolia .    She needs her blood pressure other medications from her PCP.

## 2020-04-16 ENCOUNTER — OFFICE VISIT (OUTPATIENT)
Dept: HEMATOLOGY/ONCOLOGY | Facility: CLINIC | Age: 70
End: 2020-04-16
Attending: INTERNAL MEDICINE
Payer: MEDICARE

## 2020-04-16 DIAGNOSIS — E78.5 HYPERLIPIDEMIA, UNSPECIFIED HYPERLIPIDEMIA TYPE: ICD-10-CM

## 2020-04-16 DIAGNOSIS — Z17.0 MALIGNANT NEOPLASM OF LOWER-OUTER QUADRANT OF RIGHT BREAST OF FEMALE, ESTROGEN RECEPTOR POSITIVE: Primary | ICD-10-CM

## 2020-04-16 DIAGNOSIS — C50.511 MALIGNANT NEOPLASM OF LOWER-OUTER QUADRANT OF RIGHT BREAST OF FEMALE, ESTROGEN RECEPTOR POSITIVE: Primary | ICD-10-CM

## 2020-04-16 PROCEDURE — 1159F MED LIST DOCD IN RCRD: CPT | Mod: HCNC,95,, | Performed by: INTERNAL MEDICINE

## 2020-04-16 PROCEDURE — 99212 OFFICE O/P EST SF 10 MIN: CPT | Mod: HCNC,95,, | Performed by: INTERNAL MEDICINE

## 2020-04-16 PROCEDURE — 1101F PR PT FALLS ASSESS DOC 0-1 FALLS W/OUT INJ PAST YR: ICD-10-PCS | Mod: HCNC,CPTII,95, | Performed by: INTERNAL MEDICINE

## 2020-04-16 PROCEDURE — 1159F PR MEDICATION LIST DOCUMENTED IN MEDICAL RECORD: ICD-10-PCS | Mod: HCNC,95,, | Performed by: INTERNAL MEDICINE

## 2020-04-16 PROCEDURE — 99212 PR OFFICE/OUTPT VISIT, EST, LEVL II, 10-19 MIN: ICD-10-PCS | Mod: HCNC,95,, | Performed by: INTERNAL MEDICINE

## 2020-04-16 PROCEDURE — 1101F PT FALLS ASSESS-DOCD LE1/YR: CPT | Mod: HCNC,CPTII,95, | Performed by: INTERNAL MEDICINE

## 2020-04-17 RX ORDER — PRAVASTATIN SODIUM 10 MG/1
TABLET ORAL
Qty: 90 TABLET | Refills: 0 | Status: SHIPPED | OUTPATIENT
Start: 2020-04-17 | End: 2020-06-11

## 2020-04-20 ENCOUNTER — TELEPHONE (OUTPATIENT)
Dept: INTERNAL MEDICINE | Facility: CLINIC | Age: 70
End: 2020-04-20

## 2020-04-20 DIAGNOSIS — E55.9 VITAMIN D DEFICIENCY: ICD-10-CM

## 2020-04-20 DIAGNOSIS — Z13.220 ENCOUNTER FOR LIPID SCREENING FOR CARDIOVASCULAR DISEASE: ICD-10-CM

## 2020-04-20 DIAGNOSIS — E05.90 SUBCLINICAL HYPERTHYROIDISM: ICD-10-CM

## 2020-04-20 DIAGNOSIS — Z17.0 MALIGNANT NEOPLASM OF LOWER-OUTER QUADRANT OF RIGHT BREAST OF FEMALE, ESTROGEN RECEPTOR POSITIVE: ICD-10-CM

## 2020-04-20 DIAGNOSIS — E11.59 HYPERTENSION ASSOCIATED WITH DIABETES: ICD-10-CM

## 2020-04-20 DIAGNOSIS — I10 ESSENTIAL HYPERTENSION: Primary | ICD-10-CM

## 2020-04-20 DIAGNOSIS — E11.9 TYPE 2 DIABETES MELLITUS WITHOUT COMPLICATION, WITHOUT LONG-TERM CURRENT USE OF INSULIN: ICD-10-CM

## 2020-04-20 DIAGNOSIS — Z13.6 ENCOUNTER FOR LIPID SCREENING FOR CARDIOVASCULAR DISEASE: ICD-10-CM

## 2020-04-20 DIAGNOSIS — I15.2 HYPERTENSION ASSOCIATED WITH DIABETES: ICD-10-CM

## 2020-04-20 DIAGNOSIS — C50.511 MALIGNANT NEOPLASM OF LOWER-OUTER QUADRANT OF RIGHT BREAST OF FEMALE, ESTROGEN RECEPTOR POSITIVE: ICD-10-CM

## 2020-04-20 RX ORDER — LOSARTAN POTASSIUM 50 MG/1
50 TABLET ORAL DAILY
Qty: 90 TABLET | Refills: 1 | Status: SHIPPED | OUTPATIENT
Start: 2020-04-20 | End: 2020-09-07

## 2020-04-20 RX ORDER — METFORMIN HYDROCHLORIDE 500 MG/1
500 TABLET ORAL
Qty: 90 TABLET | Refills: 1 | Status: SHIPPED | OUTPATIENT
Start: 2020-04-20 | End: 2020-09-07

## 2020-04-20 NOTE — TELEPHONE ENCOUNTER
Please call and remind the patient it's time for the annual and schedule in Aug/Sept if possible.  Please schedule lab appt 1 week prior and let me know when done and I'll order and link labs.  Please remind pt to do fitkit. Thanks!

## 2020-05-13 DIAGNOSIS — E03.9 HYPOTHYROIDISM, UNSPECIFIED TYPE: ICD-10-CM

## 2020-05-14 RX ORDER — LEVOTHYROXINE SODIUM 50 UG/1
TABLET ORAL
Qty: 90 TABLET | Refills: 3 | Status: SHIPPED | OUTPATIENT
Start: 2020-05-14 | End: 2020-12-17 | Stop reason: SDUPTHER

## 2020-05-15 DIAGNOSIS — Z13.9 SCREENING FOR CONDITION: Primary | ICD-10-CM

## 2020-05-15 DIAGNOSIS — M89.8X9 CANCER TREATMENT INDUCED BONE LOSS: ICD-10-CM

## 2020-05-15 DIAGNOSIS — T45.1X5A CANCER TREATMENT INDUCED BONE LOSS: ICD-10-CM

## 2020-05-15 NOTE — PROGRESS NOTES
05/15/2020      Phoned the patient.      Discussed the following with the patient:  In an effort to protect our immunocompromised patients from potential exposure to COVID-19, [facility] will now require all patients receiving an infusion, an injection, and/or radiation therapy to be tested for COVID-19 prior to their appointment.  All patients currently under treatment will be tested immediately, and patients initiating new treatment cycles or with one-time appointments (injections, transfusions, etc.) must be tested within 72 hours of their appointment.      Symptom Patient's Response   Fever YES/NO: no   Cough YES/NO: no   Shortness of breath  YES/NO: no   Difficulty breathing YES/NO: no   GI symptoms such as diarrhea or nausea YES/NO: no   Loss of taste YES/NO: no   Loss of smell YES/NO: no     Other Screening Patient's Response   Has the patient previously undergone COVID-19 testing? YES/NO: no     If yes to the question directly above, what was the result? not applicable   Has the patient been in close contact with someone who has undergone COVID-19 testing? YES/NO: no     If yes to the question directly above, what was the result? not applicable      The patient's 24-hour COVID-19 test was ordered and scheduled.  Reviewed the appointment date, time, and location with the patient.      Advised the patient that she can expect the results to take approximately 24 hours.  Advised the patient that someone will reach out to her regarding her results if she tests positive, as her treatment appointment will be rescheduled if she tests positive for COVID-19.  Also advised the patient that if she does not hear back from our office or if she is told by our office she has tested negative for COVID-19, she can proceed with treatment as originally planned.     Questions were answered to the patient's satisfaction, and the patient verbalized understanding of information and agreement with the plan.       The above was  completed in accordance with instructions and guidelines set forth by [facility].     Signed,        Elicia Smith RN     Date:  05/15/2020

## 2020-06-10 DIAGNOSIS — E78.5 HYPERLIPIDEMIA, UNSPECIFIED HYPERLIPIDEMIA TYPE: ICD-10-CM

## 2020-06-11 RX ORDER — PRAVASTATIN SODIUM 10 MG/1
TABLET ORAL
Qty: 90 TABLET | Refills: 0 | Status: SHIPPED | OUTPATIENT
Start: 2020-06-11 | End: 2020-08-20

## 2020-07-08 ENCOUNTER — PATIENT OUTREACH (OUTPATIENT)
Dept: ADMINISTRATIVE | Facility: OTHER | Age: 70
End: 2020-07-08

## 2020-07-08 DIAGNOSIS — E11.9 TYPE 2 DIABETES MELLITUS WITHOUT COMPLICATION, WITHOUT LONG-TERM CURRENT USE OF INSULIN: Primary | ICD-10-CM

## 2020-07-08 NOTE — PROGRESS NOTES
Chart reviewed.   Immunizations: Triggered Imm Registry     Orders placed: eye exam   Upcoming appts to satisfy DEANN topics: n/a

## 2020-07-09 ENCOUNTER — OFFICE VISIT (OUTPATIENT)
Dept: SURGERY | Facility: CLINIC | Age: 70
End: 2020-07-09
Attending: SURGERY
Payer: MEDICARE

## 2020-07-09 VITALS
HEART RATE: 99 BPM | WEIGHT: 150.81 LBS | SYSTOLIC BLOOD PRESSURE: 139 MMHG | DIASTOLIC BLOOD PRESSURE: 74 MMHG | HEIGHT: 66 IN | BODY MASS INDEX: 24.24 KG/M2

## 2020-07-09 DIAGNOSIS — Z12.31 ENCOUNTER FOR SCREENING MAMMOGRAM FOR BREAST CANCER: Primary | ICD-10-CM

## 2020-07-09 DIAGNOSIS — Z85.3 PERSONAL HISTORY OF BREAST CANCER: ICD-10-CM

## 2020-07-09 PROCEDURE — 1126F PR PAIN SEVERITY QUANTIFIED, NO PAIN PRESENT: ICD-10-PCS | Mod: S$GLB,,, | Performed by: SURGERY

## 2020-07-09 PROCEDURE — 1101F PT FALLS ASSESS-DOCD LE1/YR: CPT | Mod: CPTII,S$GLB,, | Performed by: SURGERY

## 2020-07-09 PROCEDURE — 3008F PR BODY MASS INDEX (BMI) DOCUMENTED: ICD-10-PCS | Mod: CPTII,S$GLB,, | Performed by: SURGERY

## 2020-07-09 PROCEDURE — 99213 PR OFFICE/OUTPT VISIT, EST, LEVL III, 20-29 MIN: ICD-10-PCS | Mod: S$GLB,,, | Performed by: SURGERY

## 2020-07-09 PROCEDURE — 3075F SYST BP GE 130 - 139MM HG: CPT | Mod: CPTII,S$GLB,, | Performed by: SURGERY

## 2020-07-09 PROCEDURE — 3078F DIAST BP <80 MM HG: CPT | Mod: CPTII,S$GLB,, | Performed by: SURGERY

## 2020-07-09 PROCEDURE — 3078F PR MOST RECENT DIASTOLIC BLOOD PRESSURE < 80 MM HG: ICD-10-PCS | Mod: CPTII,S$GLB,, | Performed by: SURGERY

## 2020-07-09 PROCEDURE — 1126F AMNT PAIN NOTED NONE PRSNT: CPT | Mod: S$GLB,,, | Performed by: SURGERY

## 2020-07-09 PROCEDURE — 99213 OFFICE O/P EST LOW 20 MIN: CPT | Mod: S$GLB,,, | Performed by: SURGERY

## 2020-07-09 PROCEDURE — 1101F PR PT FALLS ASSESS DOC 0-1 FALLS W/OUT INJ PAST YR: ICD-10-PCS | Mod: CPTII,S$GLB,, | Performed by: SURGERY

## 2020-07-09 PROCEDURE — 3008F BODY MASS INDEX DOCD: CPT | Mod: CPTII,S$GLB,, | Performed by: SURGERY

## 2020-07-09 PROCEDURE — 1159F MED LIST DOCD IN RCRD: CPT | Mod: S$GLB,,, | Performed by: SURGERY

## 2020-07-09 PROCEDURE — 3075F PR MOST RECENT SYSTOLIC BLOOD PRESS GE 130-139MM HG: ICD-10-PCS | Mod: CPTII,S$GLB,, | Performed by: SURGERY

## 2020-07-09 PROCEDURE — 1159F PR MEDICATION LIST DOCUMENTED IN MEDICAL RECORD: ICD-10-PCS | Mod: S$GLB,,, | Performed by: SURGERY

## 2020-07-09 PROCEDURE — 99499 RISK ADDL DX/OHS AUDIT: ICD-10-PCS | Mod: S$GLB,,, | Performed by: SURGERY

## 2020-07-09 PROCEDURE — 99499 UNLISTED E&M SERVICE: CPT | Mod: S$GLB,,, | Performed by: SURGERY

## 2020-07-09 NOTE — PROGRESS NOTES
Breast Surgery  [facility]  Department of Surgery      REFERRING PROVIDER:  No referring provider defined for this encounter.    Chief Complaint: Follow-up      She presents s/p Right mastectomy with right SLNB and port placement.   Completed adjuvant TC 2019  Letrozole started 4/15/2019      Doing well. Multinodular goiter still slightly bothersome. Otherwise denies any headache, CP, SOB, cough, bone pain.      Patient ID: Alia Andre is a 70 y.o. female who presents with newly diagnosed RIGHT breast cancer. She reports that she felt the mass on SBE approx 1 month ago. Bilateral diagnostic mammogram and ultrasound (18) was BI-RADS 4 showing 19 x 14 x 14 mm irregularly shaped, hypoechoic mass with indistinct margins seen in the RIGHT breast at [id number], a lymph node in the RIGHT axilla with thickened cortex of 3 mm, and a LEFT breast 9 mm oval simple cyst at 3OA7PMP. An ultrasound guided biopsy was performed on 10/11/18 with pathology revealing grade 3 ER+ (10-20%, weak) NE- Her2- [id number]% invasive mammary carcinoma with medullary features of the RIGHT breast with CNBx of lymph node showing no evidence of carcinoma.    Patient does routinely do self breast exams. Patient has noted a change on breast exam. Patient denies nipple discharge. Patient denies previous breast biopsy (prior to current events). Patient denies a personal history of breast cancer (prior to current events).    Findings at that time were the following:   Tumor size: 1.9 cm (on imaging)   Tumor thgthrthathdtheth:th th4th Estrogen Receptor: + (10-20%, weak)  Progesterone Receptor: -  Her-2 cristhian: -   Lymph node status: Clinically negative   Lymphatic invasion: N/A       Risk Factors/Breast History  Age of Menarche: 20  Menstrual History: Regular, monthly, no menorrhagia  Age of Menopause: Uncertain (hot flashes at 29, cycles until late 30s)  History of Hormonal Therapy: No OCPs or HRT  Number of Pregnancies: 2 ()  Age of First Birth:  "24  Lactational History: No  Mammogram History: One at approx age 42 prior to this occurrence  History of Chest Radiation: No  History of Breast Bx: Yes (current events only)  History of Breast Ca: Yes (current events only)  Family History of Breast Ca: Paternal first cousin (age late 50s), maternal first cousin (age late 50s), paternal aunt (age early 50s), possible sister (age 60s) - states "they didn't do any surgery; they're just watching it every year"  Family History of Ovarian Ca: No      Current everyday smoker. 1 PPD x 40 years.  Had BTL. No hysterectomy or oophorectomy.    Past Medical History:   Diagnosis Date    Cancer     breast    Diabetes mellitus type 2 in nonobese     Goiter     History of endometrial ablation     age 29    Hypertension     Tobacco abuse     Vitamin D deficiency       Past Surgical History:   Procedure Laterality Date    INSERTION OF TUNNELED CENTRAL VENOUS CATHETER (CVC) WITH SUBCUTANEOUS PORT Left 11/16/2018    Procedure: [id number] LEFT;  Surgeon: Graciela Echeverria MD;  Location: [facility] OR [address] [place] [address];  Service: General;  Laterality: Left;    MASTECTOMY Right 11/16/2018    Procedure: MASTECTOMY RIGHT 2.5 HR CASE;  Surgeon: Cristiane Frederick MD;  Location: [facility] OR [address] [place] [address];  Service: General;  Laterality: Right;  needs to be 1st case, Dr. Cameron has afternoon cases at [facility]    SENTINEL LYMPH NODE BIOPSY Right 11/16/2018    Procedure: BIOPSY, LYMPH NODE, SENTINEL RIGHT;  Surgeon: Cristiane Frederick MD;  Location: [facility] OR [address] [place] [address];  Service: General;  Laterality: Right;    TUBAL LIGATION      1970/80's        Current Outpatient Medications on File Prior to Visit   Medication Sig Dispense Refill    ACCU-CHEK USHA PLUS METER Misc       ACCU-CHEK USHA PLUS TEST STRP Strp       acetaminophen (TYLENOL) 325 MG tablet Take 325 mg by mouth every 6 (six) hours as needed for Pain.      BD ALCOHOL SWABS PadM       cyproheptadine (PERIACTIN) 4 mg tablet Take 1 tablet (4 mg " total) by mouth 3 (three) times daily as needed. 90 tablet 0    diclofenac sodium (VOLTAREN) 1 % Gel Apply 2 g topically daily as needed. 100 g 0    letrozole (FEMARA) 2.5 mg Tab Take 1 tablet (2.5 mg total) by mouth once daily. 90 tablet 3    levothyroxine (SYNTHROID) 50 MCG tablet TAKE 1 TABLET EVERY DAY 90 tablet 3    losartan (COZAAR) 50 MG tablet Take 1 tablet (50 mg total) by mouth once daily. 90 tablet 1    metFORMIN (GLUCOPHAGE) 500 MG tablet Take 1 tablet (500 mg total) by mouth daily with breakfast. 90 tablet 1    pravastatin (PRAVACHOL) 10 MG tablet TAKE 1 TABLET EVERY DAY 90 tablet 0    aspirin 81 MG Chew Take 1 tablet (81 mg total) by mouth once daily. Start on Monday.  0    gabapentin (NEURONTIN) 300 MG capsule       lidocaine-prilocaine (EMLA) cream Apply topically as needed. Apply over port site 30 minutes before appointment for chemotherapy (Patient not taking: Reported on 1/16/2020) 30 g 0    nicotine (NICODERM CQ) 21 mg/24 hr APPLY 1 PATCH TRANSDERMALLY EVERY 24 HOURS  0    nicotine, polacrilex, (NICORETTE) 2 mg Gum CHEW 1 PIECE (2 MG) BY MOUTH 10 TIMES PER DAY AS NEEDED  0    traMADol (ULTRAM) 50 mg tablet Take 1 tablet (50 mg total) by mouth every 24 hours as needed for Pain. (Patient not taking: Reported on 1/16/2020) 30 tablet 0    turmeric root extract 500 mg Cap Take 1 tablet by mouth once daily.       No current facility-administered medications on file prior to visit.      Social History     Socioeconomic History    Marital status:      Spouse name: Not on file    Number of children: Not on file    Years of education: Not on file    Highest education level: Not on file   Occupational History    Not on file   Social Needs    Financial resource strain: Not on file    Food insecurity     Worry: Not on file     Inability: Not on file    Transportation needs     Medical: Not on file     Non-medical: Not on file   Tobacco Use    Smoking status: Current Some Day  Smoker     Packs/day: 1.00     Years: 30.00     Pack years: 30.00     Types: Cigarettes    Smokeless tobacco: Never Used   Substance and Sexual Activity    Alcohol use: No     Frequency: Never    Drug use: No    Sexual activity: Not Currently   Lifestyle    Physical activity     Days per week: Not on file     Minutes per session: Not on file    Stress: Not on file   Relationships    Social connections     Talks on phone: Not on file     Gets together: Not on file     Attends [employer] service: Not on file     Active member of club or organization: Not on file     Attends meetings of clubs or organizations: Not on file     Relationship status: Not on file   Other Topics Concern    Not on file   Social History Narrative    Not on file     Family History   Problem Relation Age of Onset    No Known Problems Sister     Breast cancer Paternal Aunt     Breast cancer Paternal Cousin     Breast cancer Maternal Cousin     Aortic aneurysm Mother     Leukemia Father     Breast cancer Paternal Cousin         Review of Systems   Constitutional: Negative for activity change, chills, fatigue and fever.   HENT: Negative for congestion, rhinorrhea, sore throat, trouble swallowing and voice change.    Eyes: Negative for visual disturbance.   Respiratory: Negative for cough, shortness of breath and wheezing.    Cardiovascular: Negative for chest pain, palpitations and leg swelling.   Gastrointestinal: Negative for abdominal distention, abdominal pain, constipation, diarrhea, nausea and vomiting.   Genitourinary: Negative for dysuria, frequency and hematuria.   Musculoskeletal: Negative for arthralgias and myalgias.   Skin: Negative for color change, rash and wound.   Neurological: Negative for syncope, weakness and numbness.   Hematological: Does not bruise/bleed easily.   Psychiatric/Behavioral: Negative for behavioral problems and confusion.        Objective:   /74 (BP Location: Left arm, Patient Position:  "Sitting, BP Method: Large (Automatic))   Pulse 99   Ht 5' 6" (1.676 m)   Wt 68.4 kg (150 lb 12.7 oz)   BMI 24.34 kg/m²     Breasts: breasts appear normal, no suspicious masses, no skin or nipple changes or axillary nodes, RIGHT post-mastectomy site well healed and free of suspicious changes.    Radiology review: Images personally reviewed by me in the clinic.     MMG due in October      Assessment:       Right 1.9cm IDC (essently triple negative) s/p right simple mastectomy with right SLNB (negative on frozen)   Plan:     F/u 6 months  Due for MMG October 2020  F/u with endocrine.        "

## 2020-07-24 ENCOUNTER — TELEPHONE (OUTPATIENT)
Dept: HEMATOLOGY/ONCOLOGY | Facility: CLINIC | Age: 70
End: 2020-07-24

## 2020-07-24 NOTE — TELEPHONE ENCOUNTER
----- Message from Junie Elliott sent at 7/24/2020 12:30 PM CDT -----  Regarding: PT  Contact: PT  PT called to speak to the doctor regarding a letter she received from the doctor. She wants to know why the doctor scheduled her for a chemo injection on Monday. Please call back     Callback: [phone]

## 2020-07-24 NOTE — TELEPHONE ENCOUNTER
Patient did not understand why she had an appt with chemo on Monday - informed patient this was a Prolia injection ordered by Dr. Mata. Patient had concerns about coming to the hospital due to COVID-19. Information forwarded to  to re-schedule.

## 2020-08-03 ENCOUNTER — PATIENT OUTREACH (OUTPATIENT)
Dept: ADMINISTRATIVE | Facility: HOSPITAL | Age: 70
End: 2020-08-03

## 2020-08-03 NOTE — PROGRESS NOTES
Message sent of need to call our office to schedule her Eye Exam. She is to  her Fit Kit at her upcoming PCP visit, PCP to address Statin, LDCT Screening, ASA Tx and Vaccines Due. Labs Scheduled.- MMG Scheduled.

## 2020-08-19 DIAGNOSIS — E78.5 HYPERLIPIDEMIA, UNSPECIFIED HYPERLIPIDEMIA TYPE: ICD-10-CM

## 2020-08-20 RX ORDER — PRAVASTATIN SODIUM 10 MG/1
TABLET ORAL
Qty: 90 TABLET | Refills: 0 | Status: SHIPPED | OUTPATIENT
Start: 2020-08-20 | End: 2020-11-12

## 2020-08-20 NOTE — TELEPHONE ENCOUNTER
Please call and remind the patient it's time for the annual and schedule if possible.  Please schedule lab appt 1 week prior and let me know when done and I'll order and link labs.

## 2020-08-28 ENCOUNTER — TELEPHONE (OUTPATIENT)
Dept: INTERNAL MEDICINE | Facility: CLINIC | Age: 70
End: 2020-08-28

## 2020-08-28 DIAGNOSIS — E78.5 HYPERLIPIDEMIA, UNSPECIFIED HYPERLIPIDEMIA TYPE: ICD-10-CM

## 2020-08-28 DIAGNOSIS — E55.9 VITAMIN D DEFICIENCY: Primary | ICD-10-CM

## 2020-08-28 DIAGNOSIS — Z17.0 MALIGNANT NEOPLASM OF UPPER-INNER QUADRANT OF RIGHT BREAST IN FEMALE, ESTROGEN RECEPTOR POSITIVE: ICD-10-CM

## 2020-08-28 DIAGNOSIS — E11.9 TYPE 2 DIABETES MELLITUS WITHOUT COMPLICATION, WITHOUT LONG-TERM CURRENT USE OF INSULIN: ICD-10-CM

## 2020-08-28 DIAGNOSIS — I10 ESSENTIAL HYPERTENSION: ICD-10-CM

## 2020-08-28 DIAGNOSIS — C50.211 MALIGNANT NEOPLASM OF UPPER-INNER QUADRANT OF RIGHT BREAST IN FEMALE, ESTROGEN RECEPTOR POSITIVE: ICD-10-CM

## 2020-08-28 NOTE — TELEPHONE ENCOUNTER
----- Message from Estefani Nunez MA sent at 8/27/2020  2:29 PM CDT -----  Regarding: FW: labs    ----- Message -----  From: Tory Quiros  Sent: 8/27/2020   2:25 PM CDT  To: [facility] Staff  Subject: labs                                             Spoke to patient and scheduled appointments.  Reminders mailed.   Please place lab orders and link to appointment.

## 2020-09-11 ENCOUNTER — PATIENT OUTREACH (OUTPATIENT)
Dept: ADMINISTRATIVE | Facility: HOSPITAL | Age: 70
End: 2020-09-11

## 2020-09-11 NOTE — PROGRESS NOTES
Health Maintenance Due   Topic Date Due    Eye Exam  04/27/1960    TETANUS VACCINE  04/27/1970    Aspirin/Antiplatelet Therapy  04/27/1970    High Dose Statin  04/27/1973    Shingles Vaccine (1 of 2) 04/27/2002    Colorectal Cancer Screening  04/27/2002    Pneumococcal Vaccine (65+ High/Highest Risk) (2 of 2 - PPSV23) 07/17/2019    Hemoglobin A1c  11/22/2019    Foot Exam  05/22/2020    Lipid Panel  05/22/2020    LDCT Lung Screen  06/06/2020    Influenza Vaccine (1) 08/01/2020     Patient has been scheduled for labs on 12/4/2020.  Portal message has been sent to patient regarding overdue health maintenance.  Chart review completed.

## 2020-09-29 ENCOUNTER — PATIENT MESSAGE (OUTPATIENT)
Dept: OTHER | Facility: OTHER | Age: 70
End: 2020-09-29

## 2020-10-05 ENCOUNTER — PATIENT MESSAGE (OUTPATIENT)
Dept: ADMINISTRATIVE | Facility: HOSPITAL | Age: 70
End: 2020-10-05

## 2020-11-19 ENCOUNTER — HOSPITAL ENCOUNTER (OUTPATIENT)
Dept: RADIOLOGY | Facility: HOSPITAL | Age: 70
Discharge: HOME | End: 2020-11-19
Attending: SURGERY
Payer: MEDICARE

## 2020-11-19 DIAGNOSIS — Z85.3 PERSONAL HISTORY OF BREAST CANCER: ICD-10-CM

## 2020-11-19 DIAGNOSIS — Z12.31 ENCOUNTER FOR SCREENING MAMMOGRAM FOR BREAST CANCER: ICD-10-CM

## 2020-11-19 PROCEDURE — 77067 SCR MAMMO BI INCL CAD: CPT | Mod: 26,HCNC,, | Performed by: RADIOLOGY

## 2020-11-19 PROCEDURE — 77063 MAMMO DIGITAL SCREENING LEFT WITH TOMOSYNTHESIS_CAD: ICD-10-PCS | Mod: 26,HCNC,, | Performed by: RADIOLOGY

## 2020-11-19 PROCEDURE — 77063 BREAST TOMOSYNTHESIS BI: CPT | Mod: 26,HCNC,, | Performed by: RADIOLOGY

## 2020-11-19 PROCEDURE — 77067 MAMMO DIGITAL SCREENING LEFT WITH TOMOSYNTHESIS_CAD: ICD-10-PCS | Mod: 26,HCNC,, | Performed by: RADIOLOGY

## 2020-11-19 PROCEDURE — 77067 SCR MAMMO BI INCL CAD: CPT | Mod: TC,HCNC

## 2020-12-04 ENCOUNTER — PES CALL (OUTPATIENT)
Dept: ADMINISTRATIVE | Facility: CLINIC | Age: 70
End: 2020-12-04

## 2020-12-11 ENCOUNTER — PATIENT MESSAGE (OUTPATIENT)
Dept: OTHER | Facility: OTHER | Age: 70
End: 2020-12-11

## 2020-12-14 ENCOUNTER — LAB VISIT (OUTPATIENT)
Dept: LAB | Facility: HOSPITAL | Age: 70
End: 2020-12-14
Attending: INTERNAL MEDICINE
Payer: MEDICARE

## 2020-12-14 DIAGNOSIS — E78.5 HYPERLIPIDEMIA, UNSPECIFIED HYPERLIPIDEMIA TYPE: ICD-10-CM

## 2020-12-14 DIAGNOSIS — Z17.0 MALIGNANT NEOPLASM OF UPPER-INNER QUADRANT OF RIGHT BREAST IN FEMALE, ESTROGEN RECEPTOR POSITIVE: ICD-10-CM

## 2020-12-14 DIAGNOSIS — I10 ESSENTIAL HYPERTENSION: ICD-10-CM

## 2020-12-14 DIAGNOSIS — E11.9 TYPE 2 DIABETES MELLITUS WITHOUT COMPLICATION, WITHOUT LONG-TERM CURRENT USE OF INSULIN: ICD-10-CM

## 2020-12-14 DIAGNOSIS — E55.9 VITAMIN D DEFICIENCY: ICD-10-CM

## 2020-12-14 DIAGNOSIS — C50.211 MALIGNANT NEOPLASM OF UPPER-INNER QUADRANT OF RIGHT BREAST IN FEMALE, ESTROGEN RECEPTOR POSITIVE: ICD-10-CM

## 2020-12-14 LAB
25(OH)D3+25(OH)D2 SERPL-MCNC: 37 NG/ML (ref 30–96)
ALBUMIN SERPL BCP-MCNC: 4.2 G/DL (ref 3.5–5.2)
ALP SERPL-CCNC: 138 U/L (ref 55–135)
ALT SERPL W/O P-5'-P-CCNC: 26 U/L (ref 10–44)
ANION GAP SERPL CALC-SCNC: 11 MMOL/L (ref 8–16)
AST SERPL-CCNC: 22 U/L (ref 10–40)
BASOPHILS # BLD AUTO: 0.04 K/UL (ref 0–0.2)
BASOPHILS NFR BLD: 0.7 % (ref 0–1.9)
BILIRUB SERPL-MCNC: 0.4 MG/DL (ref 0.1–1)
BUN SERPL-MCNC: 23 MG/DL (ref 8–23)
CALCIUM SERPL-MCNC: 10 MG/DL (ref 8.7–10.5)
CHLORIDE SERPL-SCNC: 103 MMOL/L (ref 95–110)
CHOLEST SERPL-MCNC: 166 MG/DL (ref 120–199)
CHOLEST/HDLC SERPL: 4.9 {RATIO} (ref 2–5)
CO2 SERPL-SCNC: 24 MMOL/L (ref 23–29)
CREAT SERPL-MCNC: 0.9 MG/DL (ref 0.5–1.4)
DIFFERENTIAL METHOD: ABNORMAL
EOSINOPHIL # BLD AUTO: 0.3 K/UL (ref 0–0.5)
EOSINOPHIL NFR BLD: 4.7 % (ref 0–8)
ERYTHROCYTE [DISTWIDTH] IN BLOOD BY AUTOMATED COUNT: 14 % (ref 11.5–14.5)
EST. GFR  (AFRICAN AMERICAN): >60 ML/MIN/1.73 M^2
EST. GFR  (NON AFRICAN AMERICAN): >60 ML/MIN/1.73 M^2
ESTIMATED AVG GLUCOSE: 183 MG/DL (ref 68–131)
GLUCOSE SERPL-MCNC: 170 MG/DL (ref 70–110)
HBA1C MFR BLD HPLC: 8 % (ref 4–5.6)
HCT VFR BLD AUTO: 45.9 % (ref 37–48.5)
HDLC SERPL-MCNC: 34 MG/DL (ref 40–75)
HDLC SERPL: 20.5 % (ref 20–50)
HGB BLD-MCNC: 14.2 G/DL (ref 12–16)
IMM GRANULOCYTES # BLD AUTO: 0.03 K/UL (ref 0–0.04)
IMM GRANULOCYTES NFR BLD AUTO: 0.5 % (ref 0–0.5)
LDLC SERPL CALC-MCNC: 114.4 MG/DL (ref 63–159)
LYMPHOCYTES # BLD AUTO: 1.7 K/UL (ref 1–4.8)
LYMPHOCYTES NFR BLD: 29.1 % (ref 18–48)
MCH RBC QN AUTO: 29.5 PG (ref 27–31)
MCHC RBC AUTO-ENTMCNC: 30.9 G/DL (ref 32–36)
MCV RBC AUTO: 95 FL (ref 82–98)
MONOCYTES # BLD AUTO: 0.5 K/UL (ref 0.3–1)
MONOCYTES NFR BLD: 8 % (ref 4–15)
NEUTROPHILS # BLD AUTO: 3.3 K/UL (ref 1.8–7.7)
NEUTROPHILS NFR BLD: 57 % (ref 38–73)
NONHDLC SERPL-MCNC: 132 MG/DL
NRBC BLD-RTO: 0 /100 WBC
PLATELET # BLD AUTO: 280 K/UL (ref 150–350)
PMV BLD AUTO: 10.2 FL (ref 9.2–12.9)
POTASSIUM SERPL-SCNC: 4.8 MMOL/L (ref 3.5–5.1)
PROT SERPL-MCNC: 8.3 G/DL (ref 6–8.4)
RBC # BLD AUTO: 4.81 M/UL (ref 4–5.4)
SODIUM SERPL-SCNC: 138 MMOL/L (ref 136–145)
T4 FREE SERPL-MCNC: 1.05 NG/DL (ref 0.71–1.51)
TRIGL SERPL-MCNC: 88 MG/DL (ref 30–150)
TSH SERPL DL<=0.005 MIU/L-ACNC: 0.38 UIU/ML (ref 0.4–4)
WBC # BLD AUTO: 5.74 K/UL (ref 3.9–12.7)

## 2020-12-14 PROCEDURE — 83036 HEMOGLOBIN GLYCOSYLATED A1C: CPT | Mod: HCNC

## 2020-12-14 PROCEDURE — 84443 ASSAY THYROID STIM HORMONE: CPT | Mod: HCNC

## 2020-12-14 PROCEDURE — 80061 LIPID PANEL: CPT | Mod: HCNC

## 2020-12-14 PROCEDURE — 36415 COLL VENOUS BLD VENIPUNCTURE: CPT | Mod: HCNC

## 2020-12-14 PROCEDURE — 80053 COMPREHEN METABOLIC PANEL: CPT | Mod: HCNC

## 2020-12-14 PROCEDURE — 82306 VITAMIN D 25 HYDROXY: CPT | Mod: HCNC

## 2020-12-14 PROCEDURE — 84439 ASSAY OF FREE THYROXINE: CPT | Mod: HCNC

## 2020-12-14 PROCEDURE — 85025 COMPLETE CBC W/AUTO DIFF WBC: CPT | Mod: HCNC

## 2020-12-17 ENCOUNTER — OFFICE VISIT (OUTPATIENT)
Dept: INTERNAL MEDICINE | Facility: CLINIC | Age: 70
End: 2020-12-17
Payer: MEDICARE

## 2020-12-17 ENCOUNTER — TELEPHONE (OUTPATIENT)
Dept: INTERNAL MEDICINE | Facility: CLINIC | Age: 70
End: 2020-12-17

## 2020-12-17 VITALS
WEIGHT: 151.25 LBS | HEIGHT: 66 IN | OXYGEN SATURATION: 97 % | HEART RATE: 93 BPM | BODY MASS INDEX: 24.31 KG/M2 | SYSTOLIC BLOOD PRESSURE: 118 MMHG | DIASTOLIC BLOOD PRESSURE: 64 MMHG

## 2020-12-17 DIAGNOSIS — E04.1 THYROID NODULE: ICD-10-CM

## 2020-12-17 DIAGNOSIS — E03.9 HYPOTHYROIDISM, UNSPECIFIED TYPE: ICD-10-CM

## 2020-12-17 DIAGNOSIS — E11.69 HYPERLIPIDEMIA ASSOCIATED WITH TYPE 2 DIABETES MELLITUS: ICD-10-CM

## 2020-12-17 DIAGNOSIS — Z72.0 TOBACCO ABUSE: ICD-10-CM

## 2020-12-17 DIAGNOSIS — R06.09 DOE (DYSPNEA ON EXERTION): Primary | ICD-10-CM

## 2020-12-17 DIAGNOSIS — R09.82 POSTNASAL DRIP: ICD-10-CM

## 2020-12-17 DIAGNOSIS — H61.22 EXCESSIVE CERUMEN IN LEFT EAR CANAL: ICD-10-CM

## 2020-12-17 DIAGNOSIS — E11.59 HYPERTENSION ASSOCIATED WITH DIABETES: ICD-10-CM

## 2020-12-17 DIAGNOSIS — R74.8 ALKALINE PHOSPHATASE ELEVATION: ICD-10-CM

## 2020-12-17 DIAGNOSIS — R91.8 LUNG NODULES: ICD-10-CM

## 2020-12-17 DIAGNOSIS — Z87.891 PERSONAL HISTORY OF NICOTINE DEPENDENCE: ICD-10-CM

## 2020-12-17 DIAGNOSIS — Z12.11 ENCOUNTER FOR FIT (FECAL IMMUNOCHEMICAL TEST) SCREENING: ICD-10-CM

## 2020-12-17 DIAGNOSIS — I15.2 HYPERTENSION ASSOCIATED WITH DIABETES: ICD-10-CM

## 2020-12-17 DIAGNOSIS — E78.5 HYPERLIPIDEMIA ASSOCIATED WITH TYPE 2 DIABETES MELLITUS: ICD-10-CM

## 2020-12-17 DIAGNOSIS — M25.50 ARTHRALGIA, UNSPECIFIED JOINT: ICD-10-CM

## 2020-12-17 DIAGNOSIS — E11.9 TYPE 2 DIABETES MELLITUS WITHOUT COMPLICATION, WITHOUT LONG-TERM CURRENT USE OF INSULIN: ICD-10-CM

## 2020-12-17 DIAGNOSIS — R49.0 HOARSENESS OF VOICE: ICD-10-CM

## 2020-12-17 DIAGNOSIS — E55.9 VITAMIN D DEFICIENCY: ICD-10-CM

## 2020-12-17 PROCEDURE — 69209 PR REMOVAL IMPACTED CERUMEN USING IRRIGATION/LAVAGE, UNILATERAL: ICD-10-PCS | Mod: HCNC,LT,S$GLB, | Performed by: INTERNAL MEDICINE

## 2020-12-17 PROCEDURE — 99499 UNLISTED E&M SERVICE: CPT | Mod: S$GLB,,, | Performed by: INTERNAL MEDICINE

## 2020-12-17 PROCEDURE — 1125F PR PAIN SEVERITY QUANTIFIED, PAIN PRESENT: ICD-10-PCS | Mod: HCNC,S$GLB,, | Performed by: INTERNAL MEDICINE

## 2020-12-17 PROCEDURE — 69209 REMOVE IMPACTED EAR WAX UNI: CPT | Mod: HCNC,LT,S$GLB, | Performed by: INTERNAL MEDICINE

## 2020-12-17 PROCEDURE — 1159F PR MEDICATION LIST DOCUMENTED IN MEDICAL RECORD: ICD-10-PCS | Mod: HCNC,S$GLB,, | Performed by: INTERNAL MEDICINE

## 2020-12-17 PROCEDURE — 3074F SYST BP LT 130 MM HG: CPT | Mod: HCNC,CPTII,S$GLB, | Performed by: INTERNAL MEDICINE

## 2020-12-17 PROCEDURE — 99215 OFFICE O/P EST HI 40 MIN: CPT | Mod: HCNC,25,S$GLB, | Performed by: INTERNAL MEDICINE

## 2020-12-17 PROCEDURE — 99999 PR PBB SHADOW E&M-EST. PATIENT-LVL V: ICD-10-PCS | Mod: PBBFAC,HCNC,, | Performed by: INTERNAL MEDICINE

## 2020-12-17 PROCEDURE — 3078F PR MOST RECENT DIASTOLIC BLOOD PRESSURE < 80 MM HG: ICD-10-PCS | Mod: HCNC,CPTII,S$GLB, | Performed by: INTERNAL MEDICINE

## 2020-12-17 PROCEDURE — 99499 RISK ADDL DX/OHS AUDIT: ICD-10-PCS | Mod: S$GLB,,, | Performed by: INTERNAL MEDICINE

## 2020-12-17 PROCEDURE — 3052F HG A1C>EQUAL 8.0%<EQUAL 9.0%: CPT | Mod: HCNC,CPTII,S$GLB, | Performed by: INTERNAL MEDICINE

## 2020-12-17 PROCEDURE — 1125F AMNT PAIN NOTED PAIN PRSNT: CPT | Mod: HCNC,S$GLB,, | Performed by: INTERNAL MEDICINE

## 2020-12-17 PROCEDURE — 3008F PR BODY MASS INDEX (BMI) DOCUMENTED: ICD-10-PCS | Mod: HCNC,CPTII,S$GLB, | Performed by: INTERNAL MEDICINE

## 2020-12-17 PROCEDURE — 3008F BODY MASS INDEX DOCD: CPT | Mod: HCNC,CPTII,S$GLB, | Performed by: INTERNAL MEDICINE

## 2020-12-17 PROCEDURE — 99215 PR OFFICE/OUTPT VISIT, EST, LEVL V, 40-54 MIN: ICD-10-PCS | Mod: HCNC,25,S$GLB, | Performed by: INTERNAL MEDICINE

## 2020-12-17 PROCEDURE — 3052F PR MOST RECENT HEMOGLOBIN A1C LEVEL 8.0 - < 9.0%: ICD-10-PCS | Mod: HCNC,CPTII,S$GLB, | Performed by: INTERNAL MEDICINE

## 2020-12-17 PROCEDURE — 3074F PR MOST RECENT SYSTOLIC BLOOD PRESSURE < 130 MM HG: ICD-10-PCS | Mod: HCNC,CPTII,S$GLB, | Performed by: INTERNAL MEDICINE

## 2020-12-17 PROCEDURE — 3078F DIAST BP <80 MM HG: CPT | Mod: HCNC,CPTII,S$GLB, | Performed by: INTERNAL MEDICINE

## 2020-12-17 PROCEDURE — 1159F MED LIST DOCD IN RCRD: CPT | Mod: HCNC,S$GLB,, | Performed by: INTERNAL MEDICINE

## 2020-12-17 PROCEDURE — 99999 PR PBB SHADOW E&M-EST. PATIENT-LVL V: CPT | Mod: PBBFAC,HCNC,, | Performed by: INTERNAL MEDICINE

## 2020-12-17 RX ORDER — FLUTICASONE PROPIONATE 50 MCG
1 SPRAY, SUSPENSION (ML) NASAL DAILY
Qty: 16 G | Refills: 1 | Status: SHIPPED | OUTPATIENT
Start: 2020-12-17 | End: 2021-02-17

## 2020-12-17 RX ORDER — IBUPROFEN 800 MG/1
800 TABLET ORAL DAILY PRN
Qty: 15 TABLET | Refills: 0 | Status: SHIPPED | OUTPATIENT
Start: 2020-12-17 | End: 2021-01-02

## 2020-12-17 RX ORDER — LEVOTHYROXINE SODIUM 50 UG/1
TABLET ORAL
Qty: 90 TABLET | Refills: 3 | Status: SHIPPED | OUTPATIENT
Start: 2020-12-17 | End: 2021-12-11

## 2020-12-17 RX ORDER — ROSUVASTATIN CALCIUM 5 MG/1
5 TABLET, COATED ORAL DAILY
Qty: 90 TABLET | Refills: 3 | Status: SHIPPED | OUTPATIENT
Start: 2020-12-17 | End: 2021-10-07

## 2020-12-17 RX ORDER — METFORMIN HYDROCHLORIDE 500 MG/1
1000 TABLET ORAL 2 TIMES DAILY WITH MEALS
Qty: 180 TABLET | Refills: 3 | Status: SHIPPED | OUTPATIENT
Start: 2020-12-17 | End: 2021-04-29

## 2020-12-17 RX ORDER — NAPROXEN SODIUM 220 MG/1
81 TABLET, FILM COATED ORAL DAILY
Refills: 0 | COMMUNITY
Start: 2020-12-17 | End: 2023-10-25 | Stop reason: SDUPTHER

## 2020-12-17 NOTE — PATIENT INSTRUCTIONS
Tdap and flu vaccine today.   Schedule lung CT and neck CT before the end of the year.    Schedule pulmonary function test, stress echo (wants it done in 2021)    Follow up with me in 2 months with labs a few days prior.   Schedule eye exam the same day you come back to see me.     Bring back FITKIT (stool screening for colon cancer) at your next visit.     Decrease thyroid medicine from levothyroxine 50mcg daily to 50mcg daily except skip Sundays.     Increase metformin from 500mg daily to 500mg twice daily with food.     Start flonase 1 spray each nostril daily to see if it helps with postnasal drip and hoarse voice. If this is not better by the next visit, I'll refer you to ENT.

## 2020-12-17 NOTE — PROGRESS NOTES
INTERNAL MEDICINE ANNUAL VISIT NOTE      CHIEF COMPLAINT     ANNUAL    HPI     Alia Andre is a 70 y.o. AA female who presents for annual.  DEXA 1/8/20 - osteopenia.  MMG 11/19/20 neg.  Low dose CT lung CA screening w/ subcm lung nodules. Repeat CT in a yr.  Due for Td and flu vaccine.     R thyroid nodule s/p FNA 9/23/19 - neg for CA. Needs repeat US.  No odynophagia/dysphagia. Thyroid always feel a little enlarged.     L ear feels stopped up. No pain. No rhinorrhea. Postnasal drip.   Does not take anything for it.     DM2 x 1-2 yrs - metformin 500mg qam w/ breakfast.   a1c went from 6.8 a yr ago to 8 about 3 days ago.   MAC - 12/14/20  Due for optometry  Foot exam done 5/22/19 when she saw me but needs repeat.  Walks in the park for 45 min every day but hasn't been doing that since the pandemic.   Tried on pravastatin 10mg but caused leg cramps. Went away after stopping statin.     HTN - losartan 50mg daily.      Hypothyroidism - synthroid 50mcg prior to breakfast.   TSH 0.384     Mildly elevated alk phos, which is new 12/14/20.    Vit D def - ergo 50k weekly.   vIT d 12/14/20 37     Current smoker - smokes about a 6 cigarettes in a day (cut down to every few days).   Prior to this, 1/2-1 ppd x 42 yrs.   If she's up doing something, she's more SOB (doing anything around the house) x 3 mo. No leg swelling.      R Breast CA (invasive mammary carcinoma) 10/2018 s/p R mastectomy and SLNB. S/p adjuvant chemo, which ended at the end of Feb 2019. On letrozole since 4/2019.  Follows w/ Dr. Mata.     Past Medical History:  Past Medical History:   Diagnosis Date    Cancer     breast    Diabetes mellitus type 2 in nonobese     Goiter     History of endometrial ablation     age 29    Hypertension     Tobacco abuse     Vitamin D deficiency        Past Surgical History:  Past Surgical History:   Procedure Laterality Date    INSERTION OF TUNNELED CENTRAL VENOUS CATHETER (CVC) WITH SUBCUTANEOUS PORT Left 11/16/2018     Procedure: [id number] LEFT;  Surgeon: Graciela Echeverria MD;  [place] [facility] [place] [address] [place] [address];  Service: General;  Laterality: Left;    MASTECTOMY Right 11/16/2018    Procedure: MASTECTOMY RIGHT 2.5 HR CASE;  Surgeon: Cristiane Frederick MD;  [facility];  Service: General;  Laterality: Right;  needs to be 1st case, Dr. Cameron has afternoon cases at [facility]    SENTINEL LYMPH NODE BIOPSY Right 11/16/2018    Procedure: BIOPSY, LYMPH NODE, SENTINEL RIGHT;  Surgeon: Cristiane Frederick MD;  [facility];  Service: General;  Laterality: Right;    TUBAL LIGATION      1970/80's       Allergies:  Review of patient's allergies indicates:  No Known Allergies    Home Medications:  Prior to Admission medications    Medication Sig Start Date End Date Taking? Authorizing Provider   ACCU-CHEK USHA PLUS METER Misc  1/22/19   Historical Provider   ACCU-CHEK USHA PLUS TEST STRP Strp  9/5/18   Historical Provider   acetaminophen (TYLENOL) 325 MG tablet Take 325 mg by mouth every 6 (six) hours as needed for Pain.    Historical Provider   aspirin 81 MG Chew Take 1 tablet (81 mg total) by mouth once daily. Start on Monday. 11/17/18 11/17/19  Terell Rowley MD   BD ALCOHOL SWABS PadM  9/5/18   Historical Provider   cyproheptadine (PERIACTIN) 4 mg tablet Take 1 tablet (4 mg total) by mouth 3 (three) times daily as needed. 5/20/19   Alexander Garces MD   diclofenac sodium (VOLTAREN) 1 % Gel Apply 2 g topically daily as needed. 9/3/19   DOMI [place]   gabapentin (NEURONTIN) 300 MG capsule  6/5/19   Historical Provider   letrozole (FEMARA) 2.5 mg Tab TAKE 1 TABLET EVERY DAY 11/16/20   Alexander Garces MD   levothyroxine (SYNTHROID) 50 MCG tablet TAKE 1 TABLET EVERY DAY 5/14/20   Nancy Poe MD   lidocaine-prilocaine (EMLA) cream Apply topically as needed. Apply over port site 30 minutes before appointment for chemotherapy  Patient not taking: Reported on 1/16/2020 12/7/18   Alexander Garces MD   losartan  "(COZAAR) 50 MG tablet TAKE 1 TABLET (50 MG TOTAL) BY MOUTH ONCE DAILY. 9/7/20   Nancy Poe MD   metFORMIN (GLUCOPHAGE) 500 MG tablet TAKE 1 TABLET (500 MG TOTAL) BY MOUTH DAILY WITH BREAKFAST. 9/7/20   Nancy Poe MD   nicotine (NICODERM CQ) 21 mg/24 hr APPLY 1 PATCH TRANSDERMALLY EVERY 24 HOURS 8/3/18   Historical Provider   nicotine, polacrilex, (NICORETTE) 2 mg Gum CHEW 1 PIECE (2 MG) BY MOUTH 10 TIMES PER DAY AS NEEDED 8/6/18   Historical Provider   turmeric root extract 500 mg Cap Take 1 tablet by mouth once daily.    Historical Provider       Family History:  Family History   Problem Relation Age of Onset    No Known Problems Sister     Breast cancer Paternal Aunt     Breast cancer Paternal Cousin     Breast cancer Maternal Cousin     Aortic aneurysm Mother     Leukemia Father     Breast cancer Paternal Cousin        Social History:  Social History     Tobacco Use    Smoking status: Current Some Day Smoker     Packs/day: 1.00     Years: 30.00     Pack years: 30.00     Types: Cigarettes    Smokeless tobacco: Never Used   Substance Use Topics    Alcohol use: No     Frequency: Never    Drug use: No       Review of Systems:  Review of Systems Comprehensive review of systems otherwise negative. See history/subjective section for more details.    Health Maintainence:    reviewed.     PHYSICAL EXAM     /64 (BP Location: Left arm, Patient Position: Sitting, BP Method: Large (Manual))   Pulse 93   Ht 5' 6" (1.676 m)   Wt 68.6 kg (151 lb 3.8 oz)   SpO2 97%   BMI 24.41 kg/m²     GEN - A+OX4, NAD   HEENT - PERRL, EOMI, OP clear. MMM. L TM ceruminosis.   Neck - No thyromegaly or cervical LAD. No thyroid masses felt.  CV - RRR, no m/r   Chest - CTAB, no wheezing or rhonchi  Abd - S/NT/ND/+BS.   Ext - 2+BDP and radial pulses. No LE edema.   Foot - normal sensation to monofilament. L medial ball of foot w/ callus.  Neuro - PERRL, EOMI, no nystagmus, eyebrow raise, facial sensation, hearing, m of " mastication, smile, palatal raise, shoulder shrug, tongue protrusion symmetric and intact. 5/5 BUE and BLE strength. Sensation to light touch intact throughout. 2+ DTRs. Normal gait.   MSK - No spinal tenderness to palpation. Normal gait.   Skin - No rash.    LABS     Previous labs reviewed.    ASSESSMENT/PLAN     Alia Andre is a 70 y.o. female with  Alia was seen today for annual exam.    Diagnoses and all orders for this visit:    GOMEZ (dyspnea on exertion)  -     BNP; Future; Expected date: 12/17/2020  -     Stress Echo Which stress agent will be used? Pharmacological; Future  -     Complete PFT with bronchodilator; Future  -     CT Neck Chest Without Contrast (XPD); Future; Expected date: 12/17/2020    Hypothyroidism, unspecified type  -     levothyroxine (SYNTHROID) 50 MCG tablet; Take 1 tab on empty stomach an hour before breakfast daily except skip Sundays.  -     TSH; Future; Expected date: 02/17/2021  -     T4, Free; Future; Expected date: 02/17/2021    Thyroid nodule  -     Cancel: US Soft Tissue Head Neck Thyroid; Future; Expected date: 12/17/2020  -     TSH; Future; Expected date: 02/17/2021  -     T4, Free; Future; Expected date: 02/17/2021  -     CT Neck Chest Without Contrast (XPD); Future; Expected date: 12/17/2020    Vitamin D deficiency    Tobacco abuse  -     Cancel: CT Chest Lung Screening Low Dose; Future; Expected date: 12/17/2020    Lung nodules - repeat CT 5/2020  -     Cancel: CT Chest Lung Screening Low Dose; Future; Expected date: 12/17/2020  -     CT Neck Chest Without Contrast (XPD); Future; Expected date: 12/17/2020    Type 2 diabetes mellitus without complication, without long-term current use of insulin  -     metFORMIN (GLUCOPHAGE) 500 MG tablet; Take 2 tablets (1,000 mg total) by mouth 2 (two) times daily with meals.  -     Ambulatory referral/consult to Optometry; Future; Expected date: 12/24/2020  -     aspirin 81 MG Chew; Take 1 tablet (81 mg total) by mouth once daily.  Start on Monday.  -     Comprehensive Metabolic Panel; Future; Expected date: 02/17/2021    Hypertension associated with diabetes  -     Comprehensive Metabolic Panel; Future; Expected date: 02/17/2021    Hyperlipidemia associated with type 2 diabetes mellitus  -     rosuvastatin (CRESTOR) 5 MG tablet; Take 1 tablet (5 mg total) by mouth once daily.  -     Lipid Panel; Future; Expected date: 02/17/2021  -     Comprehensive Metabolic Panel; Future; Expected date: 02/17/2021    Personal history of nicotine dependence   -     Cancel: CT Chest Lung Screening Low Dose; Future; Expected date: 12/17/2020    Encounter for FIT (fecal immunochemical test) screening  -     Fecal Immunochemical Test (iFOBT); Future; Expected date: 12/17/2020    Alkaline phosphatase elevation  -     Comprehensive Metabolic Panel; Future; Expected date: 02/17/2021  -     Gamma GT; Future; Expected date: 02/17/2021    Arthralgia, unspecified joint  -     ibuprofen (ADVIL,MOTRIN) 800 MG tablet; Take 1 tablet (800 mg total) by mouth daily as needed for Pain.    Hoarseness of voice  -     fluticasone propionate (FLONASE) 50 mcg/actuation nasal spray; 1 spray (50 mcg total) by Each Nostril route once daily.    Postnasal drip  -     fluticasone propionate (FLONASE) 50 mcg/actuation nasal spray; 1 spray (50 mcg total) by Each Nostril route once daily.    Excessive cerumen in left ear canal  Generic debrox solution was placed in the L ear x 15 min. Then L ear was gently flushed with warm tap water. Lots of cerumen came out. Pt tolerated procedure well. TM normal on exam.      Tdap and flu vaccine today.     Schedule lung CT, pulmonary function test, stress echo (wants it done in 2021)    Follow up with me in 2 months with labs a few days prior.   Schedule eye exam the same day you come back to see me.     Bring back FITKIT (stool screening for colon cancer) at your next visit.     Decrease thyroid medicine from levothyroxine 50mcg daily to 50mcg daily  except skip Sundays.     Increase metformin from 500mg daily to 500mg twice daily with food.     Start flonase 1 spray each nostril daily to see if it helps with postnasal drip and hoarse voice. If this is not better by the next visit, I'll refer you to ENT.     Will discuss pneumovax and shingrix at next visit.     50 minutes was spent on patient with over half the time was spent in coordination of care and/or counseling.    RTC in 2 months, sooner if needed and depending on labs.    Nancy Poe MD  Department of Internal Medicine - [facility]  7:46 AM

## 2020-12-24 DIAGNOSIS — I15.2 HYPERTENSION ASSOCIATED WITH DIABETES: ICD-10-CM

## 2020-12-24 DIAGNOSIS — E11.59 HYPERTENSION ASSOCIATED WITH DIABETES: ICD-10-CM

## 2020-12-30 DIAGNOSIS — E11.9 TYPE 2 DIABETES MELLITUS WITHOUT COMPLICATION, UNSPECIFIED WHETHER LONG TERM INSULIN USE: ICD-10-CM

## 2021-01-04 ENCOUNTER — PATIENT MESSAGE (OUTPATIENT)
Dept: ADMINISTRATIVE | Facility: HOSPITAL | Age: 71
End: 2021-01-04

## 2021-01-09 ENCOUNTER — TELEPHONE (OUTPATIENT)
Dept: INTERNAL MEDICINE | Facility: CLINIC | Age: 71
End: 2021-01-09

## 2021-01-12 ENCOUNTER — PATIENT OUTREACH (OUTPATIENT)
Dept: ADMINISTRATIVE | Facility: OTHER | Age: 71
End: 2021-01-12

## 2021-03-02 ENCOUNTER — TELEPHONE (OUTPATIENT)
Dept: OPTOMETRY | Facility: CLINIC | Age: 71
End: 2021-03-02

## 2021-03-05 ENCOUNTER — PATIENT MESSAGE (OUTPATIENT)
Dept: ADMINISTRATIVE | Facility: HOSPITAL | Age: 71
End: 2021-03-05

## 2021-03-10 ENCOUNTER — PES CALL (OUTPATIENT)
Dept: ADMINISTRATIVE | Facility: CLINIC | Age: 71
End: 2021-03-10

## 2021-03-12 ENCOUNTER — TELEPHONE (OUTPATIENT)
Dept: ADMINISTRATIVE | Facility: CLINIC | Age: 71
End: 2021-03-12

## 2021-03-30 ENCOUNTER — IMMUNIZATION (OUTPATIENT)
Dept: PRIMARY CARE CLINIC | Facility: CLINIC | Age: 71
End: 2021-03-30
Payer: MEDICARE

## 2021-03-30 DIAGNOSIS — Z23 NEED FOR VACCINATION: Primary | ICD-10-CM

## 2021-03-30 PROCEDURE — 0011A PR IMMUNIZ ADMIN, SARS-COV-2 COVID-19 VACC, 100MCG/0.5ML, 1ST DOSE: ICD-10-PCS | Mod: CV19,S$GLB,, | Performed by: INTERNAL MEDICINE

## 2021-03-30 PROCEDURE — 91301 PR SARS-COV-2 COVID-19 VACCINE, NO PRSV, 100MCG/0.5ML, IM: CPT | Mod: S$GLB,,, | Performed by: INTERNAL MEDICINE

## 2021-03-30 PROCEDURE — 91301 PR SARS-COV-2 COVID-19 VACCINE, NO PRSV, 100MCG/0.5ML, IM: ICD-10-PCS | Mod: S$GLB,,, | Performed by: INTERNAL MEDICINE

## 2021-03-30 PROCEDURE — 0011A PR IMMUNIZ ADMIN, SARS-COV-2 COVID-19 VACC, 100MCG/0.5ML, 1ST DOSE: CPT | Mod: CV19,S$GLB,, | Performed by: INTERNAL MEDICINE

## 2021-03-30 RX ADMIN — Medication 0.5 ML: at 08:03

## 2021-04-05 ENCOUNTER — PATIENT MESSAGE (OUTPATIENT)
Dept: ADMINISTRATIVE | Facility: HOSPITAL | Age: 71
End: 2021-04-05

## 2021-04-28 ENCOUNTER — IMMUNIZATION (OUTPATIENT)
Dept: PRIMARY CARE CLINIC | Facility: CLINIC | Age: 71
End: 2021-04-28
Payer: MEDICARE

## 2021-04-28 DIAGNOSIS — Z23 NEED FOR VACCINATION: Primary | ICD-10-CM

## 2021-04-28 PROCEDURE — 0012A PR IMMUNIZ ADMIN, SARS-COV-2 COVID-19 VACC, 100MCG/0.5ML, 2ND DOSE: CPT | Mod: CV19,S$GLB,, | Performed by: INTERNAL MEDICINE

## 2021-04-28 PROCEDURE — 91301 PR SARS-COV-2 COVID-19 VACCINE, NO PRSV, 100MCG/0.5ML, IM: CPT | Mod: S$GLB,,, | Performed by: INTERNAL MEDICINE

## 2021-04-28 PROCEDURE — 0012A PR IMMUNIZ ADMIN, SARS-COV-2 COVID-19 VACC, 100MCG/0.5ML, 2ND DOSE: ICD-10-PCS | Mod: CV19,S$GLB,, | Performed by: INTERNAL MEDICINE

## 2021-04-28 PROCEDURE — 91301 PR SARS-COV-2 COVID-19 VACCINE, NO PRSV, 100MCG/0.5ML, IM: ICD-10-PCS | Mod: S$GLB,,, | Performed by: INTERNAL MEDICINE

## 2021-04-28 RX ADMIN — Medication 0.5 ML: at 08:04

## 2021-06-01 ENCOUNTER — TELEPHONE (OUTPATIENT)
Dept: ADMINISTRATIVE | Facility: CLINIC | Age: 71
End: 2021-06-01

## 2021-06-08 ENCOUNTER — PATIENT MESSAGE (OUTPATIENT)
Dept: ADMINISTRATIVE | Facility: HOSPITAL | Age: 71
End: 2021-06-08

## 2021-06-08 ENCOUNTER — PATIENT OUTREACH (OUTPATIENT)
Dept: ADMINISTRATIVE | Facility: HOSPITAL | Age: 71
End: 2021-06-08

## 2021-06-08 ENCOUNTER — DOCUMENTATION ONLY (OUTPATIENT)
Dept: ADMINISTRATIVE | Facility: HOSPITAL | Age: 71
End: 2021-06-08

## 2021-06-29 ENCOUNTER — PES CALL (OUTPATIENT)
Dept: ADMINISTRATIVE | Facility: CLINIC | Age: 71
End: 2021-06-29

## 2021-07-07 ENCOUNTER — PATIENT MESSAGE (OUTPATIENT)
Dept: ADMINISTRATIVE | Facility: HOSPITAL | Age: 71
End: 2021-07-07

## 2021-07-30 ENCOUNTER — OFFICE VISIT (OUTPATIENT)
Dept: PODIATRY | Facility: CLINIC | Age: 71
End: 2021-07-30
Payer: MEDICARE

## 2021-07-30 VITALS — WEIGHT: 151 LBS | HEIGHT: 66 IN | BODY MASS INDEX: 24.27 KG/M2

## 2021-07-30 DIAGNOSIS — B35.1 ONYCHOMYCOSIS DUE TO DERMATOPHYTE: Primary | ICD-10-CM

## 2021-07-30 DIAGNOSIS — L84 CORN OR CALLUS: ICD-10-CM

## 2021-07-30 PROCEDURE — 1101F PR PT FALLS ASSESS DOC 0-1 FALLS W/OUT INJ PAST YR: ICD-10-PCS | Mod: CPTII,S$GLB,, | Performed by: PODIATRIST

## 2021-07-30 PROCEDURE — 99499 UNLISTED E&M SERVICE: CPT | Mod: S$GLB,,, | Performed by: PODIATRIST

## 2021-07-30 PROCEDURE — 1126F PR PAIN SEVERITY QUANTIFIED, NO PAIN PRESENT: ICD-10-PCS | Mod: CPTII,S$GLB,, | Performed by: PODIATRIST

## 2021-07-30 PROCEDURE — 1101F PT FALLS ASSESS-DOCD LE1/YR: CPT | Mod: CPTII,S$GLB,, | Performed by: PODIATRIST

## 2021-07-30 PROCEDURE — 1160F PR REVIEW ALL MEDS BY PRESCRIBER/CLIN PHARMACIST DOCUMENTED: ICD-10-PCS | Mod: CPTII,S$GLB,, | Performed by: PODIATRIST

## 2021-07-30 PROCEDURE — 99999 PR PBB SHADOW E&M-EST. PATIENT-LVL III: ICD-10-PCS | Mod: PBBFAC,,, | Performed by: PODIATRIST

## 2021-07-30 PROCEDURE — 3288F PR FALLS RISK ASSESSMENT DOCUMENTED: ICD-10-PCS | Mod: CPTII,S$GLB,, | Performed by: PODIATRIST

## 2021-07-30 PROCEDURE — 1126F AMNT PAIN NOTED NONE PRSNT: CPT | Mod: CPTII,S$GLB,, | Performed by: PODIATRIST

## 2021-07-30 PROCEDURE — 1159F MED LIST DOCD IN RCRD: CPT | Mod: CPTII,S$GLB,, | Performed by: PODIATRIST

## 2021-07-30 PROCEDURE — 99999 PR PBB SHADOW E&M-EST. PATIENT-LVL III: CPT | Mod: PBBFAC,,, | Performed by: PODIATRIST

## 2021-07-30 PROCEDURE — 3008F PR BODY MASS INDEX (BMI) DOCUMENTED: ICD-10-PCS | Mod: CPTII,S$GLB,, | Performed by: PODIATRIST

## 2021-07-30 PROCEDURE — 99204 OFFICE O/P NEW MOD 45 MIN: CPT | Mod: S$GLB,,, | Performed by: PODIATRIST

## 2021-07-30 PROCEDURE — 3008F BODY MASS INDEX DOCD: CPT | Mod: CPTII,S$GLB,, | Performed by: PODIATRIST

## 2021-07-30 PROCEDURE — 99204 PR OFFICE/OUTPT VISIT, NEW, LEVL IV, 45-59 MIN: ICD-10-PCS | Mod: S$GLB,,, | Performed by: PODIATRIST

## 2021-07-30 PROCEDURE — 1160F RVW MEDS BY RX/DR IN RCRD: CPT | Mod: CPTII,S$GLB,, | Performed by: PODIATRIST

## 2021-07-30 PROCEDURE — 3288F FALL RISK ASSESSMENT DOCD: CPT | Mod: CPTII,S$GLB,, | Performed by: PODIATRIST

## 2021-07-30 PROCEDURE — 1159F PR MEDICATION LIST DOCUMENTED IN MEDICAL RECORD: ICD-10-PCS | Mod: CPTII,S$GLB,, | Performed by: PODIATRIST

## 2021-07-30 PROCEDURE — 99499 RISK ADDL DX/OHS AUDIT: ICD-10-PCS | Mod: S$GLB,,, | Performed by: PODIATRIST

## 2021-07-30 RX ORDER — AMMONIUM LACTATE 12 G/100G
CREAM TOPICAL
Qty: 140 G | Refills: 11 | Status: SHIPPED | OUTPATIENT
Start: 2021-07-30 | End: 2023-07-20 | Stop reason: SDUPTHER

## 2021-07-30 RX ORDER — CICLOPIROX 80 MG/ML
SOLUTION TOPICAL NIGHTLY
Qty: 6.6 ML | Refills: 11 | Status: SHIPPED | OUTPATIENT
Start: 2021-07-30 | End: 2023-04-10 | Stop reason: SDUPTHER

## 2021-10-04 ENCOUNTER — PATIENT MESSAGE (OUTPATIENT)
Dept: ADMINISTRATIVE | Facility: HOSPITAL | Age: 71
End: 2021-10-04

## 2021-11-30 ENCOUNTER — PATIENT MESSAGE (OUTPATIENT)
Dept: ADMINISTRATIVE | Facility: HOSPITAL | Age: 71
End: 2021-11-30
Payer: MEDICARE

## 2021-11-30 ENCOUNTER — PATIENT OUTREACH (OUTPATIENT)
Dept: ADMINISTRATIVE | Facility: HOSPITAL | Age: 71
End: 2021-11-30
Payer: MEDICARE

## 2021-11-30 DIAGNOSIS — E11.9 TYPE 2 DIABETES MELLITUS WITHOUT COMPLICATION, WITHOUT LONG-TERM CURRENT USE OF INSULIN: Primary | ICD-10-CM

## 2021-12-06 ENCOUNTER — TELEPHONE (OUTPATIENT)
Dept: INTERNAL MEDICINE | Facility: CLINIC | Age: 71
End: 2021-12-06
Payer: MEDICARE

## 2021-12-06 DIAGNOSIS — Z12.31 ENCOUNTER FOR SCREENING MAMMOGRAM FOR MALIGNANT NEOPLASM OF BREAST: Primary | ICD-10-CM

## 2021-12-10 DIAGNOSIS — R49.0 HOARSENESS OF VOICE: ICD-10-CM

## 2021-12-10 DIAGNOSIS — E03.9 HYPOTHYROIDISM, UNSPECIFIED TYPE: ICD-10-CM

## 2021-12-10 DIAGNOSIS — R09.82 POSTNASAL DRIP: ICD-10-CM

## 2021-12-11 RX ORDER — FLUTICASONE PROPIONATE 50 MCG
SPRAY, SUSPENSION (ML) NASAL
Qty: 48 G | Refills: 0 | Status: SHIPPED | OUTPATIENT
Start: 2021-12-11 | End: 2022-03-02

## 2021-12-13 RX ORDER — LEVOTHYROXINE SODIUM 50 UG/1
TABLET ORAL
Qty: 78 TABLET | Refills: 0 | Status: SHIPPED | OUTPATIENT
Start: 2021-12-13 | End: 2022-03-02

## 2021-12-15 DIAGNOSIS — I15.2 HYPERTENSION ASSOCIATED WITH DIABETES: ICD-10-CM

## 2021-12-15 DIAGNOSIS — E11.59 HYPERTENSION ASSOCIATED WITH DIABETES: ICD-10-CM

## 2021-12-16 RX ORDER — LOSARTAN POTASSIUM 50 MG/1
TABLET ORAL
Qty: 90 TABLET | Refills: 3 | Status: SHIPPED | OUTPATIENT
Start: 2021-12-16 | End: 2022-10-05

## 2022-02-16 ENCOUNTER — HOSPITAL ENCOUNTER (EMERGENCY)
Facility: HOSPITAL | Age: 72
Discharge: HOME | End: 2022-02-16
Attending: EMERGENCY MEDICINE
Payer: MEDICARE

## 2022-02-16 VITALS
TEMPERATURE: 99 F | OXYGEN SATURATION: 97 % | HEIGHT: 66 IN | SYSTOLIC BLOOD PRESSURE: 142 MMHG | RESPIRATION RATE: 19 BRPM | WEIGHT: 142 LBS | BODY MASS INDEX: 22.82 KG/M2 | DIASTOLIC BLOOD PRESSURE: 61 MMHG | HEART RATE: 99 BPM

## 2022-02-16 DIAGNOSIS — M79.671 PAIN OF RIGHT HEEL: ICD-10-CM

## 2022-02-16 DIAGNOSIS — M77.31 CALCANEAL SPUR OF RIGHT FOOT: ICD-10-CM

## 2022-02-16 DIAGNOSIS — T25.221A PARTIAL THICKNESS BURN OF RIGHT FOOT, INITIAL ENCOUNTER: Primary | ICD-10-CM

## 2022-02-16 LAB — POCT GLUCOSE: 146 MG/DL (ref 70–110)

## 2022-02-16 PROCEDURE — 63600175 PHARM REV CODE 636 W HCPCS: Mod: HCNC,ER | Performed by: NURSE PRACTITIONER

## 2022-02-16 PROCEDURE — 99284 EMERGENCY DEPT VISIT MOD MDM: CPT | Mod: 25,HCNC,ER

## 2022-02-16 PROCEDURE — 25000003 PHARM REV CODE 250: Mod: HCNC,ER | Performed by: NURSE PRACTITIONER

## 2022-02-16 PROCEDURE — 90471 IMMUNIZATION ADMIN: CPT | Mod: HCNC,ER | Performed by: NURSE PRACTITIONER

## 2022-02-16 PROCEDURE — 90715 TDAP VACCINE 7 YRS/> IM: CPT | Mod: HCNC,ER | Performed by: NURSE PRACTITIONER

## 2022-02-16 PROCEDURE — 82962 GLUCOSE BLOOD TEST: CPT | Mod: HCNC,ER

## 2022-02-16 RX ORDER — MUPIROCIN 20 MG/G
OINTMENT TOPICAL 3 TIMES DAILY
Qty: 15 G | Refills: 0 | Status: SHIPPED | OUTPATIENT
Start: 2022-02-16 | End: 2023-04-10

## 2022-02-16 RX ORDER — HYDROCODONE BITARTRATE AND ACETAMINOPHEN 5; 325 MG/1; MG/1
1 TABLET ORAL EVERY 6 HOURS PRN
Qty: 6 TABLET | Refills: 0 | Status: SHIPPED | OUTPATIENT
Start: 2022-02-16 | End: 2022-06-01

## 2022-02-16 RX ORDER — SILVER SULFADIAZINE 10 G/1000G
CREAM TOPICAL
Status: COMPLETED | OUTPATIENT
Start: 2022-02-16 | End: 2022-02-16

## 2022-02-16 RX ADMIN — SILVER SULFADIAZINE: 10 CREAM TOPICAL at 05:02

## 2022-02-16 RX ADMIN — TETANUS TOXOID, REDUCED DIPHTHERIA TOXOID AND ACELLULAR PERTUSSIS VACCINE, ADSORBED 0.5 ML: 5; 2.5; 8; 8; 2.5 SUSPENSION INTRAMUSCULAR at 05:02
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
## 2022-02-17 NOTE — DISCHARGE INSTRUCTIONS
You have been prescribed NORCO for pain. Please do not take this medication while working, drinking alcohol, swimming, or while driving/operating heavy machinery. This medication may cause drowsiness, impair judgment, and reduce physical capabilities.This medication contains Tylenol. Please do not take any additional Tylenol while you are taking this medication.      Please keep your wound clean and dry.  Wash gently with soap and water and apply antibiotic ointment (bacitracin, neosporin, etc.) over the wound after washing. Please watch for signs of infection including: increased\spreading redness, swelling, pus-like discharge, or a fever greater than 100.4F. If you experience any of these, please contact your Primary Care Doctor or Return to the Emergency Department for a wound check.     Please return to the ER for any new or worsening symptoms.

## 2022-02-23 ENCOUNTER — HOSPITAL ENCOUNTER (OUTPATIENT)
Dept: WOUND CARE | Facility: HOSPITAL | Age: 72
Discharge: HOME | End: 2022-02-23
Attending: FAMILY MEDICINE
Payer: MEDICARE

## 2022-02-23 VITALS
HEART RATE: 88 BPM | DIASTOLIC BLOOD PRESSURE: 78 MMHG | TEMPERATURE: 98 F | RESPIRATION RATE: 20 BRPM | WEIGHT: 146.5 LBS | BODY MASS INDEX: 24.41 KG/M2 | HEIGHT: 65 IN | SYSTOLIC BLOOD PRESSURE: 172 MMHG

## 2022-02-23 DIAGNOSIS — E11.622 DIABETIC ULCER OF ANKLE WITH FAT LAYER EXPOSED: Primary | ICD-10-CM

## 2022-02-23 DIAGNOSIS — L97.302 DIABETIC ULCER OF ANKLE WITH FAT LAYER EXPOSED: Primary | ICD-10-CM

## 2022-02-23 DIAGNOSIS — E11.621 DIABETIC ULCER OF RIGHT FOOT ASSOCIATED WITH TYPE 2 DIABETES MELLITUS, WITH FAT LAYER EXPOSED, UNSPECIFIED PART OF FOOT: Primary | ICD-10-CM

## 2022-02-23 DIAGNOSIS — T25.221D PARTIAL THICKNESS BURN OF RIGHT FOOT, SUBSEQUENT ENCOUNTER: ICD-10-CM

## 2022-02-23 DIAGNOSIS — L97.512 DIABETIC ULCER OF RIGHT FOOT ASSOCIATED WITH TYPE 2 DIABETES MELLITUS, WITH FAT LAYER EXPOSED, UNSPECIFIED PART OF FOOT: Primary | ICD-10-CM

## 2022-02-23 PROCEDURE — 11042 DBRDMT SUBQ TIS 1ST 20SQCM/<: CPT | Mod: HCNC | Performed by: FAMILY MEDICINE

## 2022-02-23 PROCEDURE — 11042 DBRDMT SUBQ TIS 1ST 20SQCM/<: CPT | Mod: HCNC,,, | Performed by: FAMILY MEDICINE

## 2022-02-23 PROCEDURE — 99214 OFFICE O/P EST MOD 30 MIN: CPT | Mod: 25,HCNC,, | Performed by: FAMILY MEDICINE

## 2022-02-23 PROCEDURE — 27201912 HC WOUND CARE DEBRIDEMENT SUPPLIES: Mod: HCNC | Performed by: FAMILY MEDICINE

## 2022-02-23 PROCEDURE — 99214 PR OFFICE/OUTPT VISIT, EST, LEVL IV, 30-39 MIN: ICD-10-PCS | Mod: 25,HCNC,, | Performed by: FAMILY MEDICINE

## 2022-02-23 PROCEDURE — 11042 DEBRIDEMENT: ICD-10-PCS | Mod: HCNC,,, | Performed by: FAMILY MEDICINE
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
## 2022-03-01 DIAGNOSIS — R09.82 POSTNASAL DRIP: ICD-10-CM

## 2022-03-01 DIAGNOSIS — R49.0 HOARSENESS OF VOICE: ICD-10-CM

## 2022-03-01 DIAGNOSIS — E03.9 HYPOTHYROIDISM, UNSPECIFIED TYPE: ICD-10-CM

## 2022-03-02 ENCOUNTER — HOSPITAL ENCOUNTER (OUTPATIENT)
Dept: WOUND CARE | Facility: HOSPITAL | Age: 72
Discharge: HOME | End: 2022-03-02
Attending: FAMILY MEDICINE
Payer: MEDICARE

## 2022-03-02 ENCOUNTER — PATIENT MESSAGE (OUTPATIENT)
Dept: PRIMARY CARE CLINIC | Facility: CLINIC | Age: 72
End: 2022-03-02
Payer: MEDICARE

## 2022-03-02 VITALS — DIASTOLIC BLOOD PRESSURE: 79 MMHG | TEMPERATURE: 97 F | SYSTOLIC BLOOD PRESSURE: 172 MMHG | HEART RATE: 70 BPM

## 2022-03-02 DIAGNOSIS — L97.302 DIABETIC ULCER OF ANKLE WITH FAT LAYER EXPOSED: Primary | ICD-10-CM

## 2022-03-02 DIAGNOSIS — E11.622 DIABETIC ULCER OF ANKLE WITH FAT LAYER EXPOSED: Primary | ICD-10-CM

## 2022-03-02 DIAGNOSIS — T25.221D PARTIAL THICKNESS BURN OF RIGHT FOOT, SUBSEQUENT ENCOUNTER: ICD-10-CM

## 2022-03-02 PROCEDURE — 17250 CHEM CAUT OF GRANLTJ TISSUE: CPT | Performed by: FAMILY MEDICINE

## 2022-03-02 PROCEDURE — 99214 OFFICE O/P EST MOD 30 MIN: CPT | Mod: ,,, | Performed by: FAMILY MEDICINE

## 2022-03-02 PROCEDURE — 99214 PR OFFICE/OUTPT VISIT, EST, LEVL IV, 30-39 MIN: ICD-10-PCS | Mod: ,,, | Performed by: FAMILY MEDICINE

## 2022-03-02 RX ORDER — FLUTICASONE PROPIONATE 50 MCG
SPRAY, SUSPENSION (ML) NASAL
Qty: 32 G | Refills: 0 | Status: SHIPPED | OUTPATIENT
Start: 2022-03-02 | End: 2022-05-10

## 2022-03-02 RX ORDER — LEVOTHYROXINE SODIUM 50 UG/1
TABLET ORAL
Qty: 78 TABLET | Refills: 0 | Status: SHIPPED | OUTPATIENT
Start: 2022-03-02 | End: 2022-05-02 | Stop reason: SDUPTHER

## 2022-03-02 NOTE — TELEPHONE ENCOUNTER
Please call and remind the patient it's time for the annual and schedule if possible in the next mo or two. Last seen pt 2020.
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
## 2022-03-09 ENCOUNTER — HOSPITAL ENCOUNTER (OUTPATIENT)
Dept: WOUND CARE | Facility: HOSPITAL | Age: 72
Discharge: HOME | End: 2022-03-09
Attending: FAMILY MEDICINE
Payer: MEDICARE

## 2022-03-09 VITALS — DIASTOLIC BLOOD PRESSURE: 70 MMHG | HEART RATE: 72 BPM | TEMPERATURE: 98 F | SYSTOLIC BLOOD PRESSURE: 152 MMHG

## 2022-03-09 DIAGNOSIS — E11.622 DIABETIC ULCER OF ANKLE WITH FAT LAYER EXPOSED: Primary | ICD-10-CM

## 2022-03-09 DIAGNOSIS — L97.302 DIABETIC ULCER OF ANKLE WITH FAT LAYER EXPOSED: Primary | ICD-10-CM

## 2022-03-09 PROCEDURE — 99213 OFFICE O/P EST LOW 20 MIN: CPT | Performed by: FAMILY MEDICINE

## 2022-03-09 PROCEDURE — 99214 PR OFFICE/OUTPT VISIT, EST, LEVL IV, 30-39 MIN: ICD-10-PCS | Mod: ,,, | Performed by: FAMILY MEDICINE

## 2022-03-09 PROCEDURE — 99214 OFFICE O/P EST MOD 30 MIN: CPT | Mod: ,,, | Performed by: FAMILY MEDICINE

## 2022-03-09 NOTE — PROGRESS NOTES
[facility]                Progress Note    Subjective:       Patient ID: Alia Andre is a 71 y.o. female.    Chief Complaint: No chief complaint on file.    Walked to clinic unaided. Continues to complain of pain 8 on 1-10 scale no complaints when wound site cleaned. No opening noted but small 0.1 circumference fibrin over where wound site was. Iodosorb applied with tegaderm. Asked if I would prescribe pain med for ankle pain not related to burn.      Review of Systems   Constitutional: Negative.    HENT: Negative.    Eyes: Negative.    Respiratory: Negative.    Cardiovascular: Negative.    Gastrointestinal: Negative.    Skin: Positive for wound.   Psychiatric/Behavioral: Negative.          Objective:        Physical Exam  Constitutional:       Appearance: Normal appearance.   HENT:      Head: Normocephalic and atraumatic.      Mouth/Throat:      Mouth: Mucous membranes are moist.   Eyes:      Extraocular Movements: Extraocular movements intact.      Conjunctiva/sclera: Conjunctivae normal.      Pupils: Pupils are equal, round, and reactive to light.   Cardiovascular:      Rate and Rhythm: Normal rate and regular rhythm.   Pulmonary:      Effort: Pulmonary effort is normal. No respiratory distress.   Abdominal:      General: There is no distension.      Palpations: Abdomen is soft.   Skin:     General: Skin is warm and dry.      Comments: + burn to ankle with small eschar in place   Neurological:      Mental Status: She is alert and oriented to person, place, and time. Mental status is at baseline.         Vitals:    03/09/22 1031   BP: (!) 152/70   Pulse: 72   Temp: 97.7 °F (36.5 °C)       Assessment:           ICD-10-CM ICD-9-CM   1. 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  E11.622 250.80    L97.302 707.13            Wound 02/23/22 0918 Blister(s) Right anterior;dorsal Foot (Active)   02/23/22 0918    Pre-existing: Yes   Primary Wound Type: Blister(s)    Side: Right   Orientation: anterior;dorsal   Location: Foot   Wound Number:    Ankle-Brachial Index:    Pulses:    Removal Indication and Assessment:    Wound Outcome:    (Retired) Wound Type:    (Retired) Wound Length (cm):    (Retired) Wound Width (cm):    (Retired) Depth (cm):    Wound Description (Comments):    Removal Indications:    Wound Image   03/09/22 1000   Wound WDL ex 03/09/22 1000   Dressing Appearance Dry;Intact 03/09/22 1000   Drainage Amount None 03/09/22 1000   Appearance Intact;Maroon 03/09/22 1000   Red (%), Wound Tissue Color 100 % 03/09/22 1000   Periwound Area Dry;Intact 03/09/22 1000   Wound Length (cm) 0.1 cm 03/09/22 1000   Wound Width (cm) 0.1 cm 03/09/22 1000   Wound Surface Area (cm^2) 0.01 cm^2 03/09/22 1000   Care Cleansed with:;Antimicrobial agent;Sterile normal saline 03/09/22 1000   Dressing Change Due 03/16/22 03/09/22 1000           Plan:                Orders Placed This Encounter   Procedures    Change dressing     Iodosorb to fibrin cap, Benzoin to kevin wound and Tegafoam.   patient instructed to contact PCP for pain in ankle as it is not related to burn.  Burn looks mostly healed with no evidence of infection       Follow up in about 1 week (around 3/16/2022).     Mingo Jasmine MD

## 2022-03-14 ENCOUNTER — TELEPHONE (OUTPATIENT)
Dept: FAMILY MEDICINE | Facility: CLINIC | Age: 72
End: 2022-03-14
Payer: MEDICARE

## 2022-03-14 NOTE — TELEPHONE ENCOUNTER
----- Message from Rita Malagon sent at 3/14/2022  9:13 AM CDT -----  Regarding: self [phone]  Type: Patient Call Back    Who called: self    What is the request in detail: patient would like to reschedule Wound care appt but system would not let me. Would like to see if something is available on 3/15.    Can the clinic reply by [facility]? no    Would the patient rather a call back or a response via [facility]? Call back    Best call back number: [phone]

## 2022-03-23 ENCOUNTER — HOSPITAL ENCOUNTER (OUTPATIENT)
Dept: WOUND CARE | Facility: HOSPITAL | Age: 72
Discharge: HOME | End: 2022-03-23
Attending: FAMILY MEDICINE
Payer: MEDICARE

## 2022-03-23 VITALS — TEMPERATURE: 98 F | HEART RATE: 93 BPM | DIASTOLIC BLOOD PRESSURE: 90 MMHG | SYSTOLIC BLOOD PRESSURE: 195 MMHG

## 2022-03-23 DIAGNOSIS — L97.302 DIABETIC ULCER OF ANKLE WITH FAT LAYER EXPOSED: Primary | ICD-10-CM

## 2022-03-23 DIAGNOSIS — T25.221D PARTIAL THICKNESS BURN OF RIGHT FOOT, SUBSEQUENT ENCOUNTER: ICD-10-CM

## 2022-03-23 DIAGNOSIS — E11.621 DIABETIC ULCER OF RIGHT FOOT ASSOCIATED WITH TYPE 2 DIABETES MELLITUS, WITH FAT LAYER EXPOSED, UNSPECIFIED PART OF FOOT: ICD-10-CM

## 2022-03-23 DIAGNOSIS — L97.512 DIABETIC ULCER OF RIGHT FOOT ASSOCIATED WITH TYPE 2 DIABETES MELLITUS, WITH FAT LAYER EXPOSED, UNSPECIFIED PART OF FOOT: ICD-10-CM

## 2022-03-23 DIAGNOSIS — E11.622 DIABETIC ULCER OF ANKLE WITH FAT LAYER EXPOSED: Primary | ICD-10-CM

## 2022-03-23 PROCEDURE — 99214 PR OFFICE/OUTPT VISIT, EST, LEVL IV, 30-39 MIN: ICD-10-PCS | Mod: ,,, | Performed by: FAMILY MEDICINE

## 2022-03-23 PROCEDURE — 99214 OFFICE O/P EST MOD 30 MIN: CPT | Mod: ,,, | Performed by: FAMILY MEDICINE

## 2022-03-23 PROCEDURE — 99213 OFFICE O/P EST LOW 20 MIN: CPT | Performed by: FAMILY MEDICINE
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
## 2022-04-07 ENCOUNTER — HOSPITAL ENCOUNTER (OUTPATIENT)
Dept: WOUND CARE | Facility: HOSPITAL | Age: 72
Discharge: HOME | End: 2022-04-07
Attending: FAMILY MEDICINE
Payer: MEDICARE

## 2022-04-07 VITALS
HEART RATE: 77 BPM | RESPIRATION RATE: 20 BRPM | DIASTOLIC BLOOD PRESSURE: 72 MMHG | SYSTOLIC BLOOD PRESSURE: 166 MMHG | TEMPERATURE: 98 F

## 2022-04-07 DIAGNOSIS — L97.302 DIABETIC ULCER OF ANKLE WITH FAT LAYER EXPOSED: Primary | ICD-10-CM

## 2022-04-07 DIAGNOSIS — E11.622 DIABETIC ULCER OF ANKLE WITH FAT LAYER EXPOSED: Primary | ICD-10-CM

## 2022-04-07 PROCEDURE — 99213 PR OFFICE/OUTPT VISIT, EST, LEVL III, 20-29 MIN: ICD-10-PCS | Mod: ,,, | Performed by: FAMILY MEDICINE

## 2022-04-07 PROCEDURE — 99213 OFFICE O/P EST LOW 20 MIN: CPT | Mod: ,,, | Performed by: FAMILY MEDICINE

## 2022-04-07 PROCEDURE — 99212 OFFICE O/P EST SF 10 MIN: CPT | Performed by: FAMILY MEDICINE

## 2022-04-07 NOTE — PROGRESS NOTES
[facility]                Progress Note    Subjective:       Patient ID: Alia Andre is a 71 y.o. female.    Chief Complaint: Wound Check    F/u wound care visit. Patient ambulatory to exam room with no difficulty noted. Pt c/o 7/10 pain to right foot. Wound dressing to right anterior foot dry and intact with no drainage noted. Wound to right anterior foot is healed. Patient instructed to keep area dry and to report any problems or concerns to C, pt verbalized understanding.       Review of Systems   Constitutional: Negative.    HENT: Negative.    Eyes: Negative.    Respiratory: Negative.    Cardiovascular: Negative.    Gastrointestinal: Negative.    Endocrine: Negative.    Musculoskeletal: Negative.    Skin: Negative for wound.   All other systems reviewed and are negative.        Objective:        Physical Exam  Vitals reviewed.   Constitutional:       Appearance: She is well-developed.   HENT:      Head: Normocephalic and atraumatic.   Eyes:      Conjunctiva/sclera: Conjunctivae normal.      Pupils: Pupils are equal, round, and reactive to light.   Skin:     General: Skin is warm and dry.   Neurological:      Mental Status: She is alert and oriented to person, place, and time.   Psychiatric:         Behavior: Behavior normal.         Vitals:    04/07/22 0816   BP: (!) 166/72   Pulse: 77   Resp: 20   Temp: 97.7 °F (36.5 °C)       Assessment:           ICD-10-CM ICD-9-CM   1. 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  E11.622 250.80    L97.302 707.13       [REMOVED]      Wound 02/23/22 0918 Blister(s) Right anterior Foot (Removed)   02/23/22 0918    Pre-existing: Yes   Primary Wound Type: Blister(s)   Side: Right   Orientation: anterior   Location: Foot   Wound Number:    Ankle-Brachial Index:    Pulses:    Removal Indication and Assessment:    Wound Outcome: Healed   (Retired) Wound Type:    (Retired) Wound Length (cm):    (Retired) Wound Width (cm):     (Retired) Depth (cm):    Wound Description (Comments):    Removal Indications:    Removed 04/07/22 0820   Wound Image   04/07/22 0800   Wound WDL WDL 04/07/22 0800   Dressing Appearance Intact;Dry 04/07/22 0800   Drainage Amount None 04/07/22 0800   Appearance Epithelialization 04/07/22 0800   Tissue loss description Not applicable 04/07/22 0800   Wound Edges Rolled/closed 04/07/22 0800   Wound Length (cm) 0 cm 04/07/22 0800   Wound Width (cm) 0 cm 04/07/22 0800   Wound Depth (cm) 0 cm 04/07/22 0800   Wound Volume (cm^3) 0 cm^3 04/07/22 0800   Wound Surface Area (cm^2) 0 cm^2 04/07/22 0800   Care Cleansed with:;Sterile normal saline;Soap and water 04/07/22 0800           Plan:          1. Debridement not needed and Not Done today.   2. Wound healed  3. Continue eating protein   4. Keep dressing clean and intact  5. Continue with wound care orders and plan as noted in orders.   6. Continue to follow current medication regimen as per pcp   7. Call for any questions / concerns.           Healed Wound Discharge Instructions    Congratulations your wound is healed.  Thank you for working with [facility] to reach this goal. Please note the following instructions:     1. You may apply moisturizing cream to the healed area.  Apply once a day as        needed for the next 14 days.         If you had a previous foot wound do not put moisturizer between your toes.    2. Do not allow the area to soak in water for an extended time period.        You can cleans the area with mild soap and water.         DO NOT USE PEROOXIDE      3. It will take several weeks for the tensile strength of your skin to reach 100%.        This is new skin.      4. Do not allow excessive moisture to accumulate in the area for the next 6      weeks.  Increased moisture will cause your new skin to breakdown.     5. Please monitor for edema (swelling) in the area of your former wound.  This      can cause your skin to  breakdown and your wound will reopen.     No orders of the defined types were placed in this encounter.       Follow up if symptoms worsen or fail to improve.

## 2022-04-28 ENCOUNTER — LAB VISIT (OUTPATIENT)
Dept: LAB | Facility: HOSPITAL | Age: 72
End: 2022-04-28
Attending: INTERNAL MEDICINE
Payer: MEDICARE

## 2022-04-28 ENCOUNTER — OFFICE VISIT (OUTPATIENT)
Dept: PRIMARY CARE CLINIC | Facility: CLINIC | Age: 72
End: 2022-04-28
Payer: MEDICARE

## 2022-04-28 VITALS
HEART RATE: 84 BPM | HEIGHT: 66 IN | OXYGEN SATURATION: 98 % | WEIGHT: 145.31 LBS | DIASTOLIC BLOOD PRESSURE: 74 MMHG | BODY MASS INDEX: 23.35 KG/M2 | TEMPERATURE: 98 F | SYSTOLIC BLOOD PRESSURE: 160 MMHG

## 2022-04-28 DIAGNOSIS — I70.0 AORTIC ATHEROSCLEROSIS: ICD-10-CM

## 2022-04-28 DIAGNOSIS — R91.1 LUNG NODULE: ICD-10-CM

## 2022-04-28 DIAGNOSIS — E11.69 HYPERLIPIDEMIA ASSOCIATED WITH TYPE 2 DIABETES MELLITUS: ICD-10-CM

## 2022-04-28 DIAGNOSIS — E03.9 HYPOTHYROIDISM, UNSPECIFIED TYPE: ICD-10-CM

## 2022-04-28 DIAGNOSIS — Z12.11 COLON CANCER SCREENING: ICD-10-CM

## 2022-04-28 DIAGNOSIS — Z72.0 TOBACCO ABUSE: ICD-10-CM

## 2022-04-28 DIAGNOSIS — Z90.11 S/P RIGHT MASTECTOMY: ICD-10-CM

## 2022-04-28 DIAGNOSIS — I10 ESSENTIAL HYPERTENSION: ICD-10-CM

## 2022-04-28 DIAGNOSIS — E78.5 HYPERLIPIDEMIA ASSOCIATED WITH TYPE 2 DIABETES MELLITUS: ICD-10-CM

## 2022-04-28 DIAGNOSIS — F32.1 CURRENT MODERATE EPISODE OF MAJOR DEPRESSIVE DISORDER WITHOUT PRIOR EPISODE: ICD-10-CM

## 2022-04-28 DIAGNOSIS — E11.65 UNCONTROLLED TYPE 2 DIABETES MELLITUS WITH HYPERGLYCEMIA: ICD-10-CM

## 2022-04-28 DIAGNOSIS — E04.1 THYROID NODULE: ICD-10-CM

## 2022-04-28 DIAGNOSIS — C50.211 MALIGNANT NEOPLASM OF UPPER-INNER QUADRANT OF RIGHT BREAST IN FEMALE, ESTROGEN RECEPTOR POSITIVE: ICD-10-CM

## 2022-04-28 DIAGNOSIS — R74.8 ELEVATED ALKALINE PHOSPHATASE LEVEL: ICD-10-CM

## 2022-04-28 DIAGNOSIS — E11.65 UNCONTROLLED TYPE 2 DIABETES MELLITUS WITH HYPERGLYCEMIA: Primary | ICD-10-CM

## 2022-04-28 DIAGNOSIS — Z17.0 MALIGNANT NEOPLASM OF UPPER-INNER QUADRANT OF RIGHT BREAST IN FEMALE, ESTROGEN RECEPTOR POSITIVE: ICD-10-CM

## 2022-04-28 DIAGNOSIS — Z78.0 POSTMENOPAUSAL ESTROGEN DEFICIENCY: ICD-10-CM

## 2022-04-28 DIAGNOSIS — Z87.891 PERSONAL HISTORY OF NICOTINE DEPENDENCE: ICD-10-CM

## 2022-04-28 DIAGNOSIS — J31.0 CHRONIC RHINITIS: ICD-10-CM

## 2022-04-28 LAB
ALBUMIN SERPL BCP-MCNC: 3.9 G/DL (ref 3.5–5.2)
ALP SERPL-CCNC: 122 U/L (ref 55–135)
ALT SERPL W/O P-5'-P-CCNC: 17 U/L (ref 10–44)
ANION GAP SERPL CALC-SCNC: 9 MMOL/L (ref 8–16)
AST SERPL-CCNC: 15 U/L (ref 10–40)
BASOPHILS # BLD AUTO: 0.05 K/UL (ref 0–0.2)
BASOPHILS NFR BLD: 0.8 % (ref 0–1.9)
BILIRUB SERPL-MCNC: 0.4 MG/DL (ref 0.1–1)
BUN SERPL-MCNC: 15 MG/DL (ref 8–23)
CALCIUM SERPL-MCNC: 10 MG/DL (ref 8.7–10.5)
CHLORIDE SERPL-SCNC: 105 MMOL/L (ref 95–110)
CHOLEST SERPL-MCNC: 181 MG/DL (ref 120–199)
CHOLEST/HDLC SERPL: 4.8 {RATIO} (ref 2–5)
CO2 SERPL-SCNC: 25 MMOL/L (ref 23–29)
CREAT SERPL-MCNC: 0.8 MG/DL (ref 0.5–1.4)
DIFFERENTIAL METHOD: NORMAL
EOSINOPHIL # BLD AUTO: 0.2 K/UL (ref 0–0.5)
EOSINOPHIL NFR BLD: 2.5 % (ref 0–8)
ERYTHROCYTE [DISTWIDTH] IN BLOOD BY AUTOMATED COUNT: 14.3 % (ref 11.5–14.5)
EST. GFR  (AFRICAN AMERICAN): >60 ML/MIN/1.73 M^2
EST. GFR  (NON AFRICAN AMERICAN): >60 ML/MIN/1.73 M^2
ESTIMATED AVG GLUCOSE: 169 MG/DL (ref 68–131)
GGT SERPL-CCNC: 43 U/L (ref 8–55)
GLUCOSE SERPL-MCNC: 124 MG/DL (ref 70–110)
HBA1C MFR BLD: 7.5 % (ref 4–5.6)
HCT VFR BLD AUTO: 44.3 % (ref 37–48.5)
HDLC SERPL-MCNC: 38 MG/DL (ref 40–75)
HDLC SERPL: 21 % (ref 20–50)
HGB BLD-MCNC: 14.2 G/DL (ref 12–16)
IMM GRANULOCYTES # BLD AUTO: 0.02 K/UL (ref 0–0.04)
IMM GRANULOCYTES NFR BLD AUTO: 0.3 % (ref 0–0.5)
LDLC SERPL CALC-MCNC: 112.6 MG/DL (ref 63–159)
LYMPHOCYTES # BLD AUTO: 2.1 K/UL (ref 1–4.8)
LYMPHOCYTES NFR BLD: 33.8 % (ref 18–48)
MCH RBC QN AUTO: 28.3 PG (ref 27–31)
MCHC RBC AUTO-ENTMCNC: 32.1 G/DL (ref 32–36)
MCV RBC AUTO: 88 FL (ref 82–98)
MONOCYTES # BLD AUTO: 0.5 K/UL (ref 0.3–1)
MONOCYTES NFR BLD: 7.3 % (ref 4–15)
NEUTROPHILS # BLD AUTO: 3.5 K/UL (ref 1.8–7.7)
NEUTROPHILS NFR BLD: 55.3 % (ref 38–73)
NONHDLC SERPL-MCNC: 143 MG/DL
NRBC BLD-RTO: 0 /100 WBC
PLATELET # BLD AUTO: 318 K/UL (ref 150–450)
PMV BLD AUTO: 10.2 FL (ref 9.2–12.9)
POTASSIUM SERPL-SCNC: 4.2 MMOL/L (ref 3.5–5.1)
PROT SERPL-MCNC: 8.1 G/DL (ref 6–8.4)
RBC # BLD AUTO: 5.02 M/UL (ref 4–5.4)
SODIUM SERPL-SCNC: 139 MMOL/L (ref 136–145)
TRIGL SERPL-MCNC: 152 MG/DL (ref 30–150)
WBC # BLD AUTO: 6.34 K/UL (ref 3.9–12.7)

## 2022-04-28 PROCEDURE — 1125F PR PAIN SEVERITY QUANTIFIED, PAIN PRESENT: ICD-10-PCS | Mod: CPTII,S$GLB,, | Performed by: INTERNAL MEDICINE

## 2022-04-28 PROCEDURE — 1160F RVW MEDS BY RX/DR IN RCRD: CPT | Mod: CPTII,S$GLB,, | Performed by: INTERNAL MEDICINE

## 2022-04-28 PROCEDURE — 4010F PR ACE/ARB THEARPY RXD/TAKEN: ICD-10-PCS | Mod: CPTII,S$GLB,, | Performed by: INTERNAL MEDICINE

## 2022-04-28 PROCEDURE — 85025 COMPLETE CBC W/AUTO DIFF WBC: CPT | Performed by: INTERNAL MEDICINE

## 2022-04-28 PROCEDURE — 99999 PR PBB SHADOW E&M-EST. PATIENT-LVL V: ICD-10-PCS | Mod: PBBFAC,,, | Performed by: INTERNAL MEDICINE

## 2022-04-28 PROCEDURE — 3008F PR BODY MASS INDEX (BMI) DOCUMENTED: ICD-10-PCS | Mod: CPTII,S$GLB,, | Performed by: INTERNAL MEDICINE

## 2022-04-28 PROCEDURE — 82977 ASSAY OF GGT: CPT | Performed by: INTERNAL MEDICINE

## 2022-04-28 PROCEDURE — 3078F PR MOST RECENT DIASTOLIC BLOOD PRESSURE < 80 MM HG: ICD-10-PCS | Mod: CPTII,S$GLB,, | Performed by: INTERNAL MEDICINE

## 2022-04-28 PROCEDURE — 36415 COLL VENOUS BLD VENIPUNCTURE: CPT | Mod: PN | Performed by: INTERNAL MEDICINE

## 2022-04-28 PROCEDURE — 1159F MED LIST DOCD IN RCRD: CPT | Mod: CPTII,S$GLB,, | Performed by: INTERNAL MEDICINE

## 2022-04-28 PROCEDURE — 3288F PR FALLS RISK ASSESSMENT DOCUMENTED: ICD-10-PCS | Mod: CPTII,S$GLB,, | Performed by: INTERNAL MEDICINE

## 2022-04-28 PROCEDURE — 99999 PR PBB SHADOW E&M-EST. PATIENT-LVL V: CPT | Mod: PBBFAC,,, | Performed by: INTERNAL MEDICINE

## 2022-04-28 PROCEDURE — 1101F PT FALLS ASSESS-DOCD LE1/YR: CPT | Mod: CPTII,S$GLB,, | Performed by: INTERNAL MEDICINE

## 2022-04-28 PROCEDURE — 3288F FALL RISK ASSESSMENT DOCD: CPT | Mod: CPTII,S$GLB,, | Performed by: INTERNAL MEDICINE

## 2022-04-28 PROCEDURE — 3077F SYST BP >= 140 MM HG: CPT | Mod: CPTII,S$GLB,, | Performed by: INTERNAL MEDICINE

## 2022-04-28 PROCEDURE — 83036 HEMOGLOBIN GLYCOSYLATED A1C: CPT | Performed by: INTERNAL MEDICINE

## 2022-04-28 PROCEDURE — 4010F ACE/ARB THERAPY RXD/TAKEN: CPT | Mod: CPTII,S$GLB,, | Performed by: INTERNAL MEDICINE

## 2022-04-28 PROCEDURE — 3078F DIAST BP <80 MM HG: CPT | Mod: CPTII,S$GLB,, | Performed by: INTERNAL MEDICINE

## 2022-04-28 PROCEDURE — 84443 ASSAY THYROID STIM HORMONE: CPT | Performed by: INTERNAL MEDICINE

## 2022-04-28 PROCEDURE — 99215 PR OFFICE/OUTPT VISIT, EST, LEVL V, 40-54 MIN: ICD-10-PCS | Mod: S$GLB,,, | Performed by: INTERNAL MEDICINE

## 2022-04-28 PROCEDURE — 1101F PR PT FALLS ASSESS DOC 0-1 FALLS W/OUT INJ PAST YR: ICD-10-PCS | Mod: CPTII,S$GLB,, | Performed by: INTERNAL MEDICINE

## 2022-04-28 PROCEDURE — 80053 COMPREHEN METABOLIC PANEL: CPT | Performed by: INTERNAL MEDICINE

## 2022-04-28 PROCEDURE — 84439 ASSAY OF FREE THYROXINE: CPT | Performed by: INTERNAL MEDICINE

## 2022-04-28 PROCEDURE — 99499 UNLISTED E&M SERVICE: CPT | Mod: S$GLB,,, | Performed by: INTERNAL MEDICINE

## 2022-04-28 PROCEDURE — 99215 OFFICE O/P EST HI 40 MIN: CPT | Mod: S$GLB,,, | Performed by: INTERNAL MEDICINE

## 2022-04-28 PROCEDURE — 1160F PR REVIEW ALL MEDS BY PRESCRIBER/CLIN PHARMACIST DOCUMENTED: ICD-10-PCS | Mod: CPTII,S$GLB,, | Performed by: INTERNAL MEDICINE

## 2022-04-28 PROCEDURE — 99499 RISK ADDL DX/OHS AUDIT: ICD-10-PCS | Mod: S$GLB,,, | Performed by: INTERNAL MEDICINE

## 2022-04-28 PROCEDURE — 80061 LIPID PANEL: CPT | Performed by: INTERNAL MEDICINE

## 2022-04-28 PROCEDURE — 3008F BODY MASS INDEX DOCD: CPT | Mod: CPTII,S$GLB,, | Performed by: INTERNAL MEDICINE

## 2022-04-28 PROCEDURE — 3077F PR MOST RECENT SYSTOLIC BLOOD PRESSURE >= 140 MM HG: ICD-10-PCS | Mod: CPTII,S$GLB,, | Performed by: INTERNAL MEDICINE

## 2022-04-28 PROCEDURE — 1159F PR MEDICATION LIST DOCUMENTED IN MEDICAL RECORD: ICD-10-PCS | Mod: CPTII,S$GLB,, | Performed by: INTERNAL MEDICINE

## 2022-04-28 PROCEDURE — 1125F AMNT PAIN NOTED PAIN PRSNT: CPT | Mod: CPTII,S$GLB,, | Performed by: INTERNAL MEDICINE

## 2022-04-28 RX ORDER — LANCING DEVICE
1 EACH MISCELLANEOUS 2 TIMES DAILY WITH MEALS
Qty: 1 EACH | Refills: 0 | Status: SHIPPED | OUTPATIENT
Start: 2022-04-28 | End: 2023-10-25

## 2022-04-28 RX ORDER — LANCETS
1 EACH MISCELLANEOUS 2 TIMES DAILY WITH MEALS
Qty: 100 EACH | Refills: 11 | Status: SHIPPED | OUTPATIENT
Start: 2022-04-28 | End: 2023-07-12

## 2022-04-28 RX ORDER — LORATADINE 10 MG/1
10 TABLET ORAL DAILY
Qty: 90 TABLET | Refills: 3 | Status: SHIPPED | OUTPATIENT
Start: 2022-04-28 | End: 2023-04-27 | Stop reason: SDUPTHER

## 2022-04-28 RX ORDER — INSULIN PUMP SYRINGE, 3 ML
EACH MISCELLANEOUS
Qty: 1 EACH | Refills: 0 | Status: SHIPPED | OUTPATIENT
Start: 2022-04-28 | End: 2023-10-25 | Stop reason: SDUPTHER
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
## 2022-04-28 NOTE — PATIENT INSTRUCTIONS
Check your blood pressure daily and write it down on a log. Bring your blood pressure cuff along with your paper to our next visit.     Labs and urine today. Pneumovax (pneumonia vaccine) today.     Schedule mammogram and bone density scan.   Schedule eye and foot appointments.   Schedule with Dr. Mata and Dr. Sparrow for breast follow up.   Schedule CT scan of the chest to screen for lung cancer due to history of smoking.     I referred you to Refugio Tanner, our therapist, to discuss some of the stressful things in life currently. If you don't hear from him in a week, call us back.

## 2022-04-29 LAB
T4 FREE SERPL-MCNC: 0.88 NG/DL (ref 0.71–1.51)
TSH SERPL DL<=0.005 MIU/L-ACNC: 0.21 UIU/ML (ref 0.4–4)

## 2022-05-02 ENCOUNTER — PATIENT OUTREACH (OUTPATIENT)
Dept: ADMINISTRATIVE | Facility: OTHER | Age: 72
End: 2022-05-02
Payer: MEDICARE

## 2022-05-02 ENCOUNTER — PATIENT MESSAGE (OUTPATIENT)
Dept: PRIMARY CARE CLINIC | Facility: CLINIC | Age: 72
End: 2022-05-02
Payer: MEDICARE

## 2022-05-02 ENCOUNTER — TELEPHONE (OUTPATIENT)
Dept: PRIMARY CARE CLINIC | Facility: CLINIC | Age: 72
End: 2022-05-02
Payer: MEDICARE

## 2022-05-02 DIAGNOSIS — E03.9 HYPOTHYROIDISM, UNSPECIFIED TYPE: ICD-10-CM

## 2022-05-02 RX ORDER — LEVOTHYROXINE SODIUM 50 UG/1
TABLET ORAL
Qty: 78 TABLET | Refills: 0
Start: 2022-05-02 | End: 2022-05-10

## 2022-05-02 RX ORDER — ROSUVASTATIN CALCIUM 10 MG/1
10 TABLET, COATED ORAL NIGHTLY
Qty: 90 TABLET | Refills: 3 | Status: SHIPPED | OUTPATIENT
Start: 2022-05-02 | End: 2023-04-19

## 2022-05-02 NOTE — TELEPHONE ENCOUNTER
Spoke with Ms HsuAlia, let her know about labs. She will go get the crestor 10mg, and adjust the synthroid to where she skips Sundays. She will try to do complex carbs like brown rice and do carbs  in moderation. Understands that we will re-check labs at next appt.

## 2022-05-02 NOTE — PROGRESS NOTES
LINKS immunization registry updated  Care Everywhere updated  Health Maintenance updated  Chart reviewed for overdue Proactive [facility] (DEANN) health maintenance testing (CRS, Breast Ca, Diabetic Eye Exam)   Orders entered:N/A

## 2022-05-02 NOTE — TELEPHONE ENCOUNTER
Please call and notify pt:    Sugars are better controlled with a1c of 7.5. cont metformin and watching carbs in diet. There is a little bit of protein spilling into the urine. Continue losartan as it helps to protect the kidneys.  Blood count, liver and and kidney functions are normal.   Cholesterol is higher than what we want it to be. Can we increase her crestor from 5 to 10mg nightly? I sent in the new script.  Her TSH is a little bit high. I'd like her to decrease synthroid from 50mcg daily to 50mcg daily except skip Sundays. F/u as scheduled in about 6 weeks and we can recheck labs then.

## 2022-05-25 ENCOUNTER — HOSPITAL ENCOUNTER (OUTPATIENT)
Dept: RADIOLOGY | Facility: HOSPITAL | Age: 72
Discharge: HOME | End: 2022-05-25
Attending: INTERNAL MEDICINE
Payer: MEDICARE

## 2022-05-25 ENCOUNTER — HOSPITAL ENCOUNTER (OUTPATIENT)
Dept: RADIOLOGY | Facility: CLINIC | Age: 72
Discharge: HOME | End: 2022-05-25
Attending: INTERNAL MEDICINE
Payer: MEDICARE

## 2022-05-25 VITALS — WEIGHT: 145 LBS | BODY MASS INDEX: 23.3 KG/M2 | HEIGHT: 66 IN

## 2022-05-25 DIAGNOSIS — Z78.0 POSTMENOPAUSAL ESTROGEN DEFICIENCY: ICD-10-CM

## 2022-05-25 DIAGNOSIS — Z12.31 ENCOUNTER FOR SCREENING MAMMOGRAM FOR MALIGNANT NEOPLASM OF BREAST: ICD-10-CM

## 2022-05-25 PROCEDURE — 77080 DXA BONE DENSITY AXIAL: CPT | Mod: 26,,, | Performed by: INTERNAL MEDICINE

## 2022-05-25 PROCEDURE — 77063 MAMMO DIGITAL SCREENING LEFT WITH TOMO: ICD-10-PCS | Mod: 26,,, | Performed by: RADIOLOGY

## 2022-05-25 PROCEDURE — 77063 BREAST TOMOSYNTHESIS BI: CPT | Mod: TC

## 2022-05-25 PROCEDURE — 77080 DXA BONE DENSITY AXIAL: CPT | Mod: TC

## 2022-05-25 PROCEDURE — 77067 SCR MAMMO BI INCL CAD: CPT | Mod: 26,,, | Performed by: RADIOLOGY

## 2022-05-25 PROCEDURE — 77080 DEXA BONE DENSITY SPINE HIP: ICD-10-PCS | Mod: 26,,, | Performed by: INTERNAL MEDICINE

## 2022-05-25 PROCEDURE — 77063 BREAST TOMOSYNTHESIS BI: CPT | Mod: 26,,, | Performed by: RADIOLOGY

## 2022-05-25 PROCEDURE — 77067 MAMMO DIGITAL SCREENING LEFT WITH TOMO: ICD-10-PCS | Mod: 26,,, | Performed by: RADIOLOGY

## 2022-06-01 ENCOUNTER — OFFICE VISIT (OUTPATIENT)
Dept: SURGERY | Facility: CLINIC | Age: 72
End: 2022-06-01
Payer: MEDICARE

## 2022-06-01 VITALS
HEART RATE: 92 BPM | SYSTOLIC BLOOD PRESSURE: 171 MMHG | WEIGHT: 145 LBS | DIASTOLIC BLOOD PRESSURE: 77 MMHG | HEIGHT: 66 IN | BODY MASS INDEX: 23.3 KG/M2

## 2022-06-01 DIAGNOSIS — C50.411 MALIGNANT NEOPLASM OF UPPER-OUTER QUADRANT OF RIGHT BREAST IN FEMALE, ESTROGEN RECEPTOR POSITIVE: ICD-10-CM

## 2022-06-01 DIAGNOSIS — Z98.890 S/P LUMPECTOMY, RIGHT BREAST: ICD-10-CM

## 2022-06-01 DIAGNOSIS — E04.9 GOITER: ICD-10-CM

## 2022-06-01 DIAGNOSIS — Z90.11 ACQUIRED ABSENCE OF RIGHT BREAST AND NIPPLE: ICD-10-CM

## 2022-06-01 DIAGNOSIS — Z17.0 MALIGNANT NEOPLASM OF UPPER-OUTER QUADRANT OF RIGHT BREAST IN FEMALE, ESTROGEN RECEPTOR POSITIVE: ICD-10-CM

## 2022-06-01 DIAGNOSIS — Z85.3 PERSONAL HISTORY OF BREAST CANCER: Primary | ICD-10-CM

## 2022-06-01 DIAGNOSIS — Z12.31 ENCOUNTER FOR SCREENING MAMMOGRAM FOR BREAST CANCER: ICD-10-CM

## 2022-06-01 PROCEDURE — 1159F MED LIST DOCD IN RCRD: CPT | Mod: CPTII,S$GLB,, | Performed by: PHYSICIAN ASSISTANT

## 2022-06-01 PROCEDURE — 1101F PT FALLS ASSESS-DOCD LE1/YR: CPT | Mod: CPTII,S$GLB,, | Performed by: PHYSICIAN ASSISTANT

## 2022-06-01 PROCEDURE — 3051F HG A1C>EQUAL 7.0%<8.0%: CPT | Mod: CPTII,S$GLB,, | Performed by: PHYSICIAN ASSISTANT

## 2022-06-01 PROCEDURE — 1126F AMNT PAIN NOTED NONE PRSNT: CPT | Mod: CPTII,S$GLB,, | Performed by: PHYSICIAN ASSISTANT

## 2022-06-01 PROCEDURE — 3077F SYST BP >= 140 MM HG: CPT | Mod: CPTII,S$GLB,, | Performed by: PHYSICIAN ASSISTANT

## 2022-06-01 PROCEDURE — 1160F PR REVIEW ALL MEDS BY PRESCRIBER/CLIN PHARMACIST DOCUMENTED: ICD-10-PCS | Mod: CPTII,S$GLB,, | Performed by: PHYSICIAN ASSISTANT

## 2022-06-01 PROCEDURE — 3288F PR FALLS RISK ASSESSMENT DOCUMENTED: ICD-10-PCS | Mod: CPTII,S$GLB,, | Performed by: PHYSICIAN ASSISTANT

## 2022-06-01 PROCEDURE — 1160F RVW MEDS BY RX/DR IN RCRD: CPT | Mod: CPTII,S$GLB,, | Performed by: PHYSICIAN ASSISTANT

## 2022-06-01 PROCEDURE — 3077F PR MOST RECENT SYSTOLIC BLOOD PRESSURE >= 140 MM HG: ICD-10-PCS | Mod: CPTII,S$GLB,, | Performed by: PHYSICIAN ASSISTANT

## 2022-06-01 PROCEDURE — 99213 OFFICE O/P EST LOW 20 MIN: CPT | Mod: S$GLB,,, | Performed by: PHYSICIAN ASSISTANT

## 2022-06-01 PROCEDURE — 99999 PR PBB SHADOW E&M-EST. PATIENT-LVL V: CPT | Mod: PBBFAC,,, | Performed by: PHYSICIAN ASSISTANT

## 2022-06-01 PROCEDURE — 3060F PR POS MICROALBUMINURIA RESULT DOCUMENTED/REVIEW: ICD-10-PCS | Mod: CPTII,S$GLB,, | Performed by: PHYSICIAN ASSISTANT

## 2022-06-01 PROCEDURE — 3008F PR BODY MASS INDEX (BMI) DOCUMENTED: ICD-10-PCS | Mod: CPTII,S$GLB,, | Performed by: PHYSICIAN ASSISTANT

## 2022-06-01 PROCEDURE — 3078F PR MOST RECENT DIASTOLIC BLOOD PRESSURE < 80 MM HG: ICD-10-PCS | Mod: CPTII,S$GLB,, | Performed by: PHYSICIAN ASSISTANT

## 2022-06-01 PROCEDURE — 1159F PR MEDICATION LIST DOCUMENTED IN MEDICAL RECORD: ICD-10-PCS | Mod: CPTII,S$GLB,, | Performed by: PHYSICIAN ASSISTANT

## 2022-06-01 PROCEDURE — 3008F BODY MASS INDEX DOCD: CPT | Mod: CPTII,S$GLB,, | Performed by: PHYSICIAN ASSISTANT

## 2022-06-01 PROCEDURE — 3066F NEPHROPATHY DOC TX: CPT | Mod: CPTII,S$GLB,, | Performed by: PHYSICIAN ASSISTANT

## 2022-06-01 PROCEDURE — 99999 PR PBB SHADOW E&M-EST. PATIENT-LVL V: ICD-10-PCS | Mod: PBBFAC,,, | Performed by: PHYSICIAN ASSISTANT

## 2022-06-01 PROCEDURE — 3060F POS MICROALBUMINURIA REV: CPT | Mod: CPTII,S$GLB,, | Performed by: PHYSICIAN ASSISTANT

## 2022-06-01 PROCEDURE — 4010F PR ACE/ARB THEARPY RXD/TAKEN: ICD-10-PCS | Mod: CPTII,S$GLB,, | Performed by: PHYSICIAN ASSISTANT

## 2022-06-01 PROCEDURE — 1101F PR PT FALLS ASSESS DOC 0-1 FALLS W/OUT INJ PAST YR: ICD-10-PCS | Mod: CPTII,S$GLB,, | Performed by: PHYSICIAN ASSISTANT

## 2022-06-01 PROCEDURE — 3078F DIAST BP <80 MM HG: CPT | Mod: CPTII,S$GLB,, | Performed by: PHYSICIAN ASSISTANT

## 2022-06-01 PROCEDURE — 3066F PR DOCUMENTATION OF TREATMENT FOR NEPHROPATHY: ICD-10-PCS | Mod: CPTII,S$GLB,, | Performed by: PHYSICIAN ASSISTANT

## 2022-06-01 PROCEDURE — 99213 PR OFFICE/OUTPT VISIT, EST, LEVL III, 20-29 MIN: ICD-10-PCS | Mod: S$GLB,,, | Performed by: PHYSICIAN ASSISTANT

## 2022-06-01 PROCEDURE — 4010F ACE/ARB THERAPY RXD/TAKEN: CPT | Mod: CPTII,S$GLB,, | Performed by: PHYSICIAN ASSISTANT

## 2022-06-01 PROCEDURE — 3051F PR MOST RECENT HEMOGLOBIN A1C LEVEL 7.0 - < 8.0%: ICD-10-PCS | Mod: CPTII,S$GLB,, | Performed by: PHYSICIAN ASSISTANT

## 2022-06-01 PROCEDURE — 1126F PR PAIN SEVERITY QUANTIFIED, NO PAIN PRESENT: ICD-10-PCS | Mod: CPTII,S$GLB,, | Performed by: PHYSICIAN ASSISTANT

## 2022-06-01 PROCEDURE — 3288F FALL RISK ASSESSMENT DOCD: CPT | Mod: CPTII,S$GLB,, | Performed by: PHYSICIAN ASSISTANT

## 2022-06-01 NOTE — PROGRESS NOTES
[facility]  Department of Surgery    REFERRING PROVIDER: Nancy Poe Md  [address]  [place] [address]   CHIEF COMPLAINT:   Chief Complaint   Patient presents with    Follow-up     Annual CBE Hx Right Breast Cancer .       DIAGNOSIS:   This is a 72 y.o. female with a history of stage pT2N0, grade 3, ER +(weak 10-20%), IA -, HER2 - invasive mammary carcinoma of the right breast.    TREATMENT:   1. S/p right mastectomy with sentinel node biopsy on 2018. Dr. Otoniel M.D. Surgical Oncology  2. Final Path: 23 mm of invasive carcinoma (no specific type). Negative margins, negative nodes.   3. Completed adjuvant TC 2019.  4. Letrozole started 4/15/2019.    HISTORY OF PRESENT ILLNESS:   Alia Andre is a 72 y.o. female comes in for oncological follow up.  She denies change in her breast self-exam specifically denying new masses, skin or nipple changes, or nipple discharge. Past medical and surgical history is updated without new changes. There have been no changes to family history. The patient denies constitutional symptoms of night sweats, chills, weight loss, new headaches, visual changes, new back or bony pain, chest pain, or shortness of breath.        Review of Systems: See HPI/Interval History for other systems reviewed.     IMAGIN/25/22 Left Screening Mammo:     Findings:   This procedure was performed using tomosynthesis.   Computer-aided detection was utilized in the interpretation of this examination.     The left breast is almost entirely fatty. There is no evidence of suspicious masses, microcalcifications or architectural distortion.     Impression:   No mammographic evidence of malignancy.     BI-RADS Category 1: Negative     Recommendation:  Routine screening mammogram in 1 year is recommended.     MEDICATIONS/ALLERGIES:     Current Outpatient Medications   Medication Sig    acetaminophen (TYLENOL) 325 MG tablet Take 325 mg by mouth every 6 (six) hours as needed for  Pain.    blood sugar diagnostic Strp 1 strip by Misc.(Non-Drug; Combo Route) route 2 (two) times daily with meals.    blood-glucose meter kit Use as instructed    diclofenac sodium (VOLTAREN) 1 % Gel Apply 2 g topically daily as needed.    fluticasone propionate (FLONASE) 50 mcg/actuation nasal spray USE 1 SPRAY IN EACH NOSTRIL EVERY DAY    gabapentin (NEURONTIN) 300 MG capsule      mg tablet TAKE 1 TABLET EVERY DAY AS NEEDED FOR PAIN    lancets Misc 1 lancet by Misc.(Non-Drug; Combo Route) route 2 (two) times daily with meals.    lancing device Misc 1 Device by Misc.(Non-Drug; Combo Route) route 2 (two) times daily with meals.    letrozole (FEMARA) 2.5 mg Tab TAKE 1 TABLET EVERY DAY    levothyroxine (SYNTHROID) 50 MCG tablet TAKE 1 TABLET ON EMPTY STOMACH AN HOUR BEFORE BREAKFAST DAILY EXCEPT SKIP SUNDAYS.    loratadine (CLARITIN) 10 mg tablet Take 1 tablet (10 mg total) by mouth once daily.    losartan (COZAAR) 50 MG tablet TAKE 1 TABLET EVERY DAY    metFORMIN (GLUCOPHAGE) 500 MG tablet TAKE 2 TABLETS TWICE DAILY WITH MEALS    mupirocin (BACTROBAN) 2 % ointment Apply topically 3 (three) times daily.    mupirocin (BACTROBAN) 2 % ointment apply topically 3 times daily    rosuvastatin (CRESTOR) 10 MG tablet Take 1 tablet (10 mg total) by mouth every evening.    turmeric root extract 500 mg Cap Take 1 tablet by mouth once daily.    ammonium lactate 12 % Crea Apply twice daily to affected parts both feet as needed.    aspirin 81 MG Chew Take 1 tablet (81 mg total) by mouth once daily. Start on Monday.    BD ALCOHOL SWABS PadM     ciclopirox (PENLAC) 8 % Soln Apply topically nightly. (Patient not taking: Reported on 6/1/2022)    cyproheptadine (PERIACTIN) 4 mg tablet Take 1 tablet (4 mg total) by mouth 3 (three) times daily as needed. (Patient not taking: No sig reported)    HYDROcodone-acetaminophen (NORCO) 5-325 mg per tablet Take 1 tablet by mouth every 6 (six) hours as needed for Pain.  "   HYDROcodone-acetaminophen (NORCO) 5-325 mg per tablet take 1 tablet by mouth every 6 hours as needed for pain    nicotine (NICODERM CQ) 21 mg/24 hr APPLY 1 PATCH TRANSDERMALLY EVERY 24 HOURS    nicotine, polacrilex, (NICORETTE) 2 mg Gum CHEW 1 PIECE (2 MG) BY MOUTH 10 TIMES PER DAY AS NEEDED     No current facility-administered medications for this visit.      Review of patient's allergies indicates:   Allergen Reactions    Pravastatin      Leg cramps       PHYSICAL EXAM:   BP (!) 171/77 (BP Location: Left arm, Patient Position: Sitting, BP Method: Medium (Automatic))   Pulse 92   Ht 5' 6" (1.676 m)   Wt 65.8 kg (145 lb)   BMI 23.40 kg/m²     Physical Exam   Constitutional: She appears well-developed and well-nourished.   HENT:   Head: Normocephalic.   Eyes: No scleral icterus.   Neck: No tracheal deviation present.   Cardiovascular: Normal rate and regular rhythm.    Pulmonary/Chest: Breath sounds normal. No respiratory distress. She exhibits no mass, no tenderness and no edema. Right breast exhibits no mass, no skin change and no tenderness. Left breast exhibits no inverted nipple, no mass, no nipple discharge, no skin change and no tenderness. Breasts are symmetrical.       Abdominal: Soft. She exhibits no mass. There is no abdominal tenderness.   Musculoskeletal: No edema.   Lymphadenopathy:     She has no cervical adenopathy.   Neurological: She is alert.   Skin: No rash noted. No erythema.     Psychiatric: She has a normal mood and affect.       ASSESSMENT:   This is a 72 y.o. female without evidence of recurrence by exam, history or imaging.       PLAN:   1. CBE without concerning findings. Patient reassured.   2. Continue monthly self breast exams and call the clinic with any changes or problems.  3. Mammogram in 1 year .  4. Return to clinic in 1 year .    The patient is in agreement with the plan. Questions were encouraged and answered to patient's satisfaction. Alia will call our office with " any questions or concerns.     Dalila Lua PA-C  Breast Surgery

## 2022-10-28 DIAGNOSIS — Z98.890 S/P LUMPECTOMY, RIGHT BREAST: ICD-10-CM

## 2022-10-28 DIAGNOSIS — Z90.11 ACQUIRED ABSENCE OF RIGHT BREAST AND NIPPLE: ICD-10-CM

## 2022-10-28 DIAGNOSIS — C50.411 MALIGNANT NEOPLASM OF UPPER-OUTER QUADRANT OF RIGHT BREAST IN FEMALE, ESTROGEN RECEPTOR POSITIVE: ICD-10-CM

## 2022-10-28 DIAGNOSIS — Z17.0 MALIGNANT NEOPLASM OF UPPER-OUTER QUADRANT OF RIGHT BREAST IN FEMALE, ESTROGEN RECEPTOR POSITIVE: ICD-10-CM

## 2022-10-28 DIAGNOSIS — Z85.3 PERSONAL HISTORY OF BREAST CANCER: Primary | ICD-10-CM

## 2023-01-20 ENCOUNTER — TELEPHONE (OUTPATIENT)
Dept: PRIMARY CARE CLINIC | Facility: CLINIC | Age: 73
End: 2023-01-20
Payer: MEDICARE

## 2023-01-20 NOTE — TELEPHONE ENCOUNTER
Pt states she has been dealing with a cold and has been having a cough and chest congestion. She said her cancer doctor or surgeon advised her to reach out for a z-saul to be prescribed, scheduled pt for eval on Monday.

## 2023-01-23 ENCOUNTER — OFFICE VISIT (OUTPATIENT)
Dept: PRIMARY CARE CLINIC | Facility: CLINIC | Age: 73
End: 2023-01-23
Payer: MEDICARE

## 2023-01-23 VITALS
WEIGHT: 138 LBS | TEMPERATURE: 98 F | HEIGHT: 66 IN | DIASTOLIC BLOOD PRESSURE: 72 MMHG | HEART RATE: 88 BPM | SYSTOLIC BLOOD PRESSURE: 166 MMHG | BODY MASS INDEX: 22.18 KG/M2

## 2023-01-23 DIAGNOSIS — E78.5 HYPERLIPIDEMIA ASSOCIATED WITH TYPE 2 DIABETES MELLITUS: ICD-10-CM

## 2023-01-23 DIAGNOSIS — E11.9 TYPE 2 DIABETES MELLITUS WITHOUT COMPLICATION, WITHOUT LONG-TERM CURRENT USE OF INSULIN: ICD-10-CM

## 2023-01-23 DIAGNOSIS — R09.89 CHEST CONGESTION: ICD-10-CM

## 2023-01-23 DIAGNOSIS — C50.411 MALIGNANT NEOPLASM OF UPPER-OUTER QUADRANT OF RIGHT BREAST IN FEMALE, ESTROGEN RECEPTOR POSITIVE: ICD-10-CM

## 2023-01-23 DIAGNOSIS — Z17.0 MALIGNANT NEOPLASM OF UPPER-OUTER QUADRANT OF RIGHT BREAST IN FEMALE, ESTROGEN RECEPTOR POSITIVE: ICD-10-CM

## 2023-01-23 DIAGNOSIS — J20.9 CHRONIC BRONCHITIS WITH ACUTE EXACERBATION: Primary | ICD-10-CM

## 2023-01-23 DIAGNOSIS — J42 CHRONIC BRONCHITIS WITH ACUTE EXACERBATION: Primary | ICD-10-CM

## 2023-01-23 DIAGNOSIS — R52 BODY ACHES: ICD-10-CM

## 2023-01-23 DIAGNOSIS — I70.0 AORTIC ATHEROSCLEROSIS: ICD-10-CM

## 2023-01-23 DIAGNOSIS — E11.69 HYPERLIPIDEMIA ASSOCIATED WITH TYPE 2 DIABETES MELLITUS: ICD-10-CM

## 2023-01-23 DIAGNOSIS — R05.9 COUGH, UNSPECIFIED TYPE: ICD-10-CM

## 2023-01-23 DIAGNOSIS — E01.0 THYROMEGALY: ICD-10-CM

## 2023-01-23 DIAGNOSIS — F32.1 CURRENT MODERATE EPISODE OF MAJOR DEPRESSIVE DISORDER WITHOUT PRIOR EPISODE: ICD-10-CM

## 2023-01-23 LAB
CTP QC/QA: YES
POC MOLECULAR INFLUENZA A AGN: NEGATIVE
POC MOLECULAR INFLUENZA B AGN: NEGATIVE

## 2023-01-23 PROCEDURE — 99499 RISK ADDL DX/OHS AUDIT: ICD-10-PCS | Mod: S$GLB,,, | Performed by: INTERNAL MEDICINE

## 2023-01-23 PROCEDURE — 3008F PR BODY MASS INDEX (BMI) DOCUMENTED: ICD-10-PCS | Mod: CPTII,S$GLB,, | Performed by: INTERNAL MEDICINE

## 2023-01-23 PROCEDURE — 99214 OFFICE O/P EST MOD 30 MIN: CPT | Mod: 25,S$GLB,, | Performed by: INTERNAL MEDICINE

## 2023-01-23 PROCEDURE — 3077F SYST BP >= 140 MM HG: CPT | Mod: CPTII,S$GLB,, | Performed by: INTERNAL MEDICINE

## 2023-01-23 PROCEDURE — 87502 INFLUENZA DNA AMP PROBE: CPT | Mod: QW,S$GLB,, | Performed by: INTERNAL MEDICINE

## 2023-01-23 PROCEDURE — 1125F PR PAIN SEVERITY QUANTIFIED, PAIN PRESENT: ICD-10-PCS | Mod: CPTII,S$GLB,, | Performed by: INTERNAL MEDICINE

## 2023-01-23 PROCEDURE — 99214 PR OFFICE/OUTPT VISIT, EST, LEVL IV, 30-39 MIN: ICD-10-PCS | Mod: 25,S$GLB,, | Performed by: INTERNAL MEDICINE

## 2023-01-23 PROCEDURE — 94640 AIRWAY INHALATION TREATMENT: CPT | Mod: S$GLB,,, | Performed by: INTERNAL MEDICINE

## 2023-01-23 PROCEDURE — 1125F AMNT PAIN NOTED PAIN PRSNT: CPT | Mod: CPTII,S$GLB,, | Performed by: INTERNAL MEDICINE

## 2023-01-23 PROCEDURE — 3077F PR MOST RECENT SYSTOLIC BLOOD PRESSURE >= 140 MM HG: ICD-10-PCS | Mod: CPTII,S$GLB,, | Performed by: INTERNAL MEDICINE

## 2023-01-23 PROCEDURE — 4010F ACE/ARB THERAPY RXD/TAKEN: CPT | Mod: CPTII,S$GLB,, | Performed by: INTERNAL MEDICINE

## 2023-01-23 PROCEDURE — 1159F PR MEDICATION LIST DOCUMENTED IN MEDICAL RECORD: ICD-10-PCS | Mod: CPTII,S$GLB,, | Performed by: INTERNAL MEDICINE

## 2023-01-23 PROCEDURE — 3078F DIAST BP <80 MM HG: CPT | Mod: CPTII,S$GLB,, | Performed by: INTERNAL MEDICINE

## 2023-01-23 PROCEDURE — 99499 UNLISTED E&M SERVICE: CPT | Mod: S$GLB,,, | Performed by: INTERNAL MEDICINE

## 2023-01-23 PROCEDURE — 3008F BODY MASS INDEX DOCD: CPT | Mod: CPTII,S$GLB,, | Performed by: INTERNAL MEDICINE

## 2023-01-23 PROCEDURE — 99999 PR PBB SHADOW E&M-EST. PATIENT-LVL IV: ICD-10-PCS | Mod: PBBFAC,,, | Performed by: INTERNAL MEDICINE

## 2023-01-23 PROCEDURE — 1159F MED LIST DOCD IN RCRD: CPT | Mod: CPTII,S$GLB,, | Performed by: INTERNAL MEDICINE

## 2023-01-23 PROCEDURE — 4010F PR ACE/ARB THEARPY RXD/TAKEN: ICD-10-PCS | Mod: CPTII,S$GLB,, | Performed by: INTERNAL MEDICINE

## 2023-01-23 PROCEDURE — 99999 PR PBB SHADOW E&M-EST. PATIENT-LVL IV: CPT | Mod: PBBFAC,,, | Performed by: INTERNAL MEDICINE

## 2023-01-23 PROCEDURE — 94640 PR INHAL RX, AIRWAY OBST/DX SPUTUM INDUCT: ICD-10-PCS | Mod: S$GLB,,, | Performed by: INTERNAL MEDICINE

## 2023-01-23 PROCEDURE — 3078F PR MOST RECENT DIASTOLIC BLOOD PRESSURE < 80 MM HG: ICD-10-PCS | Mod: CPTII,S$GLB,, | Performed by: INTERNAL MEDICINE

## 2023-01-23 PROCEDURE — 87502 POCT INFLUENZA A/B MOLECULAR: ICD-10-PCS | Mod: QW,S$GLB,, | Performed by: INTERNAL MEDICINE

## 2023-01-23 RX ORDER — ALBUTEROL SULFATE 0.83 MG/ML
2.5 SOLUTION RESPIRATORY (INHALATION)
Status: COMPLETED | OUTPATIENT
Start: 2023-01-23 | End: 2023-01-23

## 2023-01-23 RX ORDER — LANCETS 33 GAUGE
EACH MISCELLANEOUS
COMMUNITY
Start: 2023-01-21 | End: 2023-10-25 | Stop reason: SDUPTHER

## 2023-01-23 RX ORDER — ALBUTEROL SULFATE 0.83 MG/ML
2.5 SOLUTION RESPIRATORY (INHALATION) EVERY 6 HOURS PRN
Qty: 270 ML | Refills: 0 | Status: SHIPPED | OUTPATIENT
Start: 2023-01-23 | End: 2023-07-20

## 2023-01-23 RX ORDER — PREDNISONE 20 MG/1
20 TABLET ORAL DAILY
Qty: 5 TABLET | Refills: 0 | Status: SHIPPED | OUTPATIENT
Start: 2023-01-23 | End: 2023-04-10

## 2023-01-23 RX ORDER — AZITHROMYCIN 250 MG/1
TABLET, FILM COATED ORAL
Qty: 6 TABLET | Refills: 0 | Status: SHIPPED | OUTPATIENT
Start: 2023-01-23 | End: 2023-01-28

## 2023-01-23 RX ADMIN — ALBUTEROL SULFATE 2.5 MG: 0.83 SOLUTION RESPIRATORY (INHALATION) at 11:01
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
## 2023-01-26 ENCOUNTER — PATIENT MESSAGE (OUTPATIENT)
Dept: ADMINISTRATIVE | Facility: HOSPITAL | Age: 73
End: 2023-01-26
Payer: MEDICARE

## 2023-02-07 DIAGNOSIS — Z00.00 ENCOUNTER FOR MEDICARE ANNUAL WELLNESS EXAM: ICD-10-CM

## 2023-02-09 DIAGNOSIS — Z00.00 ENCOUNTER FOR MEDICARE ANNUAL WELLNESS EXAM: ICD-10-CM

## 2023-03-20 DIAGNOSIS — R09.82 POSTNASAL DRIP: ICD-10-CM

## 2023-03-20 DIAGNOSIS — R49.0 HOARSENESS OF VOICE: ICD-10-CM

## 2023-03-20 DIAGNOSIS — I10 ESSENTIAL HYPERTENSION: ICD-10-CM

## 2023-03-20 DIAGNOSIS — E78.5 HYPERLIPIDEMIA, UNSPECIFIED HYPERLIPIDEMIA TYPE: ICD-10-CM

## 2023-03-20 DIAGNOSIS — E11.9 TYPE 2 DIABETES MELLITUS WITHOUT COMPLICATION, WITHOUT LONG-TERM CURRENT USE OF INSULIN: Primary | ICD-10-CM

## 2023-03-20 RX ORDER — FLUTICASONE PROPIONATE 50 MCG
SPRAY, SUSPENSION (ML) NASAL
Qty: 32 G | Refills: 0 | Status: SHIPPED | OUTPATIENT
Start: 2023-03-20 | End: 2023-08-22

## 2023-03-23 ENCOUNTER — OFFICE VISIT (OUTPATIENT)
Dept: ENDOCRINOLOGY | Facility: CLINIC | Age: 73
End: 2023-03-23
Payer: MEDICARE

## 2023-03-23 VITALS
BODY MASS INDEX: 22.34 KG/M2 | TEMPERATURE: 98 F | WEIGHT: 138.38 LBS | DIASTOLIC BLOOD PRESSURE: 74 MMHG | HEART RATE: 99 BPM | SYSTOLIC BLOOD PRESSURE: 148 MMHG

## 2023-03-23 DIAGNOSIS — Z72.0 TOBACCO ABUSE: ICD-10-CM

## 2023-03-23 DIAGNOSIS — E04.9 GOITER: Primary | ICD-10-CM

## 2023-03-23 DIAGNOSIS — E55.9 VITAMIN D DEFICIENCY: ICD-10-CM

## 2023-03-23 DIAGNOSIS — E11.9 TYPE 2 DIABETES MELLITUS WITHOUT COMPLICATION, WITHOUT LONG-TERM CURRENT USE OF INSULIN: ICD-10-CM

## 2023-03-23 DIAGNOSIS — E01.0 THYROMEGALY: ICD-10-CM

## 2023-03-23 PROCEDURE — 4010F PR ACE/ARB THEARPY RXD/TAKEN: ICD-10-PCS | Mod: CPTII,S$GLB,, | Performed by: HOSPITALIST

## 2023-03-23 PROCEDURE — 1126F PR PAIN SEVERITY QUANTIFIED, NO PAIN PRESENT: ICD-10-PCS | Mod: CPTII,S$GLB,, | Performed by: HOSPITALIST

## 2023-03-23 PROCEDURE — 3077F SYST BP >= 140 MM HG: CPT | Mod: CPTII,S$GLB,, | Performed by: HOSPITALIST

## 2023-03-23 PROCEDURE — 3288F FALL RISK ASSESSMENT DOCD: CPT | Mod: CPTII,S$GLB,, | Performed by: HOSPITALIST

## 2023-03-23 PROCEDURE — 99999 PR PBB SHADOW E&M-EST. PATIENT-LVL IV: ICD-10-PCS | Mod: PBBFAC,,, | Performed by: HOSPITALIST

## 2023-03-23 PROCEDURE — 1159F PR MEDICATION LIST DOCUMENTED IN MEDICAL RECORD: ICD-10-PCS | Mod: CPTII,S$GLB,, | Performed by: HOSPITALIST

## 2023-03-23 PROCEDURE — 1160F RVW MEDS BY RX/DR IN RCRD: CPT | Mod: CPTII,S$GLB,, | Performed by: HOSPITALIST

## 2023-03-23 PROCEDURE — 99204 OFFICE O/P NEW MOD 45 MIN: CPT | Mod: S$GLB,,, | Performed by: HOSPITALIST

## 2023-03-23 PROCEDURE — 3008F BODY MASS INDEX DOCD: CPT | Mod: CPTII,S$GLB,, | Performed by: HOSPITALIST

## 2023-03-23 PROCEDURE — 3078F PR MOST RECENT DIASTOLIC BLOOD PRESSURE < 80 MM HG: ICD-10-PCS | Mod: CPTII,S$GLB,, | Performed by: HOSPITALIST

## 2023-03-23 PROCEDURE — 1101F PT FALLS ASSESS-DOCD LE1/YR: CPT | Mod: CPTII,S$GLB,, | Performed by: HOSPITALIST

## 2023-03-23 PROCEDURE — 4010F ACE/ARB THERAPY RXD/TAKEN: CPT | Mod: CPTII,S$GLB,, | Performed by: HOSPITALIST

## 2023-03-23 PROCEDURE — 1126F AMNT PAIN NOTED NONE PRSNT: CPT | Mod: CPTII,S$GLB,, | Performed by: HOSPITALIST

## 2023-03-23 PROCEDURE — 3078F DIAST BP <80 MM HG: CPT | Mod: CPTII,S$GLB,, | Performed by: HOSPITALIST

## 2023-03-23 PROCEDURE — 99999 PR PBB SHADOW E&M-EST. PATIENT-LVL IV: CPT | Mod: PBBFAC,,, | Performed by: HOSPITALIST

## 2023-03-23 PROCEDURE — 3288F PR FALLS RISK ASSESSMENT DOCUMENTED: ICD-10-PCS | Mod: CPTII,S$GLB,, | Performed by: HOSPITALIST

## 2023-03-23 PROCEDURE — 3008F PR BODY MASS INDEX (BMI) DOCUMENTED: ICD-10-PCS | Mod: CPTII,S$GLB,, | Performed by: HOSPITALIST

## 2023-03-23 PROCEDURE — 1159F MED LIST DOCD IN RCRD: CPT | Mod: CPTII,S$GLB,, | Performed by: HOSPITALIST

## 2023-03-23 PROCEDURE — 1101F PR PT FALLS ASSESS DOC 0-1 FALLS W/OUT INJ PAST YR: ICD-10-PCS | Mod: CPTII,S$GLB,, | Performed by: HOSPITALIST

## 2023-03-23 PROCEDURE — 3077F PR MOST RECENT SYSTOLIC BLOOD PRESSURE >= 140 MM HG: ICD-10-PCS | Mod: CPTII,S$GLB,, | Performed by: HOSPITALIST

## 2023-03-23 PROCEDURE — 99204 PR OFFICE/OUTPT VISIT, NEW, LEVL IV, 45-59 MIN: ICD-10-PCS | Mod: S$GLB,,, | Performed by: HOSPITALIST

## 2023-03-23 PROCEDURE — 1160F PR REVIEW ALL MEDS BY PRESCRIBER/CLIN PHARMACIST DOCUMENTED: ICD-10-PCS | Mod: CPTII,S$GLB,, | Performed by: HOSPITALIST

## 2023-03-23 NOTE — ASSESSMENT & PLAN NOTE
- Patient with longstanding history of thyroid goiter, over 30 years  - Patient previously seen by General surgery, with plan for thyroidectomy, unfortunately that was placed on hold due to breast cancer  - Ultrasound thyroid from 2019 review, diffuse goiter, multiple thyroid nodule.  Previous FNA benign  - Patient currently reporting compressive symptoms, dysphagia, occasional shortness of breath  - We will check TFTs and check TPO antibody  - I offered to get thyroid ultrasound, patient declined, she would prefer to talk to Dr. Shay before getting imaging study  - ENT referral placed  - Post thyroidectomy levothyroxine dose recommendation:  Weight based, levothyroxine 100 mcg daily

## 2023-03-23 NOTE — PROGRESS NOTES
"Subjective:      Patient ID: Alia Andre is a 72 y.o. female presented to [facility] on 3/23/2023.  Chief Complaint:  Goiter      History of Present Illness: Alia Andre is a 72 y.o. female here for goiter  Other significant past medical history:  Diabetes, hypertension, history of breast cancer    1) Multiple Thyroid nodule/Goiter/hypothyroidism  - History of thyroid goiter: Early 30s.  - Patient did see general surgery for goiter on 06/2019.  - Patient also had diet misses of right breast cancer, undergoing mastectomy and adjuvant chemotherapy at that time.  - Patient had FNA of right thyroid nodule: Benign  - Patient is interested in parathyroidectomy, unfortunately they timing of her breast cancer and its treatment led to her putting off thyroidectomy  - Got chemo but no radiation for breast cancer  - Patient does have family history of thyroid disease: sister, daughter and granddaughter both with goiter and thyroidectomy, denies thyroid cancer.  - Does take levothyroxine  - Tobacco user: yes, "sometime"  - patient interested in total thyroidectomy, requesting to go to see Dr. Jalen Shay with ENT    Symptoms:  No  Yes  []    [x] Compressive symptoms  []    [x] Anterior neck pressure  []    [x] Dysphagia sometimes  []    [x] Voice changes - comes and goes  []    [x] Shortness of breath when laying flat      2) Hypothyroidism   - for many year on LT4  - Taking Levothyroxine 50mcg daily, skipping Sunday  - Taking it as directed   - Patient report fatigue due to her goiter and insomnia    Thyroid lab work  Lab Results   Component Value Date    TSH 0.214 (L) 04/28/2022    TSH 0.384 (L) 12/14/2020    TSH 0.420 07/29/2019    TSH <0.010 (L) 05/22/2019    FREET4 0.88 04/28/2022    FREET4 1.05 12/14/2020    FREET4 1.51 05/22/2019      Antibodies  No results found for: THYROPEROXID, TSIMMGLBN, THYROIDSTIMI      3) Diabetes  - last A1c 7.5%  - managed by PCP    Reviewed past surgical, " medical, family, social history and updated as appropriate.  Review of Systems: see HPI above    Objective:   BP (!) 148/74 (BP Location: Left arm)   Pulse 99   Temp 98.3 °F (36.8 °C) (Oral)   Wt 62.8 kg (138 lb 6.4 oz)   BMI 22.34 kg/m²   Body mass index is 22.34 kg/m².  Vital signs reviewed    Physical Exam  Vitals and nursing note reviewed.   Constitutional:       Appearance: Normal appearance. She is well-developed. She is not ill-appearing.   Neck:      Thyroid: No thyromegaly.      Comments: Large goiter  Pulmonary:      Effort: Pulmonary effort is normal. No respiratory distress.   Musculoskeletal:         General: Normal range of motion.      Cervical back: Normal range of motion.   Neurological:      General: No focal deficit present.      Mental Status: She is alert. Mental status is at baseline.   Psychiatric:         Mood and Affect: Mood normal.         Behavior: Behavior normal.       Lab Reviewed:  See results in subjective  Lab Results   Component Value Date    HGBA1C 7.5 (H) 04/28/2022     Lab Results   Component Value Date    CHOL 181 04/28/2022    HDL 38 (L) 04/28/2022    LDLCALC 112.6 04/28/2022    TRIG 152 (H) 04/28/2022    CHOLHDL 21.0 04/28/2022     Lab Results   Component Value Date     04/28/2022    K 4.2 04/28/2022     04/28/2022    CO2 25 04/28/2022     (H) 04/28/2022    BUN 15 04/28/2022    CREATININE 0.8 04/28/2022    CALCIUM 10.0 04/28/2022    PHOS 3.3 11/17/2018    PROT 8.1 04/28/2022    ALBUMIN 3.9 04/28/2022    BILITOT 0.4 04/28/2022    ALKPHOS 122 04/28/2022    AST 15 04/28/2022    ALT 17 04/28/2022    ANIONGAP 9 04/28/2022    ESTGFRAFRICA >60.0 04/28/2022    EGFRNONAA >60.0 04/28/2022    TSH 0.214 (L) 04/28/2022    [id number] 37 12/14/2020     Assessment     1. Goiter  TSH    T4, Free    Thyroid Peroxidase Antibody    Ambulatory referral/consult to ENT    CANCELED: US Soft Tissue Head Neck Thyroid    CANCELED: Ambulatory referral/consult to General Surgery       2. Thyromegaly  Ambulatory referral/consult to Endocrinology    Ambulatory referral/consult to ENT      3. Vitamin D deficiency        4. Tobacco abuse        5. Type 2 diabetes mellitus without complication, without long-term current use of insulin             Plan     Goiter  - Patient with longstanding history of thyroid goiter, over 30 years  - Patient previously seen by General surgery, with plan for thyroidectomy, unfortunately that was placed on hold due to breast cancer  - Ultrasound thyroid from 2019 review, diffuse goiter, multiple thyroid nodule.  Previous FNA benign  - Patient currently reporting compressive symptoms, dysphagia, occasional shortness of breath  - We will check TFTs and check TPO antibody  - I offered to get thyroid ultrasound, patient declined, she would prefer to talk to Dr. Shay before getting imaging study  - ENT referral placed  - Post thyroidectomy levothyroxine dose recommendation:  Weight based, levothyroxine 100 mcg daily      Tobacco abuse  - advised cessation, patient is aware    Type 2 diabetes mellitus, without long-term current use of insulin  - diabetes continue management by PCP   - advised dietary modification   - A1c per PCP soon      Advised patient to follow up with PCP for routine health maintenance care.   RTC in 6 months or sooner pending thyroidectomy      Blaine Adams M.D.  Endocrinology  [facility]  3/23/2023      Disclaimer: This note has been generated in part with the use of voice-recognition software. There may be typographical errors that have been missed during proof-reading.

## 2023-03-24 ENCOUNTER — LAB VISIT (OUTPATIENT)
Dept: LAB | Facility: HOSPITAL | Age: 73
End: 2023-03-24
Attending: INTERNAL MEDICINE
Payer: MEDICARE

## 2023-03-24 ENCOUNTER — PATIENT MESSAGE (OUTPATIENT)
Dept: OTOLARYNGOLOGY | Facility: CLINIC | Age: 73
End: 2023-03-24
Payer: MEDICARE

## 2023-03-24 DIAGNOSIS — I10 ESSENTIAL HYPERTENSION: ICD-10-CM

## 2023-03-24 DIAGNOSIS — E04.9 GOITER: ICD-10-CM

## 2023-03-24 DIAGNOSIS — E11.9 TYPE 2 DIABETES MELLITUS WITHOUT COMPLICATION, WITHOUT LONG-TERM CURRENT USE OF INSULIN: ICD-10-CM

## 2023-03-24 DIAGNOSIS — E78.5 HYPERLIPIDEMIA, UNSPECIFIED HYPERLIPIDEMIA TYPE: ICD-10-CM

## 2023-03-24 LAB
ALBUMIN SERPL BCP-MCNC: 4.1 G/DL (ref 3.5–5.2)
ALP SERPL-CCNC: 118 U/L (ref 55–135)
ALT SERPL W/O P-5'-P-CCNC: 21 U/L (ref 10–44)
ANION GAP SERPL CALC-SCNC: 10 MMOL/L (ref 8–16)
AST SERPL-CCNC: 20 U/L (ref 10–40)
BASOPHILS # BLD AUTO: 0.04 K/UL (ref 0–0.2)
BASOPHILS NFR BLD: 0.6 % (ref 0–1.9)
BILIRUB SERPL-MCNC: 0.7 MG/DL (ref 0.1–1)
BUN SERPL-MCNC: 15 MG/DL (ref 8–23)
CALCIUM SERPL-MCNC: 10.5 MG/DL (ref 8.7–10.5)
CHLORIDE SERPL-SCNC: 104 MMOL/L (ref 95–110)
CHOLEST SERPL-MCNC: 185 MG/DL (ref 120–199)
CHOLEST/HDLC SERPL: 5 {RATIO} (ref 2–5)
CO2 SERPL-SCNC: 26 MMOL/L (ref 23–29)
CREAT SERPL-MCNC: 0.7 MG/DL (ref 0.5–1.4)
DIFFERENTIAL METHOD: ABNORMAL
EOSINOPHIL # BLD AUTO: 0.3 K/UL (ref 0–0.5)
EOSINOPHIL NFR BLD: 4.3 % (ref 0–8)
ERYTHROCYTE [DISTWIDTH] IN BLOOD BY AUTOMATED COUNT: 15.6 % (ref 11.5–14.5)
EST. GFR  (NO RACE VARIABLE): >60 ML/MIN/1.73 M^2
ESTIMATED AVG GLUCOSE: 143 MG/DL (ref 68–131)
GLUCOSE SERPL-MCNC: 141 MG/DL (ref 70–110)
HBA1C MFR BLD: 6.6 % (ref 4–5.6)
HCT VFR BLD AUTO: 44.7 % (ref 37–48.5)
HDLC SERPL-MCNC: 37 MG/DL (ref 40–75)
HDLC SERPL: 20 % (ref 20–50)
HGB BLD-MCNC: 13.6 G/DL (ref 12–16)
IMM GRANULOCYTES # BLD AUTO: 0.01 K/UL (ref 0–0.04)
IMM GRANULOCYTES NFR BLD AUTO: 0.2 % (ref 0–0.5)
LDLC SERPL CALC-MCNC: 129.2 MG/DL (ref 63–159)
LYMPHOCYTES # BLD AUTO: 1.9 K/UL (ref 1–4.8)
LYMPHOCYTES NFR BLD: 29.5 % (ref 18–48)
MCH RBC QN AUTO: 27.9 PG (ref 27–31)
MCHC RBC AUTO-ENTMCNC: 30.4 G/DL (ref 32–36)
MCV RBC AUTO: 92 FL (ref 82–98)
MONOCYTES # BLD AUTO: 0.5 K/UL (ref 0.3–1)
MONOCYTES NFR BLD: 7.6 % (ref 4–15)
NEUTROPHILS # BLD AUTO: 3.7 K/UL (ref 1.8–7.7)
NEUTROPHILS NFR BLD: 57.8 % (ref 38–73)
NONHDLC SERPL-MCNC: 148 MG/DL
NRBC BLD-RTO: 0 /100 WBC
PLATELET # BLD AUTO: 296 K/UL (ref 150–450)
PMV BLD AUTO: 10.1 FL (ref 9.2–12.9)
POTASSIUM SERPL-SCNC: 4.6 MMOL/L (ref 3.5–5.1)
PROT SERPL-MCNC: 8.3 G/DL (ref 6–8.4)
RBC # BLD AUTO: 4.88 M/UL (ref 4–5.4)
SODIUM SERPL-SCNC: 140 MMOL/L (ref 136–145)
T4 FREE SERPL-MCNC: 0.99 NG/DL (ref 0.71–1.51)
THYROPEROXIDASE IGG SERPL-ACNC: <6 IU/ML
TRIGL SERPL-MCNC: 94 MG/DL (ref 30–150)
TSH SERPL DL<=0.005 MIU/L-ACNC: 0.1 UIU/ML (ref 0.4–4)
WBC # BLD AUTO: 6.31 K/UL (ref 3.9–12.7)

## 2023-03-24 PROCEDURE — 83036 HEMOGLOBIN GLYCOSYLATED A1C: CPT | Performed by: INTERNAL MEDICINE

## 2023-03-24 PROCEDURE — 80053 COMPREHEN METABOLIC PANEL: CPT | Performed by: INTERNAL MEDICINE

## 2023-03-24 PROCEDURE — 86376 MICROSOMAL ANTIBODY EACH: CPT | Performed by: HOSPITALIST

## 2023-03-24 PROCEDURE — 80061 LIPID PANEL: CPT | Performed by: INTERNAL MEDICINE

## 2023-03-24 PROCEDURE — 85025 COMPLETE CBC W/AUTO DIFF WBC: CPT | Performed by: INTERNAL MEDICINE

## 2023-03-24 PROCEDURE — 36415 COLL VENOUS BLD VENIPUNCTURE: CPT | Mod: PO | Performed by: INTERNAL MEDICINE

## 2023-03-24 PROCEDURE — 84443 ASSAY THYROID STIM HORMONE: CPT | Performed by: HOSPITALIST

## 2023-03-24 PROCEDURE — 84439 ASSAY OF FREE THYROXINE: CPT | Performed by: HOSPITALIST

## 2023-03-31 ENCOUNTER — OFFICE VISIT (OUTPATIENT)
Dept: OTOLARYNGOLOGY | Facility: CLINIC | Age: 73
End: 2023-03-31
Payer: MEDICARE

## 2023-03-31 VITALS
HEART RATE: 88 BPM | HEIGHT: 66 IN | DIASTOLIC BLOOD PRESSURE: 88 MMHG | BODY MASS INDEX: 22.82 KG/M2 | SYSTOLIC BLOOD PRESSURE: 150 MMHG | WEIGHT: 142 LBS

## 2023-03-31 DIAGNOSIS — E04.9 GOITER: Primary | ICD-10-CM

## 2023-03-31 DIAGNOSIS — E04.9 SUBSTERNAL GOITER: ICD-10-CM

## 2023-03-31 DIAGNOSIS — E01.0 THYROMEGALY: ICD-10-CM

## 2023-03-31 DIAGNOSIS — C50.211 MALIGNANT NEOPLASM OF UPPER-INNER QUADRANT OF RIGHT BREAST IN FEMALE, ESTROGEN RECEPTOR POSITIVE: ICD-10-CM

## 2023-03-31 DIAGNOSIS — Z17.0 MALIGNANT NEOPLASM OF UPPER-INNER QUADRANT OF RIGHT BREAST IN FEMALE, ESTROGEN RECEPTOR POSITIVE: ICD-10-CM

## 2023-03-31 DIAGNOSIS — I10 ESSENTIAL HYPERTENSION: ICD-10-CM

## 2023-03-31 DIAGNOSIS — E11.9 TYPE 2 DIABETES MELLITUS WITHOUT COMPLICATION, WITHOUT LONG-TERM CURRENT USE OF INSULIN: ICD-10-CM

## 2023-03-31 DIAGNOSIS — Z72.0 TOBACCO ABUSE: ICD-10-CM

## 2023-03-31 PROCEDURE — 31575 PR LARYNGOSCOPY, FLEXIBLE; DIAGNOSTIC: ICD-10-PCS | Mod: S$GLB,,, | Performed by: OTOLARYNGOLOGY

## 2023-03-31 PROCEDURE — 99999 PR PBB SHADOW E&M-EST. PATIENT-LVL V: CPT | Mod: PBBFAC,,, | Performed by: OTOLARYNGOLOGY

## 2023-03-31 PROCEDURE — 4010F ACE/ARB THERAPY RXD/TAKEN: CPT | Mod: CPTII,S$GLB,, | Performed by: OTOLARYNGOLOGY

## 2023-03-31 PROCEDURE — 3079F DIAST BP 80-89 MM HG: CPT | Mod: CPTII,S$GLB,, | Performed by: OTOLARYNGOLOGY

## 2023-03-31 PROCEDURE — 3044F PR MOST RECENT HEMOGLOBIN A1C LEVEL <7.0%: ICD-10-PCS | Mod: CPTII,S$GLB,, | Performed by: OTOLARYNGOLOGY

## 2023-03-31 PROCEDURE — 3077F PR MOST RECENT SYSTOLIC BLOOD PRESSURE >= 140 MM HG: ICD-10-PCS | Mod: CPTII,S$GLB,, | Performed by: OTOLARYNGOLOGY

## 2023-03-31 PROCEDURE — 99999 PR PBB SHADOW E&M-EST. PATIENT-LVL V: ICD-10-PCS | Mod: PBBFAC,,, | Performed by: OTOLARYNGOLOGY

## 2023-03-31 PROCEDURE — 3077F SYST BP >= 140 MM HG: CPT | Mod: CPTII,S$GLB,, | Performed by: OTOLARYNGOLOGY

## 2023-03-31 PROCEDURE — 1126F PR PAIN SEVERITY QUANTIFIED, NO PAIN PRESENT: ICD-10-PCS | Mod: CPTII,S$GLB,, | Performed by: OTOLARYNGOLOGY

## 2023-03-31 PROCEDURE — 31575 DIAGNOSTIC LARYNGOSCOPY: CPT | Mod: S$GLB,,, | Performed by: OTOLARYNGOLOGY

## 2023-03-31 PROCEDURE — 1159F PR MEDICATION LIST DOCUMENTED IN MEDICAL RECORD: ICD-10-PCS | Mod: CPTII,S$GLB,, | Performed by: OTOLARYNGOLOGY

## 2023-03-31 PROCEDURE — 1126F AMNT PAIN NOTED NONE PRSNT: CPT | Mod: CPTII,S$GLB,, | Performed by: OTOLARYNGOLOGY

## 2023-03-31 PROCEDURE — 1159F MED LIST DOCD IN RCRD: CPT | Mod: CPTII,S$GLB,, | Performed by: OTOLARYNGOLOGY

## 2023-03-31 PROCEDURE — 3079F PR MOST RECENT DIASTOLIC BLOOD PRESSURE 80-89 MM HG: ICD-10-PCS | Mod: CPTII,S$GLB,, | Performed by: OTOLARYNGOLOGY

## 2023-03-31 PROCEDURE — 99204 PR OFFICE/OUTPT VISIT, NEW, LEVL IV, 45-59 MIN: ICD-10-PCS | Mod: 25,S$GLB,, | Performed by: OTOLARYNGOLOGY

## 2023-03-31 PROCEDURE — 3008F PR BODY MASS INDEX (BMI) DOCUMENTED: ICD-10-PCS | Mod: CPTII,S$GLB,, | Performed by: OTOLARYNGOLOGY

## 2023-03-31 PROCEDURE — 4010F PR ACE/ARB THEARPY RXD/TAKEN: ICD-10-PCS | Mod: CPTII,S$GLB,, | Performed by: OTOLARYNGOLOGY

## 2023-03-31 PROCEDURE — 3044F HG A1C LEVEL LT 7.0%: CPT | Mod: CPTII,S$GLB,, | Performed by: OTOLARYNGOLOGY

## 2023-03-31 PROCEDURE — 99204 OFFICE O/P NEW MOD 45 MIN: CPT | Mod: 25,S$GLB,, | Performed by: OTOLARYNGOLOGY

## 2023-03-31 PROCEDURE — 3008F BODY MASS INDEX DOCD: CPT | Mod: CPTII,S$GLB,, | Performed by: OTOLARYNGOLOGY

## 2023-03-31 RX ORDER — DEXAMETHASONE SODIUM PHOSPHATE 4 MG/ML
8 INJECTION, SOLUTION INTRA-ARTICULAR; INTRALESIONAL; INTRAMUSCULAR; INTRAVENOUS; SOFT TISSUE
Status: CANCELLED | OUTPATIENT
Start: 2023-03-31

## 2023-03-31 RX ORDER — LIDOCAINE HYDROCHLORIDE 10 MG/ML
1 INJECTION, SOLUTION EPIDURAL; INFILTRATION; INTRACAUDAL; PERINEURAL ONCE
Status: CANCELLED | OUTPATIENT
Start: 2023-03-31 | End: 2023-03-31

## 2023-03-31 NOTE — ASSESSMENT & PLAN NOTE
She has a growing, symptomatic goiter with significant compression symptoms on her breathing and swallowing. I would like to get a new CT of her neck. This has been previously biopsied as benign, but the calculus has changed with her increased compression symptoms. I have recommended a total thyroidectomy, with the chief alternative being continued observation.    I explained the risks of thyroidectomy include, but are not limited to, infection, bleeding, scarring, failure to achieve the diagnosis, no evidence of cancer, recurrence, collection of blood or tissue fluid requiring drainage, injury to the recurrent laryngeal nerve with resultant temporary or permanent hoarseness (1% permanent risk with up to 10% temporary risk, greater in revision operations), injury to the superior laryngeal nerve with resultant loss of the upper register for singing or challenges with yelling, temporary or permanent hypocalcemia related to injury or devascularization of the parathyroid glands (less than 5% permanent, up to 30-60% when paratracheal dissection is accomplished, again greater in revision operations), and the need for additional procedures or therapies.  Time was allowed for questions, and all questions were answered to the patient's apparent satisfaction. The risks of paratracheal lymph node dissection are included above. Informed consent was obtained.    Surgery has been scheduled for May 1, 2023. Paperwork will be signed on the morning of surgery.

## 2023-03-31 NOTE — PATIENT INSTRUCTIONS
Surgery/procedure time and date: 5/1/2023 @ 0700                             Arrival time: 0530  (usually at least 1 hour prior to surgery/procedure)    Stop ALL solid food, gum, candy (including vitamins) 8 hours before surgery/procedure time.  Stop all CLOUDY liquids: coffee with creamer, formula, tube feeds, cloudy juices, non-human milk and breast milk with additives, 6 hours prior to surgery/procedure time.  Stop plain breast milk 4 hours prior to surgery/procedure time.  The patient should be ENCOURAGED to drink carbohydrate-rich clear liquids (sports drinks, clear juices) until 2 hours prior to surgery/procedure time.  CLEAR liquids include only water, black coffee NO creamer, clear oral rehydration drinks, clear sports drinks or clear fruit juices (no orange juice, no pulpy juices, no apple cider). Advise patients if they can read newsprint through the liquid, it qualifies as clear liquid.   IF IN DOUBT, drink water instead.   NOTHING TO EAT OR DRINK 2 hours before to surgery/procedure time. If you are told to take medication on the morning of surgery, it may be taken with a sip of water.     The team will call the week before (or the day before) surgery to tell you the arrival time.    The anesthesia preop center will call to ask you some questions before surgery.     Please stop aspirin 2 weeks before surgery, plavix 1 week before surgery, and other blood thinners 5 days before surgery, if indicated. Sometimes the anesthesia team or your doctors will want you stay on at least one blood thinner - they will let you know.     For your day of surgery, please come to [facility] on the 2nd floor of the main hospital - follow the signs.

## 2023-03-31 NOTE — PROGRESS NOTES
"[facility]    Subjective:       Patient ID: Alia Andre is a 72 y.o. female.    Chief Complaint: No chief complaint on file.    HPI  Oncology History:  Oncology History   Infiltrating ductal carcinoma of right female breast (Resolved)   10/11/2018 Biopsy    There is a 19 mm x 14 mm x 14 mm irregularly shaped, hypoechoic mass with indistinct margins seen in the right breast at 11 o'clock, 6 cm from the nipple. The mass correlates with a palpable mass reported by the patient       10/11/2018 Initial Diagnosis    1. Right breast, mass, biopsy:  -Invasive mammary carcinoma with medullary features.  -Histologic grade: Grade 3 (3, 3, 2).  -9 mm in greatest linear dimension involving core biopsy tip.    2. Lymph node, submitted as "right lymph node", biopsy:  -Lymphoid tissue, negative for metastatic carcinoma.       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 pathologic prognostic staging system       12/4/2018 Tumor Conference    Port placed at time of surgery. With only 10% ER will need adjuvant therapy.     No radiation therapy       Malignant neoplasm of right female breast   11/26/2018 Initial Diagnosis    Malignant neoplasm of right female breast       Alia Andre is a 72 y.o. female who presents as referral from her daughter (who I operated on, along with her daughter) regarding a longstanding substernal goiter. She has known about the goiter for 30+ " years, but she is having increasing difficulty sleeping because it feels like she is having anterior neck compression. She sleeps propped up on 2 pillows with some relief. She has intermittent dysphagia but no odynophagia. There is occasional tenderness in the neck. She thinks that it is getting bigger. She is breathing ok when awake.     She had an US and a CT scan several years ago. She also had an FNA in 2019 with Dr. Rodriguez that was benign.     She was treated with chemotherapy for breast cancer after a mastectomy in 2019, but has no history of head and neck radiation therapy. She presents for evaluation and management.     Past Medical History:   Diagnosis Date    Allergy     Arthritis     Cancer     breast    Diabetes mellitus type 2 in nonobese     Goiter     History of endometrial ablation     age 29    Hypertension     Tobacco abuse     Vitamin D deficiency        Past Surgical History:   Procedure Laterality Date    BREAST SURGERY      INSERTION OF TUNNELED CENTRAL VENOUS CATHETER (CVC) WITH SUBCUTANEOUS PORT Left 11/16/2018    Procedure: [id number] LEFT;  Surgeon: Graciela Echeverria MD;  Location: [facility] OR [address] [place] [address];  Service: General;  Laterality: Left;    MASTECTOMY Right 11/16/2018    Procedure: MASTECTOMY RIGHT 2.5 HR CASE;  Surgeon: Cristiane Frederick MD;  Location: [facility] OR [address] [place] [address];  Service: General;  Laterality: Right;  needs to be 1st case, Dr. Cameron has afternoon cases at [facility]    SENTINEL LYMPH NODE BIOPSY Right 11/16/2018    Procedure: BIOPSY, LYMPH NODE, SENTINEL RIGHT;  Surgeon: Cristiane Frederick MD;  Location: [facility] OR [address] [place] [address];  Service: General;  Laterality: Right;    TUBAL LIGATION      1970/80's         Current Outpatient Medications:     acetaminophen (TYLENOL) 325 MG tablet, Take 325 mg by mouth every 6 (six) hours as needed for Pain., Disp: , Rfl:     albuterol (PROVENTIL) 2.5 mg /3 mL (0.083 %) nebulizer solution, Take 3 mLs (2.5 mg total) by nebulization every 6 (six) hours as  needed for Wheezing. Rescue, Disp: 270 mL, Rfl: 0    ammonium lactate 12 % Crea, Apply twice daily to affected parts both feet as needed., Disp: 140 g, Rfl: 11    BD ALCOHOL SWABS PadM, , Disp: , Rfl:     blood sugar diagnostic Strp, 1 strip by Misc.(Non-Drug; Combo Route) route 2 (two) times daily with meals., Disp: 100 strip, Rfl: 11    blood-glucose meter kit, Use as instructed, Disp: 1 each, Rfl: 0    ciclopirox (PENLAC) 8 % Soln, Apply topically nightly., Disp: 6.6 mL, Rfl: 11    cyproheptadine (PERIACTIN) 4 mg tablet, Take 1 tablet (4 mg total) by mouth 3 (three) times daily as needed., Disp: 90 tablet, Rfl: 0    diclofenac sodium (VOLTAREN) 1 % Gel, Apply 2 g topically daily as needed., Disp: 100 g, Rfl: 0    fluticasone propionate (FLONASE) 50 mcg/actuation nasal spray, USE 1 SPRAY IN EACH NOSTRIL EVERY DAY, Disp: 32 g, Rfl: 0    gabapentin (NEURONTIN) 300 MG capsule, , Disp: , Rfl:      mg tablet, TAKE 1 TABLET EVERY DAY AS NEEDED FOR PAIN, Disp: 15 tablet, Rfl: 0    lancets Misc, 1 lancet by Misc.(Non-Drug; Combo Route) route 2 (two) times daily with meals., Disp: 100 each, Rfl: 11    lancing device Misc, 1 Device by Misc.(Non-Drug; Combo Route) route 2 (two) times daily with meals., Disp: 1 each, Rfl: 0    letrozole (FEMARA) 2.5 mg Tab, TAKE 1 TABLET EVERY DAY, Disp: 90 tablet, Rfl: 3    levothyroxine (SYNTHROID) 50 MCG tablet, TAKE 1 TABLET ON EMPTY STOMACH AN HOUR BEFORE BREAKFAST DAILY EXCEPT SKIP SUNDAYS., Disp: 78 tablet, Rfl: 3    loratadine (CLARITIN) 10 mg tablet, Take 1 tablet (10 mg total) by mouth once daily., Disp: 90 tablet, Rfl: 3    losartan (COZAAR) 50 MG tablet, TAKE 1 TABLET EVERY DAY, Disp: 90 tablet, Rfl: 3    metFORMIN (GLUCOPHAGE) 500 MG tablet, TAKE 2 TABLETS TWICE DAILY WITH MEALS, Disp: 360 tablet, Rfl: 3    mupirocin (BACTROBAN) 2 % ointment, Apply topically 3 (three) times daily., Disp: 15 g, Rfl: 0    mupirocin (BACTROBAN) 2 % ointment, apply topically 3 times daily,  Disp: 22 g, Rfl: 0    nicotine (NICODERM CQ) 21 mg/24 hr, APPLY 1 PATCH TRANSDERMALLY EVERY 24 HOURS, Disp: , Rfl: 0    nicotine, polacrilex, (NICORETTE) 2 mg Gum, CHEW 1 PIECE (2 MG) BY MOUTH 10 TIMES PER DAY AS NEEDED, Disp: , Rfl: 0    predniSONE (DELTASONE) 20 MG tablet, Take 1 tablet (20 mg total) by mouth once daily., Disp: 5 tablet, Rfl: 0    rosuvastatin (CRESTOR) 10 MG tablet, Take 1 tablet (10 mg total) by mouth every evening., Disp: 90 tablet, Rfl: 3    TRUEPLUS LANCETS 33 gauge Misc, , Disp: , Rfl:     turmeric root extract 500 mg Cap, Take 1 tablet by mouth once daily., Disp: , Rfl:     aspirin 81 MG Chew, Take 1 tablet (81 mg total) by mouth once daily. Start on Monday., Disp: , Rfl: 0    Review of patient's allergies indicates:   Allergen Reactions    Pravastatin      Leg cramps       Social History     Socioeconomic History    Marital status:    Tobacco Use    Smoking status: Some Days     Packs/day: 1.00     Years: 30.00     Pack years: 30.00     Types: Cigarettes    Smokeless tobacco: Never   Substance and Sexual Activity    Alcohol use: No    Drug use: No    Sexual activity: Not Currently   Social History Narrative    Lives by self       Family History   Problem Relation Age of Onset    Aortic aneurysm Mother     Leukemia Father     No Known Problems Sister     Cancer Maternal Aunt     Breast cancer Paternal Aunt     Breast cancer Maternal Cousin     Breast cancer Paternal Cousin     Breast cancer Paternal Cousin        Review of Systems   Constitutional:  Positive for fatigue. Negative for unexpected weight change.   HENT:  Positive for trouble swallowing. Negative for sore throat.    Respiratory:  Negative for cough and shortness of breath.    Cardiovascular:  Negative for chest pain and palpitations.   Hematological:  Positive for adenopathy.   Psychiatric/Behavioral:  The patient is not nervous/anxious.      Objective:     Physical Exam  Vitals reviewed.   Constitutional:       General:  She is not in acute distress.     Appearance: She is well-developed.   HENT:      Head: Normocephalic and atraumatic.      Jaw: No trismus.      Salivary Glands: Right salivary gland is not diffusely enlarged or tender. Left salivary gland is not diffusely enlarged or tender.      Comments: Salivary glands - there are no lesions or asymmetric findings in the submandibular or parotid glands     Right Ear: Hearing, tympanic membrane, ear canal and external ear normal.      Left Ear: Hearing, tympanic membrane, ear canal and external ear normal.      Nose: Septal deviation (rightward) present. No nasal deformity or rhinorrhea.      Mouth/Throat:      Mouth: No oral lesions.      Tongue: No lesions.      Palate: No mass and lesions.      Pharynx: Uvula midline.      Comments: Procedure: Flexible laryngoscopy  In order to fully examine the upper aerodigestive tract, including the larynx, in a patient with a hyperactive gag reflex, flexible endoscopy is required.  After explaining the procedure and obtaining verbal consent, a timeout was performed with the patient's participation according to the universal protocol. Both nasal cavities were anesthetized with 4% Xylocaine spray mixed with Regan-Synephrine. The flexible laryngoscope (#[id number]) was inserted into the nasal cavity and advanced to visualize the nasal cavity, nasopharynx, the posterior oropharynx, hypopharynx, and the endolarynx with the above findings noted. The scope was removed and the procedure terminated. The patient tolerated this procedure well without apparent complication.      FINDINGS  Nasopharynx - the torus is clear. There are no lesions of the posterior wall.   Oropharynx - no lesions of the tongue base. There is no obvious fullness or asymmetry.  Hypopharynx - there are no lesions of the pyriform sinuses or postcricoid region   Larynx - there are no lesions of the supraglottic or glottic larynx. Vocal fold mobility is normal with complete closure.    There is subtle rotation of the glottis to her right (posteriorly), but there is no obstruction  Eyes:      General: Lids are normal.      Extraocular Movements: Extraocular movements intact.      Conjunctiva/sclera:      Right eye: Right conjunctiva is not injected. No chemosis.     Left eye: Left conjunctiva is not injected. No chemosis.  Neck:      Thyroid: Thyromegaly present. No thyroid mass.      Vascular: No JVD.      Trachea: Phonation normal. No tracheal deviation.     Pulmonary:      Effort: Pulmonary effort is normal. No accessory muscle usage or respiratory distress.      Breath sounds: No stridor.   Musculoskeletal:         General: No tenderness.      Cervical back: Full passive range of motion without pain.   Lymphadenopathy:      Cervical: No cervical adenopathy.      Right cervical: No deep or posterior cervical adenopathy.     Left cervical: No deep or posterior cervical adenopathy.   Skin:     Findings: No erythema, lesion or rash.      Nails: There is no clubbing.   Neurological:      Mental Status: She is alert and oriented to person, place, and time.      Cranial Nerves: No cranial nerve deficit or facial asymmetry.      Motor: No weakness.      Gait: Gait normal.   Psychiatric:         Speech: Speech normal.         Behavior: Behavior is cooperative.          CT, US reviewed  FNA from 2019 reviewed    Assessment & Plan:       Problem List Items Addressed This Visit       Essential hypertension    Malignant neoplasm of right female breast    Substernal goiter - Primary     She has a growing, symptomatic goiter with significant compression symptoms on her breathing and swallowing. I would like to get a new CT of her neck. This has been previously biopsied as benign, but the calculus has changed with her increased compression symptoms. I have recommended a total thyroidectomy, with the chief alternative being continued observation.    I explained the risks of thyroidectomy include, but are not  limited to, infection, bleeding, scarring, failure to achieve the diagnosis, no evidence of cancer, recurrence, collection of blood or tissue fluid requiring drainage, injury to the recurrent laryngeal nerve with resultant temporary or permanent hoarseness (1% permanent risk with up to 10% temporary risk, greater in revision operations), injury to the superior laryngeal nerve with resultant loss of the upper register for singing or challenges with yelling, temporary or permanent hypocalcemia related to injury or devascularization of the parathyroid glands (less than 5% permanent, up to 30-60% when paratracheal dissection is accomplished, again greater in revision operations), and the need for additional procedures or therapies.  Time was allowed for questions, and all questions were answered to the patient's apparent satisfaction. The risks of paratracheal lymph node dissection are included above. Informed consent was obtained.    Surgery has been scheduled for May 1, 2023. Paperwork will be signed on the morning of surgery.                 Tobacco abuse    Type 2 diabetes mellitus, without long-term current use of insulin     Other Visit Diagnoses       Thyromegaly

## 2023-04-03 ENCOUNTER — HOSPITAL ENCOUNTER (OUTPATIENT)
Dept: RADIOLOGY | Facility: HOSPITAL | Age: 73
Discharge: HOME | End: 2023-04-03
Attending: OTOLARYNGOLOGY
Payer: MEDICARE

## 2023-04-03 DIAGNOSIS — E04.9 GOITER: ICD-10-CM

## 2023-04-03 PROCEDURE — 70491 CT SOFT TISSUE NECK W/DYE: CPT | Mod: TC

## 2023-04-03 PROCEDURE — 25500020 PHARM REV CODE 255: Performed by: OTOLARYNGOLOGY

## 2023-04-03 PROCEDURE — 70491 CT SOFT TISSUE NECK WITH CONTRAST: ICD-10-PCS | Mod: 26,,, | Performed by: RADIOLOGY

## 2023-04-03 PROCEDURE — 70491 CT SOFT TISSUE NECK W/DYE: CPT | Mod: 26,,, | Performed by: RADIOLOGY

## 2023-04-03 RX ADMIN — IOHEXOL 75 ML: 350 INJECTION, SOLUTION INTRAVENOUS at 11:04

## 2023-04-06 ENCOUNTER — PATIENT MESSAGE (OUTPATIENT)
Dept: OTOLARYNGOLOGY | Facility: CLINIC | Age: 73
End: 2023-04-06
Payer: MEDICARE

## 2023-04-06 ENCOUNTER — PATIENT MESSAGE (OUTPATIENT)
Dept: SURGERY | Facility: HOSPITAL | Age: 73
End: 2023-04-06
Payer: MEDICARE

## 2023-04-10 ENCOUNTER — OFFICE VISIT (OUTPATIENT)
Dept: PRIMARY CARE CLINIC | Facility: CLINIC | Age: 73
End: 2023-04-10
Payer: MEDICARE

## 2023-04-10 VITALS
WEIGHT: 138.25 LBS | OXYGEN SATURATION: 98 % | SYSTOLIC BLOOD PRESSURE: 138 MMHG | DIASTOLIC BLOOD PRESSURE: 74 MMHG | TEMPERATURE: 97 F | HEART RATE: 88 BPM | HEIGHT: 66 IN | BODY MASS INDEX: 22.22 KG/M2

## 2023-04-10 DIAGNOSIS — B35.1 ONYCHOMYCOSIS: ICD-10-CM

## 2023-04-10 DIAGNOSIS — I10 BENIGN ESSENTIAL HYPERTENSION: ICD-10-CM

## 2023-04-10 DIAGNOSIS — E04.9 THYROID GOITER: ICD-10-CM

## 2023-04-10 DIAGNOSIS — C50.211 MALIGNANT NEOPLASM OF UPPER-INNER QUADRANT OF RIGHT BREAST IN FEMALE, ESTROGEN RECEPTOR POSITIVE: ICD-10-CM

## 2023-04-10 DIAGNOSIS — M77.31 CALCANEAL SPUR OF RIGHT FOOT: ICD-10-CM

## 2023-04-10 DIAGNOSIS — F17.210 CIGARETTE NICOTINE DEPENDENCE WITHOUT COMPLICATION: ICD-10-CM

## 2023-04-10 DIAGNOSIS — Z17.0 MALIGNANT NEOPLASM OF UPPER-INNER QUADRANT OF RIGHT BREAST IN FEMALE, ESTROGEN RECEPTOR POSITIVE: ICD-10-CM

## 2023-04-10 DIAGNOSIS — E11.9 TYPE 2 DIABETES MELLITUS WITHOUT COMPLICATION, WITHOUT LONG-TERM CURRENT USE OF INSULIN: Primary | ICD-10-CM

## 2023-04-10 DIAGNOSIS — Z12.11 COLON CANCER SCREENING: ICD-10-CM

## 2023-04-10 DIAGNOSIS — E03.9 HYPOTHYROIDISM, UNSPECIFIED TYPE: ICD-10-CM

## 2023-04-10 DIAGNOSIS — J31.0 CHRONIC RHINITIS: ICD-10-CM

## 2023-04-10 DIAGNOSIS — I70.0 AORTIC ATHEROSCLEROSIS: ICD-10-CM

## 2023-04-10 PROBLEM — F32.1 CURRENT MODERATE EPISODE OF MAJOR DEPRESSIVE DISORDER WITHOUT PRIOR EPISODE: Status: RESOLVED | Noted: 2023-01-23 | Resolved: 2023-04-10

## 2023-04-10 LAB
ALBUMIN/CREAT UR: 35.6 UG/MG (ref 0–30)
CREAT UR-MCNC: 104 MG/DL (ref 15–325)
MICROALBUMIN UR DL<=1MG/L-MCNC: 37 UG/ML

## 2023-04-10 PROCEDURE — 3288F FALL RISK ASSESSMENT DOCD: CPT | Mod: CPTII,S$GLB,, | Performed by: INTERNAL MEDICINE

## 2023-04-10 PROCEDURE — 1126F PR PAIN SEVERITY QUANTIFIED, NO PAIN PRESENT: ICD-10-PCS | Mod: CPTII,S$GLB,, | Performed by: INTERNAL MEDICINE

## 2023-04-10 PROCEDURE — 3044F HG A1C LEVEL LT 7.0%: CPT | Mod: CPTII,S$GLB,, | Performed by: INTERNAL MEDICINE

## 2023-04-10 PROCEDURE — 1160F PR REVIEW ALL MEDS BY PRESCRIBER/CLIN PHARMACIST DOCUMENTED: ICD-10-PCS | Mod: CPTII,S$GLB,, | Performed by: INTERNAL MEDICINE

## 2023-04-10 PROCEDURE — 1101F PT FALLS ASSESS-DOCD LE1/YR: CPT | Mod: CPTII,S$GLB,, | Performed by: INTERNAL MEDICINE

## 2023-04-10 PROCEDURE — 82570 ASSAY OF URINE CREATININE: CPT | Performed by: INTERNAL MEDICINE

## 2023-04-10 PROCEDURE — 3075F PR MOST RECENT SYSTOLIC BLOOD PRESS GE 130-139MM HG: ICD-10-PCS | Mod: CPTII,S$GLB,, | Performed by: INTERNAL MEDICINE

## 2023-04-10 PROCEDURE — 3075F SYST BP GE 130 - 139MM HG: CPT | Mod: CPTII,S$GLB,, | Performed by: INTERNAL MEDICINE

## 2023-04-10 PROCEDURE — 3078F DIAST BP <80 MM HG: CPT | Mod: CPTII,S$GLB,, | Performed by: INTERNAL MEDICINE

## 2023-04-10 PROCEDURE — 1160F RVW MEDS BY RX/DR IN RCRD: CPT | Mod: CPTII,S$GLB,, | Performed by: INTERNAL MEDICINE

## 2023-04-10 PROCEDURE — 3008F PR BODY MASS INDEX (BMI) DOCUMENTED: ICD-10-PCS | Mod: CPTII,S$GLB,, | Performed by: INTERNAL MEDICINE

## 2023-04-10 PROCEDURE — 3078F PR MOST RECENT DIASTOLIC BLOOD PRESSURE < 80 MM HG: ICD-10-PCS | Mod: CPTII,S$GLB,, | Performed by: INTERNAL MEDICINE

## 2023-04-10 PROCEDURE — 3008F BODY MASS INDEX DOCD: CPT | Mod: CPTII,S$GLB,, | Performed by: INTERNAL MEDICINE

## 2023-04-10 PROCEDURE — 1159F MED LIST DOCD IN RCRD: CPT | Mod: CPTII,S$GLB,, | Performed by: INTERNAL MEDICINE

## 2023-04-10 PROCEDURE — 1126F AMNT PAIN NOTED NONE PRSNT: CPT | Mod: CPTII,S$GLB,, | Performed by: INTERNAL MEDICINE

## 2023-04-10 PROCEDURE — 1101F PR PT FALLS ASSESS DOC 0-1 FALLS W/OUT INJ PAST YR: ICD-10-PCS | Mod: CPTII,S$GLB,, | Performed by: INTERNAL MEDICINE

## 2023-04-10 PROCEDURE — 1159F PR MEDICATION LIST DOCUMENTED IN MEDICAL RECORD: ICD-10-PCS | Mod: CPTII,S$GLB,, | Performed by: INTERNAL MEDICINE

## 2023-04-10 PROCEDURE — 99999 PR PBB SHADOW E&M-EST. PATIENT-LVL V: CPT | Mod: PBBFAC,,, | Performed by: INTERNAL MEDICINE

## 2023-04-10 PROCEDURE — 99214 PR OFFICE/OUTPT VISIT, EST, LEVL IV, 30-39 MIN: ICD-10-PCS | Mod: S$GLB,,, | Performed by: INTERNAL MEDICINE

## 2023-04-10 PROCEDURE — 4010F ACE/ARB THERAPY RXD/TAKEN: CPT | Mod: CPTII,S$GLB,, | Performed by: INTERNAL MEDICINE

## 2023-04-10 PROCEDURE — 3044F PR MOST RECENT HEMOGLOBIN A1C LEVEL <7.0%: ICD-10-PCS | Mod: CPTII,S$GLB,, | Performed by: INTERNAL MEDICINE

## 2023-04-10 PROCEDURE — 3288F PR FALLS RISK ASSESSMENT DOCUMENTED: ICD-10-PCS | Mod: CPTII,S$GLB,, | Performed by: INTERNAL MEDICINE

## 2023-04-10 PROCEDURE — 99999 PR PBB SHADOW E&M-EST. PATIENT-LVL V: ICD-10-PCS | Mod: PBBFAC,,, | Performed by: INTERNAL MEDICINE

## 2023-04-10 PROCEDURE — 4010F PR ACE/ARB THEARPY RXD/TAKEN: ICD-10-PCS | Mod: CPTII,S$GLB,, | Performed by: INTERNAL MEDICINE

## 2023-04-10 PROCEDURE — 99214 OFFICE O/P EST MOD 30 MIN: CPT | Mod: S$GLB,,, | Performed by: INTERNAL MEDICINE

## 2023-04-10 RX ORDER — METHYLPREDNISOLONE 4 MG/1
TABLET ORAL
Qty: 21 EACH | Refills: 0 | Status: ON HOLD | OUTPATIENT
Start: 2023-04-10 | End: 2023-05-02 | Stop reason: HOSPADM

## 2023-04-10 RX ORDER — CICLOPIROX 80 MG/ML
SOLUTION TOPICAL NIGHTLY
Qty: 6.6 ML | Refills: 11 | Status: SHIPPED | OUTPATIENT
Start: 2023-04-10

## 2023-04-10 NOTE — PROGRESS NOTES
INTERNAL MEDICINE ANNUAL VISIT NOTE      CHIEF COMPLAINT     ANNUAL    HPI     Alia Andre is a 72 y.o. AA female who presents for annual.  Cologuard - has the package at home.   DEXA - 5/25/22. Osteopenia.   L breast MMG - due after 5/25/22.   Have 2 daughters - Rodrigo and another daughter.   Walks 4-5 days a week for a block or two.   Able to do everything for herself. Kids always drive her around. Friends will bring her grocery shopping.   Watches TV at home.     R breast CA s/p R mastectomy and SLNB. S/p chemo. On letrozole since 4/2019.   LOV breast center 6/1/22    CT chest lung screening - Aorta and vasculature: Mild atherosclerosis of the thoracic and partially visualized abdominal aorta.  Pravastatin 10-->leg cramps.  Currently on crestor 5mg daily.   .2 two weeks ago.      DM2 x 1-2 yrs - metformin 500mg BID.  No nausea/vomiting/diarrhea. Sugars run about 120 at home on average.  Last foot exam - due.  a1c 3/24/23 6.6  MAC - due for MAC. On losartan 50mg qd.  Due for optometry.     HTN - losartan 50mg daily. Took med this morning.   Checks BP at home every morning. SBP is 120s-140s.      Hypothyroidism - synthroid 50mcg daily except skip on Sundays.  Thyroid nodule s/p FNA 9/2019 w/ Dr. Rodriguez. Benign path.  Feels like losing weight unintentionally about 7lbs.   Decreased appetite. Makes herself eat 3 meals a day.   CT neck 4/3/23 - Markedly enlarged thyroid gland in keeping with the reported history of goiter as above.  1.7 enhancing nodular lesion along the posterior margin of the left parotid gland, likely within the gland.  Differential considerations include pathologically enlarged lymph node or primary parotid mass.  Clinical correlation advised with further workup as warranted.  Saw Dr. Shay for substernal goiter - plan for surgery 5/1/23. No trouble swallowing. No SOB.      Takes Ca and Vit D together.      Current smoker - smokes about 4-5 cigarettes a day.   Prior to this, 1/2-1 ppd x  42 yrs.   Had a cold issue in January. Still w/ a little bit of coughing. Z pack and steroids helped but didn't clear it all the way up. Postnasal drip. No fevers/chills. Left ear is hurting but no sinus pain. Still takes claritin nightly. Using flonase consistently. Didn't feel like it helped much.     Past Medical History:  Past Medical History:   Diagnosis Date    Allergy     Arthritis     Cancer     breast    Diabetes mellitus type 2 in nonobese     Goiter     History of endometrial ablation     age 29    Hypertension     Tobacco abuse     Vitamin D deficiency        Past Surgical History:  Past Surgical History:   Procedure Laterality Date    BREAST SURGERY      INSERTION OF TUNNELED CENTRAL VENOUS CATHETER (CVC) WITH SUBCUTANEOUS PORT Left 11/16/2018    Procedure: [id number] LEFT;  Surgeon: Graciela Echeverria MD;  Location: [facility] OR [address] [place] [address];  Service: General;  Laterality: Left;    MASTECTOMY Right 11/16/2018    Procedure: MASTECTOMY RIGHT 2.5 HR CASE;  Surgeon: Cristiane Frederick MD;  Location: [facility] OR [address] [place] [address];  Service: General;  Laterality: Right;  needs to be 1st case, Dr. Cameron has afternoon cases at [facility]    SENTINEL LYMPH NODE BIOPSY Right 11/16/2018    Procedure: BIOPSY, LYMPH NODE, SENTINEL RIGHT;  Surgeon: Cristiane Frederick MD;  Location: [facility] OR [address] [place] [address];  Service: General;  Laterality: Right;    TUBAL LIGATION      1970/80's       Allergies:  Review of patient's allergies indicates:   Allergen Reactions    Pravastatin      Leg cramps       Home Medications:  Prior to Admission medications    Medication Sig Start Date End Date Taking? Authorizing Provider   acetaminophen (TYLENOL) 325 MG tablet Take 325 mg by mouth every 6 (six) hours as needed for Pain.    Historical Provider   albuterol (PROVENTIL) 2.5 mg /3 mL (0.083 %) nebulizer solution Take 3 mLs (2.5 mg total) by nebulization every 6 (six) hours as needed for Wheezing. Rescue 1/23/23 1/23/24  Wali Sawant MD   ammonium lactate 12 %  Crea Apply twice daily to affected parts both feet as needed. 7/30/21   Maxi Moreno DPM   aspirin 81 MG Chew Take 1 tablet (81 mg total) by mouth once daily. Start on Monday. 12/17/20 4/28/22  Nancy Poe MD   BD ALCOHOL SWABS PadM  9/5/18   Historical Provider   blood sugar diagnostic Strp 1 strip by Misc.(Non-Drug; Combo Route) route 2 (two) times daily with meals. 4/28/22   Nancy Poe MD   blood-glucose meter kit Use as instructed 4/28/22 4/28/23  Nancy Poe MD   ciclopirox (PENLAC) 8 % Soln Apply topically nightly. 7/30/21   Maxi Moreno DPM   cyproheptadine (PERIACTIN) 4 mg tablet Take 1 tablet (4 mg total) by mouth 3 (three) times daily as needed. 5/20/19   Alexander Garces MD   diclofenac sodium (VOLTAREN) 1 % Gel Apply 2 g topically daily as needed. 9/3/19   DOMI Valiente   fluticasone propionate (FLONASE) 50 mcg/actuation nasal spray USE 1 SPRAY IN EACH NOSTRIL EVERY DAY 3/20/23   Nancy Poe MD   gabapentin (NEURONTIN) 300 MG capsule  6/5/19   Historical Provider    mg tablet TAKE 1 TABLET EVERY DAY AS NEEDED FOR PAIN 1/2/21   Nancy Poe MD   lancets Misc 1 lancet by Misc.(Non-Drug; Combo Route) route 2 (two) times daily with meals. 4/28/22   Nancy Poe MD   lancing device Misc 1 Device by Misc.(Non-Drug; Combo Route) route 2 (two) times daily with meals. 4/28/22 4/28/23  Nancy Poe MD   letrozole (FEMARA) 2.5 mg Tab TAKE 1 TABLET EVERY DAY 6/8/22   Alexander Garces MD   levothyroxine (SYNTHROID) 50 MCG tablet TAKE 1 TABLET ON EMPTY STOMACH AN HOUR BEFORE BREAKFAST DAILY EXCEPT SKIP SUNDAYS. 10/5/22   aNncy Poe MD   loratadine (CLARITIN) 10 mg tablet Take 1 tablet (10 mg total) by mouth once daily. 4/28/22 4/28/23  Nancy Poe MD   losartan (COZAAR) 50 MG tablet TAKE 1 TABLET EVERY DAY 10/5/22   Nancy Poe MD   metFORMIN (GLUCOPHAGE) 500 MG tablet TAKE 2 TABLETS TWICE DAILY WITH MEALS 1/12/23   Nancy Poe MD   mupirocin (BACTROBAN) 2 % ointment Apply topically 3 (three) times daily. 2/16/22   Nancy Haque NP  "  mupirocin (BACTROBAN) 2 % ointment apply topically 3 times daily 2/17/22   Nancy Haque NP   nicotine (NICODERM CQ) 21 mg/24 hr APPLY 1 PATCH TRANSDERMALLY EVERY 24 HOURS 8/3/18   Historical Provider   nicotine, polacrilex, (NICORETTE) 2 mg Gum CHEW 1 PIECE (2 MG) BY MOUTH 10 TIMES PER DAY AS NEEDED 8/6/18   Historical Provider   predniSONE (DELTASONE) 20 MG tablet Take 1 tablet (20 mg total) by mouth once daily. 1/23/23   aWli Sawant MD   rosuvastatin (CRESTOR) 10 MG tablet Take 1 tablet (10 mg total) by mouth every evening. 5/2/22 5/2/23  Nancy Poe MD   TRUEPLUS LANCETS 33 gauge Misc  1/21/23   Historical Provider   turmeric root extract 500 mg Cap Take 1 tablet by mouth once daily.    Historical Provider       Family History:  Family History   Problem Relation Age of Onset    Aortic aneurysm Mother     Leukemia Father     No Known Problems Sister     Cancer Maternal Aunt     Breast cancer Paternal Aunt     Breast cancer Maternal Cousin     Breast cancer Paternal Cousin     Breast cancer Paternal Cousin        Social History:  Social History     Tobacco Use    Smoking status: Some Days     Packs/day: 1.00     Years: 30.00     Pack years: 30.00     Types: Cigarettes    Smokeless tobacco: Never   Substance Use Topics    Alcohol use: No    Drug use: No       Review of Systems:  Review of Systems  Comprehensive review of systems otherwise negative. See history/subjective section for more details.    Health Maintainence:    reviewed.     PHYSICAL EXAM     /74 (BP Location: Left arm, Patient Position: Sitting, BP Method: Large (Manual))   Pulse 88   Temp 97.4 °F (36.3 °C) (Oral)   Ht 5' 5.5" (1.664 m)   Wt 62.7 kg (138 lb 3.7 oz)   SpO2 98%   BMI 22.65 kg/m²     GEN - A+OX4, NAD   HEENT - PERRL, EOMI, OP clear. MMM. TM normal. No sinus tenderness to palpation.   Neck - large thyroid goiter.   CV - RRR, no m/r   Chest - CTAB, no wheezing or rhonchi  Abd - S/NT/ND/+BS.   Ext - 2+BDP and radial " pulses. No LE edema.   Protective Sensation (w/ 10 gram monofilament):  Right: Intact  Left: Intact    Visual Inspection:  Onychomycosis -  Bilateral    Pedal Pulses:   Right: Present  Left: Present    Posterior Tibialis Pulses:   Right:Present  Left: Present    Neuro - PERRL, EOMI, no nystagmus, eyebrow raise, facial sensation, hearing, m of mastication, smile, palatal raise, shoulder shrug, tongue protrusion symmetric and intact. 5/5 BUE and BLE strength. Sensation to light touch intact throughout. 2+ DTRs. Normal gait.   MSK - No spinal tenderness to palpation. Normal gait.   Skin - No rash.    LABS     Previous labs reviewed.    ASSESSMENT/PLAN     Alia Andre is a 72 y.o. female with  Alia was seen today for diabetes.    Diagnoses and all orders for this visit:    Type 2 diabetes mellitus without complication, without long-term current use of insulin  -     Microalbumin/Creatinine Ratio, Urine  -     Ambulatory referral/consult to Optometry; Future; Expected date: 04/17/2023  -     Ambulatory referral/consult to Podiatry; Future; Expected date: 04/17/2023    Calcaneal spur of right foot  -     Ambulatory referral/consult to Podiatry; Future; Expected date: 04/17/2023    Colon cancer screening  -     Cologuard Screening (Multitarget Stool DNA); Future; Expected date: 04/10/2023  -     Cologuard Screening (Multitarget Stool DNA)    Benign essential hypertension - Stable and controlled. Continue current medications.    Cigarette nicotine dependence without complication  -     CT Chest Lung Screening Low Dose; Future; Expected date: 04/10/2023    Onychomycosis  -     ciclopirox (PENLAC) 8 % Soln; Apply topically nightly.    Malignant neoplasm of upper-inner quadrant of right breast in female, estrogen receptor positive - cont letrole. MMG AFTER 5/25.    Aortic atherosclerosis - Cont crestor 10.     Hypothyroidism, unspecified type - cont synthroid.     Thyroid goiter - plan for thyroidectomy w/ Dr. Shay on  5/1.     Chronic rhinitis - flonase BID.     Other orders  -     methylPREDNISolone (MEDROL DOSEPACK) 4 mg tablet; use as directed    Given ACP documents.    RTC in 3 months, sooner if needed and depending on labs.    Get your shingles vaccine through pharmacy.     If you decide to get your second pneumonia vaccine, let me know.     Nancy Poe MD  Department of Internal Medicine - [facility]  8:37 AM

## 2023-04-10 NOTE — PATIENT INSTRUCTIONS
Podiatry - Dr. Renee, Dr. Reyes  Optometry - Dr. Cunha or Dr. Lovett    Schedule CT of the lung for lung cancer screening due to smoking history.     Schedule mammogram after May 25.     Get your shingles vaccine through pharmacy.     If you decide to get your second pneumonia vaccine, let me know.

## 2023-04-21 ENCOUNTER — PATIENT MESSAGE (OUTPATIENT)
Dept: ADMINISTRATIVE | Facility: HOSPITAL | Age: 73
End: 2023-04-21
Payer: MEDICARE

## 2023-04-24 ENCOUNTER — TELEPHONE (OUTPATIENT)
Dept: PREADMISSION TESTING | Facility: HOSPITAL | Age: 73
End: 2023-04-24
Payer: MEDICARE

## 2023-04-24 DIAGNOSIS — Z01.818 PRE-OP TESTING: Primary | ICD-10-CM

## 2023-04-24 NOTE — ANESTHESIA PAT ROS NOTE
04/24/2023  Alia Andre is a 72 y.o., female.      Pre-op Assessment          Review of Systems  Anesthesia Hx:  No problems with previous Anesthesia   History of prior surgery of interest to airway management or planning:  Previous anesthesia: General MASTECTOMY RIGHT 11/16/18 with general anesthesia.  Procedure performed at an [facility].      Airway issues documented on chart review include mask, easy, easy direct laryngoscopy , view on direct laryngoscopy Grade I    Denies Family Hx of Anesthesia complications.    Denies Personal Hx of Anesthesia complications.                    Social:  Smoker, No Alcohol Use       Hematology/Oncology:  Hematology Normal                       --  Cancer in past history:       Breast    right   chemotherapy and surgery       EENT/Dental:   GOITER          Cardiovascular:     Hypertension       Denies Angina.     hyperlipidemia     Functional Capacity good / => 4 METS                         Pulmonary:       Denies Shortness of breath.  Denies Recent URI.                 Renal/:  Renal/ Normal                 Hepatic/GI:  Hepatic/GI Normal                 Musculoskeletal:  Arthritis        Bone Disorders:    Osteopenia        Neurological:  Neurology Normal                                      Endocrine:  Diabetes, type 2 Hypothyroidism    Thyroid Disease, Goiter             Psych:  Psychiatric Normal                       Anesthesia Assessment: Preoperative EQUATION    Planned Procedure: Procedure(s) (LRB):  THYROIDECTOMY (Bilateral)  Requested Anesthesia Type:General  Surgeon: Jalen Shay MD  Service: ENT  Known or anticipated Date of Surgery:5/1/2023    Surgeon notes: reviewed    Electronic QUestionnaire Assessment completed via nurse interview with patient.        Triage considerations:     The patient has no apparent active cardiac condition (No  unstable coronary Syndrome such as severe unstable angina or recent [<1 month] myocardial infarction, decompensated CHF, severe valvular   disease or significant arrhythmia)    Previous anesthesia records:GETA and No problems    Airway/Jaw/Neck:  Airway Findings: Mouth Opening: Normal Tongue: Normal  General Airway Assessment: Adult  Mallampati: II  TM Distance: Normal, at least 6 cm  Jaw/Neck Findings:  Neck ROM: Normal ROM  Neck Findings:  Goiter, mod.       Present Prior to Hospital Arrival?: No Placement Date: 11/16/18 Placement Time: 0708 Method of Intubation: Direct laryngoscopy Inserted by: CRNA Airway Device: Endotracheal Tube Mask Ventilation: Easy Intubated: Postinduction Blade: Riggs #2 Airway Device Size: 7.5 Style: Cuffed Cuff Inflation: Minimal occlusive pressure Inflation Amount (mL): 1 Placement Verified By: Auscultation;Capnometry;ETT Condensation Grade: Grade I Complicating Factors: None Findings Post-Intubation: Positive EtCO2;Bilateral breath sounds;Atraumatic/Condition of teeth unchanged Depth of Insertion (cm): 21 Secured at: Lips Complications: Other , large goiter  Breath Sounds: Equal Bilateral Insertion attempts (enter comment if more than 2 attempts): 1     Last PCP note: within 1 month , within [facility]   Subspecialty notes: Endocrinology, ENT    Other important co-morbidities: DM2, HLD, HTN, Hypothyroid, Smoker, and GOITER       Tests already available:  Available tests,  within 3 months , > 1 year ago , within [facility] .     3/24/23  THYROID PEOXIDASE ANTIBODY, T4, TSH, A1C, LIPID PANEL, CBC, CMP, MICROALBUMIN/CREATININE RATIO    6/19/19  ECHO STRESS TEST - EF 60%, NEGATIVE FOR ISCHEMIA            Instructions given. (See in Nurse's note)    Optimization:  Anesthesia Preop Clinic Assessment  NOT Indicated    Medical Opinion NOT Indicated             Plan:    Testing:  EKG and PTH        Patient  has previously scheduled Medical Appointment: NOT AT THIS TIME    Navigation: Tests  Scheduled.                           Results will be tracked by Preop Clinic.

## 2023-04-26 ENCOUNTER — HOSPITAL ENCOUNTER (OUTPATIENT)
Dept: CARDIOLOGY | Facility: HOSPITAL | Age: 73
Discharge: HOME | End: 2023-04-26
Attending: ANESTHESIOLOGY
Payer: MEDICARE

## 2023-04-26 DIAGNOSIS — Z01.818 PRE-OP TESTING: ICD-10-CM

## 2023-04-26 PROCEDURE — 93010 ELECTROCARDIOGRAM REPORT: CPT | Mod: ,,, | Performed by: INTERNAL MEDICINE

## 2023-04-26 PROCEDURE — 93010 EKG 12-LEAD: ICD-10-PCS | Mod: ,,, | Performed by: INTERNAL MEDICINE

## 2023-04-26 PROCEDURE — 93005 ELECTROCARDIOGRAM TRACING: CPT

## 2023-04-27 RX ORDER — LORATADINE 10 MG/1
TABLET ORAL
Qty: 90 TABLET | Refills: 3 | Status: SHIPPED | OUTPATIENT
Start: 2023-04-27 | End: 2023-10-25 | Stop reason: SDUPTHER

## 2023-04-28 ENCOUNTER — PATIENT MESSAGE (OUTPATIENT)
Dept: OTOLARYNGOLOGY | Facility: CLINIC | Age: 73
End: 2023-04-28
Payer: MEDICARE

## 2023-04-28 ENCOUNTER — TELEPHONE (OUTPATIENT)
Dept: OTOLARYNGOLOGY | Facility: CLINIC | Age: 73
End: 2023-04-28
Payer: MEDICARE

## 2023-04-30 ENCOUNTER — ANESTHESIA EVENT (OUTPATIENT)
Dept: SURGERY | Facility: HOSPITAL | Age: 73
End: 2023-04-30
Payer: MEDICARE
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
## 2023-05-01 ENCOUNTER — ANESTHESIA (OUTPATIENT)
Dept: SURGERY | Facility: HOSPITAL | Age: 73
End: 2023-05-01
Payer: MEDICARE

## 2023-05-01 ENCOUNTER — HOSPITAL ENCOUNTER (OUTPATIENT)
Facility: HOSPITAL | Age: 73
Discharge: HOME | End: 2023-05-02
Attending: OTOLARYNGOLOGY | Admitting: OTOLARYNGOLOGY
Payer: MEDICARE

## 2023-05-01 DIAGNOSIS — E04.9 GOITER: Primary | ICD-10-CM

## 2023-05-01 DIAGNOSIS — E11.9 TYPE 2 DIABETES MELLITUS WITHOUT COMPLICATION, WITHOUT LONG-TERM CURRENT USE OF INSULIN: ICD-10-CM

## 2023-05-01 LAB
CA-I BLDV-SCNC: 1.09 MMOL/L (ref 1.06–1.42)
POCT GLUCOSE: 130 MG/DL (ref 70–110)
POCT GLUCOSE: 147 MG/DL (ref 70–110)
POCT GLUCOSE: 169 MG/DL (ref 70–110)
POCT GLUCOSE: 179 MG/DL (ref 70–110)
POCT GLUCOSE: 184 MG/DL (ref 70–110)
PTH-INTACT SERPL-MCNC: 80.7 PG/ML (ref 9–77)

## 2023-05-01 PROCEDURE — 63600175 PHARM REV CODE 636 W HCPCS: Performed by: STUDENT IN AN ORGANIZED HEALTH CARE EDUCATION/TRAINING PROGRAM

## 2023-05-01 PROCEDURE — 88305 TISSUE EXAM BY PATHOLOGIST: CPT | Mod: 26,,, | Performed by: PATHOLOGY

## 2023-05-01 PROCEDURE — 27201423 OPTIME MED/SURG SUP & DEVICES STERILE SUPPLY: Performed by: OTOLARYNGOLOGY

## 2023-05-01 PROCEDURE — 88307 TISSUE EXAM BY PATHOLOGIST: CPT | Performed by: PATHOLOGY

## 2023-05-01 PROCEDURE — 36000706: Performed by: OTOLARYNGOLOGY

## 2023-05-01 PROCEDURE — 88331 PATH CONSLTJ SURG 1 BLK 1SPC: CPT | Performed by: PATHOLOGY

## 2023-05-01 PROCEDURE — 88305 TISSUE EXAM BY PATHOLOGIST: ICD-10-PCS | Mod: 26,,, | Performed by: PATHOLOGY

## 2023-05-01 PROCEDURE — 37000008 HC ANESTHESIA 1ST 15 MINUTES: Performed by: OTOLARYNGOLOGY

## 2023-05-01 PROCEDURE — 88305 TISSUE EXAM BY PATHOLOGIST: CPT | Performed by: PATHOLOGY

## 2023-05-01 PROCEDURE — 82962 GLUCOSE BLOOD TEST: CPT | Performed by: OTOLARYNGOLOGY

## 2023-05-01 PROCEDURE — 60271 REMOVAL OF THYROID: CPT | Mod: ,,, | Performed by: OTOLARYNGOLOGY

## 2023-05-01 PROCEDURE — C1729 CATH, DRAINAGE: HCPCS | Performed by: OTOLARYNGOLOGY

## 2023-05-01 PROCEDURE — 25000003 PHARM REV CODE 250: Performed by: OTOLARYNGOLOGY

## 2023-05-01 PROCEDURE — 25000003 PHARM REV CODE 250: Performed by: STUDENT IN AN ORGANIZED HEALTH CARE EDUCATION/TRAINING PROGRAM

## 2023-05-01 PROCEDURE — 88331 PR  PATH CONSULT IN SURG,W FRZ SEC: ICD-10-PCS | Mod: 26,,, | Performed by: PATHOLOGY

## 2023-05-01 PROCEDURE — 82330 ASSAY OF CALCIUM: CPT | Performed by: STUDENT IN AN ORGANIZED HEALTH CARE EDUCATION/TRAINING PROGRAM

## 2023-05-01 PROCEDURE — 37000009 HC ANESTHESIA EA ADD 15 MINS: Performed by: OTOLARYNGOLOGY

## 2023-05-01 PROCEDURE — 83970 ASSAY OF PARATHORMONE: CPT | Performed by: STUDENT IN AN ORGANIZED HEALTH CARE EDUCATION/TRAINING PROGRAM

## 2023-05-01 PROCEDURE — D9220A PRA ANESTHESIA: ICD-10-PCS | Mod: ,,, | Performed by: ANESTHESIOLOGY

## 2023-05-01 PROCEDURE — 71000033 HC RECOVERY, INTIAL HOUR: Performed by: OTOLARYNGOLOGY

## 2023-05-01 PROCEDURE — 36000707: Performed by: OTOLARYNGOLOGY

## 2023-05-01 PROCEDURE — 88331 PATH CONSLTJ SURG 1 BLK 1SPC: CPT | Mod: 26,,, | Performed by: PATHOLOGY

## 2023-05-01 PROCEDURE — 60271 PR THYROIDECTOMY=SUBSTERNAL,TRANSCERV: ICD-10-PCS | Mod: ,,, | Performed by: OTOLARYNGOLOGY

## 2023-05-01 PROCEDURE — 27201037 HC PRESSURE MONITORING SET UP

## 2023-05-01 PROCEDURE — 94761 N-INVAS EAR/PLS OXIMETRY MLT: CPT

## 2023-05-01 PROCEDURE — 71000015 HC POSTOP RECOV 1ST HR: Performed by: OTOLARYNGOLOGY

## 2023-05-01 PROCEDURE — 88307 PR  SURG PATH,LEVEL V: ICD-10-PCS | Mod: 26,,, | Performed by: PATHOLOGY

## 2023-05-01 PROCEDURE — 71000016 HC POSTOP RECOV ADDL HR: Performed by: OTOLARYNGOLOGY

## 2023-05-01 PROCEDURE — 27000221 HC OXYGEN, UP TO 24 HOURS

## 2023-05-01 PROCEDURE — 88307 TISSUE EXAM BY PATHOLOGIST: CPT | Mod: 26,,, | Performed by: PATHOLOGY

## 2023-05-01 PROCEDURE — D9220A PRA ANESTHESIA: Mod: ,,, | Performed by: ANESTHESIOLOGY

## 2023-05-01 RX ORDER — SODIUM CHLORIDE 0.9 % (FLUSH) 0.9 %
10 SYRINGE (ML) INJECTION
Status: DISCONTINUED | OUTPATIENT
Start: 2023-05-01 | End: 2023-05-01 | Stop reason: HOSPADM

## 2023-05-01 RX ORDER — PROPOFOL 10 MG/ML
VIAL (ML) INTRAVENOUS
Status: DISCONTINUED | OUTPATIENT
Start: 2023-05-01 | End: 2023-05-01

## 2023-05-01 RX ORDER — TALC
6 POWDER (GRAM) TOPICAL NIGHTLY PRN
Status: DISCONTINUED | OUTPATIENT
Start: 2023-05-01 | End: 2023-05-02 | Stop reason: HOSPADM

## 2023-05-01 RX ORDER — LIDOCAINE HYDROCHLORIDE 10 MG/ML
1 INJECTION, SOLUTION EPIDURAL; INFILTRATION; INTRACAUDAL; PERINEURAL ONCE
Status: COMPLETED | OUTPATIENT
Start: 2023-05-01 | End: 2023-05-01

## 2023-05-01 RX ORDER — DEXAMETHASONE SODIUM PHOSPHATE 4 MG/ML
INJECTION, SOLUTION INTRA-ARTICULAR; INTRALESIONAL; INTRAMUSCULAR; INTRAVENOUS; SOFT TISSUE
Status: DISCONTINUED | OUTPATIENT
Start: 2023-05-01 | End: 2023-05-01

## 2023-05-01 RX ORDER — PHENYLEPHRINE HYDROCHLORIDE 10 MG/ML
INJECTION INTRAVENOUS
Status: DISCONTINUED | OUTPATIENT
Start: 2023-05-01 | End: 2023-05-01

## 2023-05-01 RX ORDER — INSULIN ASPART 100 [IU]/ML
0-5 INJECTION, SOLUTION INTRAVENOUS; SUBCUTANEOUS
Status: DISCONTINUED | OUTPATIENT
Start: 2023-05-01 | End: 2023-05-02 | Stop reason: HOSPADM

## 2023-05-01 RX ORDER — ACETAMINOPHEN 325 MG/1
650 TABLET ORAL EVERY 8 HOURS PRN
Status: DISCONTINUED | OUTPATIENT
Start: 2023-05-01 | End: 2023-05-02 | Stop reason: HOSPADM

## 2023-05-01 RX ORDER — KETOROLAC TROMETHAMINE 30 MG/ML
INJECTION, SOLUTION INTRAMUSCULAR; INTRAVENOUS
Status: DISCONTINUED | OUTPATIENT
Start: 2023-05-01 | End: 2023-05-01

## 2023-05-01 RX ORDER — LOSARTAN POTASSIUM 50 MG/1
50 TABLET ORAL DAILY
Status: DISCONTINUED | OUTPATIENT
Start: 2023-05-02 | End: 2023-05-02 | Stop reason: HOSPADM

## 2023-05-01 RX ORDER — LIDOCAINE HYDROCHLORIDE 10 MG/ML
1 INJECTION, SOLUTION EPIDURAL; INFILTRATION; INTRACAUDAL; PERINEURAL ONCE AS NEEDED
Status: DISCONTINUED | OUTPATIENT
Start: 2023-05-01 | End: 2023-05-02 | Stop reason: HOSPADM

## 2023-05-01 RX ORDER — SODIUM CHLORIDE 0.9 % (FLUSH) 0.9 %
10 SYRINGE (ML) INJECTION
Status: DISCONTINUED | OUTPATIENT
Start: 2023-05-01 | End: 2023-05-02 | Stop reason: HOSPADM

## 2023-05-01 RX ORDER — DEXTROSE 40 %
15 GEL (GRAM) ORAL
Status: DISCONTINUED | OUTPATIENT
Start: 2023-05-01 | End: 2023-05-02 | Stop reason: HOSPADM

## 2023-05-01 RX ORDER — ACETAMINOPHEN 10 MG/ML
INJECTION, SOLUTION INTRAVENOUS
Status: DISCONTINUED | OUTPATIENT
Start: 2023-05-01 | End: 2023-05-01

## 2023-05-01 RX ORDER — CALCIUM CARBONATE 1250 MG/5ML
1000 SUSPENSION ORAL
Status: DISCONTINUED | OUTPATIENT
Start: 2023-05-01 | End: 2023-05-02 | Stop reason: HOSPADM

## 2023-05-01 RX ORDER — ACETAMINOPHEN 325 MG/1
650 TABLET ORAL EVERY 4 HOURS PRN
Status: DISCONTINUED | OUTPATIENT
Start: 2023-05-01 | End: 2023-05-02 | Stop reason: HOSPADM

## 2023-05-01 RX ORDER — CEFAZOLIN SODIUM 1 G/3ML
INJECTION, POWDER, FOR SOLUTION INTRAMUSCULAR; INTRAVENOUS
Status: DISCONTINUED | OUTPATIENT
Start: 2023-05-01 | End: 2023-05-01

## 2023-05-01 RX ORDER — ONDANSETRON 2 MG/ML
INJECTION INTRAMUSCULAR; INTRAVENOUS
Status: DISCONTINUED | OUTPATIENT
Start: 2023-05-01 | End: 2023-05-01

## 2023-05-01 RX ORDER — GLUCAGON 1 MG
1 KIT INJECTION
Status: DISCONTINUED | OUTPATIENT
Start: 2023-05-01 | End: 2023-05-02 | Stop reason: HOSPADM

## 2023-05-01 RX ORDER — LIDOCAINE HYDROCHLORIDE 20 MG/ML
INJECTION INTRAVENOUS
Status: DISCONTINUED | OUTPATIENT
Start: 2023-05-01 | End: 2023-05-01

## 2023-05-01 RX ORDER — DEXAMETHASONE SODIUM PHOSPHATE 4 MG/ML
8 INJECTION, SOLUTION INTRA-ARTICULAR; INTRALESIONAL; INTRAMUSCULAR; INTRAVENOUS; SOFT TISSUE
Status: DISCONTINUED | OUTPATIENT
Start: 2023-05-01 | End: 2023-05-01

## 2023-05-01 RX ORDER — MIDAZOLAM HYDROCHLORIDE 1 MG/ML
INJECTION INTRAMUSCULAR; INTRAVENOUS
Status: DISCONTINUED | OUTPATIENT
Start: 2023-05-01 | End: 2023-05-01

## 2023-05-01 RX ORDER — DEXTROSE 40 %
30 GEL (GRAM) ORAL
Status: DISCONTINUED | OUTPATIENT
Start: 2023-05-01 | End: 2023-05-02 | Stop reason: HOSPADM

## 2023-05-01 RX ORDER — HYDRALAZINE HYDROCHLORIDE 25 MG/1
25 TABLET, FILM COATED ORAL EVERY 6 HOURS PRN
Status: DISCONTINUED | OUTPATIENT
Start: 2023-05-01 | End: 2023-05-02 | Stop reason: HOSPADM

## 2023-05-01 RX ORDER — LIDOCAINE HYDROCHLORIDE AND EPINEPHRINE 10; 10 MG/ML; UG/ML
INJECTION, SOLUTION INFILTRATION; PERINEURAL
Status: DISCONTINUED | OUTPATIENT
Start: 2023-05-01 | End: 2023-05-01

## 2023-05-01 RX ORDER — HALOPERIDOL 5 MG/ML
0.5 INJECTION INTRAMUSCULAR EVERY 10 MIN PRN
Status: DISCONTINUED | OUTPATIENT
Start: 2023-05-01 | End: 2023-05-01 | Stop reason: HOSPADM

## 2023-05-01 RX ORDER — ONDANSETRON 8 MG/1
8 TABLET, ORALLY DISINTEGRATING ORAL EVERY 8 HOURS PRN
Status: DISCONTINUED | OUTPATIENT
Start: 2023-05-01 | End: 2023-05-02 | Stop reason: HOSPADM

## 2023-05-01 RX ORDER — HYDROCODONE BITARTRATE AND ACETAMINOPHEN 5; 325 MG/1; MG/1
1 TABLET ORAL EVERY 4 HOURS PRN
Status: DISCONTINUED | OUTPATIENT
Start: 2023-05-01 | End: 2023-05-02 | Stop reason: HOSPADM

## 2023-05-01 RX ORDER — LEVOTHYROXINE SODIUM 100 UG/1
100 TABLET ORAL
Status: DISCONTINUED | OUTPATIENT
Start: 2023-05-02 | End: 2023-05-02 | Stop reason: HOSPADM

## 2023-05-01 RX ORDER — LETROZOLE 2.5 MG/1
2.5 TABLET, FILM COATED ORAL DAILY
Status: DISCONTINUED | OUTPATIENT
Start: 2023-05-02 | End: 2023-05-02 | Stop reason: HOSPADM

## 2023-05-01 RX ORDER — FENTANYL CITRATE 50 UG/ML
INJECTION, SOLUTION INTRAMUSCULAR; INTRAVENOUS
Status: DISCONTINUED | OUTPATIENT
Start: 2023-05-01 | End: 2023-05-01

## 2023-05-01 RX ORDER — KETAMINE HCL IN 0.9 % NACL 50 MG/5 ML
SYRINGE (ML) INTRAVENOUS
Status: DISCONTINUED | OUTPATIENT
Start: 2023-05-01 | End: 2023-05-01

## 2023-05-01 RX ORDER — MORPHINE SULFATE 2 MG/ML
4 INJECTION, SOLUTION INTRAMUSCULAR; INTRAVENOUS EVERY 4 HOURS PRN
Status: DISCONTINUED | OUTPATIENT
Start: 2023-05-01 | End: 2023-05-02 | Stop reason: HOSPADM

## 2023-05-01 RX ORDER — FENTANYL CITRATE 50 UG/ML
25 INJECTION, SOLUTION INTRAMUSCULAR; INTRAVENOUS EVERY 5 MIN PRN
Status: DISCONTINUED | OUTPATIENT
Start: 2023-05-01 | End: 2023-05-01 | Stop reason: HOSPADM

## 2023-05-01 RX ORDER — DEXMEDETOMIDINE HYDROCHLORIDE 100 UG/ML
INJECTION, SOLUTION INTRAVENOUS
Status: DISCONTINUED | OUTPATIENT
Start: 2023-05-01 | End: 2023-05-01

## 2023-05-01 RX ADMIN — CALCIUM CARBONATE 1000 MG: 1250 SUSPENSION ORAL at 05:05

## 2023-05-01 RX ADMIN — SODIUM CHLORIDE, SODIUM GLUCONATE, SODIUM ACETATE, POTASSIUM CHLORIDE, MAGNESIUM CHLORIDE, SODIUM PHOSPHATE, DIBASIC, AND POTASSIUM PHOSPHATE: .53; .5; .37; .037; .03; .012; .00082 INJECTION, SOLUTION INTRAVENOUS at 06:05

## 2023-05-01 RX ADMIN — FENTANYL CITRATE 100 MCG: 50 INJECTION, SOLUTION INTRAMUSCULAR; INTRAVENOUS at 07:05

## 2023-05-01 RX ADMIN — CALCIUM CARBONATE 1000 MG: 1250 SUSPENSION ORAL at 02:05

## 2023-05-01 RX ADMIN — Medication 20 MG: at 07:05

## 2023-05-01 RX ADMIN — DEXMEDETOMIDINE HYDROCHLORIDE 8 MCG: 100 INJECTION, SOLUTION INTRAVENOUS at 07:05

## 2023-05-01 RX ADMIN — GLYCOPYRROLATE 0.2 MG: 0.2 INJECTION, SOLUTION INTRAMUSCULAR; INTRAVENOUS at 07:05

## 2023-05-01 RX ADMIN — HYDRALAZINE HYDROCHLORIDE 25 MG: 25 TABLET, FILM COATED ORAL at 05:05

## 2023-05-01 RX ADMIN — MORPHINE SULFATE 4 MG: 2 INJECTION, SOLUTION INTRAMUSCULAR; INTRAVENOUS at 02:05

## 2023-05-01 RX ADMIN — Medication 6 MG: at 10:05

## 2023-05-01 RX ADMIN — DEXMEDETOMIDINE HYDROCHLORIDE 8 MCG: 100 INJECTION, SOLUTION INTRAVENOUS at 09:05

## 2023-05-01 RX ADMIN — PROPOFOL 20 MG: 10 INJECTION, EMULSION INTRAVENOUS at 07:05

## 2023-05-01 RX ADMIN — DEXMEDETOMIDINE HYDROCHLORIDE 8 MCG: 100 INJECTION, SOLUTION INTRAVENOUS at 08:05

## 2023-05-01 RX ADMIN — HYDROCODONE BITARTRATE AND ACETAMINOPHEN 1 TABLET: 5; 325 TABLET ORAL at 12:05

## 2023-05-01 RX ADMIN — DEXAMETHASONE SODIUM PHOSPHATE 8 MG: 4 INJECTION, SOLUTION INTRAMUSCULAR; INTRAVENOUS at 07:05

## 2023-05-01 RX ADMIN — MIDAZOLAM HYDROCHLORIDE 2 MG: 1 INJECTION INTRAMUSCULAR; INTRAVENOUS at 07:05

## 2023-05-01 RX ADMIN — PHENYLEPHRINE HYDROCHLORIDE 100 MCG: 10 INJECTION INTRAVENOUS at 07:05

## 2023-05-01 RX ADMIN — KETOROLAC TROMETHAMINE 15 MG: 30 INJECTION, SOLUTION INTRAMUSCULAR; INTRAVENOUS at 09:05

## 2023-05-01 RX ADMIN — Medication 30 MG: at 07:05

## 2023-05-01 RX ADMIN — PHENYLEPHRINE HYDROCHLORIDE 100 MCG: 10 INJECTION INTRAVENOUS at 10:05

## 2023-05-01 RX ADMIN — ACETAMINOPHEN 1000 MG: 10 INJECTION, SOLUTION INTRAVENOUS at 08:05

## 2023-05-01 RX ADMIN — LIDOCAINE HYDROCHLORIDE 10 MG: 10 INJECTION, SOLUTION EPIDURAL; INFILTRATION; INTRACAUDAL; PERINEURAL at 06:05

## 2023-05-01 RX ADMIN — HYDRALAZINE HYDROCHLORIDE 25 MG: 25 TABLET, FILM COATED ORAL at 10:05

## 2023-05-01 RX ADMIN — LIDOCAINE HYDROCHLORIDE 60 MG: 20 INJECTION INTRAVENOUS at 07:05

## 2023-05-01 RX ADMIN — PROPOFOL 30 MG: 10 INJECTION, EMULSION INTRAVENOUS at 07:05

## 2023-05-01 RX ADMIN — ONDANSETRON 4 MG: 2 INJECTION INTRAMUSCULAR; INTRAVENOUS at 10:05

## 2023-05-01 RX ADMIN — DEXMEDETOMIDINE HYDROCHLORIDE 8 MCG: 100 INJECTION, SOLUTION INTRAVENOUS at 10:05

## 2023-05-01 RX ADMIN — SODIUM CHLORIDE 0.5 MCG/KG/MIN: 9 INJECTION, SOLUTION INTRAVENOUS at 07:05

## 2023-05-01 RX ADMIN — CEFAZOLIN 2 G: 330 INJECTION, POWDER, FOR SOLUTION INTRAMUSCULAR; INTRAVENOUS at 07:05

## 2023-05-01 RX ADMIN — HYDROCODONE BITARTRATE AND ACETAMINOPHEN 1 TABLET: 5; 325 TABLET ORAL at 10:05

## 2023-05-01 RX ADMIN — PROPOFOL 50 MG: 10 INJECTION, EMULSION INTRAVENOUS at 07:05

## 2023-05-01 RX ADMIN — HYDROCODONE BITARTRATE AND ACETAMINOPHEN 1 TABLET: 5; 325 TABLET ORAL at 05:05

## 2023-05-01 NOTE — ANESTHESIA PROCEDURE NOTES
Intubation    Date/Time: 5/1/2023 7:11 AM  Performed by: Lawrence Sr DO  Authorized by: Lawrence Sr DO     Intubation:     Induction:  Intravenous    Intubated:  Preinduction-awake intubation    Mask Ventilation:  N/a    Attempts:  1    Attempted By:  Resident anesthesiologist    Method of Intubation:  Video laryngoscopy    Blade:  Benjamin 3    Laryngeal View Grade: Grade I - full view of cords      Difficult Airway Encountered?: No      Complications:  None    Airway Device:  EMG ETT (NIMS)    Airway Device Size:  7.0    Style/Cuff Inflation:  Cuffed (inflated to minimal occlusive pressure)    Tube secured:  24    Secured at:  The lips    Placement Verified By:  Capnometry    Complicating Factors:  None    Findings Post-Intubation:  BS equal bilateral and atraumatic/condition of teeth unchanged

## 2023-05-01 NOTE — NURSING TRANSFER
Nursing Transfer Note      5/1/2023     Reason patient is being transferred: to room post recovery    Transfer to 1027    Transfer via stretcher    Transfer with personal belongings bags x 2    Transported by transporter    Medicines sent: None    Any special needs or follow-up needed: Routine    Chart send with patient: Yes    Notified: Michaela Joya    Patient reassessed at: 05/01/23 @ 1240    Upon arrival to floor: Transfer of care to floor nurseNancy RN

## 2023-05-01 NOTE — NURSING
Nurses Note -- 4 Eyes      5/1/2023   6:50 PM      Skin assessed during: Admit      [] No Altered Skin Integrity Present    []Prevention Measures Documented      [x] Yes- Altered Skin Integrity Present or Discovered   [] LDA Added if Not in Epic (Describe Wound)   [x] New Altered Skin Integrity was Present on Admit and Documented in LDA   [] Wound Image Taken    Wound Care Consulted? No    Attending Nurse:  Nancy Orellana RN     Second RN/Staff Member:  Krysta MONTOYA

## 2023-05-01 NOTE — OP NOTE
Date of Operation: 05/01/2023    Surgeon: CASSANDRA INFANTE     Assistants: Finesse Velazquez (RES)    Anesthesia: General endotracheal anesthesia    ASA Class: 2    Preoperative Diagnosis:   1) Symptomatic multinodular goiter with substernal extension    Postoperative diagnosis:  1) Symptomatic multinodular goiter with substernal extension    Procedures performed:  1) Total thyroidectomy with transcervical resection of substernal goiter  2) Continuous nerve monitoring  3) Reimplantation of right inferior parathyroid gland in the right sternohyoid muscle    Closing Set: No    Indications for operation:  Alia Andre is a 73 y.o. female with an increasingly symptomatic substernal goiter.     The risks, benefits, indications, and alternatives to this procedure were thoroughly discussed with the patient preoperatively, and informed consent was obtained. Please see my detailed preoperative notes for a thorough account of this discussion.    Findings: Her thyroid was huge, particularly on the left, with a large anterior mediastinal extension superficial to the innominate and a separate mediastinal, paratracheal component that extended completely on the left. The left superior and right superior parathyroid glands were identified and preserved, and the right inferior gland was noted to be dusky at the end of the procedures, so it was morselized and placed in a pocket in the right sternohyoid muscle. Both RLN were intact at the end of the procedure and responded to stimulus at 1.0 mA to intensities of over 400 bilaterally.     EBL: 75mL    IVF:  May be found in the operative record    Detailed Account of Technique Employed:    The patient was identified in the holding area per routine. She was transported to the Operating Room and placed supine on the operating table. She was intubated transorally with the nerve monitoring endotracheal tube and an adequate level of general endotracheal anesthesia was achieved. Adequate functioning  of the nerve monitoring tube was determined by positive responses when the patient became light with anesthesia, as there was evidence of neuromuscular activity, and with direct palpation of the thyroid ala, which gave an appropriate stimulus response on the side of palpation. The anterior neck and chest were then prepped and draped in the standard fashion. A timeout was performed according to the Universal protocol. A suitable skin crease located about 2 fingerbreadths above the clavicle was then marked and injected with 1% lidocaine with 1:100,000 epinephrine.     A 6 cm incision was then made within the skin crease through the skin and subcutaneous tissues. The platysma was divided laterally and the superficial layer of cervical fascia divided in the midline. Subplatysmal flaps were elevated to the thyroid notch superiorly and to the sternal notch inferiorly. The median raphe between the strap muscles was identified and . The straps were then elevated off the left thyroid lobe first, since this is the dominant side and the host of the substernal components. The superior pole was identified. The cricothyroid space was developed. The superior pedicle was then doubly clamped, divided and ligated with 2-0 and 3-0 silk after visualizing branches of the external branch of the superior laryngeal nerve. The gland was rotated medially. Multiple fibrovascular attachments of the gland in the region of the middle thyroid vein were then either clipped or controlled with a bipolar. The gland was rotated even further medially. The tubercle of Zuckerkandl was identified, and the superior parathyroid gland was identified and swept laterally. The inferior parathyroid gland not definitively seen, but a capsular dissection was performed to optimize the chances of its preservation. Some fatty tissue that appeared darker than usual was sampled, but this was just fat and not parathyroid. The recurrent nerve was identified  coursing through the tracheoesophagral groove, just deep to the tubercle, and it was dissected both distally and proximally towards the cricothyroid joint, dividing the intervening fibrovascular attachments of the gland to the central neck so that we could get good rotation and delivery of the massive goiter without torquing the nerve. There was no infiltration of Berry's ligament by thyroid tissue, and the ligament was divided over the recurrent nerve. The anterior fibrovascular attachments of the gland to the trachea were then divided with the bipolar and/or small surgical clips and it was elevated off the trachea. We followed this superiorly as a small pyramidal lobe but then quickly encountered the massive right superior pole. Distal branches of the inferior thyroid artery were controlled with either small surgical clips, 3-0 ties or the bipolar as the right lobe was rotated medially. I followed the nerve inferiorly in the chest and noted several goitrous masses extended into the mediastinum that were connected to the main specimen with membranous attachments. These were released from their fibrovascular attachments to the surrounding tissues, and hemostasis achieved with the bipolar, clips, or 2-0 silk ties as indicated. I was unable to deliver the medastinal disease with the right lobe in place, so we pivoted to remove the right lobe.     Attention was then turned to the right lobe. Again, the straps were elevated off the right lobe and the superior pole visualized. The cricothyroid space was developed. The superior pedicle was doubly clamped, divided and ligated with 2-0 silk. On both sides, it should be noted that care was taken to stay on the capsule in attempts to preserve the external branch of the superior laryngeal nerve. Blunt dissection allowed isolation of the middle thyroid vein and some distal branches of the inferior thyroid artery and vein. These were controlled with the bipolar, small surgical  clips or 3-0 silk ties. The right lobe was then rotated further medially. The superior and inferior parathyroid glands were then swept laterally off the thyroid gland capsule, but the inferior gland later looked dusky and did not bleed when cut - so it was explanted, morselized, and then reimplanted in the right sternohyoid muscle. The right recurrent laryngeal nerve was identified coursing through the paratracheal gutter and the intervening fibrovascular attachments of the gland to the central neck superficial to the nerve were then divided with the bipolar cautery. Again, there was scant infiltration of Berry's ligament by thyroidal tissue and this was divided with the bipolar as it was elevated off the anterior tracheal wall. Again, distal branches of the inferior thyroid artery were sacrificed by using small surgical clips, 3-0 silk ties, or the bipolar. The remaining fibrovascular attachments of the gland to the cricotracheal complex were then divided. There was an appreciable pyramidal lobe, and this was resected from its attachments to the superior strap muscles cephalad to the hyoid.     With the bulk of the gland released, I was able to dissect and deliver the anterior mediastinal mass by dissected along the innominate and releasing it from its fibrovascular attachments to the thymic remnant and central soft tissues with clips, the bipolar, and 2-0 ties. There was a separate mediastinal mass that was quite large but was initially only evident as a fullness in the left paratracheal region at the thoracic inlet. This was dissected and released along its capsule and delivered into the neck from the cervical incision. The gland was now removed and inspected ex-vivo. There was no parathyroidal tissue identified on the specimen. Both recurrent nerves were then stimulated with the nerve integrity monitor system at 1 milliampere of stimulus intensity as noted above.    The wound was copiously irrigated with saline  and additional hemostasis was achieved as necessary with focal application of the bipolar cautery. Some Surgicel was placed in the bilateral paratracheal gutters, along with a 15Fr round drain. Multiple Valsalva maneuvers were performed as noted above. The fascia of the strap muscles was reapproximated in the midline using interrupted 3-0 Vicryl. Interrupted 3-0 Vicryl was used in the platysma, followed by deep dermal buried sutures of 4-0 Monocryl. Dermabond was applied to the skin. The patient was then allowed to awaken from anesthesia, extubated and transported to the Recovery Room in stable condition.     All sponge, needle and instrument counts were correct at the end of case x2.     I was present for and performed all portions of this operation.

## 2023-05-01 NOTE — NURSING
Pt incontinent of large amount of urine. After complete bed change, ^& scanning bladder, Dr. Shay came in room. RN mentioned concern about HTN, stated pt had morphine & urinated large amount with additional emptying of 300 mls. MD requested nurse to contact ENT resident.   Just now received return call after two pages, MD stated will enter an IV medication for pressure.

## 2023-05-01 NOTE — H&P
"[facility]     Subjective:         Subjective    Patient ID: Alia Andre is a 72 y.o. female.     Chief Complaint: No chief complaint on file.     HPI  Oncology History:      Oncology History   Infiltrating ductal carcinoma of right female breast (Resolved)   10/11/2018 Biopsy     There is a 19 mm x 14 mm x 14 mm irregularly shaped, hypoechoic mass with indistinct margins seen in the right breast at 11 o'clock, 6 cm from the nipple. The mass correlates with a palpable mass reported by the patient         10/11/2018 Initial Diagnosis     1. Right breast, mass, biopsy:  -Invasive mammary carcinoma with medullary features.  -Histologic grade: Grade 3 (3, 3, 2).  -9 mm in greatest linear dimension involving core biopsy tip.     2. Lymph node, submitted as "right lymph node", biopsy:  -Lymphoid tissue, negative for metastatic carcinoma.         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 pathologic prognostic staging system         12/4/2018 Tumor Conference     Port placed at time of surgery. With only 10% ER will need adjuvant therapy.      No radiation therapy         Malignant neoplasm of right female breast   11/26/2018 Initial Diagnosis     Malignant neoplasm of right female breast         Alia Andre is a 72 y.o. female who presents as referral from her daughter (who I operated on, along with her daughter) regarding a longstanding " substernal goiter. She has known about the goiter for 30+ years, but she is having increasing difficulty sleeping because it feels like she is having anterior neck compression. She sleeps propped up on 2 pillows with some relief. She has intermittent dysphagia but no odynophagia. There is occasional tenderness in the neck. She thinks that it is getting bigger. She is breathing ok when awake.      She had an US and a CT scan several years ago. She also had an FNA in 2019 with Dr. Rodriguez that was benign.      She was treated with chemotherapy for breast cancer after a mastectomy in 2019, but has no history of head and neck radiation therapy. She presents for evaluation and management.           Past Medical History:   Diagnosis Date    Allergy      Arthritis      Cancer       breast    Diabetes mellitus type 2 in nonobese      Goiter      History of endometrial ablation       age 29    Hypertension      Tobacco abuse      Vitamin D deficiency                 Past Surgical History:   Procedure Laterality Date    BREAST SURGERY        INSERTION OF TUNNELED CENTRAL VENOUS CATHETER (CVC) WITH SUBCUTANEOUS PORT Left 11/16/2018     Procedure: [id number]-I-CATH LEFT;  Surgeon: Graciela Echeverria MD;  Location: [facility] OR [address] [place] [address];  Service: General;  Laterality: Left;    MASTECTOMY Right 11/16/2018     Procedure: MASTECTOMY RIGHT 2.5 HR CASE;  Surgeon: Cristiane Frederick MD;  Location: [facility] OR [address] [place] [address];  Service: General;  Laterality: Right;  needs to be 1st case, Dr. Cameron has afternoon cases at [facility]    SENTINEL LYMPH NODE BIOPSY Right 11/16/2018     Procedure: BIOPSY, LYMPH NODE, SENTINEL RIGHT;  Surgeon: Cristiane Frederick MD;  [facility];  Service: General;  Laterality: Right;    TUBAL LIGATION         1970/80's            Current Outpatient Medications:     acetaminophen (TYLENOL) 325 MG tablet, Take 325 mg by mouth every 6 (six) hours as needed for Pain., Disp: , Rfl:     albuterol (PROVENTIL) 2.5 mg /3 mL  (0.083 %) nebulizer solution, Take 3 mLs (2.5 mg total) by nebulization every 6 (six) hours as needed for Wheezing. Rescue, Disp: 270 mL, Rfl: 0    ammonium lactate 12 % Crea, Apply twice daily to affected parts both feet as needed., Disp: 140 g, Rfl: 11    BD ALCOHOL SWABS PadM, , Disp: , Rfl:     blood sugar diagnostic Strp, 1 strip by Misc.(Non-Drug; Combo Route) route 2 (two) times daily with meals., Disp: 100 strip, Rfl: 11    blood-glucose meter kit, Use as instructed, Disp: 1 each, Rfl: 0    ciclopirox (PENLAC) 8 % Soln, Apply topically nightly., Disp: 6.6 mL, Rfl: 11    cyproheptadine (PERIACTIN) 4 mg tablet, Take 1 tablet (4 mg total) by mouth 3 (three) times daily as needed., Disp: 90 tablet, Rfl: 0    diclofenac sodium (VOLTAREN) 1 % Gel, Apply 2 g topically daily as needed., Disp: 100 g, Rfl: 0    fluticasone propionate (FLONASE) 50 mcg/actuation nasal spray, USE 1 SPRAY IN EACH NOSTRIL EVERY DAY, Disp: 32 g, Rfl: 0    gabapentin (NEURONTIN) 300 MG capsule, , Disp: , Rfl:      mg tablet, TAKE 1 TABLET EVERY DAY AS NEEDED FOR PAIN, Disp: 15 tablet, Rfl: 0    lancets Misc, 1 lancet by Misc.(Non-Drug; Combo Route) route 2 (two) times daily with meals., Disp: 100 each, Rfl: 11    lancing device Misc, 1 Device by Misc.(Non-Drug; Combo Route) route 2 (two) times daily with meals., Disp: 1 each, Rfl: 0    letrozole (FEMARA) 2.5 mg Tab, TAKE 1 TABLET EVERY DAY, Disp: 90 tablet, Rfl: 3    levothyroxine (SYNTHROID) 50 MCG tablet, TAKE 1 TABLET ON EMPTY STOMACH AN HOUR BEFORE BREAKFAST DAILY EXCEPT SKIP SUNDAYS., Disp: 78 tablet, Rfl: 3    loratadine (CLARITIN) 10 mg tablet, Take 1 tablet (10 mg total) by mouth once daily., Disp: 90 tablet, Rfl: 3    losartan (COZAAR) 50 MG tablet, TAKE 1 TABLET EVERY DAY, Disp: 90 tablet, Rfl: 3    metFORMIN (GLUCOPHAGE) 500 MG tablet, TAKE 2 TABLETS TWICE DAILY WITH MEALS, Disp: 360 tablet, Rfl: 3    mupirocin (BACTROBAN) 2 % ointment, Apply topically 3 (three) times  daily., Disp: 15 g, Rfl: 0    mupirocin (BACTROBAN) 2 % ointment, apply topically 3 times daily, Disp: 22 g, Rfl: 0    nicotine (NICODERM CQ) 21 mg/24 hr, APPLY 1 PATCH TRANSDERMALLY EVERY 24 HOURS, Disp: , Rfl: 0    nicotine, polacrilex, (NICORETTE) 2 mg Gum, CHEW 1 PIECE (2 MG) BY MOUTH 10 TIMES PER DAY AS NEEDED, Disp: , Rfl: 0    predniSONE (DELTASONE) 20 MG tablet, Take 1 tablet (20 mg total) by mouth once daily., Disp: 5 tablet, Rfl: 0    rosuvastatin (CRESTOR) 10 MG tablet, Take 1 tablet (10 mg total) by mouth every evening., Disp: 90 tablet, Rfl: 3    TRUEPLUS LANCETS 33 gauge Misc, , Disp: , Rfl:     turmeric root extract 500 mg Cap, Take 1 tablet by mouth once daily., Disp: , Rfl:     aspirin 81 MG Chew, Take 1 tablet (81 mg total) by mouth once daily. Start on Monday., Disp: , Rfl: 0           Review of patient's allergies indicates:   Allergen Reactions    Pravastatin         Leg cramps         Social History               Socioeconomic History    Marital status:    Tobacco Use    Smoking status: Some Days       Packs/day: 1.00       Years: 30.00       Pack years: 30.00       Types: Cigarettes    Smokeless tobacco: Never   Substance and Sexual Activity    Alcohol use: No    Drug use: No    Sexual activity: Not Currently   Social History Narrative     Lives by self                  Family History   Problem Relation Age of Onset    Aortic aneurysm Mother      Leukemia Father      No Known Problems Sister      Cancer Maternal Aunt      Breast cancer Paternal Aunt      Breast cancer Maternal Cousin      Breast cancer Paternal Cousin      Breast cancer Paternal Cousin           Review of Systems   Constitutional:  Positive for fatigue. Negative for unexpected weight change.   HENT:  Positive for trouble swallowing. Negative for sore throat.    Respiratory:  Negative for cough and shortness of breath.    Cardiovascular:  Negative for chest pain and palpitations.   Hematological:  Positive for  adenopathy.   Psychiatric/Behavioral:  The patient is not nervous/anxious.       Objective:      Objective     Physical Exam  Vitals reviewed.   Constitutional:       General: She is not in acute distress.     Appearance: She is well-developed.   HENT:      Head: Normocephalic and atraumatic.      Jaw: No trismus.      Salivary Glands: Right salivary gland is not diffusely enlarged or tender. Left salivary gland is not diffusely enlarged or tender.      Comments: Salivary glands - there are no lesions or asymmetric findings in the submandibular or parotid glands     Right Ear: Hearing, tympanic membrane, ear canal and external ear normal.      Left Ear: Hearing, tympanic membrane, ear canal and external ear normal.      Nose: Septal deviation (rightward) present. No nasal deformity or rhinorrhea.      Mouth/Throat:      Mouth: No oral lesions.      Tongue: No lesions.      Palate: No mass and lesions.      Pharynx: Uvula midline.      Comments: Procedure: Flexible laryngoscopy  In order to fully examine the upper aerodigestive tract, including the larynx, in a patient with a hyperactive gag reflex, flexible endoscopy is required.  After explaining the procedure and obtaining verbal consent, a timeout was performed with the patient's participation according to the universal protocol. Both nasal cavities were anesthetized with 4% Xylocaine spray mixed with Regan-Synephrine. The flexible laryngoscope (#[id number]) was inserted into the nasal cavity and advanced to visualize the nasal cavity, nasopharynx, the posterior oropharynx, hypopharynx, and the endolarynx with the above findings noted. The scope was removed and the procedure terminated. The patient tolerated this procedure well without apparent complication.       FINDINGS  Nasopharynx - the torus is clear. There are no lesions of the posterior wall.   Oropharynx - no lesions of the tongue base. There is no obvious fullness or asymmetry.  Hypopharynx - there are no  lesions of the pyriform sinuses or postcricoid region   Larynx - there are no lesions of the supraglottic or glottic larynx. Vocal fold mobility is normal with complete closure.   There is subtle rotation of the glottis to her right (posteriorly), but there is no obstruction  Eyes:      General: Lids are normal.      Extraocular Movements: Extraocular movements intact.      Conjunctiva/sclera:      Right eye: Right conjunctiva is not injected. No chemosis.     Left eye: Left conjunctiva is not injected. No chemosis.  Neck:      Thyroid: Thyromegaly present. No thyroid mass.      Vascular: No JVD.      Trachea: Phonation normal. No tracheal deviation.     Pulmonary:      Effort: Pulmonary effort is normal. No accessory muscle usage or respiratory distress.      Breath sounds: No stridor.   Musculoskeletal:         General: No tenderness.      Cervical back: Full passive range of motion without pain.   Lymphadenopathy:      Cervical: No cervical adenopathy.      Right cervical: No deep or posterior cervical adenopathy.     Left cervical: No deep or posterior cervical adenopathy.   Skin:     Findings: No erythema, lesion or rash.      Nails: There is no clubbing.   Neurological:      Mental Status: She is alert and oriented to person, place, and time.      Cranial Nerves: No cranial nerve deficit or facial asymmetry.      Motor: No weakness.      Gait: Gait normal.   Psychiatric:         Speech: Speech normal.         Behavior: Behavior is cooperative.            CT, US reviewed  FNA from 2019 reviewed     Assessment & Plan:      Assessment       Problem List Items Addressed This Visit         Essential hypertension     Malignant neoplasm of right female breast     Substernal goiter - Primary       She has a growing, symptomatic goiter with significant compression symptoms on her breathing and swallowing. I would like to get a new CT of her neck. This has been previously biopsied as benign, but the calculus has changed  with her increased compression symptoms. I have recommended a total thyroidectomy, with the chief alternative being continued observation.     I explained the risks of thyroidectomy include, but are not limited to, infection, bleeding, scarring, failure to achieve the diagnosis, no evidence of cancer, recurrence, collection of blood or tissue fluid requiring drainage, injury to the recurrent laryngeal nerve with resultant temporary or permanent hoarseness (1% permanent risk with up to 10% temporary risk, greater in revision operations), injury to the superior laryngeal nerve with resultant loss of the upper register for singing or challenges with yelling, temporary or permanent hypocalcemia related to injury or devascularization of the parathyroid glands (less than 5% permanent, up to 30-60% when paratracheal dissection is accomplished, again greater in revision operations), and the need for additional procedures or therapies.  Time was allowed for questions, and all questions were answered to the patient's apparent satisfaction. The risks of paratracheal lymph node dissection are included above. Informed consent was obtained.     Surgery has been scheduled for May 1, 2023. Paperwork will be signed on the morning of surgery.

## 2023-05-01 NOTE — NURSING
Pt admitted from PACU. She is A&O x 4. Incision in neck, CDI with MELINDA patent, & draining. LS clear to Auscultation Ant/post/RML. Pt urinated well, post op. & ambulated with assist to restroom. Education provided r/t keeping HOB elevated with new incision, & assisting with decreasing BP, which has been an issue since transfer, even after urinating, & morphine administration. Administered oral hydralazine 10 mg, with some improvement after. See VS documented.

## 2023-05-01 NOTE — BRIEF OP NOTE
[facility] - Surgery ([facility])  Brief Operative Note    Surgery Date: 5/1/2023     Surgeon(s) and Role:     * Cassandra Infante MD - Primary     * Wali Velazquez MD - Resident - Assisting        Pre-op Diagnosis:  Goiter [E04.9]    Post-op Diagnosis:  Post-Op Diagnosis Codes:     * Goiter [E04.9]    Procedure(s) (LRB):  THYROIDECTOMY (Bilateral) with resection of substernal goiter/mass  Reimplantation of the right inferior parathyroid in the right sternohyoid muscle    Anesthesia: General    Operative Findings: see full op note    Estimated Blood Loss: 75mL         Specimens:   Specimen (24h ago, onward)       Start     Ordered    05/01/23 1046  Specimen to Pathology, Surgery ENT  Once        Comments: Pre-op Diagnosis: Goiter [E04.9]Procedure(s):THYROIDECTOMY Number of specimens: 1Name of specimens: 1.) Rule Out Left Inferior Parathyroid, Frozen.2.) Total Thyroidectomy with attached Mediastinal Mass, Stitch in Left Superior Pole, Permanent.     References:    Click here for ordering Quick Tip   Question Answer Comment   Procedure Type: ENT    Specimen Class: Routine/Screening    Which provider would you like to cc? CASSANDRA INFANTE        05/01/23 1047                  As above  I was present for and participated in all aspects of this operation.

## 2023-05-01 NOTE — TRANSFER OF CARE
A CTA was requested by Dr. Roger Casillas for CCTA: Indications of use: Dyspnea on exertion    Patient: Sharmaine Ballard  MRN #: [id number]  Age: 52 year old  : 1969  Date: 2021    Past Medical History:   Diagnosis Date   • Anxiety    • Bilateral knee pain 2018   • Chronic kidney disease    • Depressive disorder     Related to chronic pain, passing of her pet cats (reactive)   • Essential (primary) hypertension    • Head ache    • Insulin resistance    • Lumbosacral spondylosis without myelopathy 2018   • Neuropathic pain 2018   • Obesity    • ANALISA (obstructive sleep apnea)    • Pseudotumor cerebri    • RAD (reactive airway disease)        Past Surgical History:   Procedure Laterality Date   • Cholecystectomy     • Knee surgery      arthroscopic, right knee for torn meniscus   • Service to urology Bilateral 09/03/15    Cysto, bilateral ureteroscopy with laser lithotripsy, stone extraction and stent placement   • Sinus surgery         ALLERGIES:   Allergen Reactions   • Clindamycin RASH   • Ciprofloxacin DIZZINESS   • Compazine ANXIETY     Patient became anxious and restless after receiving IV compazine. Symptoms resolved with benadryl.    • Seasonal Cough         Prior cardiac stenting/surgery: NA    Pacemaker/Defib/Loop wires: NA    If yes, CT notified?  NA    CTA orders: Orders in signed and held  Hydration:  none    Abnormal Labs: NA    Special Instructions: No caffeinated beverages 24 hours prior to procedure.         Procedure date: 21  Arrival time: 1345  Procedure time: 1500  NPO solids: 1300  NPO liquids: 1300       "Anesthesia Transfer of Care Note    Patient: Alia Andre    Procedure(s) Performed: Procedure(s) (LRB):  THYROIDECTOMY (Bilateral)    Patient location: PACU    Anesthesia Type: general    Transport from OR: Transported from OR on 6-10 L/min O2 by face mask with adequate spontaneous ventilation    Post pain: adequate analgesia    Post assessment: tolerated procedure well and no apparent anesthetic complications    Post vital signs: stable    Level of consciousness: awake    Nausea/Vomiting: no nausea/vomiting    Complications: none    Transfer of care protocol was followed      Last vitals:   Visit Vitals  BP (!) 174/81 (BP Location: Left arm, Patient Position: Lying)   Pulse 81   Temp 37.1 °C (98.7 °F) (Oral)   Resp 16   Ht 5' 6" (1.676 m)   Wt 62.9 kg (138 lb 10.7 oz)   SpO2 98%   Breastfeeding No   BMI 22.38 kg/m²     "

## 2023-05-02 VITALS
HEART RATE: 86 BPM | SYSTOLIC BLOOD PRESSURE: 180 MMHG | RESPIRATION RATE: 18 BRPM | DIASTOLIC BLOOD PRESSURE: 75 MMHG | BODY MASS INDEX: 23.27 KG/M2 | TEMPERATURE: 98 F | OXYGEN SATURATION: 96 % | WEIGHT: 144.81 LBS | HEIGHT: 66 IN

## 2023-05-02 LAB
ALBUMIN SERPL BCP-MCNC: 3.3 G/DL (ref 3.5–5.2)
ALP SERPL-CCNC: 103 U/L (ref 55–135)
ALT SERPL W/O P-5'-P-CCNC: 13 U/L (ref 10–44)
ANION GAP SERPL CALC-SCNC: 10 MMOL/L (ref 8–16)
AST SERPL-CCNC: 18 U/L (ref 10–40)
BILIRUB SERPL-MCNC: 0.5 MG/DL (ref 0.1–1)
BUN SERPL-MCNC: 23 MG/DL (ref 8–23)
CA-I BLDV-SCNC: 1.17 MMOL/L (ref 1.06–1.42)
CA-I BLDV-SCNC: 1.29 MMOL/L (ref 1.06–1.42)
CALCIUM SERPL-MCNC: 9.8 MG/DL (ref 8.7–10.5)
CHLORIDE SERPL-SCNC: 105 MMOL/L (ref 95–110)
CO2 SERPL-SCNC: 21 MMOL/L (ref 23–29)
CREAT SERPL-MCNC: 0.7 MG/DL (ref 0.5–1.4)
EST. GFR  (NO RACE VARIABLE): >60 ML/MIN/1.73 M^2
GLUCOSE SERPL-MCNC: 185 MG/DL (ref 70–110)
POCT GLUCOSE: 112 MG/DL (ref 70–110)
POCT GLUCOSE: 126 MG/DL (ref 70–110)
POTASSIUM SERPL-SCNC: 4 MMOL/L (ref 3.5–5.1)
PROT SERPL-MCNC: 6.8 G/DL (ref 6–8.4)
PTH-INTACT SERPL-MCNC: 29.4 PG/ML (ref 9–77)
SODIUM SERPL-SCNC: 136 MMOL/L (ref 136–145)

## 2023-05-02 PROCEDURE — 25000003 PHARM REV CODE 250: Performed by: STUDENT IN AN ORGANIZED HEALTH CARE EDUCATION/TRAINING PROGRAM

## 2023-05-02 PROCEDURE — 82330 ASSAY OF CALCIUM: CPT | Mod: 91 | Performed by: STUDENT IN AN ORGANIZED HEALTH CARE EDUCATION/TRAINING PROGRAM

## 2023-05-02 PROCEDURE — 36415 COLL VENOUS BLD VENIPUNCTURE: CPT | Performed by: STUDENT IN AN ORGANIZED HEALTH CARE EDUCATION/TRAINING PROGRAM

## 2023-05-02 PROCEDURE — 83970 ASSAY OF PARATHORMONE: CPT | Performed by: STUDENT IN AN ORGANIZED HEALTH CARE EDUCATION/TRAINING PROGRAM

## 2023-05-02 PROCEDURE — 80053 COMPREHEN METABOLIC PANEL: CPT | Performed by: STUDENT IN AN ORGANIZED HEALTH CARE EDUCATION/TRAINING PROGRAM

## 2023-05-02 PROCEDURE — 82330 ASSAY OF CALCIUM: CPT | Performed by: STUDENT IN AN ORGANIZED HEALTH CARE EDUCATION/TRAINING PROGRAM

## 2023-05-02 PROCEDURE — 94761 N-INVAS EAR/PLS OXIMETRY MLT: CPT

## 2023-05-02 RX ORDER — ONDANSETRON 8 MG/1
8 TABLET, ORALLY DISINTEGRATING ORAL EVERY 8 HOURS PRN
Qty: 5 TABLET | Refills: 0 | Status: SHIPPED | OUTPATIENT
Start: 2023-05-02 | End: 2023-07-20

## 2023-05-02 RX ORDER — CEPHALEXIN 500 MG/1
500 CAPSULE ORAL EVERY 12 HOURS
Qty: 10 CAPSULE | Refills: 0 | Status: SHIPPED | OUTPATIENT
Start: 2023-05-02 | End: 2023-07-20

## 2023-05-02 RX ORDER — LEVOTHYROXINE SODIUM 100 UG/1
100 TABLET ORAL
Qty: 30 TABLET | Refills: 11 | Status: SHIPPED | OUTPATIENT
Start: 2023-05-03 | End: 2023-07-24

## 2023-05-02 RX ORDER — CALCIUM CARBONATE 500(1250)
TABLET ORAL
Qty: 42 TABLET | Refills: 0 | Status: SHIPPED | OUTPATIENT
Start: 2023-05-02 | End: 2023-10-25

## 2023-05-02 RX ORDER — HYDROCODONE BITARTRATE AND ACETAMINOPHEN 5; 325 MG/1; MG/1
1 TABLET ORAL EVERY 6 HOURS PRN
Qty: 20 TABLET | Refills: 0 | Status: SHIPPED | OUTPATIENT
Start: 2023-05-02 | End: 2023-07-20 | Stop reason: SDUPTHER

## 2023-05-02 RX ORDER — HYDROCODONE BITARTRATE AND ACETAMINOPHEN 5; 325 MG/1; MG/1
1 TABLET ORAL EVERY 6 HOURS PRN
Qty: 21 TABLET | Refills: 0 | Status: SHIPPED | OUTPATIENT
Start: 2023-05-02 | End: 2023-07-20

## 2023-05-02 RX ADMIN — LOSARTAN POTASSIUM 50 MG: 50 TABLET, FILM COATED ORAL at 08:05

## 2023-05-02 RX ADMIN — CALCIUM CARBONATE 1000 MG: 1250 SUSPENSION ORAL at 11:05

## 2023-05-02 RX ADMIN — LEVOTHYROXINE SODIUM 100 MCG: 100 TABLET ORAL at 05:05

## 2023-05-02 RX ADMIN — LETROZOLE 2.5 MG: 2.5 TABLET ORAL at 11:05

## 2023-05-02 RX ADMIN — CALCIUM CARBONATE 1000 MG: 1250 SUSPENSION ORAL at 08:05

## 2023-05-02 RX ADMIN — HYDROCODONE BITARTRATE AND ACETAMINOPHEN 1 TABLET: 5; 325 TABLET ORAL at 04:05

## 2023-05-02 RX ADMIN — HYDROCODONE BITARTRATE AND ACETAMINOPHEN 1 TABLET: 5; 325 TABLET ORAL at 08:05

## 2023-05-02 RX ADMIN — HYDRALAZINE HYDROCHLORIDE 25 MG: 25 TABLET, FILM COATED ORAL at 11:05

## 2023-05-02 NOTE — PLAN OF CARE
[facility] Plan of Care Note  Dx:    Symptomatic multinodular goiter with substernal extension    S/P surgery: 05/01/23-   1) Total thyroidectomy with transcervical resection of substernal goiter  2) Continuous nerve monitoring  3) Reimplantation of right inferior parathyroid gland in the right sternohyoid muscle    Shift Events:     Goals of Care: pain control, BP control, infection control    Neuro: Alert and oriented x 4    Vital Signs: VS BP trending down after PRN BP medication admin on 05/01    Respiratory: CDB and IS given and used by pt     Diet: DM 2000 chelsie mechanical soft    Is patient tolerating current diet? yes    GTTS: NA    Urine Output/Bowel Movement: Voiding well no bladder retention    Drains/Tubes/Tube Feeds (include total output/shift): MELINDA drain x 1 right side sanguinous drainage     Lines:left hand 20 G / L wrist 18 G    Accuchecks: ACHS    Skin: Incision with dermabond s/p surgery 05/01and MELINDA drain    Fall Risk Score: 8    Activity level?    Any scheduled procedures? NA    Any safety concerns? R mastectomy NO BP or venipunctures Right side   Bed alarm, Call for assistance    Other: Prn BP meds in place/ prn pain meds given as ordered

## 2023-05-02 NOTE — ANESTHESIA POSTPROCEDURE EVALUATION
Anesthesia Post Evaluation    Patient: Alia Andre    Procedure(s) Performed: Procedure(s) (LRB):  THYROIDECTOMY (Bilateral)    Final Anesthesia Type: general      Patient location during evaluation: PACU  Patient participation: Yes- Able to Participate  Level of consciousness: awake and alert  Post-procedure vital signs: reviewed and stable  Pain management: adequate  Airway patency: patent    PONV status at discharge: No PONV  Anesthetic complications: no      Cardiovascular status: blood pressure returned to baseline  Respiratory status: unassisted  Hydration status: euvolemic  Follow-up not needed.          Vitals Value Taken Time   /75 05/02/23 1120   Temp 36.7 °C (98.1 °F) 05/02/23 1120   Pulse 86 05/02/23 1120   Resp 18 05/02/23 1120   SpO2 96 % 05/02/23 1120         Event Time   Out of Recovery 11:40:00         Pain/Chito Score: Pain Rating Prior to Med Admin: 5 (5/2/2023  8:24 AM)  Pain Rating Post Med Admin: 0 (5/2/2023  5:42 AM)  Chito Score: 10 (5/2/2023  8:00 AM)

## 2023-05-02 NOTE — DISCHARGE SUMMARY
[facility]  Otorhinolaryngology-Head & Neck Surgery  Discharge Summary      Patient Name: Alia Andre  MRN: [id number]  Admission Date: 5/1/2023  Hospital Length of Stay: 0 days  Discharge Date and Time:  05/02/2023 7:40 AM  Attending Physician: Jalen Shay MD   Discharging Provider: Wali Velazquez MD  Primary Care Provider: Nancy Poe MD    HPI:   72 yo F who is s/p thyroidectomy for substernal compressive goiter. Patient tolerated the procedure well. Was discharged home on POD1 with a drain. Her postop PTH and calcium were WNL.     Exam:  Incision c/d/i closed with dermabond  Drain sutured in right neck    Procedure(s) (LRB):  THYROIDECTOMY (Bilateral)      Indwelling Lines/Drains at time of discharge:   Lines/Drains/Airways     Central Venous Catheter Line  Duration                Port A Cath Single Lumen 11/16/18 0746 left subclavian 1627 days          Drain  Duration                Closed/Suction Drain 11/16/18 0950 Bulb 1627 days         Closed/Suction Drain 05/01/23 1049 Right Neck Bulb 15 Fr. <1 day              Hospital Course: No notes on file    Goals of Care Treatment Preferences:  Code Status: Full Code      Consults:     Significant Diagnostic Studies: N/A      Pending Diagnostic Studies:     Procedure Component Value Units Date/Time    Calcium, ionized [[id number]]     Order Status: Sent Lab Status: No result     Specimen: Blood     Specimen to Pathology, Surgery ENT [[id number]] Collected: 05/01/23 1047    Order Status: Sent Lab Status: In process Updated: 05/01/23 1047    Specimen: Tissue         There are no hospital problems to display for this patient.     Discharged Condition: stable    Disposition: Home or Self Care    Follow Up:   Follow-up Information     Nancy Pro NP. Call in 2 day(s).    Specialty: Otolaryngology  Why: Please call clinic to make appointment once drain output is < 30 mL over 24 hours  Contact information:  [address]  [facility]  [address]  [phone]                       Patient Instructions:      Diet Adult Regular     Notify your health care provider if you experience any of the following:     No dressing needed     Activity as tolerated   Order Comments: Light duty, ok to shower, pat to dry, avoid scrubbing incision     Medications:  Reconciled Home Medications:      Medication List      START taking these medications    calcium carbonate 500 mg calcium (1,250 mg) chewable tablet  Commonly known as: OS-MIKAYLA  Take 2 tablets (1,000 mg total) by mouth 2 (two) times daily for 7 days, THEN 2 tablets (1,000 mg total) once daily for 7 days.  Start taking on: May 2, 2023     cephALEXin 500 MG capsule  Commonly known as: KEFLEX  Take 1 capsule (500 mg total) by mouth every 12 (twelve) hours.     HYDROcodone-acetaminophen 5-325 mg per tablet  Commonly known as: NORCO  Take 1 tablet by mouth every 6 (six) hours as needed for Pain.     ondansetron 8 MG Tbdl  Commonly known as: ZOFRAN-ODT  Take 1 tablet (8 mg total) by mouth every 8 (eight) hours as needed (nausea).        CHANGE how you take these medications    levothyroxine 100 MCG tablet  Commonly known as: SYNTHROID  Take 1 tablet (100 mcg total) by mouth before breakfast.  Start taking on: May 3, 2023  What changed:   · medication strength  · See the new instructions.        CONTINUE taking these medications    acetaminophen 325 MG tablet  Commonly known as: TYLENOL  Take 325 mg by mouth every 6 (six) hours as needed for Pain.     albuterol 2.5 mg /3 mL (0.083 %) nebulizer solution  Commonly known as: PROVENTIL  Take 3 mLs (2.5 mg total) by nebulization every 6 (six) hours as needed for Wheezing. Rescue     ammonium lactate 12 % Crea  Apply twice daily to affected parts both feet as needed.     aspirin 81 MG Chew  Take 1 tablet (81 mg total) by mouth once daily. Start on Monday.     BD ALCOHOL SWABS Padm  Generic drug: alcohol swabs     blood sugar diagnostic Strp  1 strip by Misc.(Non-Drug;  Combo Route) route 2 (two) times daily with meals.     blood-glucose meter kit  Use as instructed     ciclopirox 8 % Soln  Commonly known as: PENLAC  Apply topically nightly.     diclofenac sodium 1 % Gel  Commonly known as: VOLTAREN  Apply 2 g topically daily as needed.     fluticasone propionate 50 mcg/actuation nasal spray  Commonly known as: FLONASE  USE 1 SPRAY IN EACH NOSTRIL EVERY DAY     gabapentin 300 MG capsule  Commonly known as: NEURONTIN     * lancets Misc  1 lancet by Misc.(Non-Drug; Combo Route) route 2 (two) times daily with meals.     * TRUEPLUS LANCETS 33 gauge Misc  Generic drug: lancets     lancing device Misc  1 Device by Misc.(Non-Drug; Combo Route) route 2 (two) times daily with meals.     letrozole 2.5 mg Tab  Commonly known as: FEMARA  TAKE 1 TABLET EVERY DAY     loratadine 10 mg tablet  Commonly known as: CLARITIN  TAKE 1 TABLET EVERY DAY     losartan 50 MG tablet  Commonly known as: COZAAR  TAKE 1 TABLET EVERY DAY     metFORMIN 500 MG tablet  Commonly known as: GLUCOPHAGE  TAKE 2 TABLETS TWICE DAILY WITH MEALS     rosuvastatin 10 MG tablet  Commonly known as: CRESTOR  TAKE 1 TABLET EVERY EVENING (STOP 5 MG TABLET)     turmeric root extract 500 mg Cap  Take 1 tablet by mouth once daily.         * This list has 2 medication(s) that are the same as other medications prescribed for you. Read the directions carefully, and ask your doctor or other care provider to review them with you.            STOP taking these medications    methylPREDNISolone 4 mg tablet  Commonly known as: MEDROL DOSEPACK          Time spent on the discharge of patient: 20 minutes    Wali Velazquez MD  Otorhinolaryngology-Head & Neck Surgery  [facility]

## 2023-05-04 LAB — POCT GLUCOSE: 196 MG/DL (ref 70–110)

## 2023-05-05 ENCOUNTER — OFFICE VISIT (OUTPATIENT)
Dept: OTOLARYNGOLOGY | Facility: CLINIC | Age: 73
End: 2023-05-05
Payer: MEDICARE

## 2023-05-05 VITALS
DIASTOLIC BLOOD PRESSURE: 75 MMHG | BODY MASS INDEX: 22.25 KG/M2 | HEART RATE: 91 BPM | SYSTOLIC BLOOD PRESSURE: 118 MMHG | WEIGHT: 138.44 LBS | HEIGHT: 66 IN

## 2023-05-05 DIAGNOSIS — E04.9 SUBSTERNAL GOITER: Primary | ICD-10-CM

## 2023-05-05 PROCEDURE — 3074F SYST BP LT 130 MM HG: CPT | Mod: CPTII,S$GLB,, | Performed by: NURSE PRACTITIONER

## 2023-05-05 PROCEDURE — 3078F PR MOST RECENT DIASTOLIC BLOOD PRESSURE < 80 MM HG: ICD-10-PCS | Mod: CPTII,S$GLB,, | Performed by: NURSE PRACTITIONER

## 2023-05-05 PROCEDURE — 3078F DIAST BP <80 MM HG: CPT | Mod: CPTII,S$GLB,, | Performed by: NURSE PRACTITIONER

## 2023-05-05 PROCEDURE — 3060F POS MICROALBUMINURIA REV: CPT | Mod: CPTII,S$GLB,, | Performed by: NURSE PRACTITIONER

## 2023-05-05 PROCEDURE — 3044F PR MOST RECENT HEMOGLOBIN A1C LEVEL <7.0%: ICD-10-PCS | Mod: CPTII,S$GLB,, | Performed by: NURSE PRACTITIONER

## 2023-05-05 PROCEDURE — 3066F NEPHROPATHY DOC TX: CPT | Mod: CPTII,S$GLB,, | Performed by: NURSE PRACTITIONER

## 2023-05-05 PROCEDURE — 3008F PR BODY MASS INDEX (BMI) DOCUMENTED: ICD-10-PCS | Mod: CPTII,S$GLB,, | Performed by: NURSE PRACTITIONER

## 2023-05-05 PROCEDURE — 3066F PR DOCUMENTATION OF TREATMENT FOR NEPHROPATHY: ICD-10-PCS | Mod: CPTII,S$GLB,, | Performed by: NURSE PRACTITIONER

## 2023-05-05 PROCEDURE — 99024 POSTOP FOLLOW-UP VISIT: CPT | Mod: S$GLB,,, | Performed by: NURSE PRACTITIONER

## 2023-05-05 PROCEDURE — 99024 PR POST-OP FOLLOW-UP VISIT: ICD-10-PCS | Mod: S$GLB,,, | Performed by: NURSE PRACTITIONER

## 2023-05-05 PROCEDURE — 1159F MED LIST DOCD IN RCRD: CPT | Mod: CPTII,S$GLB,, | Performed by: NURSE PRACTITIONER

## 2023-05-05 PROCEDURE — 4010F PR ACE/ARB THEARPY RXD/TAKEN: ICD-10-PCS | Mod: CPTII,S$GLB,, | Performed by: NURSE PRACTITIONER

## 2023-05-05 PROCEDURE — 1159F PR MEDICATION LIST DOCUMENTED IN MEDICAL RECORD: ICD-10-PCS | Mod: CPTII,S$GLB,, | Performed by: NURSE PRACTITIONER

## 2023-05-05 PROCEDURE — 4010F ACE/ARB THERAPY RXD/TAKEN: CPT | Mod: CPTII,S$GLB,, | Performed by: NURSE PRACTITIONER

## 2023-05-05 PROCEDURE — 1126F PR PAIN SEVERITY QUANTIFIED, NO PAIN PRESENT: ICD-10-PCS | Mod: CPTII,S$GLB,, | Performed by: NURSE PRACTITIONER

## 2023-05-05 PROCEDURE — 3074F PR MOST RECENT SYSTOLIC BLOOD PRESSURE < 130 MM HG: ICD-10-PCS | Mod: CPTII,S$GLB,, | Performed by: NURSE PRACTITIONER

## 2023-05-05 PROCEDURE — 3060F PR POS MICROALBUMINURIA RESULT DOCUMENTED/REVIEW: ICD-10-PCS | Mod: CPTII,S$GLB,, | Performed by: NURSE PRACTITIONER

## 2023-05-05 PROCEDURE — 3044F HG A1C LEVEL LT 7.0%: CPT | Mod: CPTII,S$GLB,, | Performed by: NURSE PRACTITIONER

## 2023-05-05 PROCEDURE — 3008F BODY MASS INDEX DOCD: CPT | Mod: CPTII,S$GLB,, | Performed by: NURSE PRACTITIONER

## 2023-05-05 PROCEDURE — 99999 PR PBB SHADOW E&M-EST. PATIENT-LVL III: ICD-10-PCS | Mod: PBBFAC,,, | Performed by: NURSE PRACTITIONER

## 2023-05-05 PROCEDURE — 1126F AMNT PAIN NOTED NONE PRSNT: CPT | Mod: CPTII,S$GLB,, | Performed by: NURSE PRACTITIONER

## 2023-05-05 PROCEDURE — 99999 PR PBB SHADOW E&M-EST. PATIENT-LVL III: CPT | Mod: PBBFAC,,, | Performed by: NURSE PRACTITIONER

## 2023-05-05 NOTE — PROGRESS NOTES
Subjective     Patient ID: Alia Andre is a 73 y.o. female.    Chief Complaint: post op    HPI    Alia Andre returns for a post op visit. Doing well. No complaints.    Past Medical History:   Diagnosis Date    Allergy     Arthritis     Cancer     breast    Diabetes mellitus type 2 in nonobese     Goiter     History of endometrial ablation     age 29    Hypertension     Tobacco abuse     Vitamin D deficiency        Past Surgical History:   Procedure Laterality Date    BREAST SURGERY      INSERTION OF TUNNELED CENTRAL VENOUS CATHETER (CVC) WITH SUBCUTANEOUS PORT Left 11/16/2018    Procedure: [id number] LEFT;  Surgeon: Graciela Echeverria MD;  Location: [facility] OR [address] [place] [address];  Service: General;  Laterality: Left;    MASTECTOMY Right 11/16/2018    Procedure: MASTECTOMY RIGHT 2.5 HR CASE;  Surgeon: Cristiane Frederick MD;  [facility];  Service: General;  Laterality: Right;  needs to be 1st case, Dr. Cameron has afternoon cases at [facility]    SENTINEL LYMPH NODE BIOPSY Right 11/16/2018    Procedure: BIOPSY, LYMPH NODE, SENTINEL RIGHT;  Surgeon: Cristiane Frederick MD;  [facility];  Service: General;  Laterality: Right;    THYROIDECTOMY Bilateral 5/1/2023    Procedure: THYROIDECTOMY;  Surgeon: Jalen Shay MD;  Location: [facility] OR [address] [place] [address];  Service: ENT;  Laterality: Bilateral;  NIM tube.    TUBAL LIGATION      1970/80's         Current Outpatient Medications:     acetaminophen (TYLENOL) 325 MG tablet, Take 325 mg by mouth every 6 (six) hours as needed for Pain., Disp: , Rfl:     albuterol (PROVENTIL) 2.5 mg /3 mL (0.083 %) nebulizer solution, Take 3 mLs (2.5 mg total) by nebulization every 6 (six) hours as needed for Wheezing. Rescue, Disp: 270 mL, Rfl: 0    ammonium lactate 12 % Crea, Apply twice daily to affected parts both feet as needed., Disp: 140 g, Rfl: 11    BD ALCOHOL SWABS PadM, , Disp: , Rfl:     blood sugar diagnostic Strp, 1 strip by Misc.(Non-Drug; Combo Route) route 2 (two)  times daily with meals., Disp: 100 strip, Rfl: 11    calcium carbonate (OS-MIKAYLA) 500 mg calcium (1,250 mg) tablet, Take 2 tablets (1,000 mg total) by mouth 2 (two) times daily for 7 days, THEN 2 tablets (1,000 mg total) once daily for 7 days., Disp: 42 tablet, Rfl: 0    cephALEXin (KEFLEX) 500 MG capsule, Take 1 capsule (500 mg total) by mouth every 12 (twelve) hours., Disp: 10 capsule, Rfl: 0    ciclopirox (PENLAC) 8 % Soln, Apply topically nightly., Disp: 6.6 mL, Rfl: 11    diclofenac sodium (VOLTAREN) 1 % Gel, Apply 2 g topically daily as needed., Disp: 100 g, Rfl: 0    fluticasone propionate (FLONASE) 50 mcg/actuation nasal spray, USE 1 SPRAY IN EACH NOSTRIL EVERY DAY, Disp: 32 g, Rfl: 0    gabapentin (NEURONTIN) 300 MG capsule, , Disp: , Rfl:     HYDROcodone-acetaminophen (NORCO) 5-325 mg per tablet, Take 1 tablet by mouth every 6 (six) hours as needed for Pain., Disp: 20 tablet, Rfl: 0    HYDROcodone-acetaminophen (NORCO) 5-325 mg per tablet, Take 1 tablet by mouth every 6 (six) hours as needed for Pain., Disp: 21 tablet, Rfl: 0    lancets Misc, 1 lancet by Misc.(Non-Drug; Combo Route) route 2 (two) times daily with meals., Disp: 100 each, Rfl: 11    letrozole (FEMARA) 2.5 mg Tab, TAKE 1 TABLET EVERY DAY, Disp: 90 tablet, Rfl: 3    levothyroxine (SYNTHROID) 100 MCG tablet, Take 1 tablet (100 mcg total) by mouth before breakfast., Disp: 30 tablet, Rfl: 11    loratadine (CLARITIN) 10 mg tablet, TAKE 1 TABLET EVERY DAY, Disp: 90 tablet, Rfl: 3    losartan (COZAAR) 50 MG tablet, TAKE 1 TABLET EVERY DAY, Disp: 90 tablet, Rfl: 3    metFORMIN (GLUCOPHAGE) 500 MG tablet, TAKE 2 TABLETS TWICE DAILY WITH MEALS, Disp: 360 tablet, Rfl: 3    ondansetron (ZOFRAN-ODT) 8 MG TbDL, Dissolve 1 tablet (8 mg total) by mouth every 8 (eight) hours as needed (nausea)., Disp: 5 tablet, Rfl: 0    rosuvastatin (CRESTOR) 10 MG tablet, TAKE 1 TABLET EVERY EVENING (STOP 5 MG TABLET), Disp: 90 tablet, Rfl: 3    TRUEPLUS LANCETS 33 gauge  Misc, , Disp: , Rfl:     turmeric root extract 500 mg Cap, Take 1 tablet by mouth once daily., Disp: , Rfl:     aspirin 81 MG Chew, Take 1 tablet (81 mg total) by mouth once daily. Start on Monday., Disp: , Rfl: 0    blood-glucose meter kit, Use as instructed, Disp: 1 each, Rfl: 0    lancing device Misc, 1 Device by Misc.(Non-Drug; Combo Route) route 2 (two) times daily with meals., Disp: 1 each, Rfl: 0    Review of patient's allergies indicates:   Allergen Reactions    Pravastatin      Leg cramps       Social History     Socioeconomic History    Marital status:    Tobacco Use    Smoking status: Some Days     Packs/day: 1.00     Years: 30.00     Pack years: 30.00     Types: Cigarettes    Smokeless tobacco: Never   Substance and Sexual Activity    Alcohol use: No    Drug use: No    Sexual activity: Not Currently   Social History Narrative    Lives by self       Family History   Problem Relation Age of Onset    Aortic aneurysm Mother     Leukemia Father     No Known Problems Sister     Cancer Maternal Aunt     Breast cancer Paternal Aunt     Breast cancer Maternal Cousin     Breast cancer Paternal Cousin     Breast cancer Paternal Cousin          Review of Systems   Constitutional:  Negative for appetite change, chills, diaphoresis, fatigue, fever and unexpected weight change.   HENT:  Negative for nasal congestion, dental problem, drooling, ear discharge, ear pain, facial swelling, hearing loss, mouth sores, nosebleeds, postnasal drip, rhinorrhea, sinus pressure/congestion, sneezing, sore throat, tinnitus, trouble swallowing and voice change.    Eyes:  Negative for pain, discharge, redness and itching.   Respiratory:  Negative for cough and shortness of breath.    Cardiovascular:  Negative for chest pain.   Gastrointestinal:  Negative for abdominal distention, abdominal pain, diarrhea, nausea and vomiting.   Endocrine: Negative for cold intolerance and heat intolerance.   Genitourinary:  Negative for  difficulty urinating.   Musculoskeletal:  Negative for neck pain and neck stiffness.   Integumentary:  Negative for rash.   Neurological:  Negative for dizziness, weakness and headaches.   Hematological:  Negative for adenopathy.        Objective     Physical Exam  Constitutional:       Appearance: Normal appearance.   HENT:      Head: Normocephalic and atraumatic.      Right Ear: External ear normal.      Left Ear: External ear normal.   Neck:      Comments: MELINDA drain with scant amount of serosanguinous drainage.  Drain and suture removed without difficulty.  Dermabond noted to incision.  Incision CDI with no redness or swelling noted.    Pulmonary:      Effort: Pulmonary effort is normal. No respiratory distress.   Neurological:      General: No focal deficit present.      Mental Status: She is alert.   Psychiatric:         Mood and Affect: Mood normal.         Behavior: Behavior normal.         Thought Content: Thought content normal.        Assessment and Plan     Problem List Items Addressed This Visit          Endocrine    Substernal goiter - Primary     Healing well. Drain removed. Path pending. RTC in 2 weeks, sooner if needed.

## 2023-05-08 LAB
FINAL PATHOLOGIC DIAGNOSIS: NORMAL
FROZEN SECTION DIAGNOSIS: NORMAL
FROZEN SECTION FOOTNOTE: NORMAL
GROSS: NORMAL
Lab: NORMAL

## 2023-05-10 ENCOUNTER — TELEPHONE (OUTPATIENT)
Dept: OTOLARYNGOLOGY | Facility: CLINIC | Age: 73
End: 2023-05-10
Payer: MEDICARE

## 2023-05-10 NOTE — TELEPHONE ENCOUNTER
I called and spoke with her. We discussed her pathology report. She reports that she is doing well, eating well, and her voice is fine. Listening to her, she can speak in complete, sustained sentences without diplophonia. We will see her back as scheduled.

## 2023-05-19 ENCOUNTER — OFFICE VISIT (OUTPATIENT)
Dept: OTOLARYNGOLOGY | Facility: CLINIC | Age: 73
End: 2023-05-19
Payer: MEDICARE

## 2023-05-19 VITALS
BODY MASS INDEX: 22.63 KG/M2 | WEIGHT: 140.19 LBS | SYSTOLIC BLOOD PRESSURE: 160 MMHG | HEART RATE: 83 BPM | DIASTOLIC BLOOD PRESSURE: 84 MMHG

## 2023-05-19 DIAGNOSIS — E04.9 SUBSTERNAL GOITER: Primary | ICD-10-CM

## 2023-05-19 PROCEDURE — 3060F POS MICROALBUMINURIA REV: CPT | Mod: CPTII,,, | Performed by: NURSE PRACTITIONER

## 2023-05-19 PROCEDURE — 4010F ACE/ARB THERAPY RXD/TAKEN: CPT | Mod: CPTII,,, | Performed by: NURSE PRACTITIONER

## 2023-05-19 PROCEDURE — 1159F MED LIST DOCD IN RCRD: CPT | Mod: CPTII,,, | Performed by: NURSE PRACTITIONER

## 2023-05-19 PROCEDURE — 3060F PR POS MICROALBUMINURIA RESULT DOCUMENTED/REVIEW: ICD-10-PCS | Mod: CPTII,,, | Performed by: NURSE PRACTITIONER

## 2023-05-19 PROCEDURE — 3044F PR MOST RECENT HEMOGLOBIN A1C LEVEL <7.0%: ICD-10-PCS | Mod: CPTII,,, | Performed by: NURSE PRACTITIONER

## 2023-05-19 PROCEDURE — 1159F PR MEDICATION LIST DOCUMENTED IN MEDICAL RECORD: ICD-10-PCS | Mod: CPTII,,, | Performed by: NURSE PRACTITIONER

## 2023-05-19 PROCEDURE — 3008F PR BODY MASS INDEX (BMI) DOCUMENTED: ICD-10-PCS | Mod: CPTII,,, | Performed by: NURSE PRACTITIONER

## 2023-05-19 PROCEDURE — 4010F PR ACE/ARB THEARPY RXD/TAKEN: ICD-10-PCS | Mod: CPTII,,, | Performed by: NURSE PRACTITIONER

## 2023-05-19 PROCEDURE — 3008F BODY MASS INDEX DOCD: CPT | Mod: CPTII,,, | Performed by: NURSE PRACTITIONER

## 2023-05-19 PROCEDURE — 3077F PR MOST RECENT SYSTOLIC BLOOD PRESSURE >= 140 MM HG: ICD-10-PCS | Mod: CPTII,,, | Performed by: NURSE PRACTITIONER

## 2023-05-19 PROCEDURE — 3066F NEPHROPATHY DOC TX: CPT | Mod: CPTII,,, | Performed by: NURSE PRACTITIONER

## 2023-05-19 PROCEDURE — 3077F SYST BP >= 140 MM HG: CPT | Mod: CPTII,,, | Performed by: NURSE PRACTITIONER

## 2023-05-19 PROCEDURE — 99999 PR PBB SHADOW E&M-EST. PATIENT-LVL III: ICD-10-PCS | Mod: PBBFAC,,, | Performed by: NURSE PRACTITIONER

## 2023-05-19 PROCEDURE — 99024 POSTOP FOLLOW-UP VISIT: CPT | Mod: ,,, | Performed by: NURSE PRACTITIONER

## 2023-05-19 PROCEDURE — 1126F PR PAIN SEVERITY QUANTIFIED, NO PAIN PRESENT: ICD-10-PCS | Mod: CPTII,,, | Performed by: NURSE PRACTITIONER

## 2023-05-19 PROCEDURE — 3079F DIAST BP 80-89 MM HG: CPT | Mod: CPTII,,, | Performed by: NURSE PRACTITIONER

## 2023-05-19 PROCEDURE — 3044F HG A1C LEVEL LT 7.0%: CPT | Mod: CPTII,,, | Performed by: NURSE PRACTITIONER

## 2023-05-19 PROCEDURE — 3066F PR DOCUMENTATION OF TREATMENT FOR NEPHROPATHY: ICD-10-PCS | Mod: CPTII,,, | Performed by: NURSE PRACTITIONER

## 2023-05-19 PROCEDURE — 99999 PR PBB SHADOW E&M-EST. PATIENT-LVL III: CPT | Mod: PBBFAC,,, | Performed by: NURSE PRACTITIONER

## 2023-05-19 PROCEDURE — 1126F AMNT PAIN NOTED NONE PRSNT: CPT | Mod: CPTII,,, | Performed by: NURSE PRACTITIONER

## 2023-05-19 PROCEDURE — 3079F PR MOST RECENT DIASTOLIC BLOOD PRESSURE 80-89 MM HG: ICD-10-PCS | Mod: CPTII,,, | Performed by: NURSE PRACTITIONER

## 2023-05-19 PROCEDURE — 99213 OFFICE O/P EST LOW 20 MIN: CPT | Performed by: NURSE PRACTITIONER

## 2023-05-19 PROCEDURE — 99024 PR POST-OP FOLLOW-UP VISIT: ICD-10-PCS | Mod: ,,, | Performed by: NURSE PRACTITIONER

## 2023-05-19 NOTE — PROGRESS NOTES
Subjective     Patient ID: Alia Andre is a 73 y.o. female.    Chief Complaint: post op    HPI    Alia Andre returns for a post op visit. Doing well. No complaints.    Past Medical History:   Diagnosis Date    Allergy     Arthritis     Cancer     breast    Diabetes mellitus type 2 in nonobese     Goiter     History of endometrial ablation     age 29    Hypertension     Tobacco abuse     Vitamin D deficiency        Past Surgical History:   Procedure Laterality Date    BREAST SURGERY      INSERTION OF TUNNELED CENTRAL VENOUS CATHETER (CVC) WITH SUBCUTANEOUS PORT Left 11/16/2018    Procedure: [id number] LEFT;  Surgeon: Graciela Echeverria MD;  Location: [facility] OR [address] [place] [address];  Service: General;  Laterality: Left;    MASTECTOMY Right 11/16/2018    Procedure: MASTECTOMY RIGHT 2.5 HR CASE;  Surgeon: Cristiane Frederick MD;  [facility];  Service: General;  Laterality: Right;  needs to be 1st case, Dr. Cameron has afternoon cases at [facility]    SENTINEL LYMPH NODE BIOPSY Right 11/16/2018    Procedure: BIOPSY, LYMPH NODE, SENTINEL RIGHT;  Surgeon: Cristiane Frederick MD;  [facility];  Service: General;  Laterality: Right;    THYROIDECTOMY Bilateral 5/1/2023    Procedure: THYROIDECTOMY;  Surgeon: Jalen Shay MD;  [facility];  Service: ENT;  Laterality: Bilateral;  NIM tube.    TUBAL LIGATION      1970/80's         Current Outpatient Medications:     acetaminophen (TYLENOL) 325 MG tablet, Take 325 mg by mouth every 6 (six) hours as needed for Pain., Disp: , Rfl:     albuterol (PROVENTIL) 2.5 mg /3 mL (0.083 %) nebulizer solution, Take 3 mLs (2.5 mg total) by nebulization every 6 (six) hours as needed for Wheezing. Rescue, Disp: 270 mL, Rfl: 0    ammonium lactate 12 % Crea, Apply twice daily to affected parts both feet as needed., Disp: 140 g, Rfl: 11    aspirin 81 MG Chew, Take 1 tablet (81 mg total) by mouth once daily. Start on Monday., Disp: , Rfl: 0     BD ALCOHOL SWABS PadM, , Disp: , Rfl:     blood sugar diagnostic Strp, 1 strip by Misc.(Non-Drug; Combo Route) route 2 (two) times daily with meals., Disp: 100 strip, Rfl: 11    blood-glucose meter kit, Use as instructed, Disp: 1 each, Rfl: 0    calcium carbonate (OS-MIKAYLA) 500 mg calcium (1,250 mg) tablet, Take 2 tablets (1,000 mg total) by mouth 2 (two) times daily for 7 days, THEN 2 tablets (1,000 mg total) once daily for 7 days., Disp: 42 tablet, Rfl: 0    cephALEXin (KEFLEX) 500 MG capsule, Take 1 capsule (500 mg total) by mouth every 12 (twelve) hours., Disp: 10 capsule, Rfl: 0    ciclopirox (PENLAC) 8 % Soln, Apply topically nightly., Disp: 6.6 mL, Rfl: 11    diclofenac sodium (VOLTAREN) 1 % Gel, Apply 2 g topically daily as needed., Disp: 100 g, Rfl: 0    fluticasone propionate (FLONASE) 50 mcg/actuation nasal spray, USE 1 SPRAY IN EACH NOSTRIL EVERY DAY, Disp: 32 g, Rfl: 0    gabapentin (NEURONTIN) 300 MG capsule, , Disp: , Rfl:     HYDROcodone-acetaminophen (NORCO) 5-325 mg per tablet, Take 1 tablet by mouth every 6 (six) hours as needed for Pain., Disp: 20 tablet, Rfl: 0    HYDROcodone-acetaminophen (NORCO) 5-325 mg per tablet, Take 1 tablet by mouth every 6 (six) hours as needed for Pain., Disp: 21 tablet, Rfl: 0    lancets Misc, 1 lancet by Misc.(Non-Drug; Combo Route) route 2 (two) times daily with meals., Disp: 100 each, Rfl: 11    lancing device Misc, 1 Device by Misc.(Non-Drug; Combo Route) route 2 (two) times daily with meals., Disp: 1 each, Rfl: 0    letrozole (FEMARA) 2.5 mg Tab, TAKE 1 TABLET EVERY DAY, Disp: 90 tablet, Rfl: 3    levothyroxine (SYNTHROID) 100 MCG tablet, Take 1 tablet (100 mcg total) by mouth before breakfast., Disp: 30 tablet, Rfl: 11    loratadine (CLARITIN) 10 mg tablet, TAKE 1 TABLET EVERY DAY, Disp: 90 tablet, Rfl: 3    losartan (COZAAR) 50 MG tablet, TAKE 1 TABLET EVERY DAY, Disp: 90 tablet, Rfl: 3    metFORMIN (GLUCOPHAGE) 500 MG tablet, TAKE 2 TABLETS  TWICE DAILY WITH MEALS, Disp: 360 tablet, Rfl: 3    ondansetron (ZOFRAN-ODT) 8 MG TbDL, Dissolve 1 tablet (8 mg total) by mouth every 8 (eight) hours as needed (nausea)., Disp: 5 tablet, Rfl: 0    rosuvastatin (CRESTOR) 10 MG tablet, TAKE 1 TABLET EVERY EVENING (STOP 5 MG TABLET), Disp: 90 tablet, Rfl: 3    TRUEPLUS LANCETS 33 gauge Misc, , Disp: , Rfl:     turmeric root extract 500 mg Cap, Take 1 tablet by mouth once daily., Disp: , Rfl:     Review of patient's allergies indicates:   Allergen Reactions    Opioids - morphine analogues Itching    Pravastatin      Leg cramps       Social History     Socioeconomic History    Marital status:    Tobacco Use    Smoking status: Some Days     Packs/day: 1.00     Years: 30.00     Pack years: 30.00     Types: Cigarettes    Smokeless tobacco: Never   Substance and Sexual Activity    Alcohol use: No    Drug use: No    Sexual activity: Not Currently   Social History Narrative    Lives by self       Family History   Problem Relation Age of Onset    Aortic aneurysm Mother     Leukemia Father     No Known Problems Sister     Cancer Maternal Aunt     Breast cancer Paternal Aunt     Breast cancer Maternal Cousin     Breast cancer Paternal Cousin     Breast cancer Paternal Cousin          Review of Systems   Constitutional:  Negative for appetite change, chills, diaphoresis, fatigue, fever and unexpected weight change.   HENT:  Negative for nasal congestion, dental problem, drooling, ear discharge, ear pain, facial swelling, hearing loss, mouth sores, nosebleeds, postnasal drip, rhinorrhea, sinus pressure/congestion, sneezing, sore throat, tinnitus, trouble swallowing and voice change.    Eyes:  Negative for pain, discharge, redness and itching.   Respiratory:  Negative for cough and shortness of breath.    Cardiovascular:  Negative for chest pain.   Gastrointestinal:  Negative for abdominal distention, abdominal pain, diarrhea, nausea and vomiting.    Endocrine: Negative for cold intolerance and heat intolerance.   Genitourinary:  Negative for difficulty urinating.   Musculoskeletal:  Negative for neck pain and neck stiffness.   Integumentary:  Negative for rash.   Neurological:  Negative for dizziness, weakness and headaches.   Hematological:  Negative for adenopathy.        Objective     Physical Exam  Constitutional:       Appearance: Normal appearance.   HENT:      Head: Normocephalic and atraumatic.      Right Ear: External ear normal.      Left Ear: External ear normal.   Neck:      Comments:  Incision CDI with no redness or swelling noted.    Pulmonary:      Effort: Pulmonary effort is normal. No respiratory distress.   Neurological:      General: No focal deficit present.      Mental Status: She is alert.   Psychiatric:         Mood and Affect: Mood normal.         Behavior: Behavior normal.         Thought Content: Thought content normal.        Assessment and Plan     Problem List Items Addressed This Visit        Endocrine    Substernal goiter - Primary     Healing well. Path report reviewed. We discussed seeing her  PCP for future thyroid medication management. Scar care discussed. Questions answered. RTC prn/

## 2023-05-19 NOTE — ASSESSMENT & PLAN NOTE
Healing well. Path report reviewed. We discussed seeing her  PCP for future thyroid medication management. Scar care discussed. Questions answered. RTC prn/

## 2023-06-30 ENCOUNTER — PATIENT MESSAGE (OUTPATIENT)
Dept: ADMINISTRATIVE | Facility: OTHER | Age: 73
End: 2023-06-30
Payer: MEDICARE

## 2023-07-03 DIAGNOSIS — C50.411 MALIGNANT NEOPLASM OF UPPER-OUTER QUADRANT OF RIGHT BREAST IN FEMALE, ESTROGEN RECEPTOR POSITIVE: ICD-10-CM

## 2023-07-03 DIAGNOSIS — Z17.0 MALIGNANT NEOPLASM OF UPPER-OUTER QUADRANT OF RIGHT BREAST IN FEMALE, ESTROGEN RECEPTOR POSITIVE: ICD-10-CM

## 2023-07-05 RX ORDER — LETROZOLE 2.5 MG/1
TABLET, FILM COATED ORAL
Qty: 90 TABLET | Refills: 3 | Status: SHIPPED | OUTPATIENT
Start: 2023-07-05

## 2023-07-10 ENCOUNTER — TELEPHONE (OUTPATIENT)
Dept: PRIMARY CARE CLINIC | Facility: CLINIC | Age: 73
End: 2023-07-10
Payer: MEDICARE

## 2023-07-10 DIAGNOSIS — Z12.31 ENCOUNTER FOR SCREENING MAMMOGRAM FOR MALIGNANT NEOPLASM OF BREAST: Primary | ICD-10-CM

## 2023-07-10 NOTE — TELEPHONE ENCOUNTER
----- Message from Ron Randolph sent at 7/10/2023  3:35 PM CDT -----  Regarding: Orders  Contact: [phone]  Alia Andre calling regarding Patient Advice (message)Pt needs a order to schedule her Mammo

## 2023-07-12 DIAGNOSIS — E11.65 UNCONTROLLED TYPE 2 DIABETES MELLITUS WITH HYPERGLYCEMIA: ICD-10-CM

## 2023-07-12 RX ORDER — CALCIUM CITRATE/VITAMIN D3 200MG-6.25
TABLET ORAL
Qty: 200 STRIP | Refills: 3 | Status: SHIPPED | OUTPATIENT
Start: 2023-07-12 | End: 2023-10-25 | Stop reason: SDUPTHER

## 2023-07-12 RX ORDER — LANCETS 33 GAUGE
EACH MISCELLANEOUS
Qty: 200 EACH | Refills: 3 | Status: SHIPPED | OUTPATIENT
Start: 2023-07-12

## 2023-07-20 ENCOUNTER — LAB VISIT (OUTPATIENT)
Dept: LAB | Facility: HOSPITAL | Age: 73
End: 2023-07-20
Attending: INTERNAL MEDICINE
Payer: MEDICARE

## 2023-07-20 ENCOUNTER — PATIENT MESSAGE (OUTPATIENT)
Dept: ADMINISTRATIVE | Facility: OTHER | Age: 73
End: 2023-07-20
Payer: MEDICARE

## 2023-07-20 ENCOUNTER — OFFICE VISIT (OUTPATIENT)
Dept: PRIMARY CARE CLINIC | Facility: CLINIC | Age: 73
End: 2023-07-20
Payer: MEDICARE

## 2023-07-20 VITALS
BODY MASS INDEX: 22.85 KG/M2 | HEIGHT: 66 IN | WEIGHT: 142.19 LBS | SYSTOLIC BLOOD PRESSURE: 146 MMHG | OXYGEN SATURATION: 98 % | TEMPERATURE: 98 F | HEART RATE: 81 BPM | DIASTOLIC BLOOD PRESSURE: 72 MMHG

## 2023-07-20 DIAGNOSIS — C50.211 MALIGNANT NEOPLASM OF UPPER-INNER QUADRANT OF RIGHT BREAST IN FEMALE, ESTROGEN RECEPTOR POSITIVE: ICD-10-CM

## 2023-07-20 DIAGNOSIS — M79.10 MYALGIA: ICD-10-CM

## 2023-07-20 DIAGNOSIS — I10 ESSENTIAL HYPERTENSION: ICD-10-CM

## 2023-07-20 DIAGNOSIS — M17.0 PRIMARY OSTEOARTHRITIS OF BOTH KNEES: ICD-10-CM

## 2023-07-20 DIAGNOSIS — I70.0 AORTIC ATHEROSCLEROSIS: ICD-10-CM

## 2023-07-20 DIAGNOSIS — I10 ESSENTIAL HYPERTENSION: Primary | ICD-10-CM

## 2023-07-20 DIAGNOSIS — E89.0 S/P THYROIDECTOMY: ICD-10-CM

## 2023-07-20 DIAGNOSIS — E55.9 VITAMIN D DEFICIENCY: ICD-10-CM

## 2023-07-20 DIAGNOSIS — M79.601 RIGHT ARM PAIN: ICD-10-CM

## 2023-07-20 DIAGNOSIS — E11.9 TYPE 2 DIABETES MELLITUS WITHOUT COMPLICATION, WITHOUT LONG-TERM CURRENT USE OF INSULIN: ICD-10-CM

## 2023-07-20 DIAGNOSIS — Z17.0 MALIGNANT NEOPLASM OF UPPER-INNER QUADRANT OF RIGHT BREAST IN FEMALE, ESTROGEN RECEPTOR POSITIVE: ICD-10-CM

## 2023-07-20 LAB
ALBUMIN SERPL BCP-MCNC: 4 G/DL (ref 3.5–5.2)
ALP SERPL-CCNC: 135 U/L (ref 55–135)
ALT SERPL W/O P-5'-P-CCNC: 28 U/L (ref 10–44)
ANION GAP SERPL CALC-SCNC: 10 MMOL/L (ref 8–16)
AST SERPL-CCNC: 21 U/L (ref 10–40)
BASOPHILS # BLD AUTO: 0.04 K/UL (ref 0–0.2)
BASOPHILS NFR BLD: 0.7 % (ref 0–1.9)
BILIRUB SERPL-MCNC: 0.6 MG/DL (ref 0.1–1)
BUN SERPL-MCNC: 12 MG/DL (ref 8–23)
CALCIUM SERPL-MCNC: 10 MG/DL (ref 8.7–10.5)
CHLORIDE SERPL-SCNC: 103 MMOL/L (ref 95–110)
CK SERPL-CCNC: 112 U/L (ref 20–180)
CO2 SERPL-SCNC: 23 MMOL/L (ref 23–29)
CREAT SERPL-MCNC: 0.8 MG/DL (ref 0.5–1.4)
DIFFERENTIAL METHOD: NORMAL
EOSINOPHIL # BLD AUTO: 0.2 K/UL (ref 0–0.5)
EOSINOPHIL NFR BLD: 3.7 % (ref 0–8)
ERYTHROCYTE [DISTWIDTH] IN BLOOD BY AUTOMATED COUNT: 13.2 % (ref 11.5–14.5)
EST. GFR  (NO RACE VARIABLE): >60 ML/MIN/1.73 M^2
ESTIMATED AVG GLUCOSE: 203 MG/DL (ref 68–131)
GLUCOSE SERPL-MCNC: 215 MG/DL (ref 70–110)
HBA1C MFR BLD: 8.7 % (ref 4–5.6)
HCT VFR BLD AUTO: 44.2 % (ref 37–48.5)
HGB BLD-MCNC: 14.3 G/DL (ref 12–16)
IMM GRANULOCYTES # BLD AUTO: 0.01 K/UL (ref 0–0.04)
IMM GRANULOCYTES NFR BLD AUTO: 0.2 % (ref 0–0.5)
LYMPHOCYTES # BLD AUTO: 2.4 K/UL (ref 1–4.8)
LYMPHOCYTES NFR BLD: 40.6 % (ref 18–48)
MCH RBC QN AUTO: 29 PG (ref 27–31)
MCHC RBC AUTO-ENTMCNC: 32.4 G/DL (ref 32–36)
MCV RBC AUTO: 90 FL (ref 82–98)
MONOCYTES # BLD AUTO: 0.5 K/UL (ref 0.3–1)
MONOCYTES NFR BLD: 7.5 % (ref 4–15)
NEUTROPHILS # BLD AUTO: 2.9 K/UL (ref 1.8–7.7)
NEUTROPHILS NFR BLD: 47.3 % (ref 38–73)
NRBC BLD-RTO: 0 /100 WBC
PLATELET # BLD AUTO: 290 K/UL (ref 150–450)
PMV BLD AUTO: 10.3 FL (ref 9.2–12.9)
POTASSIUM SERPL-SCNC: 4.5 MMOL/L (ref 3.5–5.1)
PROT SERPL-MCNC: 8 G/DL (ref 6–8.4)
RBC # BLD AUTO: 4.93 M/UL (ref 4–5.4)
SODIUM SERPL-SCNC: 136 MMOL/L (ref 136–145)
T4 FREE SERPL-MCNC: 1.35 NG/DL (ref 0.71–1.51)
TSH SERPL DL<=0.005 MIU/L-ACNC: 0.02 UIU/ML (ref 0.4–4)
WBC # BLD AUTO: 6.01 K/UL (ref 3.9–12.7)

## 2023-07-20 PROCEDURE — 4010F ACE/ARB THERAPY RXD/TAKEN: CPT | Mod: CPTII,S$GLB,, | Performed by: INTERNAL MEDICINE

## 2023-07-20 PROCEDURE — 1160F PR REVIEW ALL MEDS BY PRESCRIBER/CLIN PHARMACIST DOCUMENTED: ICD-10-PCS | Mod: CPTII,S$GLB,, | Performed by: INTERNAL MEDICINE

## 2023-07-20 PROCEDURE — 3060F POS MICROALBUMINURIA REV: CPT | Mod: CPTII,S$GLB,, | Performed by: INTERNAL MEDICINE

## 2023-07-20 PROCEDURE — 3066F PR DOCUMENTATION OF TREATMENT FOR NEPHROPATHY: ICD-10-PCS | Mod: CPTII,S$GLB,, | Performed by: INTERNAL MEDICINE

## 2023-07-20 PROCEDURE — 3008F BODY MASS INDEX DOCD: CPT | Mod: CPTII,S$GLB,, | Performed by: INTERNAL MEDICINE

## 2023-07-20 PROCEDURE — 99214 OFFICE O/P EST MOD 30 MIN: CPT | Mod: S$GLB,,, | Performed by: INTERNAL MEDICINE

## 2023-07-20 PROCEDURE — 3078F DIAST BP <80 MM HG: CPT | Mod: CPTII,S$GLB,, | Performed by: INTERNAL MEDICINE

## 2023-07-20 PROCEDURE — 82550 ASSAY OF CK (CPK): CPT | Performed by: INTERNAL MEDICINE

## 2023-07-20 PROCEDURE — 3060F PR POS MICROALBUMINURIA RESULT DOCUMENTED/REVIEW: ICD-10-PCS | Mod: CPTII,S$GLB,, | Performed by: INTERNAL MEDICINE

## 2023-07-20 PROCEDURE — 1159F PR MEDICATION LIST DOCUMENTED IN MEDICAL RECORD: ICD-10-PCS | Mod: CPTII,S$GLB,, | Performed by: INTERNAL MEDICINE

## 2023-07-20 PROCEDURE — 80053 COMPREHEN METABOLIC PANEL: CPT | Performed by: INTERNAL MEDICINE

## 2023-07-20 PROCEDURE — 3077F SYST BP >= 140 MM HG: CPT | Mod: CPTII,S$GLB,, | Performed by: INTERNAL MEDICINE

## 2023-07-20 PROCEDURE — 3044F HG A1C LEVEL LT 7.0%: CPT | Mod: CPTII,S$GLB,, | Performed by: INTERNAL MEDICINE

## 2023-07-20 PROCEDURE — 3066F NEPHROPATHY DOC TX: CPT | Mod: CPTII,S$GLB,, | Performed by: INTERNAL MEDICINE

## 2023-07-20 PROCEDURE — 99214 PR OFFICE/OUTPT VISIT, EST, LEVL IV, 30-39 MIN: ICD-10-PCS | Mod: S$GLB,,, | Performed by: INTERNAL MEDICINE

## 2023-07-20 PROCEDURE — 99999 PR PBB SHADOW E&M-EST. PATIENT-LVL V: CPT | Mod: PBBFAC,,, | Performed by: INTERNAL MEDICINE

## 2023-07-20 PROCEDURE — 3008F PR BODY MASS INDEX (BMI) DOCUMENTED: ICD-10-PCS | Mod: CPTII,S$GLB,, | Performed by: INTERNAL MEDICINE

## 2023-07-20 PROCEDURE — 4010F PR ACE/ARB THEARPY RXD/TAKEN: ICD-10-PCS | Mod: CPTII,S$GLB,, | Performed by: INTERNAL MEDICINE

## 2023-07-20 PROCEDURE — 3077F PR MOST RECENT SYSTOLIC BLOOD PRESSURE >= 140 MM HG: ICD-10-PCS | Mod: CPTII,S$GLB,, | Performed by: INTERNAL MEDICINE

## 2023-07-20 PROCEDURE — 1125F PR PAIN SEVERITY QUANTIFIED, PAIN PRESENT: ICD-10-PCS | Mod: CPTII,S$GLB,, | Performed by: INTERNAL MEDICINE

## 2023-07-20 PROCEDURE — 3044F PR MOST RECENT HEMOGLOBIN A1C LEVEL <7.0%: ICD-10-PCS | Mod: CPTII,S$GLB,, | Performed by: INTERNAL MEDICINE

## 2023-07-20 PROCEDURE — 3078F PR MOST RECENT DIASTOLIC BLOOD PRESSURE < 80 MM HG: ICD-10-PCS | Mod: CPTII,S$GLB,, | Performed by: INTERNAL MEDICINE

## 2023-07-20 PROCEDURE — 84443 ASSAY THYROID STIM HORMONE: CPT | Performed by: INTERNAL MEDICINE

## 2023-07-20 PROCEDURE — 36415 COLL VENOUS BLD VENIPUNCTURE: CPT | Mod: PN | Performed by: INTERNAL MEDICINE

## 2023-07-20 PROCEDURE — 85025 COMPLETE CBC W/AUTO DIFF WBC: CPT | Performed by: INTERNAL MEDICINE

## 2023-07-20 PROCEDURE — 84439 ASSAY OF FREE THYROXINE: CPT | Performed by: INTERNAL MEDICINE

## 2023-07-20 PROCEDURE — 1125F AMNT PAIN NOTED PAIN PRSNT: CPT | Mod: CPTII,S$GLB,, | Performed by: INTERNAL MEDICINE

## 2023-07-20 PROCEDURE — 1160F RVW MEDS BY RX/DR IN RCRD: CPT | Mod: CPTII,S$GLB,, | Performed by: INTERNAL MEDICINE

## 2023-07-20 PROCEDURE — 1159F MED LIST DOCD IN RCRD: CPT | Mod: CPTII,S$GLB,, | Performed by: INTERNAL MEDICINE

## 2023-07-20 PROCEDURE — 99999 PR PBB SHADOW E&M-EST. PATIENT-LVL V: ICD-10-PCS | Mod: PBBFAC,,, | Performed by: INTERNAL MEDICINE

## 2023-07-20 PROCEDURE — 83036 HEMOGLOBIN GLYCOSYLATED A1C: CPT | Performed by: INTERNAL MEDICINE

## 2023-07-20 RX ORDER — AMMONIUM LACTATE 12 G/100G
CREAM TOPICAL
Qty: 140 G | Refills: 11 | Status: SHIPPED | OUTPATIENT
Start: 2023-07-20

## 2023-07-20 RX ORDER — HYDROCODONE BITARTRATE AND ACETAMINOPHEN 5; 325 MG/1; MG/1
1 TABLET ORAL EVERY 6 HOURS PRN
Qty: 20 TABLET | Refills: 0 | Status: SHIPPED | OUTPATIENT
Start: 2023-07-20 | End: 2023-10-25 | Stop reason: SDUPTHER

## 2023-07-20 RX ORDER — DICLOFENAC SODIUM 10 MG/G
2 GEL TOPICAL DAILY PRN
Qty: 450 G | Refills: 3 | Status: SHIPPED | OUTPATIENT
Start: 2023-07-20

## 2023-07-20 RX ORDER — CHOLECALCIFEROL (VITAMIN D3) 25 MCG
1000 TABLET ORAL DAILY
Qty: 90 TABLET | Refills: 3 | Status: SHIPPED | OUTPATIENT
Start: 2023-07-20 | End: 2023-10-25 | Stop reason: SDUPTHER

## 2023-07-20 NOTE — PROGRESS NOTES
"Subjective:       Patient ID: Alia Andre is a 73 y.o. female.    Chief Complaint: diabetes follow up    HPI  DM2 - metformin 500mg BID.   A1c - 3/24/23 6.6  Has eye exam scheduled for next mo.   Scheduled for 8/7/23 MMG of the L breast. On letrol 2.5mg daily.  Ordered C scope.   Lung CA screening CT scheduled for 8/7/23    HTN - losartan 50mg daily.   Doesn't really check BP at home but daughter, Rodrigo checks it twice a week.     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 Jalen Shay.  Followed up w/ NP 5/19/23 in [place].   No pain in the neck. No trouble swallowing.   On synthroid 100mcg daily.     R arm pain daily that started 2 mo ago. No trauma. Feels like an 8 of 10 toothache. Not worse w/ certain movements. No other joints are painful. R hand dominant. No numbness/tingling/weakness. H/o R mastectomy for R breast CA. No swelling. No decreased range of motion.   Tylenol does not work. Was taking a piece of the pain medicine from her thyroid surgery and that helped w/ her pain. Ran out in June. Norco 5-325mg #20 pills and rx 5/2/23.     Review of Systems  Comprehensive review of systems otherwise negative. See history/subjective section for more details.    Objective:      Physical Exam    BP (!) 146/72 (BP Location: Left arm, Patient Position: Sitting, BP Method: Large (Manual))   Pulse 81   Temp 98.4 °F (36.9 °C) (Oral)   Ht 5' 6" (1.676 m)   Wt 64.5 kg (142 lb 3.2 oz)   SpO2 98%   BMI 22.95 kg/m²     GEN - A+OX4, NAD   HEENT - PERRL, EOMI, OP clear. MMM. TM normal. No sinus tenderness to palpation.   Neck - thyroidectomy scar well healed and no pain.   CV - RRR, no m/r   Chest - CTAB, no wheezing or rhonchi  Abd - S/NT/ND/+BS.   Ext - 2+BDP and radial pulses. No LE edema.   MSK - no spinal, scapula or shoulder tenderness to palaption. Pain on palpation of the R mid and distal " humerus along with R biceps muscles.   Skin - no rash.     Assessment/Plan     Diagnoses and all orders for this visit:    Essential hypertension  -     Comprehensive Metabolic Panel; Future    Type 2 diabetes mellitus without complication, without long-term current use of insulin  -     Comprehensive Metabolic Panel; Future  -     Hemoglobin A1C; Future  -     CBC Auto Differential; Future    Aortic atherosclerosis  -     Comprehensive Metabolic Panel; Future    S/P thyroidectomy  -     TSH; Future  -     T4, Free; Future    Myalgia  -     CK; Future    Right arm pain  -     HYDROcodone-acetaminophen (NORCO) 5-325 mg per tablet; Take 1 tablet by mouth every 6 (six) hours as needed for Pain.  -     CK; Future  -     diclofenac sodium (VOLTAREN) 1 % Gel; Apply 2 g topically daily as needed (pain).  -     X-Ray Humerus 2 View Right; Future  -     Ambulatory referral/consult to Physical/Occupational Therapy; Future    Primary osteoarthritis of both knees  -     diclofenac sodium (VOLTAREN) 1 % Gel; Apply 2 g topically daily as needed (pain).    Malignant neoplasm of upper-inner quadrant of right breast in female, estrogen receptor positive  -     X-Ray Humerus 2 View Right; Future    Vitamin D deficiency  -     vitamin D (VITAMIN D3) 1000 units Tab; Take 1 tablet (1,000 Units total) by mouth once daily.    Other orders  -     ammonium lactate 12 % Crea; Apply twice daily to affected parts both feet as needed.    BP not at goal today. Have daughter check BP twice a week. Interested in signing up for digital HTN program. Does not have smart phone but daughter Rodrigo does. Reassess BP at next visit.   Came by Rodney today.     Given ACP. Reminded to fill out.    Follow up in about 6 weeks (around 8/31/2023).      Nancy Poe MD  Department of Internal Medicine - [facility]  9:08 AM

## 2023-07-24 ENCOUNTER — TELEPHONE (OUTPATIENT)
Dept: PRIMARY CARE CLINIC | Facility: CLINIC | Age: 73
End: 2023-07-24
Payer: MEDICARE

## 2023-07-24 DIAGNOSIS — E03.9 HYPOTHYROIDISM, UNSPECIFIED TYPE: Primary | ICD-10-CM

## 2023-07-24 RX ORDER — LEVOTHYROXINE SODIUM 75 UG/1
75 TABLET ORAL
Qty: 90 TABLET | Refills: 1 | Status: SHIPPED | OUTPATIENT
Start: 2023-07-24 | End: 2023-10-25 | Stop reason: SDUPTHER

## 2023-07-24 NOTE — TELEPHONE ENCOUNTER
Please call and notify pt:  TSH is too low. Recommend backing down on synthroid from 100 to 75mcg daily. Make sure to take on empty stomach first thing in AM separate from other meds x 60 min. She needs to f/u w/ endocrinology (Dr. Adams) in September to repeat the labs (had fairly recent thyroidectomY).  Sugars have also significantly worsened. Please confirm she's taking metfomrin 500mg BID consistently. If so, would inc to 2 pills BID w/ food.   Let me know.

## 2023-08-07 ENCOUNTER — HOSPITAL ENCOUNTER (OUTPATIENT)
Dept: RADIOLOGY | Facility: HOSPITAL | Age: 73
Discharge: HOME | End: 2023-08-07
Attending: INTERNAL MEDICINE
Payer: MEDICARE

## 2023-08-07 DIAGNOSIS — Z12.31 ENCOUNTER FOR SCREENING MAMMOGRAM FOR MALIGNANT NEOPLASM OF BREAST: ICD-10-CM

## 2023-08-07 PROCEDURE — 77067 SCR MAMMO BI INCL CAD: CPT | Mod: TC,52

## 2023-08-07 PROCEDURE — 77067 MAMMO DIGITAL SCREENING LEFT WITH TOMO: ICD-10-PCS | Mod: 26,52,, | Performed by: RADIOLOGY

## 2023-08-07 PROCEDURE — 77063 BREAST TOMOSYNTHESIS BI: CPT | Mod: 26,52,, | Performed by: RADIOLOGY

## 2023-08-07 PROCEDURE — 77063 MAMMO DIGITAL SCREENING LEFT WITH TOMO: ICD-10-PCS | Mod: 26,52,, | Performed by: RADIOLOGY

## 2023-08-07 PROCEDURE — 77067 SCR MAMMO BI INCL CAD: CPT | Mod: 26,52,, | Performed by: RADIOLOGY

## 2023-08-22 DIAGNOSIS — R49.0 HOARSENESS OF VOICE: ICD-10-CM

## 2023-08-22 DIAGNOSIS — R09.82 POSTNASAL DRIP: ICD-10-CM

## 2023-08-22 RX ORDER — FLUTICASONE PROPIONATE 50 MCG
SPRAY, SUSPENSION (ML) NASAL
Qty: 32 G | Refills: 3 | Status: SHIPPED | OUTPATIENT
Start: 2023-08-22 | End: 2023-10-25 | Stop reason: SDUPTHER

## 2023-08-24 ENCOUNTER — PATIENT MESSAGE (OUTPATIENT)
Dept: PRIMARY CARE CLINIC | Facility: CLINIC | Age: 73
End: 2023-08-24
Payer: MEDICARE

## 2023-09-06 NOTE — Clinical Note
Hello, I am referring this patient to see Dr. Shay for total thyroidectomy.  Please help schedule an appointment for her.  Thanks
[FreeTextEntry1] : Patient presents back to the office today feeling well and offering no complaints.  She has been doing some walking up to 2 miles every other day for exercise and is able to do that without any symptoms or limitations.  Blood pressures have been in the 110s to 120s over 60s to 70s although she is not checking them very frequently.  Patient denies chest pain, shortness of breath, palpitations, orthopnea, presyncope, syncope.

## 2023-09-19 NOTE — TELEPHONE ENCOUNTER
----- Message from Silvia Riggs sent at 4/20/2020 11:04 AM CDT -----  Patient is requesting a refill on blood pressure and diabetic meds. She also stated she would like scrip sent to [facility].  
Called and spoke to pt and got her meds pended to Dr. Poe  
Negative

## 2023-10-15 ENCOUNTER — HOSPITAL ENCOUNTER (EMERGENCY)
Facility: HOSPITAL | Age: 73
Discharge: HOME | End: 2023-10-15
Attending: EMERGENCY MEDICINE
Payer: MEDICARE

## 2023-10-15 VITALS
DIASTOLIC BLOOD PRESSURE: 96 MMHG | HEART RATE: 72 BPM | TEMPERATURE: 98 F | WEIGHT: 143 LBS | HEIGHT: 66 IN | OXYGEN SATURATION: 98 % | BODY MASS INDEX: 22.98 KG/M2 | RESPIRATION RATE: 20 BRPM | SYSTOLIC BLOOD PRESSURE: 208 MMHG

## 2023-10-15 DIAGNOSIS — R68.84 JAW PAIN: Primary | ICD-10-CM

## 2023-10-15 DIAGNOSIS — R07.9 CHEST PAIN: ICD-10-CM

## 2023-10-15 LAB
ALBUMIN SERPL BCP-MCNC: 4.5 G/DL (ref 3.5–5.2)
ALLENS TEST: ABNORMAL
ALP SERPL-CCNC: 165 U/L (ref 55–135)
ALT SERPL W/O P-5'-P-CCNC: 33 U/L (ref 10–44)
ANION GAP SERPL CALC-SCNC: 12 MMOL/L (ref 8–16)
AST SERPL-CCNC: 30 U/L (ref 10–40)
B-OH-BUTYR BLD STRIP-SCNC: 0.1 MMOL/L (ref 0–0.5)
BACTERIA #/AREA URNS AUTO: ABNORMAL /HPF
BASOPHILS # BLD AUTO: 0.06 K/UL (ref 0–0.2)
BASOPHILS NFR BLD: 0.9 % (ref 0–1.9)
BILIRUB SERPL-MCNC: 0.5 MG/DL (ref 0.1–1)
BILIRUB UR QL STRIP: NEGATIVE
BNP SERPL-MCNC: <10 PG/ML (ref 0–99)
BUN SERPL-MCNC: 14 MG/DL (ref 8–23)
CALCIUM SERPL-MCNC: 10.6 MG/DL (ref 8.7–10.5)
CHLORIDE SERPL-SCNC: 100 MMOL/L (ref 95–110)
CLARITY UR REFRACT.AUTO: CLEAR
CO2 SERPL-SCNC: 22 MMOL/L (ref 23–29)
COLOR UR AUTO: COLORLESS
CREAT SERPL-MCNC: 1 MG/DL (ref 0.5–1.4)
DELSYS: ABNORMAL
DIFFERENTIAL METHOD: NORMAL
EOSINOPHIL # BLD AUTO: 0.2 K/UL (ref 0–0.5)
EOSINOPHIL NFR BLD: 3.3 % (ref 0–8)
ERYTHROCYTE [DISTWIDTH] IN BLOOD BY AUTOMATED COUNT: 13.5 % (ref 11.5–14.5)
EST. GFR  (NO RACE VARIABLE): >60 ML/MIN/1.73 M^2
GLUCOSE SERPL-MCNC: 462 MG/DL (ref 70–110)
GLUCOSE UR QL STRIP: ABNORMAL
HCO3 UR-SCNC: 31.3 MMOL/L (ref 24–28)
HCT VFR BLD AUTO: 45.2 % (ref 37–48.5)
HCT VFR BLD CALC: 38 %PCV (ref 36–54)
HGB BLD-MCNC: 15.3 G/DL (ref 12–16)
HGB UR QL STRIP: NEGATIVE
IMM GRANULOCYTES # BLD AUTO: 0.01 K/UL (ref 0–0.04)
IMM GRANULOCYTES NFR BLD AUTO: 0.1 % (ref 0–0.5)
KETONES UR QL STRIP: NEGATIVE
LEUKOCYTE ESTERASE UR QL STRIP: NEGATIVE
LYMPHOCYTES # BLD AUTO: 2.7 K/UL (ref 1–4.8)
LYMPHOCYTES NFR BLD: 38.2 % (ref 18–48)
MCH RBC QN AUTO: 29.4 PG (ref 27–31)
MCHC RBC AUTO-ENTMCNC: 33.8 G/DL (ref 32–36)
MCV RBC AUTO: 87 FL (ref 82–98)
MICROSCOPIC COMMENT: ABNORMAL
MONOCYTES # BLD AUTO: 0.4 K/UL (ref 0.3–1)
MONOCYTES NFR BLD: 6 % (ref 4–15)
NEUTROPHILS # BLD AUTO: 3.6 K/UL (ref 1.8–7.7)
NEUTROPHILS NFR BLD: 51.5 % (ref 38–73)
NITRITE UR QL STRIP: NEGATIVE
NRBC BLD-RTO: 0 /100 WBC
PCO2 BLDA: 51.6 MMHG (ref 35–45)
PH SMN: 7.39 [PH] (ref 7.35–7.45)
PH UR STRIP: 7 [PH] (ref 5–8)
PLATELET # BLD AUTO: 279 K/UL (ref 150–450)
PMV BLD AUTO: 10.6 FL (ref 9.2–12.9)
PO2 BLDA: 25 MMHG (ref 40–60)
POC BE: 6 MMOL/L
POC CARDIAC TROPONIN I: 0 NG/ML (ref 0–0.08)
POC CARDIAC TROPONIN I: 0.01 NG/ML (ref 0–0.08)
POC IONIZED CALCIUM: 1.37 MMOL/L (ref 1.06–1.42)
POC SATURATED O2: 43 % (ref 95–100)
POC TCO2: 33 MMOL/L (ref 24–29)
POTASSIUM BLD-SCNC: 4.8 MMOL/L (ref 3.5–5.1)
POTASSIUM SERPL-SCNC: 5.1 MMOL/L (ref 3.5–5.1)
PROT SERPL-MCNC: 9.5 G/DL (ref 6–8.4)
PROT UR QL STRIP: NEGATIVE
RBC # BLD AUTO: 5.21 M/UL (ref 4–5.4)
RBC #/AREA URNS AUTO: 6 /HPF (ref 0–4)
SAMPLE: ABNORMAL
SAMPLE: NORMAL
SAMPLE: NORMAL
SARS-COV-2 RDRP RESP QL NAA+PROBE: NEGATIVE
SITE: ABNORMAL
SODIUM BLD-SCNC: 137 MMOL/L (ref 136–145)
SODIUM SERPL-SCNC: 134 MMOL/L (ref 136–145)
SP GR UR STRIP: >1.03 (ref 1–1.03)
SQUAMOUS #/AREA URNS AUTO: 0 /HPF
TROPONIN I SERPL DL<=0.01 NG/ML-MCNC: 0.02 NG/ML (ref 0–0.03)
TROPONIN I SERPL DL<=0.01 NG/ML-MCNC: 0.02 NG/ML (ref 0–0.03)
URN SPEC COLLECT METH UR: ABNORMAL
WBC # BLD AUTO: 7.02 K/UL (ref 3.9–12.7)
WBC #/AREA URNS AUTO: 1 /HPF (ref 0–5)
YEAST UR QL AUTO: ABNORMAL

## 2023-10-15 PROCEDURE — 80048 BASIC METABOLIC PNL TOTAL CA: CPT | Mod: XB

## 2023-10-15 PROCEDURE — U0002 COVID-19 LAB TEST NON-CDC: HCPCS | Performed by: EMERGENCY MEDICINE

## 2023-10-15 PROCEDURE — 93005 ELECTROCARDIOGRAM TRACING: CPT

## 2023-10-15 PROCEDURE — 84484 ASSAY OF TROPONIN QUANT: CPT | Mod: 91 | Performed by: EMERGENCY MEDICINE

## 2023-10-15 PROCEDURE — 25000003 PHARM REV CODE 250: Performed by: EMERGENCY MEDICINE

## 2023-10-15 PROCEDURE — 82803 BLOOD GASES ANY COMBINATION: CPT

## 2023-10-15 PROCEDURE — 99285 EMERGENCY DEPT VISIT HI MDM: CPT | Mod: 25

## 2023-10-15 PROCEDURE — 99900035 HC TECH TIME PER 15 MIN (STAT)

## 2023-10-15 PROCEDURE — 96360 HYDRATION IV INFUSION INIT: CPT

## 2023-10-15 PROCEDURE — 25500020 PHARM REV CODE 255: Performed by: EMERGENCY MEDICINE

## 2023-10-15 PROCEDURE — 81001 URINALYSIS AUTO W/SCOPE: CPT | Performed by: EMERGENCY MEDICINE

## 2023-10-15 PROCEDURE — 82010 KETONE BODYS QUAN: CPT | Performed by: EMERGENCY MEDICINE

## 2023-10-15 PROCEDURE — 83880 ASSAY OF NATRIURETIC PEPTIDE: CPT | Performed by: EMERGENCY MEDICINE

## 2023-10-15 PROCEDURE — 93010 ELECTROCARDIOGRAM REPORT: CPT | Mod: ,,, | Performed by: INTERNAL MEDICINE

## 2023-10-15 PROCEDURE — 93010 EKG 12-LEAD: ICD-10-PCS | Mod: ,,, | Performed by: INTERNAL MEDICINE

## 2023-10-15 PROCEDURE — 84484 ASSAY OF TROPONIN QUANT: CPT

## 2023-10-15 PROCEDURE — 85025 COMPLETE CBC W/AUTO DIFF WBC: CPT | Performed by: EMERGENCY MEDICINE

## 2023-10-15 PROCEDURE — 80053 COMPREHEN METABOLIC PANEL: CPT | Performed by: EMERGENCY MEDICINE

## 2023-10-15 RX ADMIN — SODIUM CHLORIDE 1000 ML: 9 INJECTION, SOLUTION INTRAVENOUS at 05:10

## 2023-10-15 RX ADMIN — IOHEXOL 75 ML: 350 INJECTION, SOLUTION INTRAVENOUS at 07:10

## 2023-10-15 NOTE — PROVIDER PROGRESS NOTES - EMERGENCY DEPT.
Encounter Date: 10/15/2023    ED Physician Progress Notes          Physician Attestation Statement for Resident:  As the supervising MD   Physician Attestation Statement: I have personally seen and examined this patient.       I agree with the history unless otherwise noted.     As the supervising MD I agree with the PE unless otherwise noted.      I have reviewed and agree with the residents interpretation of the following unless otherwise noted:   I have personally reviewed and interpreted the patients laboratory studies: hyperglycemia without evidence of AGMA, pH WNL,B hydroxybutyrate WNL  I have personally reviewed and interpreted imaging studies: CXR: I have personally reviewed the CXR. No evidence of consolidation, cardiomegaly, pulmonary edema, pneumothroax    I have personally reviewed and interpreted the patient's EKG:  Normal sinus rhythm with T-wave inversion V1 similar to previous      I have also reviewed the following:   external records:  Stress echocardiogram from 2019 without evidence of ischemia, EF 60%  old [id number] with similar ST morphology      As the supervising MD I agree with the treatment, course, plan, and disposition unless otherwise noted.

## 2023-10-15 NOTE — ED PROVIDER NOTES
Encounter Date: 10/15/2023       History     Chief Complaint   Patient presents with    Chest Pain     Jaw pain.      73-year-old female, with history of hyperlipidemia, hypertension, diabetes, breast cancer status post resection, chronic tobacco use, presents to the ED for chest pain.  Patient reports that she has intermittent jaw pain that radiates down to her right chest her right flank for more than 2 weeks.  No known aggravating or alleviating factor.  Described the pain as aching, can be 10/10 severity when it come on.  Endorses to have cough, but no fever, chill, shortness of breath, abdominal pain, nausea, vomiting, diaphoresis, or dysuria.        Review of patient's allergies indicates:   Allergen Reactions    Opioids - morphine analogues Itching    Pravastatin      Leg cramps     Past Medical History:   Diagnosis Date    Allergy     Arthritis     Cancer     breast    Diabetes mellitus type 2 in nonobese     Goiter     History of endometrial ablation     age 29    Hypertension     Tobacco abuse     Vitamin D deficiency      Past Surgical History:   Procedure Laterality Date    BREAST SURGERY      INSERTION OF TUNNELED CENTRAL VENOUS CATHETER (CVC) WITH SUBCUTANEOUS PORT Left 11/16/2018    Procedure: [id number] LEFT;  Surgeon: Graciela Echeverria MD;  Location: [facility] OR [address] [place] [address];  Service: General;  Laterality: Left;    MASTECTOMY Right 11/16/2018    Procedure: MASTECTOMY RIGHT 2.5 HR CASE;  Surgeon: Cristiane Frederick MD;  Location: [facility] OR [address] [place] [address];  Service: General;  Laterality: Right;  needs to be 1st case, Dr. Cameron has afternoon cases at [facility]    SENTINEL LYMPH NODE BIOPSY Right 11/16/2018    Procedure: BIOPSY, LYMPH NODE, SENTINEL RIGHT;  Surgeon: Cristiane Frederick MD;  Location: [facility] OR [address] [place] [address];  Service: General;  Laterality: Right;    THYROIDECTOMY Bilateral 5/1/2023    Procedure: THYROIDECTOMY;  Surgeon: Jalen Shay MD;  Location: [facility] OR [address] [place] [address];  Service: ENT;  Laterality: Bilateral;  NIM  tube.    TUBAL LIGATION      1970/80's     Family History   Problem Relation Age of Onset    Aortic aneurysm Mother     Leukemia Father     No Known Problems Sister     Cancer Maternal Aunt     Breast cancer Paternal Aunt     Breast cancer Maternal Cousin     Breast cancer Paternal Cousin     Breast cancer Paternal Cousin      Social History     Tobacco Use    Smoking status: Some Days     Current packs/day: 1.00     Average packs/day: 1 pack/day for 30.0 years (30.0 ttl pk-yrs)     Types: Cigarettes    Smokeless tobacco: Never   Substance Use Topics    Alcohol use: No    Drug use: No     Review of Systems    Physical Exam     Initial Vitals [10/15/23 1539]   BP Pulse Resp Temp SpO2   (!) 183/86 88 16 97.9 °F (36.6 °C) 99 %      MAP       --         Physical Exam    Nursing note and vitals reviewed.  Constitutional: She appears well-developed. No distress.   HENT:   Head: Normocephalic and atraumatic.   Mouth/Throat: Oropharynx is clear and moist.   Mouth with normal range of motion, TMJ with doubt tenderness to palpation   Eyes: Conjunctivae and EOM are normal.   Neck: No JVD present.   Normal range of motion.  Cardiovascular:  Normal rate, regular rhythm, normal heart sounds and intact distal pulses.           Pulmonary/Chest: Breath sounds normal. No respiratory distress. She exhibits no tenderness.   Abdominal: Abdomen is soft. She exhibits no distension. There is no abdominal tenderness.   Musculoskeletal:         General: No tenderness or edema. Normal range of motion.      Cervical back: Normal range of motion.     Neurological: She is alert and oriented to person, place, and time. She has normal strength.   Skin: Skin is warm and dry. Capillary refill takes less than 2 seconds.         ED Course   Procedures  Labs Reviewed   COMPREHENSIVE METABOLIC PANEL - Abnormal; Notable for the following components:       Result Value    Sodium 134 (*)     CO2 22 (*)     Glucose 462 (*)     Calcium 10.6 (*)     Total  Protein 9.5 (*)     Alkaline Phosphatase 165 (*)     All other components within normal limits   URINALYSIS, REFLEX TO URINE CULTURE - Abnormal; Notable for the following components:    Color, UA Colorless (*)     Specific Gravity, UA >1.030 (*)     Glucose, UA 4+ (*)     All other components within normal limits    Narrative:     Specimen Source->Urine   URINALYSIS MICROSCOPIC - Abnormal; Notable for the following components:    RBC, UA 6 (*)     All other components within normal limits    Narrative:     Specimen Source->Urine   ISTAT PROCEDURE - Abnormal; Notable for the following components:    POC PCO2 51.6 (*)     POC PO2 25 (*)     POC HCO3 31.3 (*)     POC TCO2 33 (*)     All other components within normal limits   CBC W/ AUTO DIFFERENTIAL   TROPONIN I   TROPONIN I   B-TYPE NATRIURETIC PEPTIDE   SARS-COV-2 RNA AMPLIFICATION, QUAL   TROPONIN ISTAT   BETA - HYDROXYBUTYRATE, SERUM   TROPONIN ISTAT     EKG Readings: (Independently Interpreted)   Initial Reading: No STEMI. Previous EKG: Compared with most recent EKG Rhythm: Normal Sinus Rhythm. Heart Rate: 81. Ectopy: No Ectopy. Conduction: Normal. ST Segments: Normal ST Segments. T Waves: Normal. Axis: Normal. Clinical Impression: Normal Sinus Rhythm       Imaging Results              CTA Chest Abdomen Pelvis (Final result)  Result time 10/15/23 21:14:59      Final result by Beryl Whyte MD (10/15/23 21:14:59)                   Impression:      No evidence of aortic aneurysm or dissection.    Interval development subpleural cystic changes of the bilateral lungs, suggestive of fibrotic changes.  Continued follow-up recommended.    Postsurgical changes of thyroidectomy and right breast mastectomy.    Diffusely patulous esophagus and small hiatal hernia.  Correlate with symptoms of reflux.    Colonic diverticula without evidence of diverticulitis.    Multiple pulmonary nodules measuring up to 4 mm.  For multiple solid nodules all <6 mm, Fleischner Society  2017 guidelines recommend no routine follow up for a low risk patient, or follow up with non-contrast chest CT at 12 months after discovery in a high risk patient.    Additional findings as above.    Electronically signed by resident: Selene Escoto  Date:    10/15/2023  Time:    20:13    Electronically signed by: Beryl Whyte  Date:    10/15/2023  Time:    21:14               Narrative:    EXAMINATION:  CTA CHEST ABDOMEN PELVIS    CLINICAL HISTORY:  Aortic aneurysm, known or suspected;    TECHNIQUE:  Images were obtained from the base of the neck through the pelvis before and after the administration of 75 cc of  Omnipaque 350 IV contrast.Delayed phase images of the abdomen and pelvis also done. Axial, coronal, and sagittal reconstructions were created from the source data, including MIP reconstructions for the arterial phase images.    COMPARISON:  CT 04/03/2023, 06/06/2019    FINDINGS:  VASCULATURE    Left-sided three-vessel aortic arch.  No aneurysm of the thoracic aorta.  Moderate calcific atherosclerosis of the ascending aorta and its branching vessels.  No significant stenosis.    Although exam not tailored for evaluation of the pulmonary arteries, no filling defect within the main pulmonary arteries to suggest pulmonary embolism.    Moderate calcific atherosclerosis of the abdominal aorta.  Fusiform dilatation of the infrarenal abdominal aorta reaching up to 1.9 cm (3-577).  No aneurysm or evidence of dissection.  The celiac, SMA, bilateral renal arteries, and IMANI are patent.  Moderate calcific atherosclerosis of the common iliac arteries with moderate stenosis of the left common iliac artery at its origin (3-620).  Scattered calcific disease of the external and internal iliac arteries without significant stenosis.  The common femoral arteries are patent.    CHEST, ABDOMEN, PELVIS    Base of the neck: Postsurgical changes of thyroidectomy.    Thoracic soft tissues: No axillary lymphadenopathy.   Postsurgical changes of right breast mastectomy.    Heart: Normal heart size.  No pericardial fluid.  Multivessel coronary artery atherosclerosis.  Calcific disease of the aortic and mitral valves.    Renate/Mediastinum: Prominent hilar and mediastinal lymph nodes without pathologic enlargement.    Lungs/Airways: Trachea and bronchi are patent.  Biapical fibronodular scarring.  Interval development of cystic clusters in a subpleural and basilar configuration, suggestive fibrotic changes.  Additional mosaic parenchymal attenuation with a basilar predominance.  Linear bandlike opacities of the bilateral lung bases suggestive of atelectasis/scarring, similar to prior.  Redemonstration of multiple solid pulmonary nodules including 4 mm left upper lobe nodule (2-98) and 3 mm right apical subpleural nodule (2-74), stable when compared to prior.  No pleural effusion or pneumothorax.    Liver: Normal in size and contour.  No focal hepatic lesion.    Gallbladder: No calcified gallstones. No pericholecystic fluid or gallbladder wall thickening.    Bile Ducts: No evidence of intrahepatic or extrahepatic biliary ductal dilatation.    Pancreas: No mass or peripancreatic fat stranding.    Spleen: Normal in size and attenuation.    Adrenals: No significant abnormalities.    Kidneys/Ureters: Normal in size and location. Normal enhancement.  Subcentimeter hypoattenuating right renal cortical lesion too small to characterize but favored to represent simple cyst.  No hydronephrosis or nephrolithiasis. No ureteral dilatation.    Bladder: No evidence of wall thickening.    Reproductive organs: Uterus is present and unremarkable.    Peritoneum: No intra-abdominal free air or free fluid.    Retroperitoneum: No pathologically enlarged abdominopelvic lymph nodes.    Esophagus: Diffusely patulous.    Bowel/Mesentery: Small hiatal hernia.  Stomach is otherwise unremarkable..  Small and large bowel is normal in caliber with no evidence of  obstruction or inflammation.  Colonic diverticuli without evidence of inflammatory changes.  Appendix is visualized and unremarkable.    Abdominal wall:  No significant abnormalities.    Bones: Diffuse osteopenia.  Degenerative changes of the spine.  No acute fractures or suspicious osseous lesions.                                       X-Ray Chest AP Portable (Final result)  Result time 10/15/23 16:51:07      Final result by Luciano Stevens MD (10/15/23 16:51:07)                   Impression:      1. Interstitial findings are accentuated by habitus and shallow inspiratory effort.  There is left basilar subsegmental atelectasis or scarring, no large focal consolidation.      Electronically signed by: Luciano Stevens MD  Date:    10/15/2023  Time:    16:51               Narrative:    EXAMINATION:  XR CHEST AP PORTABLE    CLINICAL HISTORY:  Chest Pain;    TECHNIQUE:  Single frontal view of the chest was performed.    COMPARISON:  01/01/2019    FINDINGS:  The cardiomediastinal silhouette is not enlarged noting calcification of the aorta..  There is no pleural effusion.  The trachea is midline.  The lungs are symmetrically expanded bilaterally with coarse interstitial attenuation and bilateral basilar subsegmental atelectasis..  No large focal consolidation seen.  There is no pneumothorax.  The osseous structures are remarkable for degenerative change..                                       Medications   sodium chloride 0.9% bolus 1,000 mL 1,000 mL (0 mLs Intravenous Stopped 10/15/23 1840)   iohexoL (OMNIPAQUE 350) injection 75 mL (75 mLs Intravenous Given 10/15/23 1935)     Medical Decision Making  73-year-old female, with history of hyperlipidemia, hypertension, diabetes, breast cancer status post resection, chronic tobacco use, presents to the ED for chest pain.  Patient is well-appearing, afebrile, hemodynamically stable.    Differential including but not limited to TMJ inflammation, viral illness, ACS,  pneumonia, aortic dissection, UTI.    Amount and/or Complexity of Data Reviewed  Labs: ordered. Decision-making details documented in ED Course.  Radiology: ordered and independent interpretation performed. Decision-making details documented in ED Course.  ECG/medicine tests: ordered and independent interpretation performed. Decision-making details documented in ED Course.    Risk  Prescription drug management.               ED Course as of 10/16/23 0057   Sun Oct 15, 2023   1724 CBC auto differential  No leukocytosis or anemia [NC]   1724 Comprehensive metabolic panel(!!)  Hyperglycemia, patient has history of diabetes on metformin, normal anion gap.  Normal renal function. Will give 1L NS bolus [NC]   1749 Urinalysis, Reflex to Urine Culture Urine, Clean Catch(!)  No UTI [NC]   1750 Troponin I: 0.018 [NC]   1750 BNP: <10 [NC]   Mon Oct 16, 2023   0056 X-Ray Chest AP Portable  Independent interpretation, no consolidation, pleural effusion, no pneumothorax [NC]   0056 CTA Chest Abdomen Pelvis  No aortic dissection or AAA, no hydronephrosis.  No other emergency process [NC]   0057 Patient is stable to discharge home.  Provided discharge instructions and return to the ED precaution.  No other questions. [NC]      ED Course User Index  [NC] Ernesto Ibanez MD                    Clinical Impression:   Final diagnoses:  [R07.9] Chest pain  [R68.84] Jaw pain (Primary)        ED Disposition Condition    Discharge Stable          ED Prescriptions    None       Follow-up Information       Follow up With Specialties Details Why Contact Info    Nancy Poe MD Internal Medicine In 1 week  [address]  [place] [address]  [phone]      [facility] - Emergency Dept Emergency Medicine  As needed [address] [facility] [address] [phone]             Ernesto Ibanez MD  Resident  10/16/23 0057

## 2023-10-15 NOTE — ED TRIAGE NOTES
Alia Andre, a 73 y.o. female presents to the ED w/ complaint of chest pain. Pt complains right substernal chest pain along with jaw main to both sides of her face. Pt states she has  been having the intermittent pain for the past week. It comes on suddenly and goes away slowly. Pt denies any trauma or injury to the area, nausea or vomiting. Or dizziness or shortness of breath.      Triage note:  Chief Complaint   Patient presents with    Chest Pain     Jaw pain.      Review of patient's allergies indicates:   Allergen Reactions    Opioids - morphine analogues Itching    Pravastatin      Leg cramps     Past Medical History:   Diagnosis Date    Allergy     Arthritis     Cancer     breast    Diabetes mellitus type 2 in nonobese     Goiter     History of endometrial ablation     age 29    Hypertension     Tobacco abuse     Vitamin D deficiency

## 2023-10-16 NOTE — DISCHARGE INSTRUCTIONS
Diagnosis: jaw pain    Tests today showed:   Labs Reviewed   COMPREHENSIVE METABOLIC PANEL - Abnormal; Notable for the following components:       Result Value    Sodium 134 (*)     CO2 22 (*)     Glucose 462 (*)     Calcium 10.6 (*)     Total Protein 9.5 (*)     Alkaline Phosphatase 165 (*)     All other components within normal limits   URINALYSIS, REFLEX TO URINE CULTURE - Abnormal; Notable for the following components:    Color, UA Colorless (*)     Specific Gravity, UA >1.030 (*)     Glucose, UA 4+ (*)     All other components within normal limits    Narrative:     Specimen Source->Urine   URINALYSIS MICROSCOPIC - Abnormal; Notable for the following components:    RBC, UA 6 (*)     All other components within normal limits    Narrative:     Specimen Source->Urine   ISTAT PROCEDURE - Abnormal; Notable for the following components:    POC PCO2 51.6 (*)     POC PO2 25 (*)     POC HCO3 31.3 (*)     POC TCO2 33 (*)     All other components within normal limits   CBC W/ AUTO DIFFERENTIAL   TROPONIN I   TROPONIN I   B-TYPE NATRIURETIC PEPTIDE   SARS-COV-2 RNA AMPLIFICATION, QUAL   TROPONIN ISTAT   BETA - HYDROXYBUTYRATE, SERUM   TROPONIN ISTAT   POCT TROPONIN   POCT TROPONIN     Imaging Results              CTA Chest Abdomen Pelvis (In process)                      X-Ray Chest AP Portable (Final result)  Result time 10/15/23 16:51:07      Final result by Luciano Stevens MD (10/15/23 16:51:07)                   Impression:      1. Interstitial findings are accentuated by habitus and shallow inspiratory effort.  There is left basilar subsegmental atelectasis or scarring, no large focal consolidation.      Electronically signed by: Luciano Stevens MD  Date:    10/15/2023  Time:    16:51               Narrative:    EXAMINATION:  XR CHEST AP PORTABLE    CLINICAL HISTORY:  Chest Pain;    TECHNIQUE:  Single frontal view of the chest was performed.    COMPARISON:  01/01/2019    FINDINGS:  The cardiomediastinal silhouette  is not enlarged noting calcification of the aorta..  There is no pleural effusion.  The trachea is midline.  The lungs are symmetrically expanded bilaterally with coarse interstitial attenuation and bilateral basilar subsegmental atelectasis..  No large focal consolidation seen.  There is no pneumothorax.  The osseous structures are remarkable for degenerative change..                                      Treatments you had today:   Medications   sodium chloride 0.9% bolus 1,000 mL 1,000 mL (0 mLs Intravenous Stopped 10/15/23 1840)   iohexoL (OMNIPAQUE 350) injection 75 mL (75 mLs Intravenous Given 10/15/23 1935)       Follow-Up Plan:  - Follow-up with PCP within 3 - 5 days  - Additional testing and/or evaluation as directed by your pcp    Return to the Emergency Department for symptoms including but not limited to: worsening symptoms, shortness of breath or chest pain, vomiting with inability to hold down fluids, fevers greater than 100.4°F, passing out/fainting/unconsciousness, or other concerning symptoms.

## 2023-10-20 ENCOUNTER — PATIENT MESSAGE (OUTPATIENT)
Dept: PRIMARY CARE CLINIC | Facility: CLINIC | Age: 73
End: 2023-10-20
Payer: MEDICARE

## 2023-10-20 NOTE — TELEPHONE ENCOUNTER
Isrrael Riley [facility] Staff  Caller: [phone] (Today, 10:37 AM)  Patient states she was not aware of the time change for her appt. Patient states she cannot make the 1:00 appt because, she will not have enough time to  her Grandson. Please call and advise.     Thank you and have a great day.

## 2023-10-25 ENCOUNTER — PATIENT MESSAGE (OUTPATIENT)
Dept: OPTOMETRY | Facility: CLINIC | Age: 73
End: 2023-10-25
Payer: MEDICARE

## 2023-10-25 ENCOUNTER — OFFICE VISIT (OUTPATIENT)
Dept: PRIMARY CARE CLINIC | Facility: CLINIC | Age: 73
End: 2023-10-25
Payer: MEDICARE

## 2023-10-25 VITALS
TEMPERATURE: 98 F | WEIGHT: 141.56 LBS | HEART RATE: 84 BPM | BODY MASS INDEX: 22.75 KG/M2 | DIASTOLIC BLOOD PRESSURE: 82 MMHG | SYSTOLIC BLOOD PRESSURE: 144 MMHG | OXYGEN SATURATION: 100 % | HEIGHT: 66 IN

## 2023-10-25 DIAGNOSIS — R09.82 POSTNASAL DRIP: ICD-10-CM

## 2023-10-25 DIAGNOSIS — E11.9 TYPE 2 DIABETES MELLITUS WITHOUT COMPLICATION, WITHOUT LONG-TERM CURRENT USE OF INSULIN: ICD-10-CM

## 2023-10-25 DIAGNOSIS — E03.9 HYPOTHYROIDISM, UNSPECIFIED TYPE: ICD-10-CM

## 2023-10-25 DIAGNOSIS — M79.601 RIGHT ARM PAIN: Primary | ICD-10-CM

## 2023-10-25 DIAGNOSIS — E55.9 VITAMIN D DEFICIENCY: ICD-10-CM

## 2023-10-25 DIAGNOSIS — E11.59 HYPERTENSION ASSOCIATED WITH DIABETES: ICD-10-CM

## 2023-10-25 DIAGNOSIS — J31.0 CHRONIC RHINITIS: ICD-10-CM

## 2023-10-25 DIAGNOSIS — E11.69 HYPERLIPIDEMIA ASSOCIATED WITH TYPE 2 DIABETES MELLITUS: ICD-10-CM

## 2023-10-25 DIAGNOSIS — M79.601 RIGHT ARM PAIN: ICD-10-CM

## 2023-10-25 DIAGNOSIS — E78.5 HYPERLIPIDEMIA ASSOCIATED WITH TYPE 2 DIABETES MELLITUS: ICD-10-CM

## 2023-10-25 DIAGNOSIS — R51.9 FACE PAIN: Primary | ICD-10-CM

## 2023-10-25 DIAGNOSIS — I15.2 HYPERTENSION ASSOCIATED WITH DIABETES: ICD-10-CM

## 2023-10-25 DIAGNOSIS — E11.65 UNCONTROLLED TYPE 2 DIABETES MELLITUS WITH HYPERGLYCEMIA: ICD-10-CM

## 2023-10-25 DIAGNOSIS — R49.0 HOARSENESS OF VOICE: ICD-10-CM

## 2023-10-25 PROBLEM — E04.9 SUBSTERNAL GOITER: Status: RESOLVED | Noted: 2018-11-05 | Resolved: 2023-10-25

## 2023-10-25 PROCEDURE — 3060F POS MICROALBUMINURIA REV: CPT | Mod: CPTII,S$GLB,, | Performed by: HOSPITALIST

## 2023-10-25 PROCEDURE — 4010F ACE/ARB THERAPY RXD/TAKEN: CPT | Mod: CPTII,S$GLB,, | Performed by: HOSPITALIST

## 2023-10-25 PROCEDURE — 1101F PR PT FALLS ASSESS DOC 0-1 FALLS W/OUT INJ PAST YR: ICD-10-PCS | Mod: CPTII,S$GLB,, | Performed by: HOSPITALIST

## 2023-10-25 PROCEDURE — 99211 OFF/OP EST MAY X REQ PHY/QHP: CPT | Mod: S$GLB,,, | Performed by: HOSPITALIST

## 2023-10-25 PROCEDURE — 3288F PR FALLS RISK ASSESSMENT DOCUMENTED: ICD-10-PCS | Mod: CPTII,S$GLB,, | Performed by: HOSPITALIST

## 2023-10-25 PROCEDURE — 3288F FALL RISK ASSESSMENT DOCD: CPT | Mod: CPTII,S$GLB,, | Performed by: HOSPITALIST

## 2023-10-25 PROCEDURE — 1159F MED LIST DOCD IN RCRD: CPT | Mod: CPTII,S$GLB,, | Performed by: HOSPITALIST

## 2023-10-25 PROCEDURE — 3052F PR MOST RECENT HEMOGLOBIN A1C LEVEL 8.0 - < 9.0%: ICD-10-PCS | Mod: CPTII,S$GLB,, | Performed by: HOSPITALIST

## 2023-10-25 PROCEDURE — 3077F PR MOST RECENT SYSTOLIC BLOOD PRESSURE >= 140 MM HG: ICD-10-PCS | Mod: CPTII,S$GLB,, | Performed by: HOSPITALIST

## 2023-10-25 PROCEDURE — 3008F PR BODY MASS INDEX (BMI) DOCUMENTED: ICD-10-PCS | Mod: CPTII,S$GLB,, | Performed by: HOSPITALIST

## 2023-10-25 PROCEDURE — 3079F PR MOST RECENT DIASTOLIC BLOOD PRESSURE 80-89 MM HG: ICD-10-PCS | Mod: CPTII,S$GLB,, | Performed by: HOSPITALIST

## 2023-10-25 PROCEDURE — 3077F SYST BP >= 140 MM HG: CPT | Mod: CPTII,S$GLB,, | Performed by: HOSPITALIST

## 2023-10-25 PROCEDURE — 3066F PR DOCUMENTATION OF TREATMENT FOR NEPHROPATHY: ICD-10-PCS | Mod: CPTII,S$GLB,, | Performed by: HOSPITALIST

## 2023-10-25 PROCEDURE — 3079F DIAST BP 80-89 MM HG: CPT | Mod: CPTII,S$GLB,, | Performed by: HOSPITALIST

## 2023-10-25 PROCEDURE — 3060F PR POS MICROALBUMINURIA RESULT DOCUMENTED/REVIEW: ICD-10-PCS | Mod: CPTII,S$GLB,, | Performed by: HOSPITALIST

## 2023-10-25 PROCEDURE — 99999 PR PBB SHADOW E&M-EST. PATIENT-LVL IV: CPT | Mod: PBBFAC,,, | Performed by: HOSPITALIST

## 2023-10-25 PROCEDURE — 1101F PT FALLS ASSESS-DOCD LE1/YR: CPT | Mod: CPTII,S$GLB,, | Performed by: HOSPITALIST

## 2023-10-25 PROCEDURE — 3052F HG A1C>EQUAL 8.0%<EQUAL 9.0%: CPT | Mod: CPTII,S$GLB,, | Performed by: HOSPITALIST

## 2023-10-25 PROCEDURE — 3008F BODY MASS INDEX DOCD: CPT | Mod: CPTII,S$GLB,, | Performed by: HOSPITALIST

## 2023-10-25 PROCEDURE — 3066F NEPHROPATHY DOC TX: CPT | Mod: CPTII,S$GLB,, | Performed by: HOSPITALIST

## 2023-10-25 PROCEDURE — 1125F PR PAIN SEVERITY QUANTIFIED, PAIN PRESENT: ICD-10-PCS | Mod: CPTII,S$GLB,, | Performed by: HOSPITALIST

## 2023-10-25 PROCEDURE — 99211 PR OFFICE/OUTPT VISIT, EST, LEVL I: ICD-10-PCS | Mod: S$GLB,,, | Performed by: HOSPITALIST

## 2023-10-25 PROCEDURE — 99999 PR PBB SHADOW E&M-EST. PATIENT-LVL IV: ICD-10-PCS | Mod: PBBFAC,,, | Performed by: HOSPITALIST

## 2023-10-25 PROCEDURE — 4010F PR ACE/ARB THEARPY RXD/TAKEN: ICD-10-PCS | Mod: CPTII,S$GLB,, | Performed by: HOSPITALIST

## 2023-10-25 PROCEDURE — 1159F PR MEDICATION LIST DOCUMENTED IN MEDICAL RECORD: ICD-10-PCS | Mod: CPTII,S$GLB,, | Performed by: HOSPITALIST

## 2023-10-25 PROCEDURE — 1125F AMNT PAIN NOTED PAIN PRSNT: CPT | Mod: CPTII,S$GLB,, | Performed by: HOSPITALIST

## 2023-10-25 RX ORDER — ROSUVASTATIN CALCIUM 10 MG/1
TABLET, COATED ORAL
Qty: 90 TABLET | Refills: 3 | Status: SHIPPED | OUTPATIENT
Start: 2023-10-25

## 2023-10-25 RX ORDER — INSULIN PUMP SYRINGE, 3 ML
EACH MISCELLANEOUS
Qty: 1 EACH | Refills: 0 | Status: SHIPPED | OUTPATIENT
Start: 2023-10-25 | End: 2025-04-22

## 2023-10-25 RX ORDER — HYDROCODONE BITARTRATE AND ACETAMINOPHEN 5; 325 MG/1; MG/1
1 TABLET ORAL EVERY 6 HOURS PRN
Qty: 30 TABLET | Refills: 0 | Status: SHIPPED | OUTPATIENT
Start: 2023-10-25 | End: 2023-11-02

## 2023-10-25 RX ORDER — LORATADINE 10 MG/1
10 TABLET ORAL DAILY
Qty: 90 TABLET | Refills: 3 | Status: SHIPPED | OUTPATIENT
Start: 2023-10-25 | End: 2023-10-25 | Stop reason: SDUPTHER

## 2023-10-25 RX ORDER — LANCETS 33 GAUGE
EACH MISCELLANEOUS
Status: CANCELLED | OUTPATIENT
Start: 2023-10-25

## 2023-10-25 RX ORDER — NAPROXEN SODIUM 220 MG/1
81 TABLET, FILM COATED ORAL DAILY
Refills: 0 | COMMUNITY
Start: 2023-10-25 | End: 2024-10-24

## 2023-10-25 RX ORDER — GABAPENTIN 300 MG/1
300 CAPSULE ORAL 3 TIMES DAILY PRN
Qty: 90 CAPSULE | Refills: 11 | Status: SHIPPED | OUTPATIENT
Start: 2023-10-25 | End: 2024-10-24

## 2023-10-25 RX ORDER — METFORMIN HYDROCHLORIDE 500 MG/1
TABLET ORAL
Qty: 360 TABLET | Refills: 3 | Status: SHIPPED | OUTPATIENT
Start: 2023-10-25

## 2023-10-25 RX ORDER — CHOLECALCIFEROL (VITAMIN D3) 25 MCG
1000 TABLET ORAL DAILY
Qty: 90 TABLET | Refills: 3 | Status: SHIPPED | OUTPATIENT
Start: 2023-10-25 | End: 2023-10-25 | Stop reason: SDUPTHER

## 2023-10-25 RX ORDER — METFORMIN HYDROCHLORIDE 500 MG/1
TABLET ORAL
Qty: 120 TABLET | Refills: 0 | Status: SHIPPED | OUTPATIENT
Start: 2023-10-25 | End: 2023-10-25 | Stop reason: SDUPTHER

## 2023-10-25 RX ORDER — LANCETS 33 GAUGE
EACH MISCELLANEOUS
Qty: 200 EACH | Refills: 3 | Status: CANCELLED | OUTPATIENT
Start: 2023-10-25

## 2023-10-25 RX ORDER — LANCING DEVICE
1 EACH MISCELLANEOUS 2 TIMES DAILY WITH MEALS
Qty: 1 EACH | Refills: 0 | Status: CANCELLED | OUTPATIENT
Start: 2023-10-25 | End: 2024-10-24

## 2023-10-25 RX ORDER — ROSUVASTATIN CALCIUM 10 MG/1
TABLET, COATED ORAL
Qty: 30 TABLET | Refills: 0 | Status: SHIPPED | OUTPATIENT
Start: 2023-10-25 | End: 2023-10-25 | Stop reason: SDUPTHER

## 2023-10-25 RX ORDER — LORATADINE 10 MG/1
10 TABLET ORAL DAILY
Qty: 90 TABLET | Refills: 3 | Status: SHIPPED | OUTPATIENT
Start: 2023-10-25 | End: 2024-10-24

## 2023-10-25 RX ORDER — LOSARTAN POTASSIUM 50 MG/1
50 TABLET ORAL DAILY
Qty: 30 TABLET | Refills: 0 | Status: SHIPPED | OUTPATIENT
Start: 2023-10-25 | End: 2023-10-25 | Stop reason: SDUPTHER

## 2023-10-25 RX ORDER — NEBULIZER AND COMPRESSOR
EACH MISCELLANEOUS
Refills: 0 | Status: CANCELLED | OUTPATIENT
Start: 2023-10-25

## 2023-10-25 RX ORDER — LANCETS 33 GAUGE
1 EACH MISCELLANEOUS
Qty: 100 EACH | Refills: 1 | Status: SHIPPED | OUTPATIENT
Start: 2023-10-25 | End: 2024-10-24

## 2023-10-25 RX ORDER — LEVOTHYROXINE SODIUM 75 UG/1
75 TABLET ORAL
Qty: 90 TABLET | Refills: 3 | Status: SHIPPED | OUTPATIENT
Start: 2023-10-25 | End: 2024-10-24

## 2023-10-25 RX ORDER — LOSARTAN POTASSIUM 50 MG/1
50 TABLET ORAL DAILY
Qty: 90 TABLET | Refills: 3 | Status: SHIPPED | OUTPATIENT
Start: 2023-10-25

## 2023-10-25 RX ORDER — CHOLECALCIFEROL (VITAMIN D3) 25 MCG
1000 TABLET ORAL DAILY
Qty: 90 TABLET | Refills: 3 | Status: SHIPPED | OUTPATIENT
Start: 2023-10-25 | End: 2024-10-24

## 2023-10-25 RX ORDER — FLUTICASONE PROPIONATE 50 MCG
1 SPRAY, SUSPENSION (ML) NASAL DAILY
Qty: 32 G | Refills: 3 | Status: SHIPPED | OUTPATIENT
Start: 2023-10-25

## 2023-10-25 RX ORDER — LEVOTHYROXINE SODIUM 75 UG/1
75 TABLET ORAL
Qty: 30 TABLET | Refills: 0 | Status: SHIPPED | OUTPATIENT
Start: 2023-10-25 | End: 2023-10-25 | Stop reason: SDUPTHER

## 2023-10-25 RX ORDER — ISOPROPYL ALCOHOL 0.75 G/1
1 SWAB TOPICAL
Qty: 120 EACH | Refills: 11 | Status: SHIPPED | OUTPATIENT
Start: 2023-10-25

## 2023-10-25 RX ORDER — GABAPENTIN 300 MG/1
CAPSULE ORAL
Status: CANCELLED | OUTPATIENT
Start: 2023-10-25

## 2023-10-25 NOTE — ASSESSMENT & PLAN NOTE
B mandible pain x1 mo; seen in ED 10/15.  Curiously she had CTA C/A/P but no imaging of mandible.  Radiation of sx resembles Bebo's angina, but pt has been edentulous since undergoing chemotherapy for breast cancer a few years ago.  No appreciable oral lesions and no trismus.  Will get CT maxillofacial to eval for osteonecrosis of jaw, but pt has not been on a bisphosphonate.  Still on Femara as maintenance therapy for prior breast cancer.  Most recent DEXA c/w osteopenia and on Ca/Vit D supplementation.  Pt has orthodontist appt 11/1 for dentures fitting, which may be adversely impacted by current sx.  In meantime will manage pain w/ Norco, which has been helping but almost out of.  New Rx for 30 more sent.  Further mgmt pending results.

## 2023-10-25 NOTE — PROGRESS NOTES
Primary Care Provider Appointment - [facility]  Rob Lopez MD      Subjective:      Patient ID: Alia Andre is a 73 y.o. female with   Past Medical History:   Diagnosis Date    Allergy     Arthritis     Cancer     breast    Diabetes mellitus type 2 in nonobese     Goiter     History of endometrial ablation     age 29    Hypertension     Substernal goiter 11/5/2018    Tobacco abuse     Vitamin D deficiency            Chief Complaint: Hospital Follow Up and Jaw Pain    Prior to this visit, patient's last encounter with PCP was 7/20/2023.    Pt reports B mandible pain x 1 mo, which got severe enough to warrant ED presentation 10/15.  Pain is constant and somewhat aggravated by chewing.  No vision changes or scalp tenderness.  In ED CT C/A/P done but no imaging of face.  W/u for cardiovascular etiology unremarkable and she was told to take tylenol or ibuprofen and f/u w/ dentist. She is edentulous since chemo for breast cancer a few years ago.  She is seeing an orthodontist for dentures 11/1.  She started taking some leftover Norco 5/325 she was Rx'd for arthritis previously, which has been helping.        4Ms for Medical Decision-Making in Older Adults    Last Completed EAWV: None    MOBILITY:  Get Up and Go:       No data to display              Activities of Daily Living:       No data to display              Whisper Test:       No data to display              Disability Status:       No data to display              Nutrition Screening:       No data to display             Screening Score: 0-7 Malnourished, 8-11 At Risk, 12-14 Normal    MENTATION:   Depression Patient Health Questionnaire:      10/25/2023    11:13 AM   Depression Patient Health Questionnaire   Over the last two weeks how often have you been bothered by little interest or pleasure in doing things Not at all   Over the last two weeks how often have you been bothered by feeling down, depressed or hopeless Not at all   PHQ-2 Total Score 0  "    Has Dementia Dx: No    Cognitive Function Screening:       No data to display              Cognitive Function Screening Total - Less than 4 = Abnormal,  Greater than or equal to 4 = Normal    MEDICATIONS:  High Risk Medications:  Total Active Medications: 2  gabapentin - 300 MG  HYDROcodone-acetaminophen - 5-325 mg    WHAT MATTERS MOST:  Advance Care Planning   ACP Status:   Patient does not have an ACP conversation on file  Living Will: No  Power of : No  LaPOST: No    ACP review deferred today 2/2 severe physical discomfort.    Social History     Socioeconomic History    Marital status:    Tobacco Use    Smoking status: Some Days     Current packs/day: 1.00     Average packs/day: 1 pack/day for 30.0 years (30.0 ttl pk-yrs)     Types: Cigarettes    Smokeless tobacco: Never   Substance and Sexual Activity    Alcohol use: No    Drug use: No    Sexual activity: Not Currently   Social History Narrative    Lives by self       Review of Systems   Constitutional:  Negative for chills and fever.   HENT:  Negative for dental problem, mouth dryness and mouth sores.    Eyes:  Negative for visual disturbance.   Cardiovascular:  Negative for claudication.   Neurological:  Negative for weakness and headaches.        Objective:   BP (!) 144/82 (BP Location: Right arm, Patient Position: Sitting, BP Method: Large (Manual))   Pulse 84   Temp 98.4 °F (36.9 °C) (Oral)   Ht 5' 6" (1.676 m)   Wt 64.2 kg (141 lb 8.6 oz)   SpO2 100%   BMI 22.84 kg/m²     Physical Exam  Vitals reviewed.   Constitutional:       Comments: Appears uncomfortable, occasionally in tears   HENT:      Head: Normocephalic and atraumatic.      Nose: Nose normal.      Mouth/Throat:      Mouth: Mucous membranes are moist.      Pharynx: Oropharynx is clear. No oropharyngeal exudate or posterior oropharyngeal erythema.      Comments: Edentulous.  No obvious oral lesions.  Pain not reproduced with palpation of mandible or sinus percussion.  Some " discomfort at TMJ.  No trismus.  Eyes:      General: No scleral icterus.     Conjunctiva/sclera: Conjunctivae normal.   Neck:      Comments: Horizontal scar from thyroidectomy incision  Cardiovascular:      Rate and Rhythm: Normal rate and regular rhythm.   Pulmonary:      Effort: Pulmonary effort is normal. No respiratory distress.   Musculoskeletal:         General: Normal range of motion.      Cervical back: No tenderness.   Lymphadenopathy:      Cervical: No cervical adenopathy.   Skin:     General: Skin is warm and dry.   Neurological:      Mental Status: She is alert and oriented to person, place, and time. Mental status is at baseline.              Lab Results   Component Value Date    WBC 7.02 10/15/2023    HGB 15.3 10/15/2023    HCT 38 10/15/2023     10/15/2023    CHOL 185 03/24/2023    TRIG 94 03/24/2023    HDL 37 (L) 03/24/2023    ALT 33 10/15/2023    AST 30 10/15/2023     (L) 10/15/2023    K 5.1 10/15/2023     10/15/2023    CREATININE 1.0 10/15/2023    BUN 14 10/15/2023    CO2 22 (L) 10/15/2023    TSH 0.017 (L) 07/20/2023    HGBA1C 8.7 (H) 07/20/2023       Current Outpatient Medications on File Prior to Visit   Medication Sig Dispense Refill    acetaminophen (TYLENOL) 325 MG tablet Take 325 mg by mouth every 6 (six) hours as needed for Pain.      ammonium lactate 12 % Crea Apply twice daily to affected parts both feet as needed. 140 g 11    aspirin 81 MG Chew Take 1 tablet (81 mg total) by mouth once daily. Start on Monday.  0    BD ALCOHOL SWABS [facility]       blood sugar diagnostic (TRUE METRIX GLUCOSE TEST STRIP) Strp TEST BLOOD SUGAR TWICE DAILY WITH MEALS 200 strip 3    blood-glucose meter kit Use as instructed 1 each 0    calcium carbonate (OS-MIKAYLA) 500 mg calcium (1,250 mg) tablet Take 2 tablets (1,000 mg total) by mouth 2 (two) times daily for 7 days, THEN 2 tablets (1,000 mg total) once daily for 7 days. 42 tablet 0    ciclopirox (PENLAC) 8 % Soln Apply topically nightly. 6.6 mL 11     diclofenac sodium (VOLTAREN) 1 % Gel Apply 2 g topically daily as needed (pain). 450 g 3    fluticasone propionate (FLONASE) 50 mcg/actuation nasal spray USE 1 SPRAY IN EACH NOSTRIL EVERY DAY 32 g 3    gabapentin (NEURONTIN) 300 MG capsule       lancets (TRUEPLUS LANCETS) 33 gauge Misc TEST BLOOD SUGAR TWICE DAILY WITH MEALS 200 each 3    lancing device Misc 1 Device by Misc.(Non-Drug; Combo Route) route 2 (two) times daily with meals. 1 each 0    letrozole (FEMARA) 2.5 mg Tab TAKE 1 TABLET EVERY DAY 90 tablet 3    TRUEPLUS LANCETS 33 gauge Misc       turmeric root extract 500 mg Cap Take 1 tablet by mouth once daily.      [DISCONTINUED] HYDROcodone-acetaminophen (NORCO) 5-325 mg per tablet Take 1 tablet by mouth every 6 (six) hours as needed for Pain. 20 tablet 0    [DISCONTINUED] levothyroxine (SYNTHROID) 75 MCG tablet Take 1 tablet (75 mcg total) by mouth before breakfast. 90 tablet 1    [DISCONTINUED] loratadine (CLARITIN) 10 mg tablet TAKE 1 TABLET EVERY DAY 90 tablet 3    [DISCONTINUED] losartan (COZAAR) 50 MG tablet TAKE 1 TABLET EVERY DAY 90 tablet 3    [DISCONTINUED] metFORMIN (GLUCOPHAGE) 500 MG tablet TAKE 2 TABLETS TWICE DAILY WITH MEALS 360 tablet 3    [DISCONTINUED] rosuvastatin (CRESTOR) 10 MG tablet TAKE 1 TABLET EVERY EVENING (STOP 5 MG TABLET) 90 tablet 3    [DISCONTINUED] vitamin D (VITAMIN D3) 1000 units Tab Take 1 tablet (1,000 Units total) by mouth once daily. 90 tablet 3     No current facility-administered medications on file prior to visit.         Assessment:   73 y.o. female with multiple co-morbid illnesses here to follow-up with PCP and continue work-up of chronic issues    Plan:     Problem List Items Addressed This Visit       Type 2 diabetes mellitus, without long-term current use of insulin    Relevant Medications    metFORMIN (GLUCOPHAGE) 500 MG tablet    Vitamin D deficiency    Relevant Medications    vitamin D (VITAMIN D3) 1000 units Tab    Face pain - Primary     B mandible  pain x1 mo; seen in ED 10/15.  Curiously she had CTA C/A/P but no imaging of mandible.  Radiation of sx resembles Bebo's angina, but pt has been edentulous since undergoing chemotherapy for breast cancer a few years ago.  No appreciable oral lesions and no trismus.  Will get CT maxillofacial to eval for osteonecrosis of jaw, but pt has not been on a bisphosphonate.  Still on Femara as maintenance therapy for prior breast cancer.  Most recent DEXA c/w osteopenia and on Ca/Vit D supplementation.  Pt has orthodontist appt 11/1 for dentures fitting, which may be adversely impacted by current sx.  In meantime will manage pain w/ Norco, which has been helping but almost out of.  New Rx for 30 more sent.  Further mgmt pending results.         Relevant Orders    CT Maxillofacial W WO Contrast     Other Visit Diagnoses       Hypertension associated with diabetes        Relevant Medications    metFORMIN (GLUCOPHAGE) 500 MG tablet    losartan (COZAAR) 50 MG tablet    Hypothyroidism, unspecified type        Relevant Medications    levothyroxine (SYNTHROID) 75 MCG tablet    Uncontrolled type 2 diabetes mellitus with hyperglycemia        Relevant Medications    metFORMIN (GLUCOPHAGE) 500 MG tablet    Chronic rhinitis        Relevant Medications    loratadine (CLARITIN) 10 mg tablet    Hyperlipidemia associated with type 2 diabetes mellitus        Relevant Medications    metFORMIN (GLUCOPHAGE) 500 MG tablet    rosuvastatin (CRESTOR) 10 MG tablet    Right arm pain        Relevant Medications    HYDROcodone-acetaminophen (NORCO) 5-325 mg per tablet            Health Maintenance         Date Due Completion Date    Colorectal Cancer Screening Never done ---    LDCT Lung Screen 06/06/2020 6/6/2019    Eye Exam 10/11/2022 10/11/2021    Influenza Vaccine (1) 09/01/2023 11/25/2019    COVID-19 Vaccine (3 - 2023-24 season) 09/01/2023 4/28/2021    Hemoglobin A1c 10/20/2023 7/20/2023    Shingles Vaccine (1 of 2) 07/20/2024 (Originally  4/27/1969) ---    Pneumococcal Vaccines (Age 65+) (2 - PPSV23 or PCV20) 07/20/2024 (Originally 7/17/2019) 5/22/2019    Lipid Panel 03/24/2024 3/24/2023    Diabetes Urine Screening 04/10/2024 4/10/2023    Foot Exam 04/10/2024 4/10/2023    Override on 12/17/2020: Done    Override on 5/22/2019: Done    DEXA Scan 05/25/2024 5/25/2022    Mammogram 08/07/2024 8/7/2023    TETANUS VACCINE 02/16/2032 2/16/2022            Future Appointments   Date Time Provider Department Center   11/2/2023  3:00 PM [facility] [place]   11/8/2023  8:00 AM [facility] CT1 LIMIT 400 LBS [facility] CT [facility]AN [facility] [place]   2/20/2024  8:45 AM Jennifer Melvin, OD [facility] [place]         Follow up in about 8 days (around 11/2/2023) for phone f/u. Total clinical care time was 40 min.    Rob Lopez MD  [facility]

## 2023-11-02 ENCOUNTER — OFFICE VISIT (OUTPATIENT)
Dept: PRIMARY CARE CLINIC | Facility: CLINIC | Age: 73
End: 2023-11-02
Payer: MEDICARE

## 2023-11-02 DIAGNOSIS — R51.9 FACE PAIN: Primary | ICD-10-CM

## 2023-11-02 DIAGNOSIS — K59.03 DRUG-INDUCED CONSTIPATION: ICD-10-CM

## 2023-11-02 PROCEDURE — 99499 UNLISTED E&M SERVICE: CPT | Mod: 95,,, | Performed by: HOSPITALIST

## 2023-11-02 PROCEDURE — 99499 NO LOS: ICD-10-PCS | Mod: 95,,, | Performed by: HOSPITALIST

## 2023-11-02 NOTE — PROGRESS NOTES
Established Patient - Audio Only Telehealth Visit     The patient location is: home  The chief complaint leading to consultation is: f/u jaw pain  Visit type: Virtual visit with audio only (telephone)  Total time spent with patient: 5 min       The reason for the audio only service rather than synchronous audio and video virtual visit was related to technical difficulties or patient preference/necessity.     Each patient to whom I provide medical services by telemedicine is:  (1) informed of the relationship between the physician and patient and the respective role of any other health care provider with respect to management of the patient; and (2) notified that they may decline to receive medical services by telemedicine and may withdraw from such care at any time. Patient verbally consented to receive this service via voice-only telephone call.       HPI: Pt reports no change in jaw pain, but it is intermittent.  Has only had 1 episode since last visit.  CT maxillofacial scheduled for next week.  Pt does report having difficulty w/ constipation x1 wk; pt advised her pain medication is likely a culprit.  She is not taking any fiber supplement, so recommended trying Metamucil or equivalent, and if that isn't helping enough to let us know and I can Rx something.                               This service was not originating from a related E/M service provided within the previous 7 days nor will  to an E/M service or procedure within the next 24 hours or my soonest available appointment.  Prevailing standard of care was able to be met in this audio-only visit.

## 2023-11-08 ENCOUNTER — HOSPITAL ENCOUNTER (OUTPATIENT)
Dept: RADIOLOGY | Facility: HOSPITAL | Age: 73
Discharge: HOME | End: 2023-11-08
Attending: HOSPITALIST
Payer: MEDICARE

## 2023-11-08 DIAGNOSIS — R51.9 FACE PAIN: ICD-10-CM

## 2024-01-02 ENCOUNTER — TELEPHONE (OUTPATIENT)
Dept: PRIMARY CARE CLINIC | Facility: CLINIC | Age: 74
End: 2024-01-02
Payer: MEDICARE

## 2024-01-02 NOTE — TELEPHONE ENCOUNTER
----- Message from Isrrael Riley sent at 1/2/2024  8:37 AM CST -----  Contact: [phone]  Requesting an RX refill or new RX.  Is this a refill or new RX: new  RX name and strength (copy/paste from chart):  TRUEPLUS LANCETS 33 gauge Misc   Is this a 30 day or 90 day RX:   Pharmacy name and phone # (copy/paste from chart):    [facility] - [facility] [address]  [address] [facility] [address]  Phone: [phone] Fax: [phone]      The doctors have asked that we provide their patients with the following 2 reminders -- prescription refills can take up to 72 hours, and a friendly reminder that in the future you can use your [facility] account to request refills: yes

## 2024-01-18 ENCOUNTER — PATIENT MESSAGE (OUTPATIENT)
Dept: ADMINISTRATIVE | Facility: HOSPITAL | Age: 74
End: 2024-01-18
Payer: MEDICARE

## 2024-02-20 ENCOUNTER — OFFICE VISIT (OUTPATIENT)
Dept: OPTOMETRY | Facility: CLINIC | Age: 74
End: 2024-02-20
Payer: COMMERCIAL

## 2024-02-20 DIAGNOSIS — E11.9 DIABETIC EYE EXAM: Primary | ICD-10-CM

## 2024-02-20 DIAGNOSIS — H52.7 REFRACTIVE ERROR: ICD-10-CM

## 2024-02-20 DIAGNOSIS — H25.13 NUCLEAR SCLEROSIS OF BOTH EYES: ICD-10-CM

## 2024-02-20 DIAGNOSIS — Z01.00 DIABETIC EYE EXAM: Primary | ICD-10-CM

## 2024-02-20 PROCEDURE — 92015 DETERMINE REFRACTIVE STATE: CPT | Mod: S$GLB,,, | Performed by: OPTOMETRIST

## 2024-02-20 PROCEDURE — 92004 COMPRE OPH EXAM NEW PT 1/>: CPT | Mod: S$GLB,,, | Performed by: OPTOMETRIST

## 2024-02-20 PROCEDURE — 99999 PR PBB SHADOW E&M-EST. PATIENT-LVL III: CPT | Mod: PBBFAC,,, | Performed by: OPTOMETRIST

## 2024-02-20 NOTE — PROGRESS NOTES
Subjective:       Patient ID: Alia Andre is a 73 y.o. female      Chief Complaint   Patient presents with    Concerns About Ocular Health    Diabetic Eye Exam     History of Present Illness  Dls: ? Yrs     74 y/o female presents today for diabetic eye exam.  Pt with c/o blurry vision at near ou.   Pt wears otc readers      x 1 day     No tearing  No itching   No burning  No pain   No ha's  No floaters    Eye meds  None    Pohx:   None     Fohx:   None    Hemoglobin A1C       Date                     Value               Ref Range             Status                07/20/2023               8.7 (H)             4.0 - 5.6 %           Final                  03/24/2023               6.6 (H)             4.0 - 5.6 %           Final                   04/28/2022               7.5 (H)             4.0 - 5.6 %           Final                  Assessment/Plan:     1. Diabetic eye exam  [facility]    No diabetic retinopathy. Discussed with pt the effects of diabetes on vision, importance of good blood sugar control, compliance with meds, and follow up care with PCP. Return in 1 year for dilated eye exam, sooner PRN.    2. Refractive error  Educated patient on refractive error and discussed lens options. Dispensed updated spectacle Rx. Educated about adaptation period to new specs.    Eyeglass Final Rx       Eyeglass Final Rx         Sphere Cylinder Axis Add    Right +1.00 +0.50 060 +2.50    Left +0.75 +1.25 015 +2.50      Expiration Date: 2/20/2025                      3. Nuclear sclerosis of both eyes  Educated pt on presence of cataracts and effects on vision. No surgery at this time. Recheck in one year, sooner PRN.        Follow up in about 1 year (around 2/20/2025) for Diabetic Eye Exam.

## 2024-04-18 ENCOUNTER — PATIENT MESSAGE (OUTPATIENT)
Dept: ADMINISTRATIVE | Facility: HOSPITAL | Age: 74
End: 2024-04-18
Payer: MEDICARE

## 2024-07-15 ENCOUNTER — TELEPHONE (OUTPATIENT)
Dept: PRIMARY CARE CLINIC | Facility: CLINIC | Age: 74
End: 2024-07-15
Payer: MEDICARE

## 2024-07-15 NOTE — TELEPHONE ENCOUNTER
----- Message from Judith Sawyer sent at 7/15/2024  3:34 PM CDT -----  Contact: Pt [phone]  Caller is requesting an earlier appointment then we can schedule.  Caller is requesting a message be sent to the provider.    When is the next available appointment with their provider:  September    Reason for the appointment:  Right foot swollen and painful (level 7 when still and level 10 when she walks

## 2024-07-15 NOTE — TELEPHONE ENCOUNTER
Right foot swollen , x 2 weeks. Stated it is painful. Also has ongoing cough, pt stated it has been going on for weeks.   Last saw Dr Lopez in Oct 2023, Dr Poe July 2023    Made PCP appt in Sept, will mail out appt.    Pt is agreeable to seeing Dr Lopez on 7/16 at 8:40. Beliaft needed.   Scheduled. Please call pt when Lymonica is en route to her home. Pt instructed to be dressed / ready a little before 8am.

## 2024-07-16 ENCOUNTER — OFFICE VISIT (OUTPATIENT)
Facility: CLINIC | Age: 74
End: 2024-07-16
Payer: MEDICARE

## 2024-07-16 VITALS
OXYGEN SATURATION: 97 % | TEMPERATURE: 98 F | DIASTOLIC BLOOD PRESSURE: 64 MMHG | SYSTOLIC BLOOD PRESSURE: 141 MMHG | HEART RATE: 73 BPM | WEIGHT: 135.94 LBS | BODY MASS INDEX: 21.94 KG/M2

## 2024-07-16 DIAGNOSIS — E11.59 HYPERTENSION ASSOCIATED WITH DIABETES: ICD-10-CM

## 2024-07-16 DIAGNOSIS — J43.2 CENTRILOBULAR EMPHYSEMA: ICD-10-CM

## 2024-07-16 DIAGNOSIS — I15.2 HYPERTENSION ASSOCIATED WITH DIABETES: ICD-10-CM

## 2024-07-16 DIAGNOSIS — R07.89 ATYPICAL CHEST PAIN: ICD-10-CM

## 2024-07-16 DIAGNOSIS — Z72.0 TOBACCO ABUSE: Primary | ICD-10-CM

## 2024-07-16 DIAGNOSIS — C50.211 MALIGNANT NEOPLASM OF UPPER-INNER QUADRANT OF RIGHT BREAST IN FEMALE, ESTROGEN RECEPTOR POSITIVE: ICD-10-CM

## 2024-07-16 DIAGNOSIS — M17.0 PRIMARY OSTEOARTHRITIS OF BOTH KNEES: ICD-10-CM

## 2024-07-16 DIAGNOSIS — I70.0 AORTIC ATHEROSCLEROSIS: ICD-10-CM

## 2024-07-16 DIAGNOSIS — R22.41 LOCALIZED SWELLING OF RIGHT LOWER EXTREMITY: ICD-10-CM

## 2024-07-16 DIAGNOSIS — Z17.0 MALIGNANT NEOPLASM OF UPPER-INNER QUADRANT OF RIGHT BREAST IN FEMALE, ESTROGEN RECEPTOR POSITIVE: ICD-10-CM

## 2024-07-16 DIAGNOSIS — M79.601 RIGHT ARM PAIN: ICD-10-CM

## 2024-07-16 PROBLEM — R51.9 FACE PAIN: Status: RESOLVED | Noted: 2023-10-25 | Resolved: 2024-07-16

## 2024-07-16 PROCEDURE — 3008F BODY MASS INDEX DOCD: CPT | Mod: HCNC,CPTII,S$GLB, | Performed by: HOSPITALIST

## 2024-07-16 PROCEDURE — 1125F AMNT PAIN NOTED PAIN PRSNT: CPT | Mod: HCNC,CPTII,S$GLB, | Performed by: HOSPITALIST

## 2024-07-16 PROCEDURE — 1159F MED LIST DOCD IN RCRD: CPT | Mod: HCNC,CPTII,S$GLB, | Performed by: HOSPITALIST

## 2024-07-16 PROCEDURE — 99215 OFFICE O/P EST HI 40 MIN: CPT | Mod: HCNC,S$GLB,, | Performed by: HOSPITALIST

## 2024-07-16 PROCEDURE — 3078F DIAST BP <80 MM HG: CPT | Mod: HCNC,CPTII,S$GLB, | Performed by: HOSPITALIST

## 2024-07-16 PROCEDURE — 4010F ACE/ARB THERAPY RXD/TAKEN: CPT | Mod: HCNC,CPTII,S$GLB, | Performed by: HOSPITALIST

## 2024-07-16 PROCEDURE — 99999 PR PBB SHADOW E&M-EST. PATIENT-LVL V: CPT | Mod: PBBFAC,HCNC,, | Performed by: HOSPITALIST

## 2024-07-16 PROCEDURE — 3077F SYST BP >= 140 MM HG: CPT | Mod: HCNC,CPTII,S$GLB, | Performed by: HOSPITALIST

## 2024-07-16 RX ORDER — ALBUTEROL SULFATE 90 UG/1
2 AEROSOL, METERED RESPIRATORY (INHALATION) EVERY 6 HOURS PRN
Qty: 8 G | Refills: 3 | Status: SHIPPED | OUTPATIENT
Start: 2024-07-16

## 2024-07-16 RX ORDER — DICLOFENAC SODIUM 10 MG/G
4 GEL TOPICAL 3 TIMES DAILY PRN
Qty: 450 G | Refills: 3 | Status: SHIPPED | OUTPATIENT
Start: 2024-07-16

## 2024-07-16 NOTE — ASSESSMENT & PLAN NOTE
Symptoms seem to be most consistent with dependent edema related to venous valvular incompetence, but this should not be as painful as is being reported.  Exam largely unimpressive so pain may be more of a musculoskeletal etiology.  She was counseled to try Voltaren to the area.  She was instructed in proper technique in measuring out 4g dose for lower extremity application.  Although less likely, the unilateral nature of the edema warrants evaluation for DVT in context of cancer history.  US RLE venous placed.

## 2024-07-16 NOTE — ASSESSMENT & PLAN NOTE
Blood pressure not at goal today losartan 50 mg, possibly secondary to pain.  Will warrant reassessment at follow-up.

## 2024-07-16 NOTE — ASSESSMENT & PLAN NOTE
Intermittent and has been present since mastectomy.  Suspect either musculoskeletal in nature or possibly nerve damage related to surgery.  She was advised to try Voltaren to the area as needed.

## 2024-07-16 NOTE — PROGRESS NOTES
Primary Care Provider Appointment - [facility]  Rob Lopez MD      Subjective:      Patient ID: Alia Andre is a 74 y.o. female with   Past Medical History:   Diagnosis Date    Allergy     Arthritis     Cancer     breast    Diabetes mellitus type 2 in nonobese     Goiter     History of endometrial ablation     age 29    Hypertension     Substernal goiter 11/5/2018    Tobacco abuse     Vitamin D deficiency            Chief Complaint: Follow-up, Foot Swelling, and Breast Pain    Prior to this visit, patient's last encounter with PCP was 7/20/2023.    Pt of Dr. Poe known to me from prior encounters.  C/o R foot swelling and pain for about 2 weeks.  Worse in the evenings and gets better overnight.  No involvement of LLE.  Also having NP cough x 1 mo.  Occasional morning sputum.  Smokes occasionally but trying to quit.  Also c/o R chest pain 1-2x/mo, lasting about 15-20 min.  Sharp in quality w/o soreness to palpation.  Has been present since R mastectomy in 2018.  No aggravating/alleviating factors.  Has some gabapentin which she has tried for it and seems to help.      4Ms for Medical Decision-Making in Older Adults    Last Completed EAWV: None    MOBILITY:  Get Up and Go:       No data to display              Activities of Daily Living:       No data to display              Whisper Test:       No data to display              Disability Status:       No data to display              Nutrition Screening:       No data to display             Screening Score: 0-7 Malnourished, 8-11 At Risk, 12-14 Normal    MENTATION:   Depression Patient Health Questionnaire:      10/25/2023    11:13 AM   Depression Patient Health Questionnaire   Over the last two weeks how often have you been bothered by little interest or pleasure in doing things Not at all   Over the last two weeks how often have you been bothered by feeling down, depressed or hopeless Not at all   PHQ-2 Total Score 0     Has Dementia Dx: No    Cognitive  Function Screening:       No data to display              Cognitive Function Screening Total - Less than 4 = Abnormal,  Greater than or equal to 4 = Normal    MEDICATIONS:  High Risk Medications:  Total Active Medications: 1  gabapentin - 300 MG    WHAT MATTERS MOST:  Advance Care Planning   ACP Status:   Patient has had an ACP conversation  Living Will: No  Power of : No  [place]: No        Social History     Socioeconomic History    Marital status:    Tobacco Use    Smoking status: Some Days     Current packs/day: 1.00     Average packs/day: 1 pack/day for 30.0 years (30.0 ttl pk-yrs)     Types: Cigarettes    Smokeless tobacco: Never   Substance and Sexual Activity    Alcohol use: No    Drug use: No    Sexual activity: Not Currently   Social History Narrative    Lives by self       Review of Systems   Constitutional:  Negative for chills and fever.   HENT:  Negative for dental problem, mouth dryness and mouth sores.    Eyes:  Negative for visual disturbance.   Respiratory:  Positive for cough.    Cardiovascular:  Positive for chest pain and leg swelling. Negative for claudication.   Musculoskeletal:  Positive for leg pain.   Neurological:  Negative for weakness and headaches.        Objective:   BP (!) 141/64   Pulse 73   Temp 98 °F (36.7 °C) (Oral)   Wt 61.7 kg (135 lb 14.6 oz)   SpO2 97%   BMI 21.94 kg/m²     Physical Exam  Vitals reviewed.   Constitutional:       General: She is not in acute distress.     Appearance: Normal appearance.   HENT:      Head: Normocephalic and atraumatic.      Nose: Nose normal.      Mouth/Throat:      Mouth: Mucous membranes are moist.      Pharynx: Oropharynx is clear. No oropharyngeal exudate or posterior oropharyngeal erythema.      Comments: Edentulous.  No obvious oral lesions.    Eyes:      General: No scleral icterus.     Conjunctiva/sclera: Conjunctivae normal.   Neck:      Comments: Horizontal scar from thyroidectomy incision  Cardiovascular:      Rate  and Rhythm: Normal rate and regular rhythm.      Heart sounds: Normal heart sounds.   Pulmonary:      Effort: Pulmonary effort is normal. No respiratory distress.      Breath sounds: Rhonchi (diffuse) present.      Comments: R total mastectomy  Musculoskeletal:         General: No tenderness or deformity. Normal range of motion.      Cervical back: No tenderness.      Right lower leg: Edema (trace) present.   Lymphadenopathy:      Cervical: No cervical adenopathy.   Skin:     General: Skin is warm and dry.   Neurological:      Mental Status: She is alert and oriented to person, place, and time. Mental status is at baseline.              Lab Results   Component Value Date    WBC 7.02 10/15/2023    HGB 15.3 10/15/2023    HCT 38 10/15/2023     10/15/2023    CHOL 185 03/24/2023    TRIG 94 03/24/2023    HDL 37 (L) 03/24/2023    ALT 33 10/15/2023    AST 30 10/15/2023     (L) 10/15/2023    K 5.1 10/15/2023     10/15/2023    CREATININE 1.0 10/15/2023    BUN 14 10/15/2023    CO2 22 (L) 10/15/2023    TSH 0.017 (L) 07/20/2023    HGBA1C 8.7 (H) 07/20/2023       Current Outpatient Medications on File Prior to Visit   Medication Sig Dispense Refill    acetaminophen (TYLENOL) 325 MG tablet Take 325 mg by mouth every 6 (six) hours as needed for Pain.      ammonium lactate 12 % Crea Apply twice daily to affected parts both feet as needed. 140 g 11    aspirin 81 MG Chew Take 1 tablet (81 mg total) by mouth once daily. Start on Monday.  0    BD ALCOHOL SWABS PadM Apply 1 each topically as needed (topical antiseptic). 120 each 11    blood sugar diagnostic (TRUE METRIX GLUCOSE TEST STRIP) Strp TEST BLOOD SUGAR TWICE DAILY WITH MEALS 200 strip 3    blood-glucose meter kit Use as instructed 1 each 0    ciclopirox (PENLAC) 8 % Soln Apply topically nightly. 6.6 mL 11    diclofenac sodium (VOLTAREN) 1 % Gel Apply 2 g topically daily as needed (pain). 450 g 3    fluticasone propionate (FLONASE) 50 mcg/actuation nasal spray  1 spray (50 mcg total) by Each Nostril route once daily. 32 g 3    gabapentin (NEURONTIN) 300 MG capsule Take 1 capsule (300 mg total) by mouth 3 (three) times daily as needed (neuropathic pain). 90 capsule 11    lancets (TRUEPLUS LANCETS) 33 gauge Misc TEST BLOOD SUGAR TWICE DAILY WITH MEALS 200 each 3    letrozole (FEMARA) 2.5 mg Tab TAKE 1 TABLET EVERY DAY 90 tablet 3    levothyroxine (SYNTHROID) 75 MCG tablet Take 1 tablet (75 mcg total) by mouth before breakfast. 90 tablet 3    loratadine (CLARITIN) 10 mg tablet Take 1 tablet (10 mg total) by mouth once daily. 90 tablet 3    losartan (COZAAR) 50 MG tablet Take 1 tablet (50 mg total) by mouth once daily. 90 tablet 3    metFORMIN (GLUCOPHAGE) 500 MG tablet TAKE 2 TABLETS TWICE DAILY WITH MEALS 360 tablet 3    rosuvastatin (CRESTOR) 10 MG tablet TAKE 1 TABLET EVERY EVENING (STOP 5 MG TABLET) 90 tablet 3    TRUEPLUS LANCETS 33 gauge Misc 1 lancet  by Misc.(Non-Drug; Combo Route) route 4 (four) times daily before meals and nightly. 100 each 1    turmeric root extract 500 mg Cap Take 1 tablet by mouth once daily.      vitamin D (VITAMIN D3) 1000 units Tab Take 1 tablet (1,000 Units total) by mouth once daily. 90 tablet 3    calcium carbonate (OS-MIKAYLA) 500 mg calcium (1,250 mg) tablet Take 2 tablets (1,000 mg total) by mouth 2 (two) times daily for 7 days, THEN 2 tablets (1,000 mg total) once daily for 7 days. 42 tablet 0    lancing device Misc 1 Device by Misc.(Non-Drug; Combo Route) route 2 (two) times daily with meals. 1 each 0     No current facility-administered medications on file prior to visit.         Assessment:   74 y.o. female with multiple co-morbid illnesses here for evaluation of acute complaint.    Plan:     Problem List Items Addressed This Visit       Tobacco abuse - Primary     She is interested in quitting.  Referral to Smoking Cessation placed.         Relevant Orders    Ambulatory referral/consult to Smoking Cessation Program    Atypical chest  pain     Intermittent and has been present since mastectomy.  Suspect either musculoskeletal in nature or possibly nerve damage related to surgery.  She was advised to try Voltaren to the area as needed.         Malignant neoplasm of right female breast     Status post total mastectomy in 2018; continues on letrozole.         Aortic atherosclerosis     On aspirin and rosuvastatin 10 mg         Localized swelling of right lower extremity     Symptoms seem to be most consistent with dependent edema related to venous valvular incompetence, but this should not be as painful as is being reported.  Exam largely unimpressive so pain may be more of a musculoskeletal etiology.  She was counseled to try Voltaren to the area.  She was instructed in proper technique in measuring out 4g dose for lower extremity application.  Although less likely, the unilateral nature of the edema warrants evaluation for DVT in context of cancer history.  US RLE venous placed.         Relevant Orders    US Lower Extremity Veins Right    Centrilobular emphysema     Respiratory symptoms suggestive of smoker's cough as well as emphysema, which was noted on chest imaging done last October.  Lung exam with diffuse rhonchi.  She was counseled on the relationship between smoking and emphysema, and that these symptoms will likely improve with smoking cessation.  Smoking cessation referral placed.  Albuterol inhaler prescribed for respiratory symptoms; she was counseled on proper inhaler technique.         Relevant Medications    albuterol (PROVENTIL HFA) 90 mcg/actuation inhaler    Hypertension associated with diabetes     Blood pressure not at goal today losartan 50 mg, possibly secondary to pain.  Will warrant reassessment at follow-up.          Other Visit Diagnoses       Right arm pain        Relevant Medications    diclofenac sodium (VOLTAREN) 1 % Gel    Primary osteoarthritis of both knees        Relevant Medications    diclofenac sodium  (VOLTAREN) 1 % Gel              Health Maintenance         Date Due Completion Date    Colorectal Cancer Screening Never done ---    RSV Vaccine (Age 60+ and Pregnant patients) (1 - 1-dose 60+ series) Never done ---    LDCT Lung Screen 06/06/2020 6/6/2019    Hemoglobin A1c 10/20/2023 7/20/2023    Lipid Panel 03/24/2024 3/24/2023    Diabetes Urine Screening 04/10/2024 4/10/2023    Foot Exam 04/10/2024 4/10/2023    Override on 12/17/2020: Done    Override on 5/22/2019: Done    DEXA Scan 05/25/2024 5/25/2022    Mammogram 08/07/2024 8/7/2023    Shingles Vaccine (1 of 2) 07/20/2024 (Originally 4/27/1969) ---    COVID-19 Vaccine (3 - Moderna risk series) 07/20/2024 (Originally 5/26/2021) 4/28/2021    Pneumococcal Vaccines (Age 65+) (2 of 2 - PPSV23 or PCV20) 07/20/2024 (Originally 7/17/2019) 5/22/2019    Influenza Vaccine (1) 09/01/2024 11/25/2019    Eye Exam 02/20/2025 2/20/2024    TETANUS VACCINE 02/16/2032 2/16/2022            Future Appointments   Date Time Provider Department Center   7/22/2024 10:15 AM [facility]   7/26/2024 12:00 PM Gali Strange NP [facility] [place]   9/19/2024  9:40 AM Nancy Poe MD [facility] [place]         Follow up if symptoms worsen or fail to improve. Total clinical care time was 40 min.    Rob Lopez MD  [facility]

## 2024-07-16 NOTE — ASSESSMENT & PLAN NOTE
Respiratory symptoms suggestive of smoker's cough as well as emphysema, which was noted on chest imaging done last October.  Lung exam with diffuse rhonchi.  She was counseled on the relationship between smoking and emphysema, and that these symptoms will likely improve with smoking cessation.  Smoking cessation referral placed.  Albuterol inhaler prescribed for respiratory symptoms; she was counseled on proper inhaler technique.

## 2024-07-17 ENCOUNTER — TELEPHONE (OUTPATIENT)
Dept: PRIMARY CARE CLINIC | Facility: CLINIC | Age: 74
End: 2024-07-17
Payer: MEDICARE

## 2024-07-17 NOTE — TELEPHONE ENCOUNTER
----- Message from Rob Lopez MD sent at 7/16/2024  5:17 PM CDT -----  She warrants a reassessment within a couple weeks to make sure things are doing better.  I ordered an US of her leg due to her cancer history but I'd be surprised if there was a clot, as her leg didn't really look swollen.

## 2024-07-18 ENCOUNTER — TELEPHONE (OUTPATIENT)
Dept: PRIMARY CARE CLINIC | Facility: CLINIC | Age: 74
End: 2024-07-18
Payer: MEDICARE

## 2024-07-18 ENCOUNTER — PATIENT MESSAGE (OUTPATIENT)
Dept: ADMINISTRATIVE | Facility: HOSPITAL | Age: 74
End: 2024-07-18
Payer: MEDICARE

## 2024-07-18 NOTE — TELEPHONE ENCOUNTER
Can we see if she can come see Heather here at [facility], on July 31st at 11:20, to check her right foot and re-assess cough  PCP visit in September   Pt will most likely need Rodney

## 2024-07-18 NOTE — TELEPHONE ENCOUNTER
Please reach out to this worry score 2 patient to schedule him/her for a follow up with his/her PCP. Thank you!

## 2024-07-19 DIAGNOSIS — Z12.11 SCREENING FOR COLON CANCER: ICD-10-CM

## 2024-07-22 ENCOUNTER — HOSPITAL ENCOUNTER (OUTPATIENT)
Dept: RADIOLOGY | Facility: HOSPITAL | Age: 74
Discharge: HOME | End: 2024-07-22
Attending: HOSPITALIST
Payer: MEDICARE

## 2024-07-22 DIAGNOSIS — R22.41 LOCALIZED SWELLING OF RIGHT LOWER EXTREMITY: ICD-10-CM

## 2024-07-22 PROCEDURE — 93971 EXTREMITY STUDY: CPT | Mod: TC,HCNC,RT

## 2024-07-22 PROCEDURE — 93971 EXTREMITY STUDY: CPT | Mod: 26,HCNC,RT, | Performed by: RADIOLOGY

## 2024-07-25 ENCOUNTER — OFFICE VISIT (OUTPATIENT)
Dept: PRIMARY CARE CLINIC | Facility: CLINIC | Age: 74
End: 2024-07-25
Payer: MEDICARE

## 2024-07-25 VITALS
HEART RATE: 77 BPM | OXYGEN SATURATION: 99 % | DIASTOLIC BLOOD PRESSURE: 72 MMHG | WEIGHT: 135.25 LBS | BODY MASS INDEX: 21.83 KG/M2 | SYSTOLIC BLOOD PRESSURE: 110 MMHG

## 2024-07-25 DIAGNOSIS — M79.2 NEUROPATHIC PAIN OF FOOT, RIGHT: Primary | ICD-10-CM

## 2024-07-25 DIAGNOSIS — E03.9 HYPOTHYROIDISM, UNSPECIFIED TYPE: ICD-10-CM

## 2024-07-25 DIAGNOSIS — I15.2 HYPERTENSION ASSOCIATED WITH DIABETES: ICD-10-CM

## 2024-07-25 DIAGNOSIS — J42 CHRONIC BRONCHITIS WITH ACUTE EXACERBATION: ICD-10-CM

## 2024-07-25 DIAGNOSIS — E11.59 HYPERTENSION ASSOCIATED WITH DIABETES: ICD-10-CM

## 2024-07-25 DIAGNOSIS — J20.9 CHRONIC BRONCHITIS WITH ACUTE EXACERBATION: ICD-10-CM

## 2024-07-25 DIAGNOSIS — F32.1 MODERATE MAJOR DEPRESSION: ICD-10-CM

## 2024-07-25 DIAGNOSIS — B35.1 ONYCHOMYCOSIS: ICD-10-CM

## 2024-07-25 DIAGNOSIS — E11.65 UNCONTROLLED TYPE 2 DIABETES MELLITUS WITH HYPERGLYCEMIA: ICD-10-CM

## 2024-07-25 DIAGNOSIS — L85.3 DRY SKIN: ICD-10-CM

## 2024-07-25 PROCEDURE — 1160F RVW MEDS BY RX/DR IN RCRD: CPT | Mod: HCNC,CPTII,S$GLB, | Performed by: PHYSICIAN ASSISTANT

## 2024-07-25 PROCEDURE — 3078F DIAST BP <80 MM HG: CPT | Mod: HCNC,CPTII,S$GLB, | Performed by: PHYSICIAN ASSISTANT

## 2024-07-25 PROCEDURE — 99999 PR PBB SHADOW E&M-EST. PATIENT-LVL V: CPT | Mod: PBBFAC,HCNC,, | Performed by: PHYSICIAN ASSISTANT

## 2024-07-25 PROCEDURE — 4010F ACE/ARB THERAPY RXD/TAKEN: CPT | Mod: HCNC,CPTII,S$GLB, | Performed by: PHYSICIAN ASSISTANT

## 2024-07-25 PROCEDURE — 3008F BODY MASS INDEX DOCD: CPT | Mod: HCNC,CPTII,S$GLB, | Performed by: PHYSICIAN ASSISTANT

## 2024-07-25 PROCEDURE — 3288F FALL RISK ASSESSMENT DOCD: CPT | Mod: HCNC,CPTII,S$GLB, | Performed by: PHYSICIAN ASSISTANT

## 2024-07-25 PROCEDURE — 1101F PT FALLS ASSESS-DOCD LE1/YR: CPT | Mod: HCNC,CPTII,S$GLB, | Performed by: PHYSICIAN ASSISTANT

## 2024-07-25 PROCEDURE — 1125F AMNT PAIN NOTED PAIN PRSNT: CPT | Mod: HCNC,CPTII,S$GLB, | Performed by: PHYSICIAN ASSISTANT

## 2024-07-25 PROCEDURE — 99215 OFFICE O/P EST HI 40 MIN: CPT | Mod: HCNC,S$GLB,, | Performed by: PHYSICIAN ASSISTANT

## 2024-07-25 PROCEDURE — 3074F SYST BP LT 130 MM HG: CPT | Mod: HCNC,CPTII,S$GLB, | Performed by: PHYSICIAN ASSISTANT

## 2024-07-25 PROCEDURE — 1159F MED LIST DOCD IN RCRD: CPT | Mod: HCNC,CPTII,S$GLB, | Performed by: PHYSICIAN ASSISTANT

## 2024-07-25 RX ORDER — AZITHROMYCIN 250 MG/1
TABLET, FILM COATED ORAL
Qty: 6 TABLET | Refills: 0 | Status: SHIPPED | OUTPATIENT
Start: 2024-07-25

## 2024-07-25 RX ORDER — AMMONIUM LACTATE 12 G/100G
CREAM TOPICAL
Qty: 140 G | Refills: 11 | Status: SHIPPED | OUTPATIENT
Start: 2024-07-25

## 2024-07-25 RX ORDER — DICLOFENAC SODIUM 10 MG/G
4 GEL TOPICAL 3 TIMES DAILY PRN
Qty: 450 G | Refills: 3 | Status: SHIPPED | OUTPATIENT
Start: 2024-07-25

## 2024-07-25 RX ORDER — ALBUTEROL SULFATE 0.83 MG/ML
2.5 SOLUTION RESPIRATORY (INHALATION) EVERY 6 HOURS PRN
Qty: 1 EACH | Refills: 11 | Status: SHIPPED | OUTPATIENT
Start: 2024-07-25 | End: 2025-07-25

## 2024-07-25 RX ORDER — DULOXETIN HYDROCHLORIDE 30 MG/1
30 CAPSULE, DELAYED RELEASE ORAL DAILY
Qty: 30 CAPSULE | Refills: 0 | Status: SHIPPED | OUTPATIENT
Start: 2024-07-25 | End: 2025-07-25

## 2024-07-25 RX ORDER — BENZONATATE 100 MG/1
100 CAPSULE ORAL 3 TIMES DAILY PRN
Qty: 30 CAPSULE | Refills: 0 | Status: SHIPPED | OUTPATIENT
Start: 2024-07-25 | End: 2024-08-04

## 2024-07-25 RX ORDER — CICLOPIROX 80 MG/ML
SOLUTION TOPICAL NIGHTLY
Qty: 6.6 ML | Refills: 11 | Status: SHIPPED | OUTPATIENT
Start: 2024-07-25

## 2024-07-25 NOTE — PROGRESS NOTES
Primary Care Provider Appointment   [facility]        Subjective:       Patient ID:  Alia is a 74 y.o. female being seen for Burn (Right foot. 1 year ago but just started bothering her again. ), Cough, and Nasal Congestion      Chief Complaint: Burn (Right foot. 1 year ago but just started bothering her again. ), Cough, and Nasal Congestion    HPI: 73 yo female presents for right foot pain. Pain started about 3 weeks ago. Top of right foot and right great toe. No injury. States she burned her foot 1 year ago, but healed fine and no other issues since. She has numbness and tingling in her right foot, none in left. Will sometimes swell. Had US last week which was negative for DVT. Was advised to use diclofenac but couldn't get from her pharmacy.  Currently taking tylenol for pain, needs something stronger per pt.     She also has chronic right sided chest pain s/p mastectomy.     Cough and congestion- occurring for about 1 month . + Post nasal drip. + congestion, + cough productive of mucous, + wheezing. Denies fever, SOB, chills, sweats. Taking nothing for symptoms. She does still smoke.    Past Medical History:   Diagnosis Date    Allergy     Arthritis     Cancer     breast    Diabetes mellitus type 2 in nonobese     Goiter     History of endometrial ablation     age 29    Hypertension     Substernal goiter 11/5/2018    Tobacco abuse     Vitamin D deficiency        Review of Systems   Constitutional:  Negative for fever.   HENT:  Positive for postnasal drip and rhinorrhea.    Respiratory:  Positive for cough and wheezing.    Cardiovascular:  Negative for chest pain.   Musculoskeletal:  Positive for arthralgias and joint swelling.             Health Maintenance         Date Due Completion Date    Shingles Vaccine (1 of 2) Never done ---    Colorectal Cancer Screening Never done ---    RSV Vaccine (Age 60+ and Pregnant patients) (1 - 1-dose 60+ series) Never done ---     "Pneumococcal Vaccines (Age 65+) (2 of 2 - PPSV23 or PCV20) 07/17/2019 5/22/2019    LDCT Lung Screen 06/06/2020 6/6/2019    COVID-19 Vaccine (3 - Moderna risk series) 05/26/2021 4/28/2021    Hemoglobin A1c 10/20/2023 7/20/2023    Lipid Panel 03/24/2024 3/24/2023    Diabetes Urine Screening 04/10/2024 4/10/2023    Foot Exam 04/10/2024 4/10/2023    Override on 12/17/2020: Done    Override on 5/22/2019: Done    DEXA Scan 05/25/2024 5/25/2022    Mammogram 08/07/2024 8/7/2023    Influenza Vaccine (1) 09/01/2024 11/25/2019    Eye Exam 02/20/2025 2/20/2024    High Dose Statin 07/16/2025 7/16/2024    TETANUS VACCINE 02/16/2032 2/16/2022                  Objective:      Vitals:    07/25/24 0841   BP: 110/72   BP Location: Left arm   Patient Position: Sitting   BP Method: Medium (Manual)   Pulse: 77   SpO2: 99%   Weight: 61.4 kg (135 lb 4 oz)     Estimated body mass index is 21.83 kg/m² as calculated from the following:    Height as of 10/25/23: 5' 6" (1.676 m).    Weight as of this encounter: 61.4 kg (135 lb 4 oz).  Physical Exam  Constitutional:       Appearance: Normal appearance.   HENT:      Head: Normocephalic and atraumatic.   Cardiovascular:      Rate and Rhythm: Normal rate and regular rhythm.   Pulmonary:      Effort: Pulmonary effort is normal.      Breath sounds: Normal breath sounds.   Musculoskeletal:        Feet:    Neurological:      Mental Status: She is alert.   Psychiatric:         Mood and Affect: Mood normal.         Thought Content: Thought content normal.           Assessment and Plan:         1. Neuropathic pain of foot, right  Assessment & Plan:  Trial of cymbalta. Phone call set for 2 weeks, increase dose as needed.     Orders:  -     diclofenac sodium (VOLTAREN) 1 % Gel; Apply 4 g topically 3 (three) times daily as needed (pain).  Dispense: 450 g; Refill: 3  -     Ambulatory referral/consult to Podiatry; Future; Expected date: 08/01/2024  -     DULoxetine (CYMBALTA) 30 MG capsule; Take 1 capsule (30 " mg total) by mouth once daily.  Dispense: 30 capsule; Refill: 0    2. Onychomycosis  Assessment & Plan:  Continue penlac. Refer to podiatry for nail care    Orders:  -     ciclopirox (PENLAC) 8 % Soln; Apply topically nightly. For toe nail  Dispense: 6.6 mL; Refill: 11  -     Ambulatory referral/consult to Podiatry; Future; Expected date: 08/01/2024    3. Chronic bronchitis with acute exacerbation  -     albuterol (PROVENTIL) 2.5 mg /3 mL (0.083 %) nebulizer solution; Take 3 mLs (2.5 mg total) by nebulization every 6 (six) hours as needed for Wheezing. Rescue  Dispense: 1 each; Refill: 11  -     azithromycin (Z-ANDREW) 250 MG tablet; Take 2 pills day 1, then 1 pill day 2-5  Dispense: 6 tablet; Refill: 0  -     benzonatate (TESSALON) 100 MG capsule; Take 1 capsule (100 mg total) by mouth 3 (three) times daily as needed for Cough.  Dispense: 30 capsule; Refill: 0    4. Dry skin  -     ammonium lactate 12 % Crea; Apply twice daily to affected parts both feet as needed.  Dispense: 140 g; Refill: 11    5. Uncontrolled type 2 diabetes mellitus with hyperglycemia  Assessment & Plan:  No recent hga1c, lab scheduled, adjust meds as needed    Orders:  -     Hemoglobin A1C; Future; Expected date: 07/25/2024    6. Hypertension associated with diabetes  Assessment & Plan:  Continue losartan    Orders:  -     CBC Auto Differential; Future; Expected date: 07/25/2024  -     Comprehensive Metabolic Panel; Future; Expected date: 07/25/2024  -     Lipid Panel; Future; Expected date: 07/25/2024    7. Hypothyroidism, unspecified type  Assessment & Plan:  Continue synthroid , labs due    Orders:  -     TSH; Future; Expected date: 07/25/2024    8. Moderate major depression  Assessment & Plan:  Start cymbalta. Phone call set for 2 weeks    Orders:  -     DULoxetine (CYMBALTA) 30 MG capsule; Take 1 capsule (30 mg total) by mouth once daily.  Dispense: 30 capsule; Refill: 0         Follow Up:   F/u in 2 weeks        Amy Lado, PA-C [facility]  [place]

## 2024-07-26 PROBLEM — E03.9 HYPOTHYROIDISM: Status: ACTIVE | Noted: 2024-07-26

## 2024-07-26 PROBLEM — E11.65 UNCONTROLLED TYPE 2 DIABETES MELLITUS WITH HYPERGLYCEMIA: Status: ACTIVE | Noted: 2024-07-26

## 2024-07-26 PROBLEM — M79.2 NEUROPATHIC PAIN OF FOOT, RIGHT: Status: ACTIVE | Noted: 2024-07-26

## 2024-07-26 PROBLEM — B35.1 ONYCHOMYCOSIS: Status: ACTIVE | Noted: 2024-07-26

## 2024-08-01 ENCOUNTER — TELEPHONE (OUTPATIENT)
Dept: PRIMARY CARE CLINIC | Facility: CLINIC | Age: 74
End: 2024-08-01
Payer: MEDICARE

## 2024-08-02 DIAGNOSIS — Z12.31 SCREENING MAMMOGRAM, ENCOUNTER FOR: ICD-10-CM

## 2024-08-02 DIAGNOSIS — Z85.3 PERSONAL HISTORY OF BREAST CANCER: Primary | ICD-10-CM

## 2024-08-02 DIAGNOSIS — Z98.890 S/P LUMPECTOMY, RIGHT BREAST: ICD-10-CM

## 2024-08-07 ENCOUNTER — TELEPHONE (OUTPATIENT)
Dept: PRIMARY CARE CLINIC | Facility: CLINIC | Age: 74
End: 2024-08-07
Payer: MEDICARE

## 2024-08-08 ENCOUNTER — OFFICE VISIT (OUTPATIENT)
Dept: PRIMARY CARE CLINIC | Facility: CLINIC | Age: 74
End: 2024-08-08
Payer: MEDICARE

## 2024-08-08 DIAGNOSIS — E78.5 HYPERLIPIDEMIA ASSOCIATED WITH TYPE 2 DIABETES MELLITUS: ICD-10-CM

## 2024-08-08 DIAGNOSIS — M79.2 NEUROPATHIC PAIN OF FOOT, RIGHT: ICD-10-CM

## 2024-08-08 DIAGNOSIS — E11.65 UNCONTROLLED TYPE 2 DIABETES MELLITUS WITH HYPERGLYCEMIA: ICD-10-CM

## 2024-08-08 DIAGNOSIS — E03.9 HYPOTHYROIDISM, UNSPECIFIED TYPE: Primary | ICD-10-CM

## 2024-08-08 DIAGNOSIS — E11.69 HYPERLIPIDEMIA ASSOCIATED WITH TYPE 2 DIABETES MELLITUS: ICD-10-CM

## 2024-08-08 PROCEDURE — 1159F MED LIST DOCD IN RCRD: CPT | Mod: HCNC,CPTII,95, | Performed by: PHYSICIAN ASSISTANT

## 2024-08-08 PROCEDURE — 99443 PR PHYSICIAN TELEPHONE EVALUATION 21-30 MIN: CPT | Mod: HCNC,95,, | Performed by: PHYSICIAN ASSISTANT

## 2024-08-08 PROCEDURE — 4010F ACE/ARB THERAPY RXD/TAKEN: CPT | Mod: HCNC,CPTII,95, | Performed by: PHYSICIAN ASSISTANT

## 2024-08-08 PROCEDURE — 1160F RVW MEDS BY RX/DR IN RCRD: CPT | Mod: HCNC,CPTII,95, | Performed by: PHYSICIAN ASSISTANT

## 2024-08-08 PROCEDURE — 3046F HEMOGLOBIN A1C LEVEL >9.0%: CPT | Mod: HCNC,CPTII,95, | Performed by: PHYSICIAN ASSISTANT

## 2024-08-08 RX ORDER — ROSUVASTATIN CALCIUM 20 MG/1
20 TABLET, COATED ORAL DAILY
Qty: 90 TABLET | Refills: 3 | Status: SHIPPED | OUTPATIENT
Start: 2024-08-08 | End: 2025-08-08

## 2024-08-08 RX ORDER — INSULIN PUMP SYRINGE, 3 ML
EACH MISCELLANEOUS
Qty: 1 EACH | Refills: 0 | Status: SHIPPED | OUTPATIENT
Start: 2024-08-08 | End: 2025-08-08

## 2024-08-08 RX ORDER — LANCETS
EACH MISCELLANEOUS
Qty: 100 EACH | Refills: 2 | Status: SHIPPED | OUTPATIENT
Start: 2024-08-08

## 2024-08-09 ENCOUNTER — TELEPHONE (OUTPATIENT)
Dept: PRIMARY CARE CLINIC | Facility: CLINIC | Age: 74
End: 2024-08-09
Payer: MEDICARE

## 2024-08-09 RX ORDER — HYDROCODONE BITARTRATE AND ACETAMINOPHEN 5; 325 MG/1; MG/1
1 TABLET ORAL EVERY 12 HOURS PRN
Qty: 14 TABLET | Refills: 0 | Status: SHIPPED | OUTPATIENT
Start: 2024-08-09 | End: 2024-08-16

## 2024-08-16 DIAGNOSIS — M79.2 NEUROPATHIC PAIN OF FOOT, RIGHT: ICD-10-CM

## 2024-08-16 DIAGNOSIS — F32.1 MODERATE MAJOR DEPRESSION: ICD-10-CM

## 2024-08-16 RX ORDER — DULOXETIN HYDROCHLORIDE 30 MG/1
30 CAPSULE, DELAYED RELEASE ORAL
Qty: 90 CAPSULE | Refills: 1 | Status: SHIPPED | OUTPATIENT
Start: 2024-08-16

## 2024-08-20 ENCOUNTER — HOSPITAL ENCOUNTER (OUTPATIENT)
Dept: RADIOLOGY | Facility: HOSPITAL | Age: 74
Discharge: HOME | End: 2024-08-20
Attending: PHYSICIAN ASSISTANT
Payer: MEDICARE

## 2024-08-20 ENCOUNTER — OFFICE VISIT (OUTPATIENT)
Dept: SURGERY | Facility: CLINIC | Age: 74
End: 2024-08-20
Payer: MEDICARE

## 2024-08-20 VITALS
BODY MASS INDEX: 21.69 KG/M2 | HEART RATE: 88 BPM | SYSTOLIC BLOOD PRESSURE: 147 MMHG | HEIGHT: 66 IN | WEIGHT: 135 LBS | DIASTOLIC BLOOD PRESSURE: 72 MMHG | OXYGEN SATURATION: 100 %

## 2024-08-20 DIAGNOSIS — Z17.0 MALIGNANT NEOPLASM OF UPPER-OUTER QUADRANT OF RIGHT BREAST IN FEMALE, ESTROGEN RECEPTOR POSITIVE: ICD-10-CM

## 2024-08-20 DIAGNOSIS — Z85.3 PERSONAL HISTORY OF BREAST CANCER: Primary | ICD-10-CM

## 2024-08-20 DIAGNOSIS — Z12.31 SCREENING MAMMOGRAM, ENCOUNTER FOR: ICD-10-CM

## 2024-08-20 DIAGNOSIS — C50.411 MALIGNANT NEOPLASM OF UPPER-OUTER QUADRANT OF RIGHT BREAST IN FEMALE, ESTROGEN RECEPTOR POSITIVE: ICD-10-CM

## 2024-08-20 DIAGNOSIS — Z85.3 PERSONAL HISTORY OF BREAST CANCER: ICD-10-CM

## 2024-08-20 DIAGNOSIS — Z90.11 H/O RIGHT MASTECTOMY: ICD-10-CM

## 2024-08-20 DIAGNOSIS — Z98.890 S/P LUMPECTOMY, RIGHT BREAST: ICD-10-CM

## 2024-08-20 PROCEDURE — 3046F HEMOGLOBIN A1C LEVEL >9.0%: CPT | Mod: HCNC,CPTII,S$GLB, | Performed by: PHYSICIAN ASSISTANT

## 2024-08-20 PROCEDURE — 1101F PT FALLS ASSESS-DOCD LE1/YR: CPT | Mod: HCNC,CPTII,S$GLB, | Performed by: PHYSICIAN ASSISTANT

## 2024-08-20 PROCEDURE — 77063 BREAST TOMOSYNTHESIS BI: CPT | Mod: 26,52,HCNC, | Performed by: RADIOLOGY

## 2024-08-20 PROCEDURE — 3288F FALL RISK ASSESSMENT DOCD: CPT | Mod: HCNC,CPTII,S$GLB, | Performed by: PHYSICIAN ASSISTANT

## 2024-08-20 PROCEDURE — 4010F ACE/ARB THERAPY RXD/TAKEN: CPT | Mod: HCNC,CPTII,S$GLB, | Performed by: PHYSICIAN ASSISTANT

## 2024-08-20 PROCEDURE — 99214 OFFICE O/P EST MOD 30 MIN: CPT | Mod: HCNC,S$GLB,, | Performed by: PHYSICIAN ASSISTANT

## 2024-08-20 PROCEDURE — 77067 SCR MAMMO BI INCL CAD: CPT | Mod: TC,52,HCNC

## 2024-08-20 PROCEDURE — 1159F MED LIST DOCD IN RCRD: CPT | Mod: HCNC,CPTII,S$GLB, | Performed by: PHYSICIAN ASSISTANT

## 2024-08-20 PROCEDURE — 3078F DIAST BP <80 MM HG: CPT | Mod: HCNC,CPTII,S$GLB, | Performed by: PHYSICIAN ASSISTANT

## 2024-08-20 PROCEDURE — 3077F SYST BP >= 140 MM HG: CPT | Mod: HCNC,CPTII,S$GLB, | Performed by: PHYSICIAN ASSISTANT

## 2024-08-20 PROCEDURE — 1125F AMNT PAIN NOTED PAIN PRSNT: CPT | Mod: HCNC,CPTII,S$GLB, | Performed by: PHYSICIAN ASSISTANT

## 2024-08-20 PROCEDURE — 77067 SCR MAMMO BI INCL CAD: CPT | Mod: 26,52,HCNC, | Performed by: RADIOLOGY

## 2024-08-20 PROCEDURE — 77063 BREAST TOMOSYNTHESIS BI: CPT | Mod: TC,52,HCNC

## 2024-08-20 PROCEDURE — 99999 PR PBB SHADOW E&M-EST. PATIENT-LVL III: CPT | Mod: PBBFAC,HCNC,, | Performed by: PHYSICIAN ASSISTANT

## 2024-08-20 PROCEDURE — 3008F BODY MASS INDEX DOCD: CPT | Mod: HCNC,CPTII,S$GLB, | Performed by: PHYSICIAN ASSISTANT

## 2024-08-20 NOTE — PROGRESS NOTES
[facility]  Department of Surgery    REFERRING PROVIDER: No referring provider defined for this encounter.   CHIEF COMPLAINT:   Chief Complaint   Patient presents with    Follow-up     Follow up CBE & MMG  .       DIAGNOSIS:   This is a 74 y.o. female with a history of stage pT2N0, grade 3, ER +(weak 10-20%), DC -, HER2 - invasive mammary carcinoma of the right breast.    TREATMENT:   1. S/p right mastectomy with sentinel node biopsy on 2018. Dr. Otoniel M.D. Surgical Oncology  2. Final Path: 23 mm of invasive carcinoma (no specific type). Negative margins, negative nodes.   3. Completed adjuvant TC 2019.  4. Letrozole started 4/15/2019.    HISTORY OF PRESENT ILLNESS:   Alia Andre is a 74 y.o. female comes in for oncological follow up. Admits to ongoing chest wall tenderness. She has been given oxycodone in the past with some relief by PCP Dr. Poe. She denies change in her breast self-exam specifically denying new masses, skin or nipple changes, or nipple discharge. Past medical and surgical history is updated without new changes. There have been no changes to family history. The patient denies constitutional symptoms of night sweats, chills, weight loss, new headaches, visual changes, new back or bony pain, chest pain, or shortness of breath.        Review of Systems: See HPI/Interval History for other systems reviewed.     IMAGIN/20/24 Left Screening Mammo:    Findings:  This procedure was performed using tomosynthesis.   Computer-aided detection was utilized in the interpretation of this examination.     There is no evidence of suspicious masses, microcalcifications or architectural distortion.     Breast Density:  The left breast has scattered areas of fibroglandular density.      Impression:   No mammographic evidence of malignancy.     BI-RADS Category 1: Negative     Recommendation:  Routine screening mammogram in 1 year is recommended.        MEDICATIONS/ALLERGIES:      Current Outpatient Medications   Medication Sig    acetaminophen (TYLENOL) 325 MG tablet Take 325 mg by mouth every 6 (six) hours as needed for Pain.    albuterol (PROVENTIL HFA) 90 mcg/actuation inhaler Inhale 2 puffs into the lungs every 6 (six) hours as needed for Wheezing (cough). Rescue    albuterol (PROVENTIL) 2.5 mg /3 mL (0.083 %) nebulizer solution Take 3 mLs (2.5 mg total) by nebulization every 6 (six) hours as needed for Wheezing. Rescue    ammonium lactate 12 % Crea Apply twice daily to affected parts both feet as needed.    aspirin 81 MG Chew Take 1 tablet (81 mg total) by mouth once daily. Start on Monday.    BD ALCOHOL SWABS PadM Apply 1 each topically as needed (topical antiseptic).    blood sugar diagnostic (TRUE METRIX GLUCOSE TEST STRIP) Strp TEST BLOOD SUGAR TWICE DAILY WITH MEALS    blood sugar diagnostic Strp To check BG 2 times daily, to use with insurance preferred meter    blood-glucose meter kit Use as instructed    ciclopirox (PENLAC) 8 % Soln Apply topically nightly. For toe nail    diclofenac sodium (VOLTAREN) 1 % Gel Apply 4 g topically 3 (three) times daily as needed (pain).    DULoxetine (CYMBALTA) 30 MG capsule TAKE 1 CAPSULE BY MOUTH EVERY DAY    fluticasone propionate (FLONASE) 50 mcg/actuation nasal spray 1 spray (50 mcg total) by Each Nostril route once daily.    gabapentin (NEURONTIN) 300 MG capsule Take 1 capsule (300 mg total) by mouth 3 (three) times daily as needed (neuropathic pain).    lancets (TRUEPLUS LANCETS) 33 gauge Misc TEST BLOOD SUGAR TWICE DAILY WITH MEALS    lancets Misc To check BG 2 times daily, to use with insurance preferred meter    letrozole (FEMARA) 2.5 mg Tab TAKE 1 TABLET EVERY DAY    levothyroxine (SYNTHROID) 75 MCG tablet Take 1 tablet (75 mcg total) by mouth before breakfast.    loratadine (CLARITIN) 10 mg tablet Take 1 tablet (10 mg total) by mouth once daily.    losartan (COZAAR) 50 MG tablet Take 1 tablet (50 mg total) by mouth once daily.     "metFORMIN (GLUCOPHAGE) 500 MG tablet TAKE 2 TABLETS TWICE DAILY WITH MEALS    rosuvastatin (CRESTOR) 20 MG tablet Take 1 tablet (20 mg total) by mouth once daily.    TRUEPLUS LANCETS 33 gauge Misc 1 lancet  by Misc.(Non-Drug; Combo Route) route 4 (four) times daily before meals and nightly.    turmeric root extract 500 mg Cap Take 1 tablet by mouth once daily.    vitamin D (VITAMIN D3) 1000 units Tab Take 1 tablet (1,000 Units total) by mouth once daily.    azithromycin (Z-ANDREW) 250 MG tablet Take 2 pills day 1, then 1 pill day 2-5    blood-glucose meter kit To check BG 2 times daily, to use with insurance preferred meter    calcium carbonate (OS-MIKAYLA) 500 mg calcium (1,250 mg) tablet Take 2 tablets (1,000 mg total) by mouth 2 (two) times daily for 7 days, THEN 2 tablets (1,000 mg total) once daily for 7 days.    lancing device Misc 1 Device by Misc.(Non-Drug; Combo Route) route 2 (two) times daily with meals.     No current facility-administered medications for this visit.      Review of patient's allergies indicates:   Allergen Reactions    Opioids - morphine analogues Itching    Pravastatin      Leg cramps       PHYSICAL EXAM:   BP (!) 147/72 (BP Location: Left arm, Patient Position: Sitting, BP Method: Medium (Automatic))   Pulse 88   Ht 5' 6" (1.676 m)   Wt 61.2 kg (135 lb)   SpO2 100%   BMI 21.79 kg/m²     Physical Exam   Constitutional: She appears well-developed.   HENT:   Head: Normocephalic.   Eyes: No scleral icterus.   Neck: No tracheal deviation present.   Cardiovascular:  Normal rate and regular rhythm.            Pulmonary/Chest: Breath sounds normal. No respiratory distress. She exhibits no mass, no tenderness and no edema. Right breast exhibits no mass, no skin change and no tenderness. Left breast exhibits no inverted nipple, no mass, no nipple discharge, no skin change and no tenderness. Breasts are symmetrical.       Abdominal: Soft. She exhibits no mass. There is no abdominal tenderness. "   Musculoskeletal: Lymphadenopathy:      Cervical: No cervical adenopathy.     Neurological: She is alert.   Skin: No rash noted. No erythema.         ASSESSMENT:   This is a 74 y.o. female without evidence of recurrence by exam, history or imaging.       PLAN:   1. CBE without concerning findings. Patient reassured.   2. Continue monthly self breast exams and call the clinic with any changes or problems.  3. Mammogram in 1 year .  4. Return to clinic in 1 year .  5. Breast pain addressed as likely scar tissue, but will try gentle massage and Voltaren. If no change, patient will be in touch.  6. Bra and prosthetic prescription given.     The patient is in agreement with the plan. Questions were encouraged and answered to patient's satisfaction. Alia will call our office with any questions or concerns.     Dalila Lua PA-C  Breast Surgery

## 2024-09-18 ENCOUNTER — TELEPHONE (OUTPATIENT)
Dept: PRIMARY CARE CLINIC | Facility: CLINIC | Age: 74
End: 2024-09-18
Payer: MEDICARE

## 2024-09-18 NOTE — TELEPHONE ENCOUNTER
Called Ms. Andre to let her lyft is scheduled for 8:40am tomorrow for pickup she verbally understood.

## 2024-09-19 NOTE — TELEPHONE ENCOUNTER
Called pt as Rodney stated she was picked up and dropped off at [facility].  Patient stated that she had decided upon waking up that her shoulder was hurting, and she could not come into clinic.  Discussed with pt that if she is hurting, she needs to see provider for help.    Pt also stated that the cymbalta makes her feel sick and pt also stated that she thinks metformin is giving her diarrhea. Discussed with pt diet of protein and small portion of complex carb in the am may help if metformin is giving her this side effect. Pt stated she eats mostly vegetables    Talked with pt about rescheduling , pt agreeable to coming in next week to see DOMI Loja on Thursday, and coming in for PCP visit in October.  Rodney julian arranged for appt on 9/26. Appointment letters mailed out.

## 2024-09-30 ENCOUNTER — OFFICE VISIT (OUTPATIENT)
Dept: PRIMARY CARE CLINIC | Facility: CLINIC | Age: 74
End: 2024-09-30
Payer: MEDICARE

## 2024-09-30 VITALS
HEART RATE: 82 BPM | BODY MASS INDEX: 22.02 KG/M2 | DIASTOLIC BLOOD PRESSURE: 82 MMHG | SYSTOLIC BLOOD PRESSURE: 132 MMHG | OXYGEN SATURATION: 96 % | HEIGHT: 66 IN | WEIGHT: 137 LBS

## 2024-09-30 DIAGNOSIS — G89.29 CHRONIC RIGHT SHOULDER PAIN: Primary | ICD-10-CM

## 2024-09-30 DIAGNOSIS — E03.9 HYPOTHYROIDISM, UNSPECIFIED TYPE: ICD-10-CM

## 2024-09-30 DIAGNOSIS — E11.65 UNCONTROLLED TYPE 2 DIABETES MELLITUS WITH HYPERGLYCEMIA: ICD-10-CM

## 2024-09-30 DIAGNOSIS — Z17.0 MALIGNANT NEOPLASM OF UPPER-OUTER QUADRANT OF RIGHT BREAST IN FEMALE, ESTROGEN RECEPTOR POSITIVE: ICD-10-CM

## 2024-09-30 DIAGNOSIS — C50.411 MALIGNANT NEOPLASM OF UPPER-OUTER QUADRANT OF RIGHT BREAST IN FEMALE, ESTROGEN RECEPTOR POSITIVE: ICD-10-CM

## 2024-09-30 DIAGNOSIS — Z87.891 PERSONAL HISTORY OF TOBACCO USE: ICD-10-CM

## 2024-09-30 DIAGNOSIS — J31.0 CHRONIC RHINITIS: ICD-10-CM

## 2024-09-30 DIAGNOSIS — M25.511 CHRONIC RIGHT SHOULDER PAIN: Primary | ICD-10-CM

## 2024-09-30 DIAGNOSIS — J43.2 CENTRILOBULAR EMPHYSEMA: ICD-10-CM

## 2024-09-30 LAB
ALBUMIN/CREAT UR: 82.6 UG/MG (ref 0–30)
CREAT UR-MCNC: 132 MG/DL (ref 15–325)
GLUCOSE SERPL-MCNC: 177 MG/DL (ref 70–110)
MICROALBUMIN UR DL<=1MG/L-MCNC: 109 UG/ML

## 2024-09-30 PROCEDURE — 99999 PR PBB SHADOW E&M-EST. PATIENT-LVL V: CPT | Mod: PBBFAC,HCNC,, | Performed by: PHYSICIAN ASSISTANT

## 2024-09-30 PROCEDURE — 82570 ASSAY OF URINE CREATININE: CPT | Performed by: PHYSICIAN ASSISTANT

## 2024-09-30 PROCEDURE — 3046F HEMOGLOBIN A1C LEVEL >9.0%: CPT | Mod: CPTII,S$GLB,, | Performed by: PHYSICIAN ASSISTANT

## 2024-09-30 PROCEDURE — 82962 GLUCOSE BLOOD TEST: CPT | Mod: S$GLB,,, | Performed by: PHYSICIAN ASSISTANT

## 2024-09-30 PROCEDURE — 1125F AMNT PAIN NOTED PAIN PRSNT: CPT | Mod: CPTII,S$GLB,, | Performed by: PHYSICIAN ASSISTANT

## 2024-09-30 PROCEDURE — 84439 ASSAY OF FREE THYROXINE: CPT | Performed by: PHYSICIAN ASSISTANT

## 2024-09-30 PROCEDURE — 84443 ASSAY THYROID STIM HORMONE: CPT | Performed by: PHYSICIAN ASSISTANT

## 2024-09-30 PROCEDURE — 82043 UR ALBUMIN QUANTITATIVE: CPT | Performed by: PHYSICIAN ASSISTANT

## 2024-09-30 PROCEDURE — 3079F DIAST BP 80-89 MM HG: CPT | Mod: CPTII,S$GLB,, | Performed by: PHYSICIAN ASSISTANT

## 2024-09-30 PROCEDURE — 99215 OFFICE O/P EST HI 40 MIN: CPT | Mod: S$GLB,,, | Performed by: PHYSICIAN ASSISTANT

## 2024-09-30 PROCEDURE — 3008F BODY MASS INDEX DOCD: CPT | Mod: CPTII,S$GLB,, | Performed by: PHYSICIAN ASSISTANT

## 2024-09-30 PROCEDURE — 4010F ACE/ARB THERAPY RXD/TAKEN: CPT | Mod: CPTII,S$GLB,, | Performed by: PHYSICIAN ASSISTANT

## 2024-09-30 PROCEDURE — 3075F SYST BP GE 130 - 139MM HG: CPT | Mod: CPTII,S$GLB,, | Performed by: PHYSICIAN ASSISTANT

## 2024-09-30 RX ORDER — HYDROCODONE BITARTRATE AND ACETAMINOPHEN 5; 325 MG/1; MG/1
1 TABLET ORAL EVERY 12 HOURS PRN
Qty: 14 TABLET | Refills: 0 | Status: SHIPPED | OUTPATIENT
Start: 2024-09-30 | End: 2024-10-07

## 2024-09-30 RX ORDER — AZITHROMYCIN 250 MG/1
TABLET, FILM COATED ORAL
Qty: 6 TABLET | Refills: 0 | Status: SHIPPED | OUTPATIENT
Start: 2024-09-30

## 2024-09-30 RX ORDER — LETROZOLE 2.5 MG/1
2.5 TABLET, FILM COATED ORAL DAILY
Qty: 90 TABLET | Refills: 3 | Status: SHIPPED | OUTPATIENT
Start: 2024-09-30 | End: 2024-09-30

## 2024-09-30 RX ORDER — LORATADINE 10 MG/1
10 TABLET ORAL DAILY
Qty: 90 TABLET | Refills: 3 | Status: SHIPPED | OUTPATIENT
Start: 2024-09-30 | End: 2025-09-30

## 2024-09-30 RX ORDER — BENZONATATE 100 MG/1
100 CAPSULE ORAL 3 TIMES DAILY PRN
Qty: 30 CAPSULE | Refills: 0 | Status: SHIPPED | OUTPATIENT
Start: 2024-09-30 | End: 2024-10-10

## 2024-09-30 NOTE — ASSESSMENT & PLAN NOTE
Unable to tolerate increased dose of metformin  Discussed will get glucose today and urine micro and discuss options from there
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
## 2024-09-30 NOTE — PROGRESS NOTES
Primary Care Provider Appointment   [facility]        Subjective:       Patient ID:  Alia is a 74 y.o. female being seen for Shoulder Pain (Right shoulder pain x 6 months)      Chief Complaint: Shoulder Pain (Right shoulder pain x 6 months)    HPI: 75 yo female presents for right shoulder pain, cough and diabetes follow up. Shoulder pain occurring for about  6 months. Some days pain is severe. Doesn't seem to be worsened with a specific movement but has difficulty lifting overhead. The pain goes from the shoulder, axilla, and chest. Denies numbness. Has tried voltaren, tylenol with some relief.  Saw breast surgeon last month who felt pain could be scar tissue. Advised massage and voltaren with not much improvement.     URI: having cough with mucous production. Mucous is clear. Started up again in the past couple of weeks. States zpak and tessalon helped last time. Wheezing off and on. Associated congestion, rhinorrhea, post nasal drip. No fever. Denies SOB. Has chronic allergy, taking Claritin. She continues to smoke. Due for LDCT    Diabetes: states increasing metformin caused her diarrhea. She has been taking 1000 mg per day. She does not check her sugar regularly, states she may have checked one day last week. Thinks it was over 160 fasting. Due for microalbumin      Past Medical History:   Diagnosis Date    Allergy     Arthritis     Cancer     breast    Diabetes mellitus type 2 in nonobese     Goiter     History of endometrial ablation     age 29    Hypertension     Substernal goiter 11/5/2018    Tobacco abuse     Vitamin D deficiency        Review of Systems   Constitutional:  Negative for fever.   HENT:  Positive for postnasal drip, rhinorrhea and sinus pressure/congestion.    Respiratory:  Positive for cough and wheezing. Negative for shortness of breath.    Musculoskeletal:  Positive for arthralgias.   Neurological:  Negative for numbness.             Health  "Maintenance         Date Due Completion Date    Shingles Vaccine (1 of 2) Never done ---    Colorectal Cancer Screening Never done ---    RSV Vaccine (Age 60+ and Pregnant patients) (1 - Risk 60-74 years 1-dose series) Never done ---    Pneumococcal Vaccines (Age 65+) (2 of 2 - PPSV23 or PCV20) 07/17/2019 5/22/2019    LDCT Lung Screen 06/06/2020 6/6/2019    COVID-19 Vaccine (3 - Moderna risk series) 05/26/2021 4/28/2021    Diabetes Urine Screening 04/10/2024 4/10/2023    Foot Exam 04/10/2024 4/10/2023    Override on 12/17/2020: Done    Override on 5/22/2019: Done    DEXA Scan 05/25/2024 5/25/2022    Influenza Vaccine (1) 09/01/2024 11/25/2019    Hemoglobin A1c 10/31/2024 7/31/2024    Eye Exam 02/20/2025 2/20/2024    Lipid Panel 07/31/2025 7/31/2024    High Dose Statin 08/20/2025 8/20/2024    Mammogram 08/20/2025 8/20/2024    TETANUS VACCINE 02/16/2032 2/16/2022                     Objective:      Vitals:    09/30/24 0902   BP: 132/82   BP Location: Left arm   Patient Position: Sitting   Pulse: 82   SpO2: 96%   Weight: 62.1 kg (137 lb 0.3 oz)   Height: 5' 6" (1.676 m)     Estimated body mass index is 22.11 kg/m² as calculated from the following:    Height as of this encounter: 5' 6" (1.676 m).    Weight as of this encounter: 62.1 kg (137 lb 0.3 oz).  Physical Exam  Constitutional:       Appearance: Normal appearance.   HENT:      Head: Normocephalic and atraumatic.      Right Ear: Tympanic membrane normal.      Ears:      Comments: Left canal with unimpacted cerumen unable to visualize TM  Cardiovascular:      Rate and Rhythm: Normal rate and regular rhythm.   Pulmonary:      Effort: Pulmonary effort is normal.      Breath sounds: Normal breath sounds. No wheezing.   Musculoskeletal:      Right shoulder: Bony tenderness present. Decreased range of motion (unable to lift overhead). Decreased strength.      Comments: Negative drop arm on right   Neurological:      Mental Status: She is alert.   Psychiatric:         " Mood and Affect: Mood normal.         Thought Content: Thought content normal.           Assessment and Plan:         1. Chronic right shoulder pain  -     X-ray Shoulder 2 or More Views Right; Future; Expected date: 09/30/2024  -     Ambulatory referral/consult to Physical/Occupational Therapy; Future; Expected date: 10/07/2024    2. Chronic rhinitis  -     loratadine (CLARITIN) 10 mg tablet; Take 1 tablet (10 mg total) by mouth once daily.  Dispense: 90 tablet; Refill: 3    3. Malignant neoplasm of upper-outer quadrant of right breast in female, estrogen receptor positive  Assessment & Plan:  Pt requesting refill on letrozole. Note from heme onc states she started in 2019 and will remain on for 5 years  Will send refill to them to see if patient is to stop medication    Orders:  -     Discontinue: letrozole (FEMARA) 2.5 mg Tab; Take 1 tablet (2.5 mg total) by mouth once daily.  Dispense: 90 tablet; Refill: 3    4. Uncontrolled type 2 diabetes mellitus with hyperglycemia  Assessment & Plan:  Unable to tolerate increased dose of metformin  Discussed will get glucose today and urine micro and discuss options from there     Orders:  -     POCT Glucose, Hand-Held Device  -     Microalbumin/Creatinine Ratio, Urine; Future; Expected date: 09/30/2024    5. Hypothyroidism, unspecified type  Assessment & Plan:  States she is now taking her synthroid consistently. Will recheck tsh today    Orders:  -     TSH; Future; Expected date: 09/30/2024    6. Centrilobular emphysema  -     azithromycin (Z-ANDREW) 250 MG tablet; Take 2 pills day 1, then 1 pill day 2-5  Dispense: 6 tablet; Refill: 0  -     benzonatate (TESSALON) 100 MG capsule; Take 1 capsule (100 mg total) by mouth 3 (three) times daily as needed for Cough.  Dispense: 30 capsule; Refill: 0    7. Personal history of tobacco use  -     CT Chest Lung Screening Low Dose; Future; Expected date: 09/30/2024         Follow Up:   F/u in 1 month with pcp        Heather Loja  PA-C [facility]

## 2024-09-30 NOTE — ASSESSMENT & PLAN NOTE
Pt requesting refill on letrozole. Note from heme onc states she started in 2019 and will remain on for 5 years  Will send refill to them to see if patient is to stop medication

## 2024-10-01 LAB
T4 FREE SERPL-MCNC: 1.08 NG/DL (ref 0.71–1.51)
TSH SERPL DL<=0.005 MIU/L-ACNC: 4.32 UIU/ML (ref 0.4–4)

## 2024-10-01 RX ORDER — LETROZOLE 2.5 MG/1
2.5 TABLET, FILM COATED ORAL DAILY
Qty: 90 TABLET | Refills: 3 | Status: SHIPPED | OUTPATIENT
Start: 2024-10-01

## 2024-10-05 ENCOUNTER — HOSPITAL ENCOUNTER (OUTPATIENT)
Dept: RADIOLOGY | Facility: HOSPITAL | Age: 74
Discharge: HOME | End: 2024-10-05
Attending: PHYSICIAN ASSISTANT
Payer: MEDICARE

## 2024-10-05 DIAGNOSIS — G89.29 CHRONIC RIGHT SHOULDER PAIN: ICD-10-CM

## 2024-10-05 DIAGNOSIS — M25.511 CHRONIC RIGHT SHOULDER PAIN: ICD-10-CM

## 2024-10-05 PROCEDURE — 73030 X-RAY EXAM OF SHOULDER: CPT | Mod: 26,HCNC,RT, | Performed by: RADIOLOGY

## 2024-10-05 PROCEDURE — 73030 X-RAY EXAM OF SHOULDER: CPT | Mod: TC,HCNC,FY,RT

## 2024-10-07 ENCOUNTER — TELEPHONE (OUTPATIENT)
Dept: PRIMARY CARE CLINIC | Facility: CLINIC | Age: 74
End: 2024-10-07
Payer: MEDICARE

## 2024-10-07 NOTE — TELEPHONE ENCOUNTER
"Called pt to discuss labs  Discussed her urine protein is elevated, due to diabetes being uncontrolled. Discussed adding SGLT2-I and how it will help with kidneys and her diabetes. She is very stressed about adding more medication, states she can feel her "blood pressure going up."   She has an appt with pcp on 10/28 and wants to wait until then to discuss with her doctor in person.   She is still taking metformin for her diabetes at this time  Gave her # to scheduled PT for her shoulder. Discussed results of xray    "

## 2024-10-17 DIAGNOSIS — J43.2 CENTRILOBULAR EMPHYSEMA: ICD-10-CM

## 2024-10-17 RX ORDER — BENZONATATE 100 MG/1
CAPSULE ORAL
Qty: 30 CAPSULE | Refills: 0 | Status: SHIPPED | OUTPATIENT
Start: 2024-10-17

## 2024-10-28 ENCOUNTER — OFFICE VISIT (OUTPATIENT)
Dept: PRIMARY CARE CLINIC | Facility: CLINIC | Age: 74
End: 2024-10-28
Payer: MEDICARE

## 2024-10-28 VITALS
BODY MASS INDEX: 22.57 KG/M2 | HEART RATE: 69 BPM | SYSTOLIC BLOOD PRESSURE: 150 MMHG | OXYGEN SATURATION: 100 % | TEMPERATURE: 98 F | DIASTOLIC BLOOD PRESSURE: 90 MMHG | WEIGHT: 140.44 LBS | HEIGHT: 66 IN

## 2024-10-28 DIAGNOSIS — M25.511 CHRONIC RIGHT SHOULDER PAIN: ICD-10-CM

## 2024-10-28 DIAGNOSIS — E11.29 MICROALBUMINURIA DUE TO TYPE 2 DIABETES MELLITUS: ICD-10-CM

## 2024-10-28 DIAGNOSIS — I35.9 AORTIC VALVE CALCIFICATION: ICD-10-CM

## 2024-10-28 DIAGNOSIS — I77.819 AORTIC DILATATION: ICD-10-CM

## 2024-10-28 DIAGNOSIS — J43.2 CENTRILOBULAR EMPHYSEMA: Primary | ICD-10-CM

## 2024-10-28 DIAGNOSIS — I25.10 ATHEROSCLEROSIS OF NATIVE CORONARY ARTERY OF NATIVE HEART WITHOUT ANGINA PECTORIS: ICD-10-CM

## 2024-10-28 DIAGNOSIS — J20.9 CHRONIC BRONCHITIS WITH ACUTE EXACERBATION: ICD-10-CM

## 2024-10-28 DIAGNOSIS — Z23 NEED FOR STREPTOCOCCUS PNEUMONIAE VACCINATION: ICD-10-CM

## 2024-10-28 DIAGNOSIS — I70.0 AORTIC ATHEROSCLEROSIS: ICD-10-CM

## 2024-10-28 DIAGNOSIS — R59.0 LEFT CERVICAL LYMPHADENOPATHY: ICD-10-CM

## 2024-10-28 DIAGNOSIS — G89.29 CHRONIC RIGHT SHOULDER PAIN: ICD-10-CM

## 2024-10-28 DIAGNOSIS — R09.89 DECREASED DORSALIS PEDIS PULSE: ICD-10-CM

## 2024-10-28 DIAGNOSIS — E11.65 TYPE 2 DIABETES MELLITUS WITH HYPERGLYCEMIA, WITHOUT LONG-TERM CURRENT USE OF INSULIN: ICD-10-CM

## 2024-10-28 DIAGNOSIS — F17.200 SMOKER: ICD-10-CM

## 2024-10-28 DIAGNOSIS — R80.9 MICROALBUMINURIA DUE TO TYPE 2 DIABETES MELLITUS: ICD-10-CM

## 2024-10-28 DIAGNOSIS — Z78.0 POSTMENOPAUSAL ESTROGEN DEFICIENCY: ICD-10-CM

## 2024-10-28 DIAGNOSIS — J42 CHRONIC BRONCHITIS WITH ACUTE EXACERBATION: ICD-10-CM

## 2024-10-28 DIAGNOSIS — I10 BENIGN ESSENTIAL HYPERTENSION: ICD-10-CM

## 2024-10-28 DIAGNOSIS — E89.0 POSTSURGICAL HYPOTHYROIDISM: ICD-10-CM

## 2024-10-28 DIAGNOSIS — R91.1 LUNG NODULE: ICD-10-CM

## 2024-10-28 LAB — GLUCOSE SERPL-MCNC: 198 MG/DL (ref 70–110)

## 2024-10-28 PROCEDURE — 1126F AMNT PAIN NOTED NONE PRSNT: CPT | Mod: HCNC,CPTII,S$GLB, | Performed by: INTERNAL MEDICINE

## 2024-10-28 PROCEDURE — 1124F ACP DISCUSS-NO DSCNMKR DOCD: CPT | Mod: HCNC,CPTII,S$GLB, | Performed by: INTERNAL MEDICINE

## 2024-10-28 PROCEDURE — 3080F DIAST BP >= 90 MM HG: CPT | Mod: HCNC,CPTII,S$GLB, | Performed by: INTERNAL MEDICINE

## 2024-10-28 PROCEDURE — 99999 PR PBB SHADOW E&M-EST. PATIENT-LVL V: CPT | Mod: PBBFAC,HCNC,, | Performed by: INTERNAL MEDICINE

## 2024-10-28 PROCEDURE — G0009 ADMIN PNEUMOCOCCAL VACCINE: HCPCS | Mod: HCNC,S$GLB,, | Performed by: INTERNAL MEDICINE

## 2024-10-28 PROCEDURE — 1101F PT FALLS ASSESS-DOCD LE1/YR: CPT | Mod: HCNC,CPTII,S$GLB, | Performed by: INTERNAL MEDICINE

## 2024-10-28 PROCEDURE — 3077F SYST BP >= 140 MM HG: CPT | Mod: HCNC,CPTII,S$GLB, | Performed by: INTERNAL MEDICINE

## 2024-10-28 PROCEDURE — 1160F RVW MEDS BY RX/DR IN RCRD: CPT | Mod: HCNC,CPTII,S$GLB, | Performed by: INTERNAL MEDICINE

## 2024-10-28 PROCEDURE — 1159F MED LIST DOCD IN RCRD: CPT | Mod: HCNC,CPTII,S$GLB, | Performed by: INTERNAL MEDICINE

## 2024-10-28 PROCEDURE — 3060F POS MICROALBUMINURIA REV: CPT | Mod: HCNC,CPTII,S$GLB, | Performed by: INTERNAL MEDICINE

## 2024-10-28 PROCEDURE — 82962 GLUCOSE BLOOD TEST: CPT | Mod: HCNC,S$GLB,, | Performed by: INTERNAL MEDICINE

## 2024-10-28 PROCEDURE — 3066F NEPHROPATHY DOC TX: CPT | Mod: HCNC,CPTII,S$GLB, | Performed by: INTERNAL MEDICINE

## 2024-10-28 PROCEDURE — 4010F ACE/ARB THERAPY RXD/TAKEN: CPT | Mod: HCNC,CPTII,S$GLB, | Performed by: INTERNAL MEDICINE

## 2024-10-28 PROCEDURE — 90677 PCV20 VACCINE IM: CPT | Mod: HCNC,S$GLB,, | Performed by: INTERNAL MEDICINE

## 2024-10-28 PROCEDURE — 3008F BODY MASS INDEX DOCD: CPT | Mod: HCNC,CPTII,S$GLB, | Performed by: INTERNAL MEDICINE

## 2024-10-28 PROCEDURE — 3046F HEMOGLOBIN A1C LEVEL >9.0%: CPT | Mod: HCNC,CPTII,S$GLB, | Performed by: INTERNAL MEDICINE

## 2024-10-28 PROCEDURE — 99215 OFFICE O/P EST HI 40 MIN: CPT | Mod: HCNC,S$GLB,, | Performed by: INTERNAL MEDICINE

## 2024-10-28 PROCEDURE — 3288F FALL RISK ASSESSMENT DOCD: CPT | Mod: HCNC,CPTII,S$GLB, | Performed by: INTERNAL MEDICINE

## 2024-10-28 RX ORDER — HYDROCODONE BITARTRATE AND ACETAMINOPHEN 5; 325 MG/1; MG/1
1 TABLET ORAL EVERY 12 HOURS PRN
Qty: 14 TABLET | Refills: 0 | Status: CANCELLED | OUTPATIENT
Start: 2024-10-28 | End: 2024-11-04

## 2024-10-28 RX ORDER — BENZONATATE 100 MG/1
100 CAPSULE ORAL 3 TIMES DAILY PRN
Qty: 30 CAPSULE | Refills: 11 | Status: SHIPPED | OUTPATIENT
Start: 2024-10-28

## 2024-10-28 RX ORDER — HYDROCODONE BITARTRATE AND ACETAMINOPHEN 5; 325 MG/1; MG/1
1 TABLET ORAL EVERY 12 HOURS PRN
Qty: 30 TABLET | Refills: 0 | Status: SHIPPED | OUTPATIENT
Start: 2024-10-28

## 2024-10-28 RX ORDER — ALBUTEROL SULFATE 0.83 MG/ML
2.5 SOLUTION RESPIRATORY (INHALATION) EVERY 6 HOURS PRN
Qty: 1 EACH | Refills: 11 | Status: SHIPPED | OUTPATIENT
Start: 2024-10-28 | End: 2025-10-28

## 2024-10-28 RX ORDER — LEVOTHYROXINE SODIUM 88 UG/1
88 TABLET ORAL
Qty: 90 TABLET | Refills: 3 | Status: SHIPPED | OUTPATIENT
Start: 2024-10-28 | End: 2025-10-28

## 2024-11-05 ENCOUNTER — LAB VISIT (OUTPATIENT)
Dept: LAB | Facility: HOSPITAL | Age: 74
End: 2024-11-05
Attending: INTERNAL MEDICINE
Payer: MEDICARE

## 2024-11-05 DIAGNOSIS — E11.65 UNCONTROLLED TYPE 2 DIABETES MELLITUS WITH HYPERGLYCEMIA: ICD-10-CM

## 2024-11-05 DIAGNOSIS — I70.0 AORTIC ATHEROSCLEROSIS: ICD-10-CM

## 2024-11-05 DIAGNOSIS — R59.0 LEFT CERVICAL LYMPHADENOPATHY: ICD-10-CM

## 2024-11-05 DIAGNOSIS — E11.65 TYPE 2 DIABETES MELLITUS WITH HYPERGLYCEMIA, WITHOUT LONG-TERM CURRENT USE OF INSULIN: ICD-10-CM

## 2024-11-05 DIAGNOSIS — I25.10 ATHEROSCLEROSIS OF NATIVE CORONARY ARTERY OF NATIVE HEART WITHOUT ANGINA PECTORIS: ICD-10-CM

## 2024-11-05 LAB
ALBUMIN SERPL BCP-MCNC: 4.5 G/DL (ref 3.5–5.2)
ALP SERPL-CCNC: 125 U/L (ref 40–150)
ALT SERPL W/O P-5'-P-CCNC: 22 U/L (ref 10–44)
ANION GAP SERPL CALC-SCNC: 14 MMOL/L (ref 8–16)
AST SERPL-CCNC: 30 U/L (ref 10–40)
BASOPHILS # BLD AUTO: 0.07 K/UL (ref 0–0.2)
BASOPHILS NFR BLD: 0.9 % (ref 0–1.9)
BILIRUB SERPL-MCNC: 0.6 MG/DL (ref 0.1–1)
BUN SERPL-MCNC: 19 MG/DL (ref 8–23)
C PEPTIDE SERPL-MCNC: 4.26 NG/ML (ref 0.78–5.19)
CALCIUM SERPL-MCNC: 10.9 MG/DL (ref 8.7–10.5)
CHLORIDE SERPL-SCNC: 103 MMOL/L (ref 95–110)
CHOLEST SERPL-MCNC: 128 MG/DL (ref 120–199)
CHOLEST/HDLC SERPL: 3.3 {RATIO} (ref 2–5)
CO2 SERPL-SCNC: 24 MMOL/L (ref 23–29)
CREAT SERPL-MCNC: 0.9 MG/DL (ref 0.5–1.4)
DIFFERENTIAL METHOD BLD: NORMAL
EOSINOPHIL # BLD AUTO: 0.3 K/UL (ref 0–0.5)
EOSINOPHIL NFR BLD: 4.2 % (ref 0–8)
ERYTHROCYTE [DISTWIDTH] IN BLOOD BY AUTOMATED COUNT: 13.4 % (ref 11.5–14.5)
EST. GFR  (NO RACE VARIABLE): >60 ML/MIN/1.73 M^2
ESTIMATED AVG GLUCOSE: 212 MG/DL (ref 68–131)
GLUCOSE SERPL-MCNC: 125 MG/DL (ref 70–110)
HBA1C MFR BLD: 9 % (ref 4–5.6)
HCT VFR BLD AUTO: 47.4 % (ref 37–48.5)
HDLC SERPL-MCNC: 39 MG/DL (ref 40–75)
HDLC SERPL: 30.5 % (ref 20–50)
HGB BLD-MCNC: 15.6 G/DL (ref 12–16)
IMM GRANULOCYTES # BLD AUTO: 0.03 K/UL (ref 0–0.04)
IMM GRANULOCYTES NFR BLD AUTO: 0.4 % (ref 0–0.5)
LDLC SERPL CALC-MCNC: 68.6 MG/DL (ref 63–159)
LYMPHOCYTES # BLD AUTO: 2.6 K/UL (ref 1–4.8)
LYMPHOCYTES NFR BLD: 35.3 % (ref 18–48)
MCH RBC QN AUTO: 30.4 PG (ref 27–31)
MCHC RBC AUTO-ENTMCNC: 32.9 G/DL (ref 32–36)
MCV RBC AUTO: 92 FL (ref 82–98)
MONOCYTES # BLD AUTO: 0.6 K/UL (ref 0.3–1)
MONOCYTES NFR BLD: 8.2 % (ref 4–15)
NEUTROPHILS # BLD AUTO: 3.8 K/UL (ref 1.8–7.7)
NEUTROPHILS NFR BLD: 51 % (ref 38–73)
NONHDLC SERPL-MCNC: 89 MG/DL
NRBC BLD-RTO: 0 /100 WBC
PLATELET # BLD AUTO: 340 K/UL (ref 150–450)
PMV BLD AUTO: 10.2 FL (ref 9.2–12.9)
POTASSIUM SERPL-SCNC: 4.6 MMOL/L (ref 3.5–5.1)
PROT SERPL-MCNC: 9.1 G/DL (ref 6–8.4)
RBC # BLD AUTO: 5.14 M/UL (ref 4–5.4)
SODIUM SERPL-SCNC: 141 MMOL/L (ref 136–145)
TRIGL SERPL-MCNC: 102 MG/DL (ref 30–150)
WBC # BLD AUTO: 7.4 K/UL (ref 3.9–12.7)

## 2024-11-05 PROCEDURE — 86337 INSULIN ANTIBODIES: CPT | Mod: HCNC | Performed by: INTERNAL MEDICINE

## 2024-11-05 PROCEDURE — 86341 ISLET CELL ANTIBODY: CPT | Mod: HCNC | Performed by: INTERNAL MEDICINE

## 2024-11-05 PROCEDURE — 83036 HEMOGLOBIN GLYCOSYLATED A1C: CPT | Mod: HCNC | Performed by: INTERNAL MEDICINE

## 2024-11-05 PROCEDURE — 84681 ASSAY OF C-PEPTIDE: CPT | Mod: HCNC | Performed by: INTERNAL MEDICINE

## 2024-11-05 PROCEDURE — 80061 LIPID PANEL: CPT | Mod: HCNC | Performed by: INTERNAL MEDICINE

## 2024-11-05 PROCEDURE — 80053 COMPREHEN METABOLIC PANEL: CPT | Mod: HCNC | Performed by: INTERNAL MEDICINE

## 2024-11-05 PROCEDURE — 85025 COMPLETE CBC W/AUTO DIFF WBC: CPT | Mod: HCNC | Performed by: INTERNAL MEDICINE

## 2024-11-05 PROCEDURE — 36415 COLL VENOUS BLD VENIPUNCTURE: CPT | Mod: HCNC | Performed by: INTERNAL MEDICINE

## 2024-11-06 RX ORDER — LANCETS
EACH MISCELLANEOUS
Qty: 200 EACH | Refills: 0 | Status: SHIPPED | OUTPATIENT
Start: 2024-11-06

## 2024-11-06 RX ORDER — DEXTROSE 4 G
TABLET,CHEWABLE ORAL
Qty: 1 EACH | Refills: 0 | Status: SHIPPED | OUTPATIENT
Start: 2024-11-06

## 2024-11-06 NOTE — TELEPHONE ENCOUNTER
Please call and notify pt:  Sugars are better than July but still not quite at goal. Was she able to get and start the jardiance 25mg daily?  Calcium is high. Would recommend stopping any OTC calcium supplements.  Cholesterol is much better.  Reminder to check her BPs at home and also to bring cuff and log to appt w/ Heather on the 11th.

## 2024-11-07 LAB — INSULIN AB SER-SCNC: 0 NMOL/L (ref 0–0.02)

## 2024-11-08 LAB — GAD65 AB SER-SCNC: 0.03 NMOL/L

## 2024-11-12 ENCOUNTER — OFFICE VISIT (OUTPATIENT)
Dept: PRIMARY CARE CLINIC | Facility: CLINIC | Age: 74
End: 2024-11-12
Payer: MEDICARE

## 2024-11-12 VITALS
WEIGHT: 138.31 LBS | BODY MASS INDEX: 22.33 KG/M2 | HEART RATE: 73 BPM | SYSTOLIC BLOOD PRESSURE: 138 MMHG | DIASTOLIC BLOOD PRESSURE: 82 MMHG | OXYGEN SATURATION: 98 %

## 2024-11-12 DIAGNOSIS — R80.9 TYPE 2 DIABETES MELLITUS WITH DIABETIC MICROALBUMINURIA, WITHOUT LONG-TERM CURRENT USE OF INSULIN: ICD-10-CM

## 2024-11-12 DIAGNOSIS — E11.29 TYPE 2 DIABETES MELLITUS WITH DIABETIC MICROALBUMINURIA, WITHOUT LONG-TERM CURRENT USE OF INSULIN: ICD-10-CM

## 2024-11-12 DIAGNOSIS — I10 ESSENTIAL HYPERTENSION: Primary | ICD-10-CM

## 2024-11-12 DIAGNOSIS — E03.9 HYPOTHYROIDISM, UNSPECIFIED TYPE: ICD-10-CM

## 2024-11-12 LAB
ANION GAP SERPL CALC-SCNC: 9 MMOL/L (ref 8–16)
BUN SERPL-MCNC: 18 MG/DL (ref 8–23)
CALCIUM SERPL-MCNC: 10.2 MG/DL (ref 8.7–10.5)
CHLORIDE SERPL-SCNC: 106 MMOL/L (ref 95–110)
CO2 SERPL-SCNC: 23 MMOL/L (ref 23–29)
CREAT SERPL-MCNC: 0.8 MG/DL (ref 0.5–1.4)
EST. GFR  (NO RACE VARIABLE): >60 ML/MIN/1.73 M^2
GLUCOSE SERPL-MCNC: 104 MG/DL (ref 70–110)
POTASSIUM SERPL-SCNC: 4.2 MMOL/L (ref 3.5–5.1)
SODIUM SERPL-SCNC: 138 MMOL/L (ref 136–145)

## 2024-11-12 PROCEDURE — 3008F BODY MASS INDEX DOCD: CPT | Mod: HCNC,CPTII,S$GLB, | Performed by: PHYSICIAN ASSISTANT

## 2024-11-12 PROCEDURE — 1126F AMNT PAIN NOTED NONE PRSNT: CPT | Mod: HCNC,CPTII,S$GLB, | Performed by: PHYSICIAN ASSISTANT

## 2024-11-12 PROCEDURE — 99214 OFFICE O/P EST MOD 30 MIN: CPT | Mod: HCNC,S$GLB,, | Performed by: PHYSICIAN ASSISTANT

## 2024-11-12 PROCEDURE — 1159F MED LIST DOCD IN RCRD: CPT | Mod: HCNC,CPTII,S$GLB, | Performed by: PHYSICIAN ASSISTANT

## 2024-11-12 PROCEDURE — 1101F PT FALLS ASSESS-DOCD LE1/YR: CPT | Mod: HCNC,CPTII,S$GLB, | Performed by: PHYSICIAN ASSISTANT

## 2024-11-12 PROCEDURE — 3060F POS MICROALBUMINURIA REV: CPT | Mod: HCNC,CPTII,S$GLB, | Performed by: PHYSICIAN ASSISTANT

## 2024-11-12 PROCEDURE — 1158F ADVNC CARE PLAN TLK DOCD: CPT | Mod: HCNC,CPTII,S$GLB, | Performed by: PHYSICIAN ASSISTANT

## 2024-11-12 PROCEDURE — 80048 BASIC METABOLIC PNL TOTAL CA: CPT | Mod: HCNC | Performed by: PHYSICIAN ASSISTANT

## 2024-11-12 PROCEDURE — 3288F FALL RISK ASSESSMENT DOCD: CPT | Mod: HCNC,CPTII,S$GLB, | Performed by: PHYSICIAN ASSISTANT

## 2024-11-12 PROCEDURE — 4010F ACE/ARB THERAPY RXD/TAKEN: CPT | Mod: HCNC,CPTII,S$GLB, | Performed by: PHYSICIAN ASSISTANT

## 2024-11-12 PROCEDURE — 3079F DIAST BP 80-89 MM HG: CPT | Mod: HCNC,CPTII,S$GLB, | Performed by: PHYSICIAN ASSISTANT

## 2024-11-12 PROCEDURE — 3066F NEPHROPATHY DOC TX: CPT | Mod: HCNC,CPTII,S$GLB, | Performed by: PHYSICIAN ASSISTANT

## 2024-11-12 PROCEDURE — 1160F RVW MEDS BY RX/DR IN RCRD: CPT | Mod: HCNC,CPTII,S$GLB, | Performed by: PHYSICIAN ASSISTANT

## 2024-11-12 PROCEDURE — 99999 PR PBB SHADOW E&M-EST. PATIENT-LVL III: CPT | Mod: PBBFAC,HCNC,, | Performed by: PHYSICIAN ASSISTANT

## 2024-11-12 PROCEDURE — 3052F HG A1C>EQUAL 8.0%<EQUAL 9.0%: CPT | Mod: HCNC,CPTII,S$GLB, | Performed by: PHYSICIAN ASSISTANT

## 2024-11-12 PROCEDURE — 3075F SYST BP GE 130 - 139MM HG: CPT | Mod: HCNC,CPTII,S$GLB, | Performed by: PHYSICIAN ASSISTANT

## 2024-11-12 NOTE — PROGRESS NOTES
Primary Care Provider Appointment   [facility]        Subjective:       Patient ID:  Alia is a 74 y.o. female being seen for Hypertension and Diabetes      Chief Complaint: Hypertension and Diabetes    HPI: 75 yo female presents for htn and diabetes follow up.  Stopped metformin, taking jardiance now. No issues with new medication. No longer have diarrhea. Denies dysuria or vaginal discharge. Eating better. Not doing finger sticks states is waiting for glucometer from [facility].  HTN- on losartan 50 mg. Pressure better today 138/82 but not at goal. Does not have a home cuff. She is interested in digital medicine. Denies CP, SOB, HA.    Past Medical History:   Diagnosis Date    Allergy     Arthritis     Cancer     breast    Diabetes mellitus type 2 in nonobese     Goiter     History of endometrial ablation     age 29    Hypertension     Substernal goiter 11/5/2018    Tobacco abuse     Vitamin D deficiency        Review of Systems   Constitutional:  Negative for fever.   Respiratory:  Negative for shortness of breath.    Cardiovascular:  Negative for chest pain and leg swelling.   Neurological:  Negative for headaches.             Health Maintenance         Date Due Completion Date    Shingles Vaccine (1 of 2) Never done ---    Colorectal Cancer Screening Never done ---    RSV Vaccine (Age 60+ and Pregnant patients) (1 - Risk 60-74 years 1-dose series) Never done ---    LDCT Lung Screen 06/06/2020 6/6/2019    COVID-19 Vaccine (3 - Moderna risk series) 05/26/2021 4/28/2021    DEXA Scan 05/25/2024 5/25/2022    Influenza Vaccine (1) 09/01/2024 11/25/2019    Hemoglobin A1c 02/05/2025 11/5/2024    Eye Exam 02/20/2025 2/20/2024    Mammogram 08/20/2025 8/20/2024    Diabetes Urine Screening 09/30/2025 9/30/2024    Foot Exam 10/28/2025 10/28/2024    Override on 12/17/2020: Done    Override on 5/22/2019: Done    Lipid Panel 11/05/2025 11/5/2024    High Dose Statin 11/12/2025 11/12/2024  "   TETANUS VACCINE 02/16/2032 2/16/2022            Advance Care Planning     Date: 11/12/2024    Power of   I initiated the process of voluntary advance care planning today and explained the importance of this process to the patient.  I introduced the concept of advance directives to the patient, as well. Then the patient received detailed information about the importance of designating a Health Care Power of  (HCPOA). She was also instructed to communicate with this person about their wishes for future healthcare, should she become sick and lose decision-making capacity. The patient has not previously appointed a HCPOA. After our discussion, the patient has decided to complete a HCPOA and has appointed her daughter, health care agent:  Beatriz Andre  & health care agent number: [phone]. I encouraged her to communicate with this person about their wishes for future healthcare, should she become sick and lose decision-making capacity.       A total of 5 min was spent on advance care planning, goals of care discussion, emotional support, formulating and communicating prognosis and exploring burden/benefit of various approaches of treatment. This discussion occurred on a fully voluntary basis with the verbal consent of the patient and/or family.         Objective:      Vitals:    11/12/24 0939   BP: 138/82   BP Location: Left arm   Patient Position: Sitting   Pulse: 73   SpO2: 98%   Weight: 62.8 kg (138 lb 5.4 oz)     Estimated body mass index is 22.33 kg/m² as calculated from the following:    Height as of 10/28/24: 5' 6" (1.676 m).    Weight as of this encounter: 62.8 kg (138 lb 5.4 oz).  Physical Exam  Constitutional:       Appearance: Normal appearance.   HENT:      Head: Normocephalic and atraumatic.   Cardiovascular:      Rate and Rhythm: Normal rate and regular rhythm.   Pulmonary:      Effort: Pulmonary effort is normal.      Breath sounds: Normal breath sounds.   Neurological:      Mental " Status: She is alert.   Psychiatric:         Mood and Affect: Mood normal.         Thought Content: Thought content normal.           Assessment and Plan:         1. Essential hypertension  Assessment & Plan:  Above goal. Discussed with pt goal is below 130/80. She plans on starting to walk again. Check BMP today, then will likely increase losartan to 100 mg as she has microalbumbinuria  Will have her join digital medicine    Orders:  -     Hypertension Digital Medicine (HDMP) Enrollment Order  -     Basic Metabolic Panel; Future; Expected date: 11/12/2024  -     Comprehensive Metabolic Panel; Future; Expected date: 11/12/2024    2. Type 2 diabetes mellitus with diabetic microalbuminuria, without long-term current use of insulin  Assessment & Plan:  Doing well with radha, f/u Cr normal  Glucose 104 on BMP  Repeat hga1c in Feb 2025  Increase losartan to 100 mg due to microalbuminuria and better BP control    Orders:  -     Hemoglobin A1C; Future; Expected date: 11/12/2024    3. Hypothyroidism, unspecified type  Assessment & Plan:  Continue current dose of synthroid, TSH in Feb 2025    Orders:  -     TSH; Future; Expected date: 11/12/2024         Follow Up:   F/u in 3 months with pcp        Amy Lado, PA-C [facility]

## 2024-11-13 PROBLEM — R80.9 TYPE 2 DIABETES MELLITUS WITH DIABETIC MICROALBUMINURIA, WITHOUT LONG-TERM CURRENT USE OF INSULIN: Status: ACTIVE | Noted: 2018-10-24

## 2024-11-13 PROBLEM — E11.29 TYPE 2 DIABETES MELLITUS WITH DIABETIC MICROALBUMINURIA, WITHOUT LONG-TERM CURRENT USE OF INSULIN: Status: ACTIVE | Noted: 2018-10-24

## 2024-11-13 NOTE — ASSESSMENT & PLAN NOTE
Above goal. Discussed with pt goal is below 130/80. She plans on starting to walk again. Check BMP today, then will likely increase losartan to 100 mg as she has microalbumbinuria  Will have her join digital medicine

## 2024-11-13 NOTE — ASSESSMENT & PLAN NOTE
Doing well with jardaince, f/u Cr normal  Glucose 104 on BMP  Repeat hga1c in Feb 2025  Increase losartan to 100 mg due to microalbuminuria and better BP control

## 2024-11-14 ENCOUNTER — TELEPHONE (OUTPATIENT)
Dept: PRIMARY CARE CLINIC | Facility: CLINIC | Age: 74
End: 2024-11-14
Payer: MEDICARE

## 2024-11-14 NOTE — LETTER
November 14, 2024    Alia Andre  [address] [address]         [facility]  Primary Care  [address] [facility] [address] [id number] Below are the results from your recent lab visit:    Dr Poe wanted to let you know that your cholesterol is better.     A1C is better, and went down to 9. It was 12.9 in July 2024.    Dr Poe checked some additional lab test and it looks like you're starting to develop some antibodies that's going to make treating your diabetes tougher.   At this time, Dr. Poe recommends starting a long acting insulin 5 units nightly to better control your diabetes. Dr Poe understands that you did talk to the PA, Ms. Romero about this, and are hesitant to start this. Please continue adjusting your diet, and add light exercise to help with this.      Your Calcium is high, please stop any Calcium supplements at this time    Please call the clinic at [phone]  and let us know how your blood pressure is doing.   You should be taking Losartan 100 mg a day every morning and taking your blood pressure measurements and hour after taking your medication.     Please let us know if you have gotten your glucometer yet.    Thank you!     Liu RN  [facility]

## 2024-11-14 NOTE — TELEPHONE ENCOUNTER
Please call and notify pt (part of this message was from 11/6 but not sure if anyone talked to her about it):  Cholesterol is better.   A1c is better from 12.9 7/31/24 to 9 11/4/24. I checked some additional lab test and it looks like she's starting to develop some antibodies that's going to make treating her diabetes tougher. At this time, I would recommend starting a long acting insulin 5 units nightly to better control her diabetes. Let me know what she says. If needed, can schedule appt to discuss in person.  Calcium is high. Avoid calcium supplement.  Also, I think when Heather saw her, increased losartan to 100mg dialy. Can you see how her BP is doing? Would need to repeat CMP within a mo to make sure Cr and K is ok.

## 2024-11-19 ENCOUNTER — TELEPHONE (OUTPATIENT)
Dept: PRIMARY CARE CLINIC | Facility: CLINIC | Age: 74
End: 2024-11-19
Payer: MEDICARE

## 2024-11-19 NOTE — TELEPHONE ENCOUNTER
Spoke with pt about digital med program. Pt has not enrolled yet because she was waiting on the device to come in the mail. Explained to pt she must enroll online before device will be mailed, link in message on pt portal from 10/8. Pt stated she will have her daughter help her soon. Gave pt digital med support line number.

## 2024-11-26 ENCOUNTER — TELEPHONE (OUTPATIENT)
Dept: PRIMARY CARE CLINIC | Facility: CLINIC | Age: 74
End: 2024-11-26
Payer: MEDICARE

## 2024-11-26 DIAGNOSIS — I10 ESSENTIAL HYPERTENSION: Primary | ICD-10-CM

## 2024-11-26 DIAGNOSIS — I15.2 HYPERTENSION ASSOCIATED WITH DIABETES: ICD-10-CM

## 2024-11-26 DIAGNOSIS — E11.59 HYPERTENSION ASSOCIATED WITH DIABETES: ICD-10-CM

## 2024-11-26 NOTE — TELEPHONE ENCOUNTER
Call to patient to discuss labs to be drawn in two weeks.     Recent change in Losartan to increase dosage to 50mg/day. BP today 125/80, patient not sure because she did not write down.     Instructed to write BP down daily with date taken.    After discussion with Heather Loja PA-C, patient to be scheduled for nurse visit in two weeks with lab in clinic. Instructed to bring BP cuff and log of BP taken.    Instructed if BP goes below 110 systolic and she is having any dizziness or weakness to report to clinic.     Patient to be scheduled with Rodney for visit to clinic.

## 2024-11-26 NOTE — TELEPHONE ENCOUNTER
----- Message from Heather Loja PA-C sent at 11/14/2024  9:46 AM CST -----  BMP in 2 weeks increased losartan

## 2024-12-10 ENCOUNTER — TELEPHONE (OUTPATIENT)
Dept: PRIMARY CARE CLINIC | Facility: CLINIC | Age: 74
End: 2024-12-10
Payer: MEDICARE

## 2024-12-11 NOTE — TELEPHONE ENCOUNTER
Call to patient for follow-up with missed visit.  No answer, unable to leave message.     Message to be sent in My Chart for follow-up

## 2024-12-17 ENCOUNTER — LAB VISIT (OUTPATIENT)
Dept: LAB | Facility: HOSPITAL | Age: 74
End: 2024-12-17
Payer: MEDICARE

## 2024-12-17 DIAGNOSIS — I10 ESSENTIAL HYPERTENSION: ICD-10-CM

## 2024-12-17 DIAGNOSIS — I15.2 HYPERTENSION ASSOCIATED WITH DIABETES: ICD-10-CM

## 2024-12-17 DIAGNOSIS — E11.59 HYPERTENSION ASSOCIATED WITH DIABETES: ICD-10-CM

## 2024-12-17 LAB
ANION GAP SERPL CALC-SCNC: 9 MMOL/L (ref 8–16)
BUN SERPL-MCNC: 14 MG/DL (ref 8–23)
CALCIUM SERPL-MCNC: 9.2 MG/DL (ref 8.7–10.5)
CHLORIDE SERPL-SCNC: 106 MMOL/L (ref 95–110)
CO2 SERPL-SCNC: 25 MMOL/L (ref 23–29)
CREAT SERPL-MCNC: 0.8 MG/DL (ref 0.5–1.4)
EST. GFR  (NO RACE VARIABLE): >60 ML/MIN/1.73 M^2
GLUCOSE SERPL-MCNC: 121 MG/DL (ref 70–110)
POTASSIUM SERPL-SCNC: 4 MMOL/L (ref 3.5–5.1)
SODIUM SERPL-SCNC: 140 MMOL/L (ref 136–145)

## 2024-12-17 PROCEDURE — 36415 COLL VENOUS BLD VENIPUNCTURE: CPT | Mod: HCNC | Performed by: PHYSICIAN ASSISTANT

## 2024-12-17 PROCEDURE — 80048 BASIC METABOLIC PNL TOTAL CA: CPT | Mod: HCNC | Performed by: PHYSICIAN ASSISTANT

## 2024-12-19 ENCOUNTER — TELEPHONE (OUTPATIENT)
Dept: PRIMARY CARE CLINIC | Facility: CLINIC | Age: 74
End: 2024-12-19
Payer: MEDICARE

## 2024-12-19 VITALS — DIASTOLIC BLOOD PRESSURE: 77 MMHG | SYSTOLIC BLOOD PRESSURE: 132 MMHG

## 2024-12-19 NOTE — TELEPHONE ENCOUNTER
----- Message from Heather Loja PA-C sent at 12/19/2024  8:56 AM CST -----  Please inform pt her kidney function is stable, continue losartan 100 mg

## 2024-12-19 NOTE — TELEPHONE ENCOUNTER
Call to patient to inform pt her kidney function is stable, continue losartan 100 mg     BP today was 132/77. Patient is taking Losartan 100mg as prescribed. Patient unsure if cuff is working correctly. Advised to log all BP readings and bring list and BP cuff (for calibration) to next clinic visit.    She verbalized understanding.

## 2024-12-19 NOTE — TELEPHONE ENCOUNTER
----- Message from Feli sent at 12/19/2024  9:45 AM CST -----  Contact: [phone]  Patient is returning a phone call.    Who left a message for the patient: Amina Goldstein RN      Does patient know what this is regarding:  Medications     Would you like a call back, or a response through your [facility] portal?:   call back     Comments:   Responded to patient in e-advice.

## 2025-01-30 DIAGNOSIS — Z00.00 ENCOUNTER FOR MEDICARE ANNUAL WELLNESS EXAM: ICD-10-CM

## 2025-02-12 ENCOUNTER — TELEPHONE (OUTPATIENT)
Dept: PRIMARY CARE CLINIC | Facility: CLINIC | Age: 75
End: 2025-02-12
Payer: MEDICARE

## 2025-02-12 ENCOUNTER — PATIENT MESSAGE (OUTPATIENT)
Dept: INTERNAL MEDICINE | Facility: CLINIC | Age: 75
End: 2025-02-12
Payer: MEDICARE

## 2025-02-13 ENCOUNTER — TELEPHONE (OUTPATIENT)
Dept: PRIMARY CARE CLINIC | Facility: CLINIC | Age: 75
End: 2025-02-13
Payer: MEDICARE

## 2025-02-13 NOTE — TELEPHONE ENCOUNTER
I spoke with Ms. Alia to reschedule her appointment she asked if she can call back Monday to reschedule.

## 2025-02-26 DIAGNOSIS — M25.511 CHRONIC RIGHT SHOULDER PAIN: ICD-10-CM

## 2025-02-26 DIAGNOSIS — G89.29 CHRONIC RIGHT SHOULDER PAIN: ICD-10-CM

## 2025-02-26 RX ORDER — HYDROCODONE BITARTRATE AND ACETAMINOPHEN 5; 325 MG/1; MG/1
1 TABLET ORAL EVERY 12 HOURS PRN
Qty: 30 TABLET | Refills: 0 | Status: SHIPPED | OUTPATIENT
Start: 2025-02-26 | End: 2025-02-27 | Stop reason: SDUPTHER

## 2025-02-26 NOTE — TELEPHONE ENCOUNTER
I spoke with MsKarmen Alia to reschedule her elder from 2/12. She scheduled for 4/9 first available. I offered her an appointment tomorrow with Dr. Tinajero she declined she only wants to see Dr. Poe. She is asking for a refill on her pain med he last refilled 12/17.

## 2025-02-27 DIAGNOSIS — G89.29 CHRONIC RIGHT SHOULDER PAIN: ICD-10-CM

## 2025-02-27 DIAGNOSIS — M25.511 CHRONIC RIGHT SHOULDER PAIN: ICD-10-CM

## 2025-02-27 RX ORDER — HYDROCODONE BITARTRATE AND ACETAMINOPHEN 5; 325 MG/1; MG/1
1 TABLET ORAL EVERY 12 HOURS PRN
Qty: 30 TABLET | Refills: 0 | Status: SHIPPED | OUTPATIENT
Start: 2025-02-27

## 2025-02-27 NOTE — TELEPHONE ENCOUNTER
Med was sent in to [facility] on yesterday by PCP   Spoke w/ [facility] and [facility] cancelled with them    Pt would like this medication [facility]

## 2025-02-27 NOTE — TELEPHONE ENCOUNTER
----- Message from Med Assistant Dutton sent at 2/27/2025 11:20 AM CST -----  Contact: [phone]  Prescription refill request.Alia (patient)RX name and strength (copy/paste from chart):   HYDROcodone-acetaminophen (NORCO) 5-325 mg per tabletDirections (copy/paste from chart):   Take 1 tablet by mouth every 12 (twelve) hours as needed for Pain. - OralIs this a 30 day or 90 day RX:  30 dayLocal pharmacy or mail order pharmacy:  localPharmacy name and phone # (copy/paste from chart):  [facility]/pharmacy #[id number] - [place], [place] - [address] [place] [address]Cubdg: [phone] Fax: [phone]: Not open 24 hoursAdditional information:  n/a

## 2025-03-30 DIAGNOSIS — E11.65 UNCONTROLLED TYPE 2 DIABETES MELLITUS WITH HYPERGLYCEMIA: ICD-10-CM

## 2025-03-31 RX ORDER — LANCETS
EACH MISCELLANEOUS
Qty: 100 EACH | Refills: 3 | Status: SHIPPED | OUTPATIENT
Start: 2025-03-31

## 2025-03-31 RX ORDER — BLOOD SUGAR DIAGNOSTIC
STRIP MISCELLANEOUS
Qty: 100 STRIP | Refills: 3 | Status: SHIPPED | OUTPATIENT
Start: 2025-03-31

## 2025-04-09 DIAGNOSIS — E78.5 HYPERLIPIDEMIA ASSOCIATED WITH TYPE 2 DIABETES MELLITUS: ICD-10-CM

## 2025-04-09 DIAGNOSIS — I10 ESSENTIAL HYPERTENSION: ICD-10-CM

## 2025-04-09 DIAGNOSIS — E11.69 HYPERLIPIDEMIA ASSOCIATED WITH TYPE 2 DIABETES MELLITUS: ICD-10-CM

## 2025-04-09 DIAGNOSIS — E11.65 TYPE 2 DIABETES MELLITUS WITH HYPERGLYCEMIA, WITHOUT LONG-TERM CURRENT USE OF INSULIN: ICD-10-CM

## 2025-04-09 RX ORDER — LOSARTAN POTASSIUM 100 MG/1
100 TABLET ORAL DAILY
Qty: 90 TABLET | Refills: 3 | Status: SHIPPED | OUTPATIENT
Start: 2025-04-09 | End: 2026-04-09

## 2025-04-09 NOTE — TELEPHONE ENCOUNTER
Called Ms. Rojo to reschedule her missed elder with Dr. Poe today and is now rescheduled for 5/26 with labs prior on 5/19. Reminders mailed to her home.

## 2025-04-14 DIAGNOSIS — J43.2 CENTRILOBULAR EMPHYSEMA: ICD-10-CM

## 2025-04-14 RX ORDER — ALBUTEROL SULFATE 90 UG/1
1-2 INHALANT RESPIRATORY (INHALATION) EVERY 6 HOURS PRN
Qty: 18 G | Refills: 3 | Status: SHIPPED | OUTPATIENT
Start: 2025-04-14

## 2025-04-16 ENCOUNTER — TELEPHONE (OUTPATIENT)
Dept: PRIMARY CARE CLINIC | Facility: CLINIC | Age: 75
End: 2025-04-16
Payer: MEDICARE

## 2025-04-17 NOTE — TELEPHONE ENCOUNTER
Pt calling to get Rx for Norco filled. Pt states she is going out of town for a few weeks and wanted some medication  Explained to pt that PCP cannot fill Rx for controlled medication without seeing pt.    Patient last PCP appt was in October of 2024   Upcoming appt at and of May

## 2025-04-23 DIAGNOSIS — E11.65 UNCONTROLLED TYPE 2 DIABETES MELLITUS WITH HYPERGLYCEMIA: ICD-10-CM

## 2025-04-24 RX ORDER — BLOOD-GLUCOSE METER
EACH MISCELLANEOUS
Qty: 1 EACH | Refills: 3 | Status: SHIPPED | OUTPATIENT
Start: 2025-04-24

## 2025-05-01 ENCOUNTER — OFFICE VISIT (OUTPATIENT)
Dept: PRIMARY CARE CLINIC | Facility: CLINIC | Age: 75
End: 2025-05-01
Payer: MEDICARE

## 2025-05-01 VITALS
SYSTOLIC BLOOD PRESSURE: 138 MMHG | TEMPERATURE: 98 F | BODY MASS INDEX: 21.79 KG/M2 | DIASTOLIC BLOOD PRESSURE: 80 MMHG | OXYGEN SATURATION: 98 % | WEIGHT: 135.56 LBS | HEIGHT: 66 IN | HEART RATE: 68 BPM

## 2025-05-01 DIAGNOSIS — E11.65 TYPE 2 DIABETES MELLITUS WITH HYPERGLYCEMIA, WITHOUT LONG-TERM CURRENT USE OF INSULIN: Primary | ICD-10-CM

## 2025-05-01 DIAGNOSIS — G47.00 INSOMNIA, UNSPECIFIED TYPE: ICD-10-CM

## 2025-05-01 DIAGNOSIS — Z78.0 POSTMENOPAUSAL ESTROGEN DEFICIENCY: ICD-10-CM

## 2025-05-01 DIAGNOSIS — M25.511 CHRONIC RIGHT SHOULDER PAIN: ICD-10-CM

## 2025-05-01 DIAGNOSIS — E11.29 MICROALBUMINURIA DUE TO TYPE 2 DIABETES MELLITUS: ICD-10-CM

## 2025-05-01 DIAGNOSIS — G89.29 CHRONIC RIGHT SHOULDER PAIN: ICD-10-CM

## 2025-05-01 DIAGNOSIS — J43.9 PULMONARY EMPHYSEMA, UNSPECIFIED EMPHYSEMA TYPE: ICD-10-CM

## 2025-05-01 DIAGNOSIS — F17.200 SMOKER: ICD-10-CM

## 2025-05-01 DIAGNOSIS — E89.0 POSTSURGICAL HYPOTHYROIDISM: ICD-10-CM

## 2025-05-01 DIAGNOSIS — M79.2 NEUROPATHIC PAIN OF FOOT, RIGHT: ICD-10-CM

## 2025-05-01 DIAGNOSIS — I70.0 AORTIC ATHEROSCLEROSIS: ICD-10-CM

## 2025-05-01 DIAGNOSIS — Z12.31 ENCOUNTER FOR SCREENING MAMMOGRAM FOR BREAST CANCER: ICD-10-CM

## 2025-05-01 DIAGNOSIS — I10 BENIGN ESSENTIAL HYPERTENSION: ICD-10-CM

## 2025-05-01 DIAGNOSIS — E78.5 HYPERLIPIDEMIA ASSOCIATED WITH TYPE 2 DIABETES MELLITUS: ICD-10-CM

## 2025-05-01 DIAGNOSIS — E11.69 HYPERLIPIDEMIA ASSOCIATED WITH TYPE 2 DIABETES MELLITUS: ICD-10-CM

## 2025-05-01 DIAGNOSIS — R80.9 MICROALBUMINURIA DUE TO TYPE 2 DIABETES MELLITUS: ICD-10-CM

## 2025-05-01 DIAGNOSIS — Z90.11 H/O RIGHT MASTECTOMY: ICD-10-CM

## 2025-05-01 DIAGNOSIS — Z12.11 COLON CANCER SCREENING: ICD-10-CM

## 2025-05-01 DIAGNOSIS — Z85.3 HISTORY OF BREAST CANCER: ICD-10-CM

## 2025-05-01 PROBLEM — C50.911 MALIGNANT NEOPLASM OF RIGHT FEMALE BREAST: Status: RESOLVED | Noted: 2018-11-26 | Resolved: 2025-05-01

## 2025-05-01 PROBLEM — F32.1 MODERATE MAJOR DEPRESSION: Status: RESOLVED | Noted: 2024-07-25 | Resolved: 2025-05-01

## 2025-05-01 LAB
ABSOLUTE EOSINOPHIL (OHS): 0.21 K/UL
ABSOLUTE MONOCYTE (OHS): 0.46 K/UL (ref 0.3–1)
ABSOLUTE NEUTROPHIL COUNT (OHS): 3.82 K/UL (ref 1.8–7.7)
ALBUMIN SERPL BCP-MCNC: 3.8 G/DL (ref 3.5–5.2)
ALBUMIN/CREAT UR: 109.4 UG/MG
ALP SERPL-CCNC: 102 UNIT/L (ref 40–150)
ALT SERPL W/O P-5'-P-CCNC: 17 UNIT/L (ref 10–44)
ANION GAP (OHS): 7 MMOL/L (ref 8–16)
AST SERPL-CCNC: 18 UNIT/L (ref 11–45)
BASOPHILS # BLD AUTO: 0.04 K/UL
BASOPHILS NFR BLD AUTO: 0.6 %
BILIRUB SERPL-MCNC: 0.5 MG/DL (ref 0.1–1)
BUN SERPL-MCNC: 18 MG/DL (ref 8–23)
CALCIUM SERPL-MCNC: 9.4 MG/DL (ref 8.7–10.5)
CHLORIDE SERPL-SCNC: 110 MMOL/L (ref 95–110)
CHOLEST SERPL-MCNC: 209 MG/DL (ref 120–199)
CHOLEST/HDLC SERPL: 4.9 {RATIO} (ref 2–5)
CO2 SERPL-SCNC: 24 MMOL/L (ref 23–29)
CREAT SERPL-MCNC: 0.7 MG/DL (ref 0.5–1.4)
CREAT UR-MCNC: 96 MG/DL (ref 15–325)
EAG (OHS): 169 MG/DL (ref 68–131)
ERYTHROCYTE [DISTWIDTH] IN BLOOD BY AUTOMATED COUNT: 14.2 % (ref 11.5–14.5)
GFR SERPLBLD CREATININE-BSD FMLA CKD-EPI: >60 ML/MIN/1.73/M2
GLUCOSE SERPL-MCNC: 124 MG/DL (ref 70–110)
HBA1C MFR BLD: 7.5 % (ref 4–5.6)
HCT VFR BLD AUTO: 44.8 % (ref 37–48.5)
HDLC SERPL-MCNC: 43 MG/DL (ref 40–75)
HDLC SERPL: 20.6 % (ref 20–50)
HGB BLD-MCNC: 14.2 GM/DL (ref 12–16)
IMM GRANULOCYTES # BLD AUTO: 0.02 K/UL (ref 0–0.04)
IMM GRANULOCYTES NFR BLD AUTO: 0.3 % (ref 0–0.5)
LDLC SERPL CALC-MCNC: 147.6 MG/DL (ref 63–159)
LYMPHOCYTES # BLD AUTO: 2.13 K/UL (ref 1–4.8)
MCH RBC QN AUTO: 28.3 PG (ref 27–31)
MCHC RBC AUTO-ENTMCNC: 31.7 G/DL (ref 32–36)
MCV RBC AUTO: 89 FL (ref 82–98)
MICROALBUMIN UR-MCNC: 105 UG/ML (ref ?–5000)
NONHDLC SERPL-MCNC: 166 MG/DL
NUCLEATED RBC (/100WBC) (OHS): 0 /100 WBC
PLATELET # BLD AUTO: 268 K/UL (ref 150–450)
PMV BLD AUTO: 10.4 FL (ref 9.2–12.9)
POTASSIUM SERPL-SCNC: 4.3 MMOL/L (ref 3.5–5.1)
PROT SERPL-MCNC: 7.9 GM/DL (ref 6–8.4)
RBC # BLD AUTO: 5.02 M/UL (ref 4–5.4)
RELATIVE EOSINOPHIL (OHS): 3.1 %
RELATIVE LYMPHOCYTE (OHS): 31.9 % (ref 18–48)
RELATIVE MONOCYTE (OHS): 6.9 % (ref 4–15)
RELATIVE NEUTROPHIL (OHS): 57.2 % (ref 38–73)
SODIUM SERPL-SCNC: 141 MMOL/L (ref 136–145)
TRIGL SERPL-MCNC: 92 MG/DL (ref 30–150)
TSH SERPL-ACNC: 2.98 UIU/ML (ref 0.4–4)
WBC # BLD AUTO: 6.68 K/UL (ref 3.9–12.7)

## 2025-05-01 PROCEDURE — 4010F ACE/ARB THERAPY RXD/TAKEN: CPT | Mod: CPTII,HCNC,S$GLB, | Performed by: INTERNAL MEDICINE

## 2025-05-01 PROCEDURE — 1126F AMNT PAIN NOTED NONE PRSNT: CPT | Mod: CPTII,HCNC,S$GLB, | Performed by: INTERNAL MEDICINE

## 2025-05-01 PROCEDURE — 1159F MED LIST DOCD IN RCRD: CPT | Mod: CPTII,HCNC,S$GLB, | Performed by: INTERNAL MEDICINE

## 2025-05-01 PROCEDURE — 1101F PT FALLS ASSESS-DOCD LE1/YR: CPT | Mod: CPTII,HCNC,S$GLB, | Performed by: INTERNAL MEDICINE

## 2025-05-01 PROCEDURE — 3075F SYST BP GE 130 - 139MM HG: CPT | Mod: CPTII,HCNC,S$GLB, | Performed by: INTERNAL MEDICINE

## 2025-05-01 PROCEDURE — 85025 COMPLETE CBC W/AUTO DIFF WBC: CPT | Mod: HCNC | Performed by: INTERNAL MEDICINE

## 2025-05-01 PROCEDURE — 99214 OFFICE O/P EST MOD 30 MIN: CPT | Mod: HCNC,S$GLB,, | Performed by: INTERNAL MEDICINE

## 2025-05-01 PROCEDURE — G2211 COMPLEX E/M VISIT ADD ON: HCPCS | Mod: HCNC,S$GLB,, | Performed by: INTERNAL MEDICINE

## 2025-05-01 PROCEDURE — 3072F LOW RISK FOR RETINOPATHY: CPT | Mod: CPTII,HCNC,S$GLB, | Performed by: INTERNAL MEDICINE

## 2025-05-01 PROCEDURE — 3079F DIAST BP 80-89 MM HG: CPT | Mod: CPTII,HCNC,S$GLB, | Performed by: INTERNAL MEDICINE

## 2025-05-01 PROCEDURE — 3288F FALL RISK ASSESSMENT DOCD: CPT | Mod: CPTII,HCNC,S$GLB, | Performed by: INTERNAL MEDICINE

## 2025-05-01 PROCEDURE — 82043 UR ALBUMIN QUANTITATIVE: CPT | Mod: HCNC | Performed by: INTERNAL MEDICINE

## 2025-05-01 PROCEDURE — 3008F BODY MASS INDEX DOCD: CPT | Mod: CPTII,HCNC,S$GLB, | Performed by: INTERNAL MEDICINE

## 2025-05-01 PROCEDURE — 84443 ASSAY THYROID STIM HORMONE: CPT | Mod: HCNC | Performed by: INTERNAL MEDICINE

## 2025-05-01 PROCEDURE — 80061 LIPID PANEL: CPT | Mod: HCNC | Performed by: INTERNAL MEDICINE

## 2025-05-01 PROCEDURE — 80053 COMPREHEN METABOLIC PANEL: CPT | Mod: HCNC | Performed by: INTERNAL MEDICINE

## 2025-05-01 PROCEDURE — 83036 HEMOGLOBIN GLYCOSYLATED A1C: CPT | Mod: HCNC | Performed by: INTERNAL MEDICINE

## 2025-05-01 PROCEDURE — 99999 PR PBB SHADOW E&M-EST. PATIENT-LVL IV: CPT | Mod: PBBFAC,HCNC,, | Performed by: INTERNAL MEDICINE

## 2025-05-01 PROCEDURE — 1160F RVW MEDS BY RX/DR IN RCRD: CPT | Mod: CPTII,HCNC,S$GLB, | Performed by: INTERNAL MEDICINE

## 2025-05-01 RX ORDER — HYDROCODONE BITARTRATE AND ACETAMINOPHEN 5; 325 MG/1; MG/1
1 TABLET ORAL EVERY 12 HOURS PRN
Qty: 30 TABLET | Refills: 0 | Status: CANCELLED | OUTPATIENT
Start: 2025-05-01

## 2025-05-01 RX ORDER — DICLOFENAC SODIUM 10 MG/G
4 GEL TOPICAL 3 TIMES DAILY PRN
Qty: 450 G | Refills: 3 | Status: SHIPPED | OUTPATIENT
Start: 2025-05-01

## 2025-05-01 RX ORDER — MIRTAZAPINE 7.5 MG/1
7.5 TABLET, FILM COATED ORAL NIGHTLY
Qty: 30 TABLET | Refills: 3 | Status: SHIPPED | OUTPATIENT
Start: 2025-05-01 | End: 2026-05-01

## 2025-05-01 RX ORDER — HYDROCODONE BITARTRATE AND ACETAMINOPHEN 5; 325 MG/1; MG/1
1 TABLET ORAL EVERY 12 HOURS PRN
Qty: 60 TABLET | Refills: 0 | Status: SHIPPED | OUTPATIENT
Start: 2025-05-01

## 2025-05-01 NOTE — PROGRESS NOTES
Subjective:       Patient ID: Alia Andre is a 75 y.o. female.    Chief Complaint: Follow-up    HPI  Last saw pt 10/2024 but did see DOMI Coyne 11/13/24.  Rodrigo is her daughter.  Lives alone normally. Grandson who's in the [employer] is staying w/ her for a mo.     R breast CA s/p R mastectomy and SLNB. S/p chemo.   S/p Letrozole x 5 yrs.  L breast MMG - 8/20/24 neg.  LOV w/ PA Dalila Lua 8/20/24 at [facility].   Reports her spirit/energy feels down since surgery.      DM2 - Carline was supposed to send her her jardiance but hasn't gotten it and it's been over a month. Was just trying to do better w/ her foods.   Metformin-->diarrhea  Checks her sugar about once a week. 120s-130s.   A1c 9 mo ago was 12.9-->11/5/24 9.  MAC 82.6 9/30/24. On losartan 50mg daily.   Eye - Dr. Melvin 2/20/24 - no DM retinopathy. Reports no issues w/ visions.  Sometimes w/ numbness/tingling in toes but nothing that lasts.      Hld - Crestor 20mg daily.   CTA C/A/P - aortic atherosclerosis. Fusiform dilatation of the infrarenal abdominal aorta up to 1.9cm. moderate stenosis of the L common iliac a. Multivessal coronary artery atherosclerosis. AV and MV calcific diseases.   Stress test 2019  Concentric left ventricular remodeling. Normal left ventricular systolic function. The estimated ejection fraction is 60%  Normal right ventricular systolic function.  The stress echo portion of this study is negative for myocardial ischemia.  The EKG portion of this study is negative for myocardial ischemia.  Overall, the patient's exercise capacity was below average. 8 mets  LDL 11/5/24 68.6.     HTN - losartan 50mg daily.   Reports her BP machines at home has been 120s-130s on the top. Has a wrist BP cuff.       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 Jalen Shay.  Followed up w/ NP 5/19/23 in ENT  No pain in the  "neck. No trouble swallowing.   On synthroid 75mcg daily. TSH 9/30/24 4.317<--7/31/24 10.577  Needs repeat.      Current smoker - smokes about 2-3 cigarettes a day, which is about the same as previously. Discussed lung CA screening. Does not want low dose CT. Does not want to look for or know if she has lung CA. Not ready to quit completely. Doesn't want to go to smoking cessation.  Prior to this, 1/2-1 ppd x 42 yrs.    Stable lung nodules. Fibrotic changes in the lungs. CTA C/A/P 10/15/23.   Interval development subpleural cystic changes of the bilateral lungs, suggestive of fibrotic changes. Continued follow-up recommended.   No SOB/wheezing.   Reports cough every morning productive of clear mucus. No blood. No increased production. Reports can walk a mile w/o SOB.   No fevers/chills.   Uses the albuterol inhaler 2x/week. Reports has been out for a long time.      C/o R shoulder pain 2-3 mo ago. No inciting factors. Radiates to the deltoid. No weakness/numbness/tingling. Doing her own PT.   May take a norco 1/2 pill every other day. Tylenol does not help. Taking the medicine allows her to continue her ADLs and to play w/ grandchildren. Med does not make her drowsy. Does not operate heavy machinery.    reviewed - norco 5-325 #30 2/27/25    Reports no anxiety/depression currently.   Some insomnia though.     Review of Systems  Comprehensive review of systems otherwise negative. See history/subjective section for more details.      Objective:      Physical Exam    /80   Pulse 68   Temp 97.8 °F (36.6 °C) (Oral)   Ht 5' 6" (1.676 m)   Wt 61.5 kg (135 lb 9.3 oz)   SpO2 98%   BMI 21.88 kg/m²     GEN - A+OX4, NAD   HEENT - PERRL, EOMI, OP clear. MMM. TM normal.   Neck - no LAD.   CV - RRR, no m/r   Chest - CTAB, no wheezing or rhonchi. Normal work of breathing.   Abd - S/NT/ND/+BS.   Ext - Palp BDP and radial pulses. No LE edema.   MSK - no spinal or knee tenderness to palpation. Normal gait. Limited " abduction of the R shoulder to about 90 degrees. Pain on palpation of the R shoulder.   Skin - no rash.     Previous labs/imaging reviewed.     Assessment/Plan     Alia was seen today for follow-up.    Diagnoses and all orders for this visit:    Type 2 diabetes mellitus with hyperglycemia, without long-term current use of insulin - Will have staff call [facility] to check on Jardiance.  -     CBC Auto Differential  -     Comprehensive Metabolic Panel  -     Hemoglobin A1C  -     Microalbumin/Creatinine Ratio, Urine  -     Ambulatory referral/consult to Optometry; Future    Hyperlipidemia associated with type 2 diabetes mellitus  -     Comprehensive Metabolic Panel  -     Hemoglobin A1C  -     Lipid Panel  -     Microalbumin/Creatinine Ratio, Urine  -     Ambulatory referral/consult to Optometry; Future    Microalbuminuria due to type 2 diabetes mellitus  -     CBC Auto Differential  -     Comprehensive Metabolic Panel  -     Hemoglobin A1C  -     Microalbumin/Creatinine Ratio, Urine  -     Ambulatory referral/consult to Optometry; Future    History of breast cancer  -     Mammo Digital Diagnostic Left; Future    H/O right mastectomy  -     Mammo Digital Diagnostic Left; Future    Chronic right shoulder pain  -     HYDROcodone-acetaminophen (NORCO) 5-325 mg per tablet; Take 1 tablet by mouth every 12 (twelve) hours as needed for Pain.    Aortic atherosclerosis  -     Comprehensive Metabolic Panel  -     Lipid Panel    Benign essential hypertension  -     Comprehensive Metabolic Panel    Postsurgical hypothyroidism  -     TSH    Pulmonary emphysema, unspecified emphysema type - not ready to quit smoking. Able to complete all ADLs and denies SOB.    Postmenopausal estrogen deficiency  -     DXA Bone Density Axial Skeleton 1 or more sites; Future    Insomnia, unspecified type  -     mirtazapine (REMERON) 7.5 MG Tab; Take 1 tablet (7.5 mg total) by mouth every evening.    Colon cancer screening  -     Fecal  Immunochemical Test (iFOBT); Future    Encounter for screening mammogram for breast cancer  -     Mammo Digital Diagnostic Left; Future    Smoker - as above.     Other orders  The following orders have not been finalized:  -     Cancel: HYDROcodone-acetaminophen (NORCO) 5-325 mg per tablet    Labs and urine today.   Bring your blood pressure cuff to the next visit to be validated.   FITKIT for colon cancer screening.   Schedule mammogram towards end of August.   Schedule bone density scan.  When you're ready to quit smoking, we can discuss the different options.   Schedule your annual eye exam.     Follow up in about 3 months (around 8/1/2025).      Nancy Poe MD  Department of Internal Medicine - [facility]  8:27 AM

## 2025-05-01 NOTE — TELEPHONE ENCOUNTER
Ms. Rojo forgot to ask for a refill on voltaren gel. She would like it sent to [facility], [facility] let her know they do not carry it any longer.

## 2025-05-01 NOTE — PATIENT INSTRUCTIONS
Labs and urine today.   Bring your blood pressure cuff to the next visit to be validated.   FITKIT for colon cancer screening.   Schedule mammogram towards end of August.   Schedule bone density scan.  When you're ready to quit smoking, we can discuss the different options.   Schedule your annual eye exam.

## 2025-05-01 NOTE — TELEPHONE ENCOUNTER
Called [facility] and spoke with Wanda, she let me know that Ms. Rojo's Jardiance in route it was shipped on 4/25 and should arrive any day now. Called and informed Ms. Alia

## 2025-05-02 ENCOUNTER — RESULTS FOLLOW-UP (OUTPATIENT)
Dept: PRIMARY CARE CLINIC | Facility: CLINIC | Age: 75
End: 2025-05-02

## 2025-05-02 ENCOUNTER — TELEPHONE (OUTPATIENT)
Dept: PRIMARY CARE CLINIC | Facility: CLINIC | Age: 75
End: 2025-05-02
Payer: MEDICARE

## 2025-05-02 NOTE — TELEPHONE ENCOUNTER
Did we get any more information from [facility] about her Jardiance?  Please call to let her know:  A1c is so much better! I think when she adds back the Jardiance, it'll be even better. Her cholesterol is worse though. Please confirm she's taking rosuvastatin/crestor daily. Thyroid level is in th enormal range. Cont current thyroid medicine. Remind to make appt w/ eye doctor.

## 2025-05-20 ENCOUNTER — TELEPHONE (OUTPATIENT)
Dept: PRIMARY CARE CLINIC | Facility: CLINIC | Age: 75
End: 2025-05-20
Payer: MEDICARE

## 2025-06-01 DIAGNOSIS — L85.3 DRY SKIN: ICD-10-CM

## 2025-06-02 RX ORDER — AMMONIUM LACTATE 12 G/100G
CREAM TOPICAL
Qty: 280 G | Refills: 5 | Status: SHIPPED | OUTPATIENT
Start: 2025-06-02

## 2025-06-12 DIAGNOSIS — E78.5 HYPERLIPIDEMIA ASSOCIATED WITH TYPE 2 DIABETES MELLITUS: ICD-10-CM

## 2025-06-12 DIAGNOSIS — E11.69 HYPERLIPIDEMIA ASSOCIATED WITH TYPE 2 DIABETES MELLITUS: ICD-10-CM

## 2025-06-12 RX ORDER — ROSUVASTATIN CALCIUM 20 MG/1
20 TABLET, COATED ORAL
Qty: 90 TABLET | Refills: 3 | Status: SHIPPED | OUTPATIENT
Start: 2025-06-12

## 2025-07-01 ENCOUNTER — OFFICE VISIT (OUTPATIENT)
Dept: HOME HEALTH SERVICES | Facility: CLINIC | Age: 75
End: 2025-07-01
Payer: MEDICARE

## 2025-07-01 VITALS
DIASTOLIC BLOOD PRESSURE: 78 MMHG | SYSTOLIC BLOOD PRESSURE: 124 MMHG | HEART RATE: 82 BPM | BODY MASS INDEX: 21.69 KG/M2 | WEIGHT: 135 LBS | HEIGHT: 66 IN

## 2025-07-01 DIAGNOSIS — E03.9 HYPOTHYROIDISM, UNSPECIFIED TYPE: ICD-10-CM

## 2025-07-01 DIAGNOSIS — Z00.00 ENCOUNTER FOR MEDICARE ANNUAL WELLNESS EXAM: Primary | ICD-10-CM

## 2025-07-01 DIAGNOSIS — E11.29 TYPE 2 DIABETES MELLITUS WITH DIABETIC MICROALBUMINURIA, WITHOUT LONG-TERM CURRENT USE OF INSULIN: ICD-10-CM

## 2025-07-01 DIAGNOSIS — I10 ESSENTIAL HYPERTENSION: ICD-10-CM

## 2025-07-01 DIAGNOSIS — E78.5 HYPERLIPIDEMIA ASSOCIATED WITH TYPE 2 DIABETES MELLITUS: ICD-10-CM

## 2025-07-01 DIAGNOSIS — J43.2 CENTRILOBULAR EMPHYSEMA: ICD-10-CM

## 2025-07-01 DIAGNOSIS — I70.0 AORTIC ATHEROSCLEROSIS: ICD-10-CM

## 2025-07-01 DIAGNOSIS — M25.511 CHRONIC RIGHT SHOULDER PAIN: ICD-10-CM

## 2025-07-01 DIAGNOSIS — E11.69 HYPERLIPIDEMIA ASSOCIATED WITH TYPE 2 DIABETES MELLITUS: ICD-10-CM

## 2025-07-01 DIAGNOSIS — R80.9 TYPE 2 DIABETES MELLITUS WITH DIABETIC MICROALBUMINURIA, WITHOUT LONG-TERM CURRENT USE OF INSULIN: ICD-10-CM

## 2025-07-01 DIAGNOSIS — Z72.0 TOBACCO ABUSE: ICD-10-CM

## 2025-07-01 DIAGNOSIS — Z85.3 PERSONAL HISTORY OF BREAST CANCER: ICD-10-CM

## 2025-07-01 DIAGNOSIS — G89.29 CHRONIC RIGHT SHOULDER PAIN: ICD-10-CM

## 2025-07-01 DIAGNOSIS — H25.13 NUCLEAR SCLEROSIS OF BOTH EYES: ICD-10-CM

## 2025-07-01 PROCEDURE — 1126F AMNT PAIN NOTED NONE PRSNT: CPT | Mod: CPTII,S$GLB,, | Performed by: NURSE PRACTITIONER

## 2025-07-01 PROCEDURE — 3066F NEPHROPATHY DOC TX: CPT | Mod: CPTII,S$GLB,, | Performed by: NURSE PRACTITIONER

## 2025-07-01 PROCEDURE — 1159F MED LIST DOCD IN RCRD: CPT | Mod: CPTII,S$GLB,, | Performed by: NURSE PRACTITIONER

## 2025-07-01 PROCEDURE — 3072F LOW RISK FOR RETINOPATHY: CPT | Mod: CPTII,S$GLB,, | Performed by: NURSE PRACTITIONER

## 2025-07-01 PROCEDURE — 1160F RVW MEDS BY RX/DR IN RCRD: CPT | Mod: CPTII,S$GLB,, | Performed by: NURSE PRACTITIONER

## 2025-07-01 PROCEDURE — 1158F ADVNC CARE PLAN TLK DOCD: CPT | Mod: CPTII,S$GLB,, | Performed by: NURSE PRACTITIONER

## 2025-07-01 PROCEDURE — 3078F DIAST BP <80 MM HG: CPT | Mod: CPTII,S$GLB,, | Performed by: NURSE PRACTITIONER

## 2025-07-01 PROCEDURE — 4010F ACE/ARB THERAPY RXD/TAKEN: CPT | Mod: CPTII,S$GLB,, | Performed by: NURSE PRACTITIONER

## 2025-07-01 PROCEDURE — 3074F SYST BP LT 130 MM HG: CPT | Mod: CPTII,S$GLB,, | Performed by: NURSE PRACTITIONER

## 2025-07-01 PROCEDURE — 3051F HG A1C>EQUAL 7.0%<8.0%: CPT | Mod: CPTII,S$GLB,, | Performed by: NURSE PRACTITIONER

## 2025-07-01 PROCEDURE — 1101F PT FALLS ASSESS-DOCD LE1/YR: CPT | Mod: CPTII,S$GLB,, | Performed by: NURSE PRACTITIONER

## 2025-07-01 PROCEDURE — 3060F POS MICROALBUMINURIA REV: CPT | Mod: CPTII,S$GLB,, | Performed by: NURSE PRACTITIONER

## 2025-07-01 PROCEDURE — 3288F FALL RISK ASSESSMENT DOCD: CPT | Mod: CPTII,S$GLB,, | Performed by: NURSE PRACTITIONER

## 2025-07-01 PROCEDURE — G0439 PPPS, SUBSEQ VISIT: HCPCS | Mod: S$GLB,,, | Performed by: NURSE PRACTITIONER

## 2025-07-01 NOTE — PROGRESS NOTES
"  Alia Andre presented for an initial Medicare AWV today. The following components were reviewed and updated:    Medical history  Family History  Social history  Allergies and Current Medications  Health Risk Assessment  Health Maintenance  Care Team    **See Completed Assessments for Annual Wellness visit with in the encounter summary    The following assessments were completed:  Depression Screening  Cognitive function Screening    Timed Get Up Test  Whisper Test      Opioid documentation:      Patient does have a current opioid prescription.      Patient accepted further discussion regarding opioid medication use.      Patient is currently taking hydrocodone narcotic for shoulder pain.        Pain level today is 0/10.       In addition to narcotic pain medications, patient is also using Tylenol for pain control.       Patient is not followed by a specialist currently for their pain and will not be referred today.       Patient's opioid risk potential based on ORT-OUD tool:       David each box that applies   No   Yes     Family history of substance abuse   Alcohol [x] []   Illegal drugs [x] []   Rx drugs [x] []     Personal history of substance abuse   Alcohol [x] []   Illegal drugs [x] []   Rx drugs [x] []     Age between 16-45 years   [x]   []     Patient with ADD, OCD, Bipolar disorder, schizoprenia   [x]   []     Patient with depression   [x]   []                         Scoring total                                                        0         Non-opioid treatment options have been discussed today and added to the patient's after visit summary.          Vitals:    07/01/25 0914   BP: 124/78   Patient Position: Sitting   Pulse: 82   Weight: 61.2 kg (135 lb)   Height: 5' 6" (1.676 m)     Body mass index is 21.79 kg/m².       Physical Exam  Constitutional:       Appearance: Normal appearance.   HENT:      Head: Normocephalic and atraumatic.      Nose: Nose normal.      Mouth/Throat:      Mouth: Mucous " membranes are moist.   Eyes:      Extraocular Movements: Extraocular movements intact.   Cardiovascular:      Rate and Rhythm: Normal rate and regular rhythm.   Pulmonary:      Effort: Pulmonary effort is normal. No respiratory distress.      Breath sounds: Normal breath sounds.   Abdominal:      General: Bowel sounds are normal. There is no distension.   Musculoskeletal:         General: No swelling. Normal range of motion.      Cervical back: Normal range of motion.   Skin:     General: Skin is warm and dry.   Neurological:      General: No focal deficit present.      Mental Status: She is alert and oriented to person, place, and time.   Psychiatric:         Mood and Affect: Mood normal.         Behavior: Behavior normal.           Diagnoses and health risks identified today and associated recommendations/orders:  1. Encounter for Medicare annual wellness exam  Assessments completed. Preventive measures, health maintenance, and immunizations reviewed with patient.  - Referral to Enhanced Annual Wellness Visit (eAWV) W+1    2. Type 2 diabetes mellitus with diabetic microalbuminuria, without long-term current use of insulin  Stable, patient on Jardiance. Followed by PCP.    3. Hyperlipidemia associated with type 2 diabetes mellitus  Stable, patient on Crestor. Followed by PCP.    4. Aortic atherosclerosis  Stable, patient on Aspirin. Followed by PCP.    5. Centrilobular emphysema  Stable, patient on Albuterol HFA and Albuterol neb tx's prn. Followed by PCP.    6. Nuclear sclerosis of both eyes  Stable, followed by Optometry.    7. Chronic right shoulder pain  Stable, patient on Hydrocodone-acetaminophen 5/325 mg prn. Followed by PCP.    8. Essential hypertension  Stable, patient on Losartan. Followed by PCP.    9. Hypothyroidism, unspecified type  Stable, patient on Synthroid. Followed by PCP.    10. Personal history of breast cancer  Stable, followed by PCP and Breast Surgery.    11. Tobacco abuse  Stable, followed  by PCP. Smoking cessation discussed and encouraged. Patient declined programed at this time.       Provided Alia with a 5-10 year written screening schedule and personal prevention plan. Recommendations were developed using the USPSTF age appropriate recommendations. Education, counseling, and referrals were provided as needed.  After Visit Summary printed and given to patient which includes a list of additional screenings\tests needed.    Follow up in about 1 year (around 7/1/2026) for your next annual wellness visit.      Gali Strange, TOMÁS  I offered to discuss advanced care planning, including how to pick a person who would make decisions for you if you were unable to make them for yourself, called a health care power of , and what kind of decisions you might make such as use of life sustaining treatments such as ventilators and tube feeding when faced with a life limiting illness recorded on a living will that they will need to know. (How you want to be cared for as you near the end of your natural life)     X Patient is interested in learning more about how to make advanced directives.  I provided them paperwork and offered to discuss this with them.

## 2025-07-01 NOTE — PATIENT INSTRUCTIONS
Counseling and Referral of Other Preventative  (Italic type indicates deductible and co-insurance are waived)    Patient Name: Alia Andre  Today's Date: 7/1/2025    Health Maintenance       Date Due Completion Date    Colorectal Cancer Screening Never done ---    RSV Vaccine (Age 60+ and Pregnant patients) (1 - Risk 60-74 years 1-dose series) Never done ---    DEXA Scan 05/25/2024 5/25/2022    Diabetic Eye Exam 02/20/2025 2/20/2024    Mammogram 08/20/2025 8/20/2024    LDCT Lung Screen 05/01/2026 (Originally 6/6/2020) 6/6/2019    Shingles Vaccine (1 of 2) 05/01/2026 (Originally 4/27/1969) ---    COVID-19 Vaccine (3 - Moderna risk series) 05/01/2026 (Originally 5/26/2021) 4/28/2021    Influenza Vaccine (1) 09/01/2025 11/25/2019    Foot Exam 10/28/2025 10/28/2024    Override on 12/17/2020: Done    Override on 5/22/2019: Done    Hemoglobin A1c 11/01/2025 5/1/2025    Diabetes Urine Screening 05/01/2026 5/1/2025    Lipid Panel 05/01/2026 5/1/2025    High Dose Statin 06/12/2026 6/12/2025    TETANUS VACCINE 02/16/2032 2/16/2022        No orders of the defined types were placed in this encounter.    The following information is provided to all patients.  This information is to help you find resources for any of the problems found today that may be affecting your health:                  Living healthy guide: www.[facility].[place].gov      Understanding Diabetes: www.diabetes.org      Eating healthy: www.cdc.gov/healthyweight      CDC home safety checklist: www.cdc.gov/steadi/patient.html      Agency on Aging: www.goea.[place].gov      Alcoholics anonymous (AA): www.aa.org      Physical Activity: www.vic.nih.gov/sx1kedx      Tobacco use: www.quitwithusla.org

## 2025-07-06 PROBLEM — Z85.3 PERSONAL HISTORY OF BREAST CANCER: Status: ACTIVE | Noted: 2025-07-06

## 2025-07-06 PROBLEM — M25.511 CHRONIC RIGHT SHOULDER PAIN: Status: ACTIVE | Noted: 2025-07-06

## 2025-07-06 PROBLEM — G89.29 CHRONIC RIGHT SHOULDER PAIN: Status: ACTIVE | Noted: 2025-07-06

## 2025-07-08 DIAGNOSIS — M25.511 CHRONIC RIGHT SHOULDER PAIN: ICD-10-CM

## 2025-07-08 DIAGNOSIS — G89.29 CHRONIC RIGHT SHOULDER PAIN: ICD-10-CM

## 2025-07-08 RX ORDER — HYDROCODONE BITARTRATE AND ACETAMINOPHEN 5; 325 MG/1; MG/1
1 TABLET ORAL EVERY 12 HOURS PRN
Qty: 30 TABLET | Refills: 0 | Status: SHIPPED | OUTPATIENT
Start: 2025-07-08 | End: 2025-07-10 | Stop reason: SDUPTHER

## 2025-07-08 NOTE — TELEPHONE ENCOUNTER
Patient calling in to clinic for refill on [place]. Pt advised that PCP is out of office, and that it is up to the covering provider if they are Ok sending in pain meds.   Patient is asking for 2 weeks at this time, pt has f/u appt with PCP on 7/31. Reminded pt of appt, and Lyft ride scheduled for 11:40.

## 2025-07-09 DIAGNOSIS — G89.29 CHRONIC RIGHT SHOULDER PAIN: ICD-10-CM

## 2025-07-09 DIAGNOSIS — M25.511 CHRONIC RIGHT SHOULDER PAIN: ICD-10-CM

## 2025-07-09 RX ORDER — HYDROCODONE BITARTRATE AND ACETAMINOPHEN 5; 325 MG/1; MG/1
1 TABLET ORAL EVERY 12 HOURS PRN
Qty: 30 TABLET | Refills: 0 | OUTPATIENT
Start: 2025-07-09

## 2025-07-09 NOTE — TELEPHONE ENCOUNTER
Copied from CRM #[id number]. Topic: Medications - Medication Status Check   >> Jul 9, 2025  1:32 PM Micaela wrote:  .1MEDICALADVICE     Patient is calling for Medical Advice regarding:patient calling because the RX refill for   HYDROcodone-acetaminophen (NORCO) 5-325 mg per tablet was sent to [facility] pharmacy and should have been sent to   [facility]/pharmacy #[id number] - [place], [place] - [address]  [address]  [place] [address]  Phone: [phone] Fax: [phone]        How long has patient had these symptoms:    Pharmacy name and phone#:    Patient wants a call back or thru [facility], provide patient's call back phone number:call [phone]    Comments:    Please advise patient replies from provider may take up to 48 hours.

## 2025-07-10 ENCOUNTER — TELEPHONE (OUTPATIENT)
Dept: PRIMARY CARE CLINIC | Facility: CLINIC | Age: 75
End: 2025-07-10
Payer: MEDICARE

## 2025-07-10 DIAGNOSIS — M25.511 CHRONIC RIGHT SHOULDER PAIN: ICD-10-CM

## 2025-07-10 DIAGNOSIS — G89.29 CHRONIC RIGHT SHOULDER PAIN: ICD-10-CM

## 2025-07-10 RX ORDER — HYDROCODONE BITARTRATE AND ACETAMINOPHEN 5; 325 MG/1; MG/1
1 TABLET ORAL EVERY 12 HOURS PRN
Qty: 30 TABLET | Refills: 0 | Status: SHIPPED | OUTPATIENT
Start: 2025-07-10

## 2025-07-10 NOTE — TELEPHONE ENCOUNTER
Copied from CRM #[id number]. Topic: Medications - Medication Question  >> Jul 10, 2025 10:07 AM Janey wrote:  Type: Rx Clarification/ Additional Information/ Questions    Medication:HYDROcodone-acetaminophen (NORCO) 5-325 mg per tablet    What questions do you have about the medication, if any?    What information is needed?rx was sent to the wrong pharmacy     Pharmacy number: [facility]/pharmacy #[id number] - [place], [place] - [address]  [address]  [facility] [address]  Phone: [phone] Fax: [phone]         Pharmacy Contact Name and phone number: NA    Comments: pt said that her Rx was sent to the wrong pharmacy and needs Dr Poe to send the rx to her local pharmacy instead. Please advise     Pt can be reached at:[phone]

## 2025-07-10 NOTE — TELEPHONE ENCOUNTER
Ms. Alia would like her refill for [place] sent to her local [facility], the original was sent to [facility]. Please advise

## 2025-07-21 ENCOUNTER — TELEPHONE (OUTPATIENT)
Dept: PRIMARY CARE CLINIC | Facility: CLINIC | Age: 75
End: 2025-07-21
Payer: MEDICARE

## 2025-07-21 DIAGNOSIS — E11.69 HYPERLIPIDEMIA ASSOCIATED WITH TYPE 2 DIABETES MELLITUS: Primary | ICD-10-CM

## 2025-07-21 DIAGNOSIS — E78.5 HYPERLIPIDEMIA ASSOCIATED WITH TYPE 2 DIABETES MELLITUS: Primary | ICD-10-CM

## 2025-07-21 NOTE — TELEPHONE ENCOUNTER
Labs are scheduled for 7/25 and reminded Ms. Alia to send in her FITkit she stated she received it today and will mail it in soon.

## 2025-07-26 DIAGNOSIS — G47.00 INSOMNIA, UNSPECIFIED TYPE: ICD-10-CM

## 2025-07-28 RX ORDER — MIRTAZAPINE 7.5 MG/1
7.5 TABLET, FILM COATED ORAL NIGHTLY
Qty: 90 TABLET | Refills: 1 | Status: SHIPPED | OUTPATIENT
Start: 2025-07-28

## 2025-07-31 ENCOUNTER — OFFICE VISIT (OUTPATIENT)
Dept: PRIMARY CARE CLINIC | Facility: CLINIC | Age: 75
End: 2025-07-31
Payer: MEDICARE

## 2025-07-31 VITALS
BODY MASS INDEX: 21.76 KG/M2 | OXYGEN SATURATION: 98 % | WEIGHT: 135.38 LBS | SYSTOLIC BLOOD PRESSURE: 145 MMHG | DIASTOLIC BLOOD PRESSURE: 75 MMHG | HEIGHT: 66 IN | TEMPERATURE: 98 F | HEART RATE: 73 BPM

## 2025-07-31 DIAGNOSIS — E11.29 MICROALBUMINURIA DUE TO TYPE 2 DIABETES MELLITUS: Primary | ICD-10-CM

## 2025-07-31 DIAGNOSIS — R80.9 MICROALBUMINURIA DUE TO TYPE 2 DIABETES MELLITUS: Primary | ICD-10-CM

## 2025-07-31 DIAGNOSIS — L30.9 DERMATITIS: ICD-10-CM

## 2025-07-31 DIAGNOSIS — I10 BENIGN ESSENTIAL HYPERTENSION: ICD-10-CM

## 2025-07-31 DIAGNOSIS — J43.9 PULMONARY EMPHYSEMA, UNSPECIFIED EMPHYSEMA TYPE: ICD-10-CM

## 2025-07-31 DIAGNOSIS — M25.511 CHRONIC RIGHT SHOULDER PAIN: ICD-10-CM

## 2025-07-31 DIAGNOSIS — E03.9 HYPOTHYROIDISM, UNSPECIFIED TYPE: ICD-10-CM

## 2025-07-31 DIAGNOSIS — E78.5 HYPERLIPIDEMIA ASSOCIATED WITH TYPE 2 DIABETES MELLITUS: ICD-10-CM

## 2025-07-31 DIAGNOSIS — G89.29 CHRONIC RIGHT SHOULDER PAIN: ICD-10-CM

## 2025-07-31 DIAGNOSIS — E11.69 HYPERLIPIDEMIA ASSOCIATED WITH TYPE 2 DIABETES MELLITUS: ICD-10-CM

## 2025-07-31 PROCEDURE — 99999 PR PBB SHADOW E&M-EST. PATIENT-LVL III: CPT | Mod: PBBFAC,HCNC,, | Performed by: INTERNAL MEDICINE

## 2025-07-31 PROCEDURE — 4010F ACE/ARB THERAPY RXD/TAKEN: CPT | Mod: CPTII,HCNC,S$GLB, | Performed by: INTERNAL MEDICINE

## 2025-07-31 PROCEDURE — 3078F DIAST BP <80 MM HG: CPT | Mod: CPTII,HCNC,S$GLB, | Performed by: INTERNAL MEDICINE

## 2025-07-31 PROCEDURE — 1160F RVW MEDS BY RX/DR IN RCRD: CPT | Mod: CPTII,HCNC,S$GLB, | Performed by: INTERNAL MEDICINE

## 2025-07-31 PROCEDURE — 1126F AMNT PAIN NOTED NONE PRSNT: CPT | Mod: CPTII,HCNC,S$GLB, | Performed by: INTERNAL MEDICINE

## 2025-07-31 PROCEDURE — 1124F ACP DISCUSS-NO DSCNMKR DOCD: CPT | Mod: CPTII,HCNC,S$GLB, | Performed by: INTERNAL MEDICINE

## 2025-07-31 PROCEDURE — 3288F FALL RISK ASSESSMENT DOCD: CPT | Mod: CPTII,HCNC,S$GLB, | Performed by: INTERNAL MEDICINE

## 2025-07-31 PROCEDURE — 1159F MED LIST DOCD IN RCRD: CPT | Mod: CPTII,HCNC,S$GLB, | Performed by: INTERNAL MEDICINE

## 2025-07-31 PROCEDURE — 99215 OFFICE O/P EST HI 40 MIN: CPT | Mod: HCNC,S$GLB,, | Performed by: INTERNAL MEDICINE

## 2025-07-31 PROCEDURE — 3072F LOW RISK FOR RETINOPATHY: CPT | Mod: CPTII,HCNC,S$GLB, | Performed by: INTERNAL MEDICINE

## 2025-07-31 PROCEDURE — 3060F POS MICROALBUMINURIA REV: CPT | Mod: CPTII,HCNC,S$GLB, | Performed by: INTERNAL MEDICINE

## 2025-07-31 PROCEDURE — G2211 COMPLEX E/M VISIT ADD ON: HCPCS | Mod: HCNC,S$GLB,, | Performed by: INTERNAL MEDICINE

## 2025-07-31 PROCEDURE — 3077F SYST BP >= 140 MM HG: CPT | Mod: CPTII,HCNC,S$GLB, | Performed by: INTERNAL MEDICINE

## 2025-07-31 PROCEDURE — 3051F HG A1C>EQUAL 7.0%<8.0%: CPT | Mod: CPTII,HCNC,S$GLB, | Performed by: INTERNAL MEDICINE

## 2025-07-31 PROCEDURE — 1101F PT FALLS ASSESS-DOCD LE1/YR: CPT | Mod: CPTII,HCNC,S$GLB, | Performed by: INTERNAL MEDICINE

## 2025-07-31 PROCEDURE — 3066F NEPHROPATHY DOC TX: CPT | Mod: CPTII,HCNC,S$GLB, | Performed by: INTERNAL MEDICINE

## 2025-07-31 PROCEDURE — 3008F BODY MASS INDEX DOCD: CPT | Mod: CPTII,HCNC,S$GLB, | Performed by: INTERNAL MEDICINE

## 2025-07-31 RX ORDER — HYDROCODONE BITARTRATE AND ACETAMINOPHEN 5; 325 MG/1; MG/1
1 TABLET ORAL EVERY 12 HOURS PRN
Qty: 60 TABLET | Refills: 0 | Status: SHIPPED | OUTPATIENT
Start: 2025-08-10 | End: 2025-09-09

## 2025-07-31 RX ORDER — ROSUVASTATIN CALCIUM 40 MG/1
40 TABLET, COATED ORAL NIGHTLY
Qty: 90 TABLET | Refills: 3 | Status: SHIPPED | OUTPATIENT
Start: 2025-07-31 | End: 2026-07-31

## 2025-07-31 RX ORDER — CLOTRIMAZOLE AND BETAMETHASONE DIPROPIONATE 10; .64 MG/G; MG/G
CREAM TOPICAL 2 TIMES DAILY
Qty: 45 G | Refills: 1 | Status: SHIPPED | OUTPATIENT
Start: 2025-07-31

## 2025-07-31 RX ORDER — BUDESONIDE AND FORMOTEROL FUMARATE DIHYDRATE 160; 4.5 UG/1; UG/1
2 AEROSOL RESPIRATORY (INHALATION) EVERY 12 HOURS
Qty: 10.2 G | Refills: 3 | Status: SHIPPED | OUTPATIENT
Start: 2025-07-31 | End: 2026-07-31

## 2025-07-31 RX ORDER — HYDROCODONE BITARTRATE AND ACETAMINOPHEN 5; 325 MG/1; MG/1
1 TABLET ORAL EVERY 12 HOURS PRN
Qty: 60 TABLET | Refills: 0 | Status: SHIPPED | OUTPATIENT
Start: 2025-10-09 | End: 2025-11-08

## 2025-07-31 RX ORDER — HYDROCODONE BITARTRATE AND ACETAMINOPHEN 5; 325 MG/1; MG/1
1 TABLET ORAL EVERY 12 HOURS PRN
Qty: 60 TABLET | Refills: 0 | Status: SHIPPED | OUTPATIENT
Start: 2025-09-09 | End: 2025-10-09

## 2025-07-31 NOTE — PATIENT INSTRUCTIONS
Schedule mammogram for after August 7th.  Schedule bone density scan.   Keep eye appointment with Dr. Melvin.   Schedule pulmonary function test on the [facility].

## 2025-07-31 NOTE — PROGRESS NOTES
Subjective:       Patient ID: Alia Andre is a 75 y.o. female.    Chief Complaint: type 2 diabetes mellitus with hyperglycemia,    HPI  Came by [employer] today.   She put in the [employer] stool kit to check for colon cancer 2 days ago.   MMG - orders are in.    Air conditioner broke and water was all over. Feels aggravated. Feels like she's losing weight and that's aggravating to her. Reports appetite is pretty well. Feels like her clothes are too big. Compared to weight from around this time last year, weight was the same. [place] around [place] by [place] for 30 min every day.     L inner ankle w/ circular lesion - x 2-3 weeks. Sometimes itchy. No other areas w/ it.     DM2 - jardiance 25mg daily  Metformin-->diarrhea  A1c - 7/25/25 7<--5/1/25 7.5  MAC - 76.2 a week ago.  Eye - has appt w/ Dr. Melvin in September.  Foot - no numbness/tingling in the feet.     HLD - crestor 20mg daily.  .8.    HTN - losartan 100mg daily.      Hypothyroidism - synthroid 88mcg daily  TSH 5/1/25 2.977    C/o R shoulder pain 2-3 mo ago. No inciting factors. Radiates to the deltoid. No weakness/numbness/tingling. Doing her own PT.   May take a norco 1/2 pill every other day. Tylenol does not help. Taking the medicine allows her to continue her ADLs and to play w/ grandchildren. Med does not make her drowsy. Does not operate heavy machinery.    reviewed - norco 5-325 #30 7/10/25    Hasn't been smoking 2-3 months. Wants to be around for great great grandbaby. Chronic cough that's sometimes productive amado in the AM. Sometimes wheezing and uses albuterol 3 days a weeks help.   CT chest 10/15/23   No evidence of aortic aneurysm or dissection.  Interval development subpleural cystic changes of the bilateral lungs, suggestive of fibrotic changes.  Continued follow-up recommended.  Postsurgical changes of thyroidectomy and right breast mastectomy.  Diffusely patulous esophagus and small hiatal hernia.  Correlate with symptoms of  reflux.  Colonic diverticula without evidence of diverticulitis.  Multiple pulmonary nodules measuring up to 4 mm.  For multiple solid nodules all <6 mm, Fleischner Society 2017 guidelines recommend no routine follow up for a low risk patient, or follow up with non-contrast chest CT at 12 months after discovery in a high risk patient.  Did not want repeat CT or CT lung CA screening - does not want to know even if she has lung CA.     4Ms for Medical Decision-Making in Older Adults    Last Completed EAWV: 2025    MEDICATIONS:  High Risk Medications:  Total Active Medications: 1  HYDROcodone-acetaminophen - 5-325 mg    MOBILITY:  Activities of Daily Livin/1/2025     9:20 AM   Activities of Daily Living   Ambulation Independent   Dressing Independent   Transfers Independent   Toileting Continent of bladder;Continent of bowel   Feeding Independent   Cleaning home/Chores Independent   Telephone use Independent   Shopping Assistance Required   Paying bills Independent   Taking meds Independent     Fall Risk:      2025    12:40 PM 2025     8:00 AM 2025     9:00 AM   Fall Risk Assessment - Outpatient   Mobility Status Ambulatory Ambulatory Ambulatory   Number of falls 0 0 0   Identified as fall risk False False False     Disability Status:      2025     9:21 AM   Disability Status   Are you deaf or do you have serious difficulty hearing? N   Are you blind or do you have serious difficulty seeing, even when wearing glasses? N   Because of a physical, mental, or emotional condition, do you have serious difficulty concentrating, remembering, or making decisions? N   Do you have serious difficulty walking or climbing stairs? N   Do you have difficulty dressing or bathing? N   Because of a physical, mental, or emotional condition, do you have difficulty doing errands alone such as visiting a doctor's office or shopping? N     Nutrition Screenin/1/2025     9:20 AM   Nutrition Screening   Has  food intake declined over the past three months due to loss of appetite, digestive problems, chewing or swallowing difficulties? No decrease in food intake   Involuntary weight loss during the last 3 months? No weight loss   Mobility? Goes out   Has the patient suffered psychological stress or acute disease in the past three months? No   Neuropsychological problems? No psychological problems   Body Mass Index (BMI)?  BMI 21 to less than 23   Screening Score 13   Interpretation Normal nutritional status    Screening Score: 0-7 Malnourished, 8-11 At Risk, 12-14 Normal  Get Up and Go:      2025     9:22 AM   Get Up and Go   Trial 1 12 seconds     Whisper Test:      2025     9:22 AM   Whisper Test   Whisper Test Normal           MENTATION:   Has Dementia Dx: No  Has Anxiety Dx: No    Depression Patient Health Questionnaire:      2025     9:22 AM   Depression Patient Health Questionnaire   Over the last two weeks how often have you been bothered by little interest or pleasure in doing things Not at all   Over the last two weeks how often have you been bothered by feeling down, depressed or hopeless Not at all   PHQ-2 Total Score 0     Cognitive Function Screenin/1/2025     9:22 AM   Cognitive Function Screening   Clock Drawing Test No   Mini-Cog 3 Minute Recall 3   Cognitive Function Screening 3     Cognitive Function Screening Total - Less than 4 = Abnormal,  Greater than or equal to 4 = Normal        WHAT MATTERS MOST:  Advance Care Planning   ACP Status:   Patient has had an ACP conversation  Living Will: No  Power of : No  POLST: No    Advance Care Planning     Date: 2025  Patient did not wish or was not able to name a surrogate decision maker or provide an Advance Care Plan.           Review of Systems  Comprehensive review of systems otherwise negative. See history/subjective section for more details.    Objective:      Physical Exam    BP (!) 145/75 (Patient Position: Sitting)    "Pulse 73   Temp 98.1 °F (36.7 °C) (Oral)   Ht 5' 6" (1.676 m)   Wt 61.4 kg (135 lb 5.8 oz)   SpO2 98%   BMI 21.85 kg/m²     GEN - A+OX4, NAD   HEENT - PERRL, EOMI, OP clear. MMM. TM normal.   Neck - no LAD.   CV - RRR, no m/r   Chest - CTAB, no wheezing or rhonchi. Normal work of breathing.   Abd - S/NT/ND/+BS.   Ext - Palp BDP and radial pulses. No LE edema.   MSK - no spinal or knee tenderness to palpation. Normal gait. Limited abduction of the R shoulder to about 90 degrees. Pain on palpation of the R shoulder.   Skin - L inner ankle w/ circular lesion w/ hyperpigmented border.     Previous labs reviewed.     Assessment/Plan     Alia was seen today for type 2 diabetes mellitus with hyperglycemia,.    Diagnoses and all orders for this visit:    Microalbuminuria due to type 2 diabetes mellitus - doing well on jardiance. Dec microalbuminuria. On losartan 100mg dialy. BP not quite at goal today but under a lot o fstress also. Consider switching to olmesartan 40mg daily. Jardiance may also be the reason why pt felt like she's lost a few lbs. Discussed that compared to last year at this time her weight is stable. Can reassess at next visit.  -     Comprehensive Metabolic Panel; Future    Benign essential hypertension - as above.     Chronic right shoulder pain  -     HYDROcodone-acetaminophen (NORCO) 5-325 mg per tablet; Take 1 tablet by mouth every 12 (twelve) hours as needed for Pain.  -     HYDROcodone-acetaminophen (NORCO) 5-325 mg per tablet; Take 1 tablet by mouth every 12 (twelve) hours as needed for Pain.  -     HYDROcodone-acetaminophen (NORCO) 5-325 mg per tablet; Take 1 tablet by mouth every 12 (twelve) hours as needed for Pain.    Dermatitis - ringworm like rash at the inner ankle. Rx lotrisone.   -     clotrimazole-betamethasone 1-0.05% (LOTRISONE) cream; Apply topically 2 (two) times daily.    Hyperlipidemia associated with type 2 diabetes mellitus - LDL not at goal. Inc crestor to 40mg daily w/ " goal LDL <70.  -     Lipid Panel; Future  -     rosuvastatin (CRESTOR) 40 MG Tab; Take 1 tablet (40 mg total) by mouth every evening.    Hypothyroidism, unspecified type  -     TSH; Future    Pulmonary emphysema, unspecified emphysema type - pt to schedule PFTs. Former smoker. Does not CT lung Ca screening as she doesn't want to know even if she has lung CA.   -     budesonide-formoterol 160-4.5 mcg (SYMBICORT) 160-4.5 mcg/actuation HFAA; Inhale 2 puffs into the lungs every 12 (twelve) hours. Controller    I spent a total of 45 minutes on the day of the visit.This includes face to face time and non-face to face time preparing to see the patient (eg, review of tests), obtaining and/or reviewing separately obtained history, documenting clinical information in the electronic or other health record, independently interpreting results and communicating results to the patient/family/caregiver, or care coordinator.    Follow up in about 6 weeks (around 9/11/2025).      Nancy Poe MD  Department of Internal Medicine - [facility]  12:43 PM

## 2025-08-04 ENCOUNTER — TELEPHONE (OUTPATIENT)
Dept: PHARMACY | Facility: CLINIC | Age: 75
End: 2025-08-04
Payer: MEDICARE

## 2025-08-04 NOTE — TELEPHONE ENCOUNTER
[facility]/Population Health Chart Review & Patient Outreach Details For Medication Adherence Project    Reason for Outreach Encounter: 3rd Party payor non-compliance report ([employer], [employer], [employer], etc)  2.  Patient Outreach Method: Reviewed patient chart  and [employer] message  3.   Medication in question:    Diabetes Medications              empagliflozin (JARDIANCE) 25 mg tablet Take 1 tablet (25 mg total) by mouth once daily.                 jardiance  last filled  4/24 for 100 day supply      4.  Reviewed and or Updates Made To: Patient Chart  5. Outreach Outcomes and/or actions taken: Sent inquiry to patient: Waiting for response  Additional Notes:

## (undated) DEVICE — SUT 3-0 12-18IN SILK

## (undated) DEVICE — DRAPE STERI INSTRUMENT 1018

## (undated) DEVICE — TRAY MINOR GEN SURG

## (undated) DEVICE — CLIP MED TICALL

## (undated) DEVICE — INTRODUCER CANNULA PUSH PLUS

## (undated) DEVICE — DRAPE INCISE IOBAN 2 23X17IN

## (undated) DEVICE — HOOK LONE STAR BLUNT 12MM

## (undated) DEVICE — DRAPE C ARM 42 X 120 10/BX

## (undated) DEVICE — CONTAINER SPECIMEN STRL 4OZ

## (undated) DEVICE — SEE MEDLINE ITEM 157194

## (undated) DEVICE — COVER PROBE NL STRL 3.6X96IN

## (undated) DEVICE — STAPLER SKIN REGULAR

## (undated) DEVICE — Device

## (undated) DEVICE — GAUZE FLUFF XXLG 36X36 2 PLY

## (undated) DEVICE — SEE MEDLINE ITEM 157128

## (undated) DEVICE — CUP MEDICINE STERILE 2OZ

## (undated) DEVICE — BLADE SURG #15 CARBON STEEL

## (undated) DEVICE — GAUZE SPONGE PEANUT STRL

## (undated) DEVICE — GUIDEWIRE ANG STF .035INX18CM

## (undated) DEVICE — SUT VICRYL 3-0 27 SH

## (undated) DEVICE — SPONGE LAP 18X18 PREWASHED

## (undated) DEVICE — STOCKINET 4INX48

## (undated) DEVICE — DRAPE HALF SURGICAL 40X58IN

## (undated) DEVICE — CORD BIPOLAR 12 FOOT

## (undated) DEVICE — PROBE SIMULATOR KRAFF

## (undated) DEVICE — ELECTRODE BLADE INSULATED 1 IN

## (undated) DEVICE — ELECTRODE NDL

## (undated) DEVICE — SUT ETHILON 2-0 PSLX 30IN

## (undated) DEVICE — NDL 18GA X1 1/2 REG BEVEL

## (undated) DEVICE — TRAY MINOR GEN SURG OMC

## (undated) DEVICE — SPONGE DERMA 8PLY 2X2

## (undated) DEVICE — GOWN SURGICAL X-LARGE

## (undated) DEVICE — SEE MEDLINE ITEM 146417

## (undated) DEVICE — SUT VICRYL PLUS 3-0 SH 18IN

## (undated) DEVICE — SUT 2/0 30IN SILK BLK BRAI

## (undated) DEVICE — SUT ETHILON 3-0 PS2 18 BLK

## (undated) DEVICE — NDL HYPO REG 25G X 1 1/2

## (undated) DEVICE — PACK UNIVERSAL SPLIT II

## (undated) DEVICE — SOL NACL 0.9% INJ PF/50151

## (undated) DEVICE — DRAPE EENT SPLIT STERILE

## (undated) DEVICE — SUT MONOCRYL 4-0 PS-2

## (undated) DEVICE — NEOGUARD COVER 4X30CM STERILE

## (undated) DEVICE — DRESSING XEROFORM FOIL PK 1X8

## (undated) DEVICE — TUBE EMG NIM 7.0MM TRIVANTAGE

## (undated) DEVICE — SYS CLSR DERMABOND PRINEO 22CM

## (undated) DEVICE — GUIDEWIRE MANDRIL .018IN 40CM

## (undated) DEVICE — SUT 2-0 12-18IN SILK

## (undated) DEVICE — GAUZE SPONGE 4X4 12PLY

## (undated) DEVICE — ADHESIVE DERMABOND ADVANCED

## (undated) DEVICE — SEE MEDLINE ITEM 152622

## (undated) DEVICE — DRAPE THYROID WITH ARMBOARD

## (undated) DEVICE — TEGADERM IV

## (undated) DEVICE — SUT 2-0 VICRYL / SH (J417)

## (undated) DEVICE — TOWEL OR DISP STRL BLUE 4/PK

## (undated) DEVICE — SEE MEDLINE ITEM 157131

## (undated) DEVICE — TRAY SKIN SCRUB WET PREMIUM

## (undated) DEVICE — SKINMARKER & RULER REGULAR X-F

## (undated) DEVICE — SUT LIGACLIP SMALL XTRA

## (undated) DEVICE — ELECTRODE REM PLYHSV RETURN 9

## (undated) DEVICE — SUT MCRYL PLUS 4-0 PS2 27IN

## (undated) DEVICE — SYR DISP LL 5CC

## (undated) DEVICE — SUT VICRYL 4-0 RB1 27IN UD